# Patient Record
Sex: MALE | Race: OTHER | HISPANIC OR LATINO | Employment: PART TIME | URBAN - METROPOLITAN AREA
[De-identification: names, ages, dates, MRNs, and addresses within clinical notes are randomized per-mention and may not be internally consistent; named-entity substitution may affect disease eponyms.]

---

## 2021-04-12 ENCOUNTER — IMMUNIZATIONS (OUTPATIENT)
Dept: FAMILY MEDICINE CLINIC | Facility: HOSPITAL | Age: 66
End: 2021-04-12

## 2021-04-12 DIAGNOSIS — Z23 ENCOUNTER FOR IMMUNIZATION: Primary | ICD-10-CM

## 2021-04-12 PROCEDURE — 0001A SARS-COV-2 / COVID-19 MRNA VACCINE (PFIZER-BIONTECH) 30 MCG: CPT

## 2021-04-12 PROCEDURE — 91300 SARS-COV-2 / COVID-19 MRNA VACCINE (PFIZER-BIONTECH) 30 MCG: CPT

## 2021-04-26 ENCOUNTER — OFFICE VISIT (OUTPATIENT)
Dept: FAMILY MEDICINE CLINIC | Facility: CLINIC | Age: 66
End: 2021-04-26

## 2021-04-26 VITALS
HEIGHT: 64 IN | TEMPERATURE: 97.9 F | SYSTOLIC BLOOD PRESSURE: 168 MMHG | RESPIRATION RATE: 16 BRPM | OXYGEN SATURATION: 98 % | DIASTOLIC BLOOD PRESSURE: 92 MMHG | BODY MASS INDEX: 32.85 KG/M2 | WEIGHT: 192.4 LBS | HEART RATE: 86 BPM

## 2021-04-26 DIAGNOSIS — M62.81 MUSCLE WEAKNESS OF EXTREMITY FOLLOWING POLIOMYELITIS: ICD-10-CM

## 2021-04-26 DIAGNOSIS — I10 ESSENTIAL HYPERTENSION: Primary | ICD-10-CM

## 2021-04-26 DIAGNOSIS — B91 MUSCLE WEAKNESS OF EXTREMITY FOLLOWING POLIOMYELITIS: ICD-10-CM

## 2021-04-26 DIAGNOSIS — E66.09 CLASS 1 OBESITY DUE TO EXCESS CALORIES WITH SERIOUS COMORBIDITY AND BODY MASS INDEX (BMI) OF 32.0 TO 32.9 IN ADULT: ICD-10-CM

## 2021-04-26 DIAGNOSIS — R73.03 PREDIABETES: ICD-10-CM

## 2021-04-26 PROCEDURE — 99204 OFFICE O/P NEW MOD 45 MIN: CPT | Performed by: FAMILY MEDICINE

## 2021-04-26 RX ORDER — LOSARTAN POTASSIUM 100 MG/1
50 TABLET ORAL DAILY
Qty: 90 TABLET | Refills: 0 | Status: SHIPPED | OUTPATIENT
Start: 2021-04-26

## 2021-04-26 RX ORDER — AMLODIPINE BESYLATE 10 MG/1
10 TABLET ORAL DAILY
Qty: 90 TABLET | Refills: 0 | Status: SHIPPED | OUTPATIENT
Start: 2021-04-26

## 2021-04-26 RX ORDER — AMLODIPINE BESYLATE 10 MG/1
10 TABLET ORAL DAILY
Qty: 90 TABLET | Refills: 0 | Status: SHIPPED | OUTPATIENT
Start: 2021-04-26 | End: 2021-04-26

## 2021-04-26 RX ORDER — ASPIRIN 81 MG/1
81 TABLET ORAL DAILY
Qty: 90 TABLET | Refills: 0 | Status: SHIPPED | OUTPATIENT
Start: 2021-04-26

## 2021-04-26 RX ORDER — LOSARTAN POTASSIUM 50 MG/1
50 TABLET ORAL DAILY
COMMUNITY
End: 2021-04-26 | Stop reason: SDUPTHER

## 2021-04-26 RX ORDER — ASPIRIN 81 MG/1
81 TABLET ORAL DAILY
COMMUNITY
End: 2021-04-26 | Stop reason: SDUPTHER

## 2021-04-26 RX ORDER — ATENOLOL 50 MG/1
25 TABLET ORAL 2 TIMES DAILY
COMMUNITY
End: 2021-04-26

## 2021-04-26 NOTE — PROGRESS NOTES
Subjective:      Patient ID: Bonnie Sosa is a 60 y o  male  Julius Fernandez used as   12 yo grandson who speaks [place] and [place] present in the room  Hypertension- chronic, patient is on atenelol and losartan 50 mg which he has not taken for 10 days, no history of CKD or CAD, no symptoms at present  Prediabetes- chronic, unknown control, was previously on metformin 500 bid , he has not been taking it  Barriers to treatment-lack of insurance, financial constraints  Polio- right lower extremity   Obesity- chronic, uncontrolled, poor diet and no exercise      Past Medical History:   Diagnosis Date    Hypertension     Polio        Family History   Problem Relation Age of Onset    Hypertension Mother     Diabetes Mother     Hypertension Father     Diabetes Father        Past Surgical History:   Procedure Laterality Date    LEG SURGERY      for polio        reports that he has never smoked  He has never used smokeless tobacco  He reports that he does not drink alcohol or use drugs  Current Outpatient Medications:     amLODIPine (NORVASC) 10 mg tablet, Take 1 tablet (10 mg total) by mouth daily, Disp: 90 tablet, Rfl: 0    aspirin (ECOTRIN LOW STRENGTH) 81 mg EC tablet, Take 1 tablet (81 mg total) by mouth daily, Disp: 90 tablet, Rfl: 0    losartan (COZAAR) 100 MG tablet, Take 0 5 tablets (50 mg total) by mouth daily, Disp: 90 tablet, Rfl: 0    metFORMIN (GLUCOPHAGE) 500 mg tablet, Take 1 tablet (500 mg total) by mouth 2 (two) times a day with meals, Disp: 180 tablet, Rfl: 0    The following portions of the patient's history were reviewed and updated as appropriate: allergies, current medications, past family history, past medical history, past social history, past surgical history and problem list     Review of Systems   Constitutional: Negative for activity change, chills, diaphoresis, fatigue and fever     HENT: Negative for congestion, ear discharge, ear pain, mouth sores, nosebleeds, postnasal drip, rhinorrhea, sinus pressure, sinus pain, sore throat, tinnitus and voice change  Eyes: Negative for photophobia, pain, discharge, redness and visual disturbance  Respiratory: Negative for shortness of breath, wheezing and stridor  Cardiovascular: Negative for chest pain and palpitations  Gastrointestinal: Negative for abdominal pain, blood in stool, constipation, diarrhea, nausea and vomiting  Endocrine: Negative for cold intolerance and heat intolerance  Musculoskeletal: Negative for arthralgias, back pain, joint swelling and myalgias  Skin: Negative for pallor and rash  Neurological: Negative for dizziness, tremors, seizures, syncope, speech difficulty, weakness and headaches  Psychiatric/Behavioral: Negative for behavioral problems, decreased concentration, hallucinations and suicidal ideas  The patient is not nervous/anxious  Objective:    /92 (BP Location: Right arm, Patient Position: Sitting, Cuff Size: Adult)   Pulse 86   Temp 97 9 °F (36 6 °C) (Temporal)   Resp 16   Ht 5' 4 17" (1 63 m)   Wt 87 3 kg (192 lb 6 4 oz)   SpO2 98%   BMI 32 85 kg/m²      Physical Exam  Vitals signs and nursing note reviewed  Constitutional:       Appearance: Normal appearance  He is well-developed  HENT:      Head: Normocephalic and atraumatic  Right Ear: External ear normal       Left Ear: External ear normal       Nose: Nose normal    Eyes:      Conjunctiva/sclera: Conjunctivae normal       Pupils: Pupils are equal, round, and reactive to light  Neck:      Musculoskeletal: Normal range of motion and neck supple  Cardiovascular:      Rate and Rhythm: Normal rate and regular rhythm  Heart sounds: Normal heart sounds  No murmur  No gallop  Pulmonary:      Effort: Pulmonary effort is normal  No respiratory distress  Breath sounds: Normal breath sounds  No wheezing or rales  Abdominal:      General: There is no distension        Palpations: Abdomen is soft  Tenderness: There is no abdominal tenderness  Musculoskeletal:         General: No tenderness or deformity  Lymphadenopathy:      Cervical: No cervical adenopathy  Skin:     Coloration: Skin is not pale  Findings: No erythema or rash  Neurological:      Mental Status: He is alert and oriented to person, place, and time  Mental status is at baseline  Motor: Weakness present  Gait: Gait abnormal       Comments: Left leg polio myelitis   Psychiatric:         Mood and Affect: Mood normal          Behavior: Behavior normal                Assessment/Plan:       Diagnoses and all orders for this visit:    Essential hypertension  -     aspirin (ECOTRIN LOW STRENGTH) 81 mg EC tablet; Take 1 tablet (81 mg total) by mouth daily  -     losartan (COZAAR) 100 MG tablet; Take 0 5 tablets (50 mg total) by mouth daily  -     CBC and differential; Future  -     Comprehensive metabolic panel; Future  -     Discontinue: amLODIPine (NORVASC) 10 mg tablet; Take 1 tablet (10 mg total) by mouth daily  -     amLODIPine (NORVASC) 10 mg tablet; Take 1 tablet (10 mg total) by mouth daily    Class 1 obesity due to excess calories with serious comorbidity and body mass index (BMI) of 32 0 to 32 9 in adult  -     CBC and differential; Future  -     Comprehensive metabolic panel; Future  -     Lipid panel; Future    Prediabetes  -     metFORMIN (GLUCOPHAGE) 500 mg tablet; Take 1 tablet (500 mg total) by mouth 2 (two) times a day with meals  -     HEMOGLOBIN A1C W/ EAG ESTIMATION; Future    Muscle weakness of extremity following poliomyelitis    Other orders  -     Discontinue: losartan (COZAAR) 50 mg tablet; Take 50 mg by mouth daily  -     Discontinue: aspirin (ECOTRIN LOW STRENGTH) 81 mg EC tablet; Take 81 mg by mouth daily  -     Discontinue: atenolol (TENORMIN) 50 mg tablet; Take 25 mg by mouth 2 (two) times a day        Start losartan at 100 mg -previously 50 mg, stop atenelol, start amlodipine  Continue aspirin and metformin 500 bid, check labs, follow up with insurance  BMI Counseling: Body mass index is 32 85 kg/m²  The BMI is above normal  Nutrition recommendations include decreasing portion sizes, encouraging healthy choices of fruits and vegetables, decreasing fast food intake, consuming healthier snacks, limiting drinks that contain sugar, moderation in carbohydrate intake and reducing intake of cholesterol  Exercise recommendations include moderate physical activity 150 minutes/week  No pharmacotherapy was ordered  Patient was given opportunity to ask questions and all questions were answered

## 2021-04-27 PROBLEM — B91: Status: ACTIVE | Noted: 2021-04-27

## 2021-04-27 PROBLEM — I10 ESSENTIAL HYPERTENSION: Status: ACTIVE | Noted: 2021-04-27

## 2021-04-27 PROBLEM — M62.81: Status: ACTIVE | Noted: 2021-04-27

## 2021-04-27 PROBLEM — E66.811 CLASS 1 OBESITY DUE TO EXCESS CALORIES WITH SERIOUS COMORBIDITY AND BODY MASS INDEX (BMI) OF 32.0 TO 32.9 IN ADULT: Status: ACTIVE | Noted: 2021-04-27

## 2021-04-27 PROBLEM — E66.09 CLASS 1 OBESITY DUE TO EXCESS CALORIES WITH SERIOUS COMORBIDITY AND BODY MASS INDEX (BMI) OF 32.0 TO 32.9 IN ADULT: Status: ACTIVE | Noted: 2021-04-27

## 2021-04-27 PROBLEM — R73.03 PREDIABETES: Status: ACTIVE | Noted: 2021-04-27

## 2021-05-03 ENCOUNTER — IMMUNIZATIONS (OUTPATIENT)
Dept: FAMILY MEDICINE CLINIC | Facility: HOSPITAL | Age: 66
End: 2021-05-03

## 2021-05-03 DIAGNOSIS — Z23 ENCOUNTER FOR IMMUNIZATION: Primary | ICD-10-CM

## 2021-05-03 PROCEDURE — 91300 SARS-COV-2 / COVID-19 MRNA VACCINE (PFIZER-BIONTECH) 30 MCG: CPT

## 2021-05-03 PROCEDURE — 0002A SARS-COV-2 / COVID-19 MRNA VACCINE (PFIZER-BIONTECH) 30 MCG: CPT

## 2021-11-13 ENCOUNTER — IMMUNIZATIONS (OUTPATIENT)
Dept: FAMILY MEDICINE CLINIC | Facility: HOSPITAL | Age: 66
End: 2021-11-13

## 2021-11-13 DIAGNOSIS — Z23 ENCOUNTER FOR IMMUNIZATION: Primary | ICD-10-CM

## 2021-11-13 PROCEDURE — 91300 COVID-19 PFIZER VACC 0.3 ML: CPT | Performed by: NURSE PRACTITIONER

## 2021-11-13 PROCEDURE — 0001A COVID-19 PFIZER VACC 0.3 ML: CPT | Performed by: NURSE PRACTITIONER

## 2022-07-30 ENCOUNTER — HOSPITAL ENCOUNTER (OUTPATIENT)
Dept: RADIOLOGY | Facility: HOSPITAL | Age: 67
Discharge: HOME | End: 2022-07-30
Payer: COMMERCIAL

## 2022-07-30 DIAGNOSIS — M54.32 LEFT SIDED SCIATICA: ICD-10-CM

## 2022-07-30 PROCEDURE — 72100 X-RAY EXAM L-S SPINE 2/3 VWS: CPT

## 2022-10-12 ENCOUNTER — EVALUATION (OUTPATIENT)
Dept: PHYSICAL THERAPY | Facility: CLINIC | Age: 67
End: 2022-10-12
Payer: COMMERCIAL

## 2022-10-12 DIAGNOSIS — M51.36 OTHER INTERVERTEBRAL DISC DEGENERATION, LUMBAR REGION: Primary | ICD-10-CM

## 2022-10-12 PROCEDURE — 97162 PT EVAL MOD COMPLEX 30 MIN: CPT | Performed by: PHYSICAL THERAPIST

## 2022-10-12 NOTE — LETTER
2022    Roberto Fung, [address] [place] [id number]    Patient: Lee Sabillon   YOB: 1955   Date of Visit: 10/12/2022     Encounter Diagnosis     ICD-10-CM    1  Other intervertebral disc degeneration, lumbar region  M51 36        Dear Dr Darnell Reveles: Thank you for your recent referral of Lee Sabillon  Please review the attached evaluation summary from Zack's recent visit  Please verify that you agree with the plan of care by signing the attached order  If you have any questions or concerns, please do not hesitate to call  I sincerely appreciate the opportunity to share in the care of one of your patients and hope to have another opportunity to work with you in the near future  Sincerely,    Benita Ray, PT      Referring Provider:      I certify that I have read the below Plan of Care and certify the need for these services furnished under this plan of treatment while under my care  Roberto Fung, [address] [place] [address]  Via Fax: [phone]          PT Evaluation     Today's date: 10/12/2022  Patient name: Lee Sabillon  : 1955  MRN: [id number]  Referring provider: MANNY March  Dx:   Encounter Diagnosis     ICD-10-CM    1  Other intervertebral disc degeneration, lumbar region  M51 36                   Assessment  Assessment details: Lee Sabillon is a 73 y o  male who presents to the clinic with complaints of lower back pain spanning several months  The patient presents with the above listed impairments  He demonstrates decreased lumbar ROM as notes having difficulty with prolonged activity as well as bending and lifting  He is eager to decrease his pain and should benefit from skilled physical therapy    Thank you for the referral       Impairments: abnormal muscle firing, abnormal or restricted ROM, activity intolerance, impaired physical strength, lacks appropriate home exercise program, pain with function, poor posture  and poor body mechanics  Barriers to therapy: Finances may limit the patient's ability to regularly attend PT sessions  Understanding of Dx/Px/POC: good   Prognosis: good    Goals  Impairment Goals  - Decrease pain by 2 points in 4 weeks  - Increase bilateral flexibility by 25% in 4 weeks  Functional Goals  - Return to Prior Level of Function in 4 weeks  - Patient will be independent with HEP in 4 weeks  - Patient will be able to complete all work tasks with decreased pain in 4 weeks  - Patient will be able to complete ADLs with decreased pain in 4 weeks       Plan  Patient would benefit from: skilled physical therapy  Planned modality interventions: cryotherapy, thermotherapy: hydrocollator packs and TENS  Planned therapy interventions: home exercise program, graded exercise, functional ROM exercises, flexibility, body mechanics training, postural training, patient education, therapeutic activities, therapeutic exercise, manual therapy, joint mobilization and neuromuscular re-education  Frequency: 1x week  Duration in weeks: 4  Treatment plan discussed with: patient and PCP        Subjective Evaluation    History of Present Illness  Mechanism of injury: HPI:  Patient notes that he has been having pain in his hip area and glute area as well as tingling into his left leg  He notes that the pain has been going on for 3-4 months  He notes that he had x-ray which demonstrated "arthritis"  He was then referred to physical therapy  Pain Location:  Lower Lumbar Spine / Tingling into left LE  Occupation:  Works at [employer] Limitations:   Independent   AGG:  Unable to recall any true aggravating factors  Ease:  Unable to recall any true easing factors  Patient Goals:  "I want to feel better"    Pain  Current pain ratin  At best pain ratin  At worst pain ratin  Quality: sharp and dull ache ("It's more annoying" )          Objective     Tenderness     Additional Tenderness Details  Patient notes tenderness lower lumbar spine, most discomfort lower lumbar/upper sacrum     Active Range of Motion     Additional Active Range of Motion Details  Lumbar Flexion - WNL  Lumbar Extension - WNL  Side bend - 50% b/l with slight discomfort     Strength/Myotome Testing     Left Hip   Planes of Motion   Flexion: 4  Abduction: 4    Left Knee   Extension: 4+    Muscle Activation   Patient able to activate left transverse abdominals, left multifidus, right transverse abdominals and right multifidus       Additional Muscle Activation Details  Moderate cueing                Diagnosis:    Precautions:    Manuals        PROM        Mobs                                There Ex        Prone Press Ups (?)        Lumbar Roll Outs                                Review of HEP        Neruo Re-Ed        TrA Sets        Pull Over        Bridge (?)        Glute Sets        SL Clahmshell        SL Hip ABD                 Shld Ext                                                                               Ther Act                                         Modalities             CP PRN

## 2022-10-12 NOTE — PROGRESS NOTES
PT Evaluation     Today's date: 10/12/2022  Patient name: Ant Forrest  : 1955  MRN: [id number]  Referring provider: MANNY Levy  Dx:   Encounter Diagnosis     ICD-10-CM    1  Other intervertebral disc degeneration, lumbar region  M51 36                   Assessment  Assessment details: Ant Forrest is a 73 y o  male who presents to the clinic with complaints of lower back pain spanning several months  The patient presents with the above listed impairments  He demonstrates decreased lumbar ROM as notes having difficulty with prolonged activity as well as bending and lifting  He is eager to decrease his pain and should benefit from skilled physical therapy  Thank you for the referral       Impairments: abnormal muscle firing, abnormal or restricted ROM, activity intolerance, impaired physical strength, lacks appropriate home exercise program, pain with function, poor posture  and poor body mechanics  Barriers to therapy: Finances may limit the patient's ability to regularly attend PT sessions  Understanding of Dx/Px/POC: good   Prognosis: good    Goals  Impairment Goals  - Decrease pain by 2 points in 4 weeks  - Increase bilateral flexibility by 25% in 4 weeks      Functional Goals  - Return to Prior Level of Function in 4 weeks  - Patient will be independent with HEP in 4 weeks  - Patient will be able to complete all work tasks with decreased pain in 4 weeks  - Patient will be able to complete ADLs with decreased pain in 4 weeks       Plan  Patient would benefit from: skilled physical therapy  Planned modality interventions: cryotherapy, thermotherapy: hydrocollator packs and TENS  Planned therapy interventions: home exercise program, graded exercise, functional ROM exercises, flexibility, body mechanics training, postural training, patient education, therapeutic activities, therapeutic exercise, manual therapy, joint mobilization and neuromuscular re-education  Frequency: 1x week  Duration in weeks: 4  Treatment plan discussed with: patient and PCP        Subjective Evaluation    History of Present Illness  Mechanism of injury: HPI:  Patient notes that he has been having pain in his hip area and glute area as well as tingling into his left leg  He notes that the pain has been going on for 3-4 months  He notes that he had x-ray which demonstrated "arthritis"  He was then referred to physical therapy  Pain Location:  Lower Lumbar Spine / Tingling into left LE  Occupation:  Works at [employer] Limitations: Independent   AGG:  Unable to recall any true aggravating factors  Ease:  Unable to recall any true easing factors  Patient Goals:  "I want to feel better"    Pain  Current pain ratin  At best pain ratin  At worst pain ratin  Quality: sharp and dull ache ("It's more annoying" )          Objective     Tenderness     Additional Tenderness Details  Patient notes tenderness lower lumbar spine, most discomfort lower lumbar/upper sacrum     Active Range of Motion     Additional Active Range of Motion Details  Lumbar Flexion - WNL  Lumbar Extension - WNL  Side bend - 50% b/l with slight discomfort     Strength/Myotome Testing     Left Hip   Planes of Motion   Flexion: 4  Abduction: 4    Left Knee   Extension: 4+    Muscle Activation   Patient able to activate left transverse abdominals, left multifidus, right transverse abdominals and right multifidus       Additional Muscle Activation Details  Moderate cueing                Diagnosis:    Precautions:    Manuals        PROM        Mobs                                There Ex        Prone Press Ups (?)        Lumbar Roll Outs                                Review of HEP        Neruo Re-Ed        TrA Sets        Pull Over        Bridge (?)        Glute Sets        SL Clahmshell        SL Hip ABD                 Shld Ext                                                                               Ther Act Modalities             CP PRN

## 2022-10-12 NOTE — LETTER
2022    Yobani Cruz[address] [address]    Patient: Deysi Abreu   YOB: 1955   Date of Visit: 10/12/2022     Encounter Diagnosis     ICD-10-CM    1  Other intervertebral disc degeneration, lumbar region  M51 36        Dear Dr Brice Moh: Thank you for your recent referral of Deysi Abreu  Please review the attached evaluation summary from Zack's recent visit  Please verify that you agree with the plan of care by signing the attached order  If you have any questions or concerns, please do not hesitate to call  I sincerely appreciate the opportunity to share in the care of one of your patients and hope to have another opportunity to work with you in the near future  Sincerely,    Viji Franks, PT      Referring Provider:      I certify that I have read the below Plan of Care and certify the need for these services furnished under this plan of treatment while under my care  Yobani Cruz[address] [address]  Via Fax: [phone]          PT Evaluation     Today's date: 10/12/2022  Patient name: Deysi Abreu  : 1955  MRN: [id number]  Referring provider: MANNY Flores  Dx:   Encounter Diagnosis     ICD-10-CM    1  Other intervertebral disc degeneration, lumbar region  M51 36                   Assessment  Assessment details: Deysi Abreu is a 73 y o  male who presents to the clinic with complaints of lower back pain spanning several months  The patient presents with the above listed impairments  He demonstrates decreased lumbar ROM as notes having difficulty with prolonged activity as well as bending and lifting  He is eager to decrease his pain and should benefit from skilled physical therapy    Thank you for the referral       Impairments: abnormal muscle firing, abnormal or restricted ROM, activity intolerance, impaired physical strength, lacks appropriate home exercise program, pain with function, poor posture  and poor body mechanics  Barriers to therapy: Finances may limit the patient's ability to regularly attend PT sessions  Understanding of Dx/Px/POC: good   Prognosis: good    Goals  Impairment Goals  - Decrease pain by 2 points in 4 weeks  - Increase bilateral flexibility by 25% in 4 weeks  Functional Goals  - Return to Prior Level of Function in 4 weeks  - Patient will be independent with HEP in 4 weeks  - Patient will be able to complete all work tasks with decreased pain in 4 weeks  - Patient will be able to complete ADLs with decreased pain in 4 weeks       Plan  Patient would benefit from: skilled physical therapy  Planned modality interventions: cryotherapy, thermotherapy: hydrocollator packs and TENS  Planned therapy interventions: home exercise program, graded exercise, functional ROM exercises, flexibility, body mechanics training, postural training, patient education, therapeutic activities, therapeutic exercise, manual therapy, joint mobilization and neuromuscular re-education  Frequency: 1x week  Duration in weeks: 4  Treatment plan discussed with: patient and PCP        Subjective Evaluation    History of Present Illness  Mechanism of injury: HPI:  Patient notes that he has been having pain in his hip area and glute area as well as tingling into his left leg  He notes that the pain has been going on for 3-4 months  He notes that he had x-ray which demonstrated "arthritis"  He was then referred to physical therapy  Pain Location:  Lower Lumbar Spine / Tingling into left LE  Occupation:  Works at [employer] Limitations:   Independent   AGG:  Unable to recall any true aggravating factors  Ease:  Unable to recall any true easing factors  Patient Goals:  "I want to feel better"    Pain  Current pain ratin  At best pain ratin  At worst pain ratin  Quality: sharp and dull ache ("It's more annoying" )          Objective     Tenderness     Additional Tenderness Details  Patient notes tenderness lower lumbar spine, most discomfort lower lumbar/upper sacrum     Active Range of Motion     Additional Active Range of Motion Details  Lumbar Flexion - WNL  Lumbar Extension - WNL  Side bend - 50% b/l with slight discomfort     Strength/Myotome Testing     Left Hip   Planes of Motion   Flexion: 4  Abduction: 4    Left Knee   Extension: 4+    Muscle Activation   Patient able to activate left transverse abdominals, left multifidus, right transverse abdominals and right multifidus       Additional Muscle Activation Details  Moderate cueing                Diagnosis:    Precautions:    Manuals        PROM        Mobs                                There Ex        Prone Press Ups (?)        Lumbar Roll Outs                                Review of HEP        Neruo Re-Ed        TrA Sets        Pull Over        Bridge (?)        Glute Sets        SL Clahmshell        SL Hip ABD                 Shld Ext                                                                               Ther Act                                         Modalities             CP PRN

## 2022-10-14 ENCOUNTER — OFFICE VISIT (OUTPATIENT)
Dept: PHYSICAL THERAPY | Facility: CLINIC | Age: 67
End: 2022-10-14
Payer: COMMERCIAL

## 2022-10-14 DIAGNOSIS — M51.36 OTHER INTERVERTEBRAL DISC DEGENERATION, LUMBAR REGION: Primary | ICD-10-CM

## 2022-10-14 PROCEDURE — 97112 NEUROMUSCULAR REEDUCATION: CPT

## 2022-10-14 PROCEDURE — 97110 THERAPEUTIC EXERCISES: CPT

## 2022-10-14 NOTE — PROGRESS NOTES
Daily Note     Today's date: 10/14/2022  Patient name: Filiberto Larios  : 1955  MRN: [id number]  Referring provider: MANNY Disla  Dx:   Encounter Diagnosis     ICD-10-CM    1  Other intervertebral disc degeneration, lumbar region  M51 36                   Subjective: Patient reports that he has a little bit of pain in his lower back  Objective: See treatment diary below      Assessment: Tolerated treatment well  Patient would benefit from continued PT  Patient did well with his first PT session  Focused on core activation with exercises  He is limited by his right leg motion and strength, so exercises were modified for his comfort  He does note some shoulder soreness with overhead exercises today, but notes that this is from a previous injury  He was given an updated HEP and was given bands to complete exercises at home  Will continue to progress as able  Plan: Continue per plan of care        Diagnosis:    Precautions: Unable to bend right knee, strength deficiency in right leg, shoulder discomfort with OH motions    Manuals 10/14        PROM        Mobs                                There Ex        Prone Press Ups (?)        Lumbar Roll Outs 10" x 10                                Review of HEP        Neruo Re-Ed        TrA Sets        Pull Over [place], 2x10        Bridge (?) Unable to perform        Glute Sets 2x10, 5" hold - knees extended        SL Clamshell Unable to perform        SL Hip ABD  Left only 2x10        SLR  Left only 2x10        Shld Ext 20# 4x10 with tra set        Shoulder Rows 27# 4x10 with tra set        Multifidus Press  7# 2x10 ea        Standing med ball alphabet         Seated med ball reaches OH and diagonals: 10# 10x ea                                               Ther Act                                         Modalities             CP PRN

## 2022-10-28 ENCOUNTER — OFFICE VISIT (OUTPATIENT)
Dept: PHYSICAL THERAPY | Facility: CLINIC | Age: 67
End: 2022-10-28
Payer: COMMERCIAL

## 2022-10-28 DIAGNOSIS — M51.36 OTHER INTERVERTEBRAL DISC DEGENERATION, LUMBAR REGION: Primary | ICD-10-CM

## 2022-10-28 PROCEDURE — 97112 NEUROMUSCULAR REEDUCATION: CPT | Performed by: PHYSICAL THERAPIST

## 2022-10-28 PROCEDURE — 97110 THERAPEUTIC EXERCISES: CPT | Performed by: PHYSICAL THERAPIST

## 2022-10-28 NOTE — PROGRESS NOTES
Daily Note     Today's date: 10/28/2022  Patient name: Naresh Mcclain  : 1955  MRN: [id number]  Referring provider: MANNY Sanchez  Dx:   Encounter Diagnosis     ICD-10-CM    1  Other intervertebral disc degeneration, lumbar region  M51 36                   Subjective: "It feels a little better  The pain comes and goes"       Objective: See treatment diary below      Assessment: Tolerated treatment well  Patient would benefit from continued PT   services were used throughout the session  Patient did well with new exercises today  Updated HEP - focused on core activation and strengthening  Patient will be contacted in 1-2 weeks to check on progress and check on the need for any further therapy  Plan: Progress treatment as tolerated         Diagnosis:    Precautions: Unable to bend right knee, strength deficiency in right leg, shoulder discomfort with OH motions    Manuals 10/14  10/28      PROM        Mobs                                There Ex        Prone Press Ups (?)        Lumbar Roll Outs 10" x 10  10" x 20                              Review of HEP  RT      Neruo Re-Ed        TrA Sets  3" 2x10      Pull Over [place], 2x10        Bridge (?) Unable to perform        Glute Sets 2x10, 5" hold - knees extended        SL Clamshell Unable to perform        SL Hip ABD  Left only 2x10  L only x10      SLR  Left only 2x10  L only x10      Shld Ext 20# 4x10 with tra set        Shoulder Rows 27# 4x10 with tra set        Multifidus Press  7# 2x10 ea  Seated 10# kettle ell 2x01      Standing med ball alphabet         Seated med ball reaches [place] and diagonals: 10# 10x ea        [place]  Black 2x10 ea      Wall Clock  Green 3" 2x10      Standing Plank  R UE and L LE alternating 2x10                     Ther Act                                         Modalities             CP PRN

## 2023-08-16 ENCOUNTER — OFFICE VISIT (OUTPATIENT)
Dept: FAMILY MEDICINE CLINIC | Facility: CLINIC | Age: 68
End: 2023-08-16
Payer: COMMERCIAL

## 2023-08-16 VITALS
HEIGHT: 64 IN | SYSTOLIC BLOOD PRESSURE: 220 MMHG | BODY MASS INDEX: 33.29 KG/M2 | TEMPERATURE: 98.4 F | RESPIRATION RATE: 14 BRPM | DIASTOLIC BLOOD PRESSURE: 110 MMHG | OXYGEN SATURATION: 96 % | WEIGHT: 195 LBS | HEART RATE: 70 BPM

## 2023-08-16 DIAGNOSIS — Z00.01 ENCOUNTER FOR GENERAL ADULT MEDICAL EXAMINATION WITH ABNORMAL FINDINGS: Primary | ICD-10-CM

## 2023-08-16 DIAGNOSIS — E66.09 CLASS 1 OBESITY DUE TO EXCESS CALORIES WITH SERIOUS COMORBIDITY AND BODY MASS INDEX (BMI) OF 33.0 TO 33.9 IN ADULT: ICD-10-CM

## 2023-08-16 DIAGNOSIS — I10 ESSENTIAL HYPERTENSION: ICD-10-CM

## 2023-08-16 DIAGNOSIS — R73.03 PREDIABETES: ICD-10-CM

## 2023-08-16 DIAGNOSIS — B02.9 HERPES ZOSTER WITHOUT COMPLICATION: ICD-10-CM

## 2023-08-16 DIAGNOSIS — Z12.11 COLON CANCER SCREENING: ICD-10-CM

## 2023-08-16 PROBLEM — M21.969: Status: ACTIVE | Noted: 2021-07-22

## 2023-08-16 PROBLEM — Z86.12 HISTORY OF POLIOMYELITIS: Status: ACTIVE | Noted: 2021-07-22

## 2023-08-16 PROCEDURE — 99397 PER PM REEVAL EST PAT 65+ YR: CPT | Performed by: FAMILY MEDICINE

## 2023-08-16 PROCEDURE — 99214 OFFICE O/P EST MOD 30 MIN: CPT | Performed by: FAMILY MEDICINE

## 2023-08-16 RX ORDER — ATENOLOL 25 MG/1
1 TABLET ORAL 2 TIMES DAILY
COMMUNITY
End: 2023-08-16 | Stop reason: SDUPTHER

## 2023-08-16 RX ORDER — AMLODIPINE BESYLATE 10 MG/1
10 TABLET ORAL DAILY
Qty: 90 TABLET | Refills: 0 | Status: SHIPPED | OUTPATIENT
Start: 2023-08-16

## 2023-08-16 RX ORDER — ATENOLOL 25 MG/1
25 TABLET ORAL 2 TIMES DAILY
Qty: 180 TABLET | Refills: 0 | Status: SHIPPED | OUTPATIENT
Start: 2023-08-16

## 2023-08-16 RX ORDER — VALACYCLOVIR HYDROCHLORIDE 1 G/1
1000 TABLET, FILM COATED ORAL 3 TIMES DAILY
Qty: 21 TABLET | Refills: 0 | Status: SHIPPED | OUTPATIENT
Start: 2023-08-16 | End: 2023-08-23

## 2023-08-16 RX ORDER — LOSARTAN POTASSIUM 100 MG/1
100 TABLET ORAL DAILY
Qty: 90 TABLET | Refills: 0 | Status: SHIPPED | OUTPATIENT
Start: 2023-08-16

## 2023-08-17 NOTE — PROGRESS NOTES
[address] [facility]    NAME: Dev Muñoz  AGE: 78 y.o. SEX: male  : 1955     DATE: 2023     Assessment and Plan:     Problem List Items Addressed This Visit        Cardiovascular and Mediastinum    Essential hypertension    Relevant Medications    amLODIPine (NORVASC) 10 mg tablet    losartan (COZAAR) 100 MG tablet    atenolol (TENORMIN) 25 mg tablet       Other    Prediabetes    Class 1 obesity due to excess calories with serious comorbidity and body mass index (BMI) of 33.0 to 33.9 in adult   Other Visit Diagnoses     Encounter for general adult medical examination with abnormal findings    -  Primary    Relevant Orders    CBC and differential    Comprehensive metabolic panel    Lipid panel    TSH, 3rd generation with Free T4 reflex    Herpes zoster without complication        Relevant Medications    valACYclovir (VALTREX) 1,000 mg tablet    Colon cancer screening        Relevant Orders    Cologuard      BP is elevated due to non compliance with medications  Recommend labs as above  Start valacyclovir for shingles, no pain  Resume medications for hypertension, f/u in 4 weeks    Patient has refused colonoscopy now and also in the past and has never had colonoscopy screening for colon cancer. Patient is agreeable to getting [place] guard testing every 3 years and understands the risk that smaller lesions may be missed as it is not as comprehensive as colonoscopy. Patient is agreeable to doing the colonoscopy if colo-guard was abnormal. Risks and benefits discussed at length including finding the cancer at a later stage when not age appropriately screened with colonoscopy. Patient is accepting of the risks and verbalizes understanding of increased risk of death of finding colon Cancer at later stage    Immunizations and preventive care screenings were discussed with patient today.  Appropriate education was printed on patient's after visit summary. Counseling:  Alcohol/drug use: discussed moderation in alcohol intake, the recommendations for healthy alcohol use, and avoidance of illicit drug use. Dental Health: discussed importance of regular tooth brushing, flossing, and dental visits. Injury prevention: discussed safety/seat belts, safety helmets, smoke detectors, carbon dioxide detectors, and smoking near bedding or upholstery. · Exercise: the importance of regular exercise/physical activity was discussed. Recommend exercise 3-5 times per week for at least 30 minutes. BMI Counseling: Body mass index is 33.47 kg/m². The BMI is above normal. Nutrition recommendations include decreasing portion sizes, encouraging healthy choices of fruits and vegetables, decreasing fast food intake, consuming healthier snacks, limiting drinks that contain sugar, moderation in carbohydrate intake and reducing intake of cholesterol. Exercise recommendations include moderate physical activity 150 minutes/week. No pharmacotherapy was ordered. Rationale for BMI follow-up plan is due to patient being overweight or obese. Depression Screening and Follow-up Plan: Patient was screened for depression during today's encounter. They screened negative with a PHQ-2 score of 0. Return in about 4 weeks (around 9/13/2023) for Next scheduled follow up.      Chief Complaint:     Chief Complaint   Patient presents with   • Rash      History of Present Illness:     Adult Annual Physical   Patient here for a comprehensive physical exam. The patient reports problems - rash- developed 3 days ago, itchy , painful on his trunk and chest.  26 yo grandson who speaks [place] and [place]ian present in the room  Hypertension- chronic, patient is on atenelol and losartan 50 mg gets it from his home country, no history of CKD or CAD, no symptoms at present  Prediabetes- chronic, not on medications watches his diet  Barriers to treatment-lack of insurance, financial constraints  Polio- right lower extremity   Obesity- chronic, uncontrolled, poor diet and no exercise  Diet and Physical Activity  · Diet/Nutrition: well balanced diet and consuming 3-5 servings of fruits/vegetables daily. · Exercise: walking. Depression Screening  PHQ-2/9 Depression Screening    Little interest or pleasure in doing things: 0 - not at all  Feeling down, depressed, or hopeless: 0 - not at all  PHQ-2 Score: 0  PHQ-2 Interpretation: Negative depression screen       General Health  · Sleep: sleeps well. · Hearing: grossly normal.  · Vision: no vision problems and most recent eye exam >1 year ago. · Dental: no dental visits for >1 year and brushes teeth twice daily.  Health  · Symptoms include: none     Review of Systems:     Review of Systems   Constitutional: Negative for chills and fever. HENT: Negative for congestion, rhinorrhea and sore throat. Eyes: Negative for discharge, redness and itching. Respiratory: Negative for chest tightness, shortness of breath and wheezing. Cardiovascular: Negative for chest pain and palpitations. Gastrointestinal: Negative for abdominal pain, constipation and diarrhea. Genitourinary: Negative for dysuria. Skin: Positive for rash. Negative for pallor. Neurological: Positive for weakness. Negative for dizziness, numbness and headaches.       Past Medical History:     Past Medical History:   Diagnosis Date   • Hypertension    • Polio       Past Surgical History:     Past Surgical History:   Procedure Laterality Date   • LEG SURGERY      for polio      Family History:     Family History   Problem Relation Age of Onset   • Hypertension Mother    • Diabetes Mother    • Hypertension Father    • Diabetes Father       Social History:     Social History     Socioeconomic History   • Marital status: /Civil Union     Spouse name: None   • Number of children: None   • Years of education: None   • Highest education level: None Occupational History   • None   Tobacco Use   • Smoking status: Never   • Smokeless tobacco: Never   Vaping Use   • Vaping Use: Never used   Substance and Sexual Activity   • Alcohol use: Never   • Drug use: Never   • Sexual activity: None   Other Topics Concern   • None   Social History Narrative   • None     Social Determinants of Health     Financial Resource Strain: Not on file   Food Insecurity: Not on file   Transportation Needs: Not on file   Physical Activity: Not on file   Stress: Not on file   Social Connections: Not on file   Intimate Partner Violence: Not on file   Housing Stability: Not on file      Current Medications:     Current Outpatient Medications   Medication Sig Dispense Refill   • amLODIPine (NORVASC) 10 mg tablet Take 1 tablet (10 mg total) by mouth daily 90 tablet 0   • aspirin (ECOTRIN LOW STRENGTH) 81 mg EC tablet Take 1 tablet (81 mg total) by mouth daily 90 tablet 0   • atenolol (TENORMIN) 25 mg tablet Take 1 tablet (25 mg total) by mouth 2 (two) times a day 180 tablet 0   • losartan (COZAAR) 100 MG tablet Take 1 tablet (100 mg total) by mouth daily 90 tablet 0   • valACYclovir (VALTREX) 1,000 mg tablet Take 1 tablet (1,000 mg total) by mouth 3 (three) times a day for 7 days 21 tablet 0     No current facility-administered medications for this visit. Allergies:     No Known Allergies   Physical Exam:     BP (!) 220/110 (BP Location: Left arm, Patient Position: Sitting, Cuff Size: Adult)   Pulse 70   Temp 98.4 °F (36.9 °C) (Tympanic)   Resp 14   Ht 5' 4" (1.626 m)   Wt 88.5 kg (195 lb)   SpO2 96%   BMI 33.47 kg/m²     Physical Exam  Vitals and nursing note reviewed. Constitutional:       General: He is not in acute distress. Appearance: Normal appearance. He is well-developed and normal weight. He is not ill-appearing or toxic-appearing. HENT:      Head: Normocephalic and atraumatic.       Right Ear: Tympanic membrane, ear canal and external ear normal.      Left Ear:

## 2023-08-18 ENCOUNTER — APPOINTMENT (OUTPATIENT)
Dept: LAB | Facility: CLINIC | Age: 68
End: 2023-08-18
Payer: COMMERCIAL

## 2023-08-18 DIAGNOSIS — Z00.01 ENCOUNTER FOR GENERAL ADULT MEDICAL EXAMINATION WITH ABNORMAL FINDINGS: ICD-10-CM

## 2023-08-18 LAB
ALBUMIN SERPL BCP-MCNC: 3.4 G/DL (ref 3.5–5)
ALP SERPL-CCNC: 69 U/L (ref 34–104)
ALT SERPL W P-5'-P-CCNC: 13 U/L (ref 7–52)
ANION GAP SERPL CALCULATED.3IONS-SCNC: 6 MMOL/L
AST SERPL W P-5'-P-CCNC: 13 U/L (ref 13–39)
BASOPHILS # BLD MANUAL: 0.42 THOUSAND/UL (ref 0–0.1)
BASOPHILS NFR MAR MANUAL: 6 % (ref 0–1)
BILIRUB SERPL-MCNC: 0.43 MG/DL (ref 0.2–1)
BUN SERPL-MCNC: 24 MG/DL (ref 5–25)
CALCIUM ALBUM COR SERPL-MCNC: 9.6 MG/DL (ref 8.3–10.1)
CALCIUM SERPL-MCNC: 9.1 MG/DL (ref 8.4–10.2)
CHLORIDE SERPL-SCNC: 111 MMOL/L (ref 96–108)
CHOLEST SERPL-MCNC: 76 MG/DL
CO2 SERPL-SCNC: 26 MMOL/L (ref 21–32)
CREAT SERPL-MCNC: 1.36 MG/DL (ref 0.6–1.3)
EOSINOPHIL # BLD MANUAL: 0.21 THOUSAND/UL (ref 0–0.4)
EOSINOPHIL NFR BLD MANUAL: 3 % (ref 0–6)
ERYTHROCYTE [DISTWIDTH] IN BLOOD BY AUTOMATED COUNT: 13.1 % (ref 11.6–15.1)
GFR SERPL CREATININE-BSD FRML MDRD: 53 ML/MIN/1.73SQ M
GLUCOSE P FAST SERPL-MCNC: 94 MG/DL (ref 65–99)
HCT VFR BLD AUTO: 38.9 % (ref 36.5–49.3)
HDLC SERPL-MCNC: 25 MG/DL
HGB BLD-MCNC: 12.7 G/DL (ref 12–17)
LDLC SERPL CALC-MCNC: 22 MG/DL (ref 0–100)
LYMPHOCYTES # BLD AUTO: 1.39 THOUSAND/UL (ref 0.6–4.47)
LYMPHOCYTES # BLD AUTO: 20 % (ref 14–44)
MCH RBC QN AUTO: 30.4 PG (ref 26.8–34.3)
MCHC RBC AUTO-ENTMCNC: 32.6 G/DL (ref 31.4–37.4)
MCV RBC AUTO: 93 FL (ref 82–98)
MONOCYTES # BLD AUTO: 0.9 THOUSAND/UL (ref 0–1.22)
MONOCYTES NFR BLD: 13 % (ref 4–12)
NEUTROPHILS # BLD MANUAL: 4.04 THOUSAND/UL (ref 1.85–7.62)
NEUTS BAND NFR BLD MANUAL: 1 % (ref 0–8)
NEUTS SEG NFR BLD AUTO: 57 % (ref 43–75)
NONHDLC SERPL-MCNC: 51 MG/DL
PLATELET # BLD AUTO: 133 THOUSANDS/UL (ref 149–390)
PLATELET BLD QL SMEAR: ABNORMAL
PMV BLD AUTO: 13.3 FL (ref 8.9–12.7)
POTASSIUM SERPL-SCNC: 3.6 MMOL/L (ref 3.5–5.3)
PROT SERPL-MCNC: 7.1 G/DL (ref 6.4–8.4)
RBC # BLD AUTO: 4.18 MILLION/UL (ref 3.88–5.62)
RBC MORPH BLD: NORMAL
SODIUM SERPL-SCNC: 143 MMOL/L (ref 135–147)
TRIGL SERPL-MCNC: 144 MG/DL
TSH SERPL DL<=0.05 MIU/L-ACNC: 1.39 UIU/ML (ref 0.45–4.5)
WBC # BLD AUTO: 6.96 THOUSAND/UL (ref 4.31–10.16)

## 2023-08-18 PROCEDURE — 80053 COMPREHEN METABOLIC PANEL: CPT

## 2023-08-18 PROCEDURE — 85007 BL SMEAR W/DIFF WBC COUNT: CPT

## 2023-08-18 PROCEDURE — 36415 COLL VENOUS BLD VENIPUNCTURE: CPT

## 2023-08-18 PROCEDURE — 85027 COMPLETE CBC AUTOMATED: CPT

## 2023-08-18 PROCEDURE — 84443 ASSAY THYROID STIM HORMONE: CPT

## 2023-08-18 PROCEDURE — 80061 LIPID PANEL: CPT

## 2023-08-21 ENCOUNTER — TELEPHONE (OUTPATIENT)
Dept: FAMILY MEDICINE CLINIC | Facility: CLINIC | Age: 68
End: 2023-08-21

## 2023-08-21 NOTE — TELEPHONE ENCOUNTER
Grandson called to say Oliverjose maria Dixon does NOT want to go through with colo guard. They received the kit & do not want to be charged for it. ALSO doesn't want to have colonoscopy done & wishes to [facility] appointments etc. (including the appt he had scheduled with us). He is now going to "[facility]"  They basically want to make sure he won't be charged for colo guard kit.

## 2023-09-19 PROBLEM — N18.9 CKD (CHRONIC KIDNEY DISEASE): Status: ACTIVE | Noted: 2023-09-19

## 2023-09-19 PROBLEM — Z12.5 SCREENING FOR PROSTATE CANCER: Status: ACTIVE | Noted: 2023-09-19

## 2023-10-06 ENCOUNTER — OFFICE VISIT (OUTPATIENT)
Dept: FAMILY MEDICINE CLINIC | Facility: CLINIC | Age: 68
End: 2023-10-06
Payer: COMMERCIAL

## 2023-10-06 VITALS
BODY MASS INDEX: 33.46 KG/M2 | HEART RATE: 75 BPM | OXYGEN SATURATION: 96 % | SYSTOLIC BLOOD PRESSURE: 144 MMHG | WEIGHT: 196 LBS | HEIGHT: 64 IN | TEMPERATURE: 97.2 F | RESPIRATION RATE: 16 BRPM | DIASTOLIC BLOOD PRESSURE: 80 MMHG

## 2023-10-06 DIAGNOSIS — E66.09 CLASS 1 OBESITY DUE TO EXCESS CALORIES WITH SERIOUS COMORBIDITY AND BODY MASS INDEX (BMI) OF 33.0 TO 33.9 IN ADULT: ICD-10-CM

## 2023-10-06 DIAGNOSIS — Z86.010 HISTORY OF COLON POLYPS: ICD-10-CM

## 2023-10-06 DIAGNOSIS — D69.6 THROMBOCYTOPENIA (HCC): ICD-10-CM

## 2023-10-06 DIAGNOSIS — Z11.59 NEED FOR HEPATITIS C SCREENING TEST: ICD-10-CM

## 2023-10-06 DIAGNOSIS — Z23 IMMUNIZATION DUE: ICD-10-CM

## 2023-10-06 DIAGNOSIS — N18.31 STAGE 3A CHRONIC KIDNEY DISEASE (HCC): Primary | ICD-10-CM

## 2023-10-06 DIAGNOSIS — Z12.5 PROSTATE CANCER SCREENING: ICD-10-CM

## 2023-10-06 DIAGNOSIS — M62.81 MUSCLE WEAKNESS OF EXTREMITY FOLLOWING POLIOMYELITIS: ICD-10-CM

## 2023-10-06 DIAGNOSIS — Z12.5 SCREENING FOR PROSTATE CANCER: ICD-10-CM

## 2023-10-06 DIAGNOSIS — B91 MUSCLE WEAKNESS OF EXTREMITY FOLLOWING POLIOMYELITIS: ICD-10-CM

## 2023-10-06 DIAGNOSIS — R73.03 PREDIABETES: ICD-10-CM

## 2023-10-06 DIAGNOSIS — I10 ESSENTIAL HYPERTENSION: ICD-10-CM

## 2023-10-06 PROBLEM — Z86.0100 HISTORY OF COLON POLYPS: Status: ACTIVE | Noted: 2023-10-06

## 2023-10-06 LAB — SL AMB POCT HEMOGLOBIN AIC: 5.9 (ref ?–6.5)

## 2023-10-06 PROCEDURE — 99214 OFFICE O/P EST MOD 30 MIN: CPT | Performed by: FAMILY MEDICINE

## 2023-10-06 PROCEDURE — 83036 HEMOGLOBIN GLYCOSYLATED A1C: CPT | Performed by: FAMILY MEDICINE

## 2023-10-06 RX ORDER — CYCLOBENZAPRINE HCL 10 MG
TABLET ORAL
COMMUNITY
End: 2023-10-06

## 2023-10-06 RX ORDER — AMLODIPINE BESYLATE 10 MG/1
10 TABLET ORAL DAILY
Qty: 90 TABLET | Refills: 0 | Status: SHIPPED | OUTPATIENT
Start: 2023-10-06 | End: 2023-10-06

## 2023-10-06 RX ORDER — GABAPENTIN 300 MG/1
CAPSULE ORAL
COMMUNITY
Start: 2022-08-10 | End: 2023-10-06

## 2023-10-06 RX ORDER — AMLODIPINE BESYLATE 5 MG/1
5 TABLET ORAL DAILY
Qty: 90 TABLET | Refills: 3 | Status: SHIPPED | OUTPATIENT
Start: 2023-10-06

## 2023-10-06 NOTE — ASSESSMENT & PLAN NOTE
Lab Results   Component Value Date    EGFR 53 08/18/2023    CREATININE 1.36 (H) 08/18/2023   will monitor  Needs strict control of BP

## 2023-10-06 NOTE — PROGRESS NOTES
Name: Daria Carmona      : 1955      MRN: [id number]  Encounter Provider: Jigar Bazan MD  Encounter Date: 10/6/2023   Encounter department: [address]    Assessment & Plan     1. Stage 3a chronic kidney disease [facility])  Assessment & Plan:  Lab Results   Component Value Date    EGFR 53 2023    CREATININE 1.36 (H) 2023   will monitor  Needs strict control of BP      2. Essential hypertension  Assessment & Plan:  Pt ran out of amlodipne will refill and have pt return in 1 mo    Orders:  -     amLODIPine (NORVASC) 5 mg tablet; Take 1 tablet (5 mg total) by mouth daily    3. Class 1 obesity due to excess calories with serious comorbidity and body mass index (BMI) of 33.0 to 33.9 in adult  Assessment & Plan:  Discussion on diet and exercise guidelines for weight loss and  health reviewed with pt         4. Prediabetes  Assessment & Plan:  Discussion on diet and exercise guidelines for weight loss and  health reviewed with pt       Orders:  -     POCT hemoglobin A1c    5. Thrombocytopenia (HCC)  -     CBC and differential; Future    6. Immunization due    7. Need for hepatitis C screening test  -     Hepatitis C antibody; Future    8. Prostate cancer screening  -     PSA, Total Screen; Future    9. History of colon polyps  Assessment & Plan:  Had colonoscopy in [place]  2 yrs ago due again 3/24 in [place] pt will bring report       10. Screening for prostate cancer    11. Muscle weakness of extremity following poliomyelitis  Assessment & Plan:  Wears brace              Subjective      HPI Pt is here for interval visit and evaluation of multiple medical problems, review of medications, labs, Health Maintenance     Review of Systems   Constitutional: Negative for appetite change, chills, fatigue and fever. Respiratory: Negative for cough, chest tightness and shortness of breath. Cardiovascular: Negative for chest pain, palpitations and leg swelling.    Gastrointestinal: Negative for abdominal pain, constipation, diarrhea, nausea and vomiting. Genitourinary: Negative for difficulty urinating and frequency. Musculoskeletal: Negative for arthralgias, back pain, gait problem and neck pain. Skin: Negative for rash. Neurological: Positive for weakness (right lower extremity). Negative for dizziness, light-headedness, numbness and headaches. Hematological: Does not bruise/bleed easily. Psychiatric/Behavioral: Negative for dysphoric mood and sleep disturbance. The patient is not nervous/anxious. Current Outpatient Medications on File Prior to Visit   Medication Sig   • atenolol (TENORMIN) 25 mg tablet Take 1 tablet (25 mg total) by mouth 2 (two) times a day   • losartan (COZAAR) 100 MG tablet Take 1 tablet (100 mg total) by mouth daily   • [DISCONTINUED] amLODIPine (NORVASC) 10 mg tablet Take 1 tablet (10 mg total) by mouth daily   • [DISCONTINUED] aspirin (ECOTRIN LOW STRENGTH) 81 mg EC tablet Take 1 tablet (81 mg total) by mouth daily   • [DISCONTINUED] cyclobenzaprine (FLEXERIL) 10 mg tablet TAKE 1 TABLET 3 TIMES A DAY BY ORAL ROUTE AS NEEDED. (Patient not taking: Reported on 10/6/2023)   • [DISCONTINUED] gabapentin (NEURONTIN) 300 mg capsule Take 1 capsule every day by oral route at bedtime. (Patient not taking: Reported on 10/6/2023)   • [DISCONTINUED] valACYclovir (VALTREX) 1,000 mg tablet Take 1 tablet (1,000 mg total) by mouth 3 (three) times a day for 7 days (Patient not taking: Reported on 10/6/2023)       Objective     /80   Pulse 75   Temp (!) 97.2 °F (36.2 °C) (Tympanic)   Resp 16   Ht 5' 4" (1.626 m)   Wt 88.9 kg (196 lb)   SpO2 96%   BMI 33.64 kg/m²     Physical Exam  Vitals reviewed. Constitutional:       General: He is not in acute distress. Appearance: Normal appearance. He is well-developed. He is not ill-appearing.    HENT:      Mouth/Throat:      Mouth: Mucous membranes are moist.   Eyes:      Extraocular Movements: Extraocular movements intact. Conjunctiva/sclera: Conjunctivae normal.      Pupils: Pupils are equal, round, and reactive to light. Neck:      Thyroid: No thyromegaly. Vascular: No carotid bruit. Cardiovascular:      Rate and Rhythm: Normal rate and regular rhythm. Pulses: Normal pulses. Heart sounds: Normal heart sounds. No murmur heard. Pulmonary:      Effort: Pulmonary effort is normal.      Breath sounds: Normal breath sounds. Chest:      Chest wall: No tenderness. Abdominal:      General: Bowel sounds are normal. There is no distension. Palpations: Abdomen is soft. Tenderness: There is no abdominal tenderness. Musculoskeletal:      Cervical back: Normal range of motion and neck supple. Lymphadenopathy:      Cervical: No cervical adenopathy. Skin:     General: Skin is warm and dry. Neurological:      General: No focal deficit present. Mental Status: He is alert and oriented to person, place, and time. Mental status is at baseline. Cranial Nerves: No cranial nerve deficit.    Psychiatric:         Mood and Affect: Mood normal.         Behavior: Behavior normal.       Eve Hernandez MD

## 2023-11-18 PROBLEM — Z12.5 SCREENING FOR PROSTATE CANCER: Status: RESOLVED | Noted: 2023-09-19 | Resolved: 2023-11-18

## 2024-04-04 ENCOUNTER — TELEPHONE (OUTPATIENT)
Dept: NEPHROLOGY | Facility: CLINIC | Age: 69
End: 2024-04-04

## 2024-04-09 NOTE — TELEPHONE ENCOUNTER
Second attempt. Called patient using Turkish interpretor. Patient answered phone and hung up when interpretor began speaking.

## 2024-05-07 ENCOUNTER — CONSULT (OUTPATIENT)
Dept: NEPHROLOGY | Facility: CLINIC | Age: 69
End: 2024-05-07
Payer: COMMERCIAL

## 2024-05-07 ENCOUNTER — APPOINTMENT (OUTPATIENT)
Dept: LAB | Facility: CLINIC | Age: 69
End: 2024-05-07
Payer: COMMERCIAL

## 2024-05-07 VITALS
WEIGHT: 190 LBS | HEART RATE: 92 BPM | SYSTOLIC BLOOD PRESSURE: 140 MMHG | BODY MASS INDEX: 32.44 KG/M2 | OXYGEN SATURATION: 97 % | DIASTOLIC BLOOD PRESSURE: 78 MMHG | HEIGHT: 64 IN

## 2024-05-07 DIAGNOSIS — N18.30 BENIGN HYPERTENSION WITH CKD (CHRONIC KIDNEY DISEASE) STAGE III (HCC): ICD-10-CM

## 2024-05-07 DIAGNOSIS — R80.9 PROTEINURIA, UNSPECIFIED TYPE: ICD-10-CM

## 2024-05-07 DIAGNOSIS — R10.9 FLANK PAIN: ICD-10-CM

## 2024-05-07 DIAGNOSIS — N18.31 STAGE 3A CHRONIC KIDNEY DISEASE (HCC): ICD-10-CM

## 2024-05-07 DIAGNOSIS — I12.9 BENIGN HYPERTENSION WITH CKD (CHRONIC KIDNEY DISEASE) STAGE III (HCC): ICD-10-CM

## 2024-05-07 DIAGNOSIS — D69.6 THROMBOCYTOPENIA (HCC): ICD-10-CM

## 2024-05-07 DIAGNOSIS — Z11.59 NEED FOR HEPATITIS C SCREENING TEST: ICD-10-CM

## 2024-05-07 DIAGNOSIS — Z12.5 PROSTATE CANCER SCREENING: ICD-10-CM

## 2024-05-07 DIAGNOSIS — N18.31 STAGE 3A CHRONIC KIDNEY DISEASE (HCC): Primary | ICD-10-CM

## 2024-05-07 LAB
25(OH)D3 SERPL-MCNC: 16.1 NG/ML (ref 30–100)
ALBUMIN SERPL BCP-MCNC: 3.3 G/DL (ref 3.5–5)
ANION GAP SERPL CALCULATED.3IONS-SCNC: 4 MMOL/L (ref 4–13)
BACTERIA UR QL AUTO: ABNORMAL /HPF
BASOPHILS # BLD AUTO: 0.08 THOUSANDS/ÂΜL (ref 0–0.1)
BASOPHILS NFR BLD AUTO: 1 % (ref 0–1)
BILIRUB UR QL STRIP: NEGATIVE
BUN SERPL-MCNC: 29 MG/DL (ref 5–25)
C3 SERPL-MCNC: 120 MG/DL (ref 87–200)
C4 SERPL-MCNC: 21 MG/DL (ref 19–52)
CALCIUM ALBUM COR SERPL-MCNC: 9.7 MG/DL (ref 8.3–10.1)
CALCIUM SERPL-MCNC: 9.1 MG/DL (ref 8.4–10.2)
CHLORIDE SERPL-SCNC: 109 MMOL/L (ref 96–108)
CLARITY UR: CLEAR
CO2 SERPL-SCNC: 26 MMOL/L (ref 21–32)
COLOR UR: ABNORMAL
CREAT SERPL-MCNC: 1.3 MG/DL (ref 0.6–1.3)
CREAT UR-MCNC: 92 MG/DL
CREAT UR-MCNC: 92.4 MG/DL
EOSINOPHIL # BLD AUTO: 0.42 THOUSAND/ÂΜL (ref 0–0.61)
EOSINOPHIL NFR BLD AUTO: 6 % (ref 0–6)
ERYTHROCYTE [DISTWIDTH] IN BLOOD BY AUTOMATED COUNT: 13.3 % (ref 11.6–15.1)
GFR SERPL CREATININE-BSD FRML MDRD: 56 ML/MIN/1.73SQ M
GLUCOSE SERPL-MCNC: 98 MG/DL (ref 65–140)
GLUCOSE UR STRIP-MCNC: NEGATIVE MG/DL
HCT VFR BLD AUTO: 36.1 % (ref 36.5–49.3)
HGB BLD-MCNC: 11.7 G/DL (ref 12–17)
HGB UR QL STRIP.AUTO: ABNORMAL
HYALINE CASTS #/AREA URNS LPF: ABNORMAL /LPF
IMM GRANULOCYTES # BLD AUTO: 0.01 THOUSAND/UL (ref 0–0.2)
IMM GRANULOCYTES NFR BLD AUTO: 0 % (ref 0–2)
KETONES UR STRIP-MCNC: NEGATIVE MG/DL
LEUKOCYTE ESTERASE UR QL STRIP: NEGATIVE
LYMPHOCYTES # BLD AUTO: 1.83 THOUSANDS/ÂΜL (ref 0.6–4.47)
LYMPHOCYTES NFR BLD AUTO: 25 % (ref 14–44)
MCH RBC QN AUTO: 30.1 PG (ref 26.8–34.3)
MCHC RBC AUTO-ENTMCNC: 32.4 G/DL (ref 31.4–37.4)
MCV RBC AUTO: 93 FL (ref 82–98)
MICROALBUMIN UR-MCNC: 824.8 MG/L
MICROALBUMIN/CREAT 24H UR: 893 MG/G CREATININE (ref 0–30)
MONOCYTES # BLD AUTO: 0.69 THOUSAND/ÂΜL (ref 0.17–1.22)
MONOCYTES NFR BLD AUTO: 10 % (ref 4–12)
NEUTROPHILS # BLD AUTO: 4.27 THOUSANDS/ÂΜL (ref 1.85–7.62)
NEUTS SEG NFR BLD AUTO: 58 % (ref 43–75)
NITRITE UR QL STRIP: NEGATIVE
NON-SQ EPI CELLS URNS QL MICRO: ABNORMAL /HPF
NRBC BLD AUTO-RTO: 0 /100 WBCS
PH UR STRIP.AUTO: 6 [PH]
PHOSPHATE SERPL-MCNC: 2.8 MG/DL (ref 2.3–4.1)
PLATELET # BLD AUTO: 139 THOUSANDS/UL (ref 149–390)
PMV BLD AUTO: 12.9 FL (ref 8.9–12.7)
POTASSIUM SERPL-SCNC: 3.8 MMOL/L (ref 3.5–5.3)
PROT UR STRIP-MCNC: ABNORMAL MG/DL
PROT UR-MCNC: 156 MG/DL
PROT/CREAT UR: 1.7 MG/G{CREAT} (ref 0–0.1)
PTH-INTACT SERPL-MCNC: 55.1 PG/ML (ref 12–88)
RBC # BLD AUTO: 3.89 MILLION/UL (ref 3.88–5.62)
RBC #/AREA URNS AUTO: ABNORMAL /HPF
SODIUM SERPL-SCNC: 139 MMOL/L (ref 135–147)
SP GR UR STRIP.AUTO: 1.01 (ref 1–1.03)
UROBILINOGEN UR STRIP-ACNC: <2 MG/DL
WBC # BLD AUTO: 7.3 THOUSAND/UL (ref 4.31–10.16)
WBC #/AREA URNS AUTO: ABNORMAL /HPF

## 2024-05-07 PROCEDURE — 36415 COLL VENOUS BLD VENIPUNCTURE: CPT

## 2024-05-07 PROCEDURE — 82043 UR ALBUMIN QUANTITATIVE: CPT

## 2024-05-07 PROCEDURE — 84165 PROTEIN E-PHORESIS SERUM: CPT

## 2024-05-07 PROCEDURE — 84166 PROTEIN E-PHORESIS/URINE/CSF: CPT

## 2024-05-07 PROCEDURE — 83970 ASSAY OF PARATHORMONE: CPT

## 2024-05-07 PROCEDURE — 86038 ANTINUCLEAR ANTIBODIES: CPT

## 2024-05-07 PROCEDURE — 86160 COMPLEMENT ANTIGEN: CPT

## 2024-05-07 PROCEDURE — 82570 ASSAY OF URINE CREATININE: CPT

## 2024-05-07 PROCEDURE — 83521 IG LIGHT CHAINS FREE EACH: CPT

## 2024-05-07 PROCEDURE — 85025 COMPLETE CBC W/AUTO DIFF WBC: CPT

## 2024-05-07 PROCEDURE — 82306 VITAMIN D 25 HYDROXY: CPT

## 2024-05-07 PROCEDURE — 86225 DNA ANTIBODY NATIVE: CPT

## 2024-05-07 PROCEDURE — 86335 IMMUNFIX E-PHORSIS/URINE/CSF: CPT

## 2024-05-07 PROCEDURE — 99204 OFFICE O/P NEW MOD 45 MIN: CPT | Performed by: INTERNAL MEDICINE

## 2024-05-07 PROCEDURE — 86334 IMMUNOFIX E-PHORESIS SERUM: CPT

## 2024-05-07 PROCEDURE — 81001 URINALYSIS AUTO W/SCOPE: CPT

## 2024-05-07 PROCEDURE — 80069 RENAL FUNCTION PANEL: CPT

## 2024-05-07 PROCEDURE — 84156 ASSAY OF PROTEIN URINE: CPT

## 2024-05-07 RX ORDER — MULTIVITAMIN
1 CAPSULE ORAL DAILY
COMMUNITY

## 2024-05-07 RX ORDER — CHLORTHALIDONE 25 MG/1
12.5 TABLET ORAL DAILY
Qty: 45 TABLET | Refills: 3 | Status: SHIPPED | OUTPATIENT
Start: 2024-05-07 | End: 2024-08-05

## 2024-05-07 RX ORDER — IRBESARTAN 300 MG/1
300 TABLET ORAL DAILY
COMMUNITY

## 2024-05-07 NOTE — PATIENT INSTRUCTIONS
You were evaluated for kidney disease and protein in the urine.    We will start with lab work including blood tests and urine test for further evaluation.    Given the pain in your flank area we will check a kidney ultrasound and will give you a referral to see the urologist.    We will start chlorthalidone half tablet once daily for better blood pressure control and control of protein in the urine.    After starting the medication, check blood test BMP in 1 month.    Repeat lab work before next office visit in 3 to 4 months.

## 2024-05-07 NOTE — H&P (VIEW-ONLY)
Assessment & Plan     1. Hyperlipidemia, unspecified hyperlipidemia type  - stable   - atorvastatin (LIPITOR) 40 MG tablet; Take 1 tablet (40 mg) by mouth daily  Dispense: 90 tablet; Refill: 1  - Lipid Profile (Chol, Trig, HDL, LDL calc); Future    Benign essential hypertension  - controlled continue current medication   - lisinopril (ZESTRIL) 5 MG tablet; Take 1 tablet (5 mg) by mouth daily  Dispense: 90 tablet; Refill: 1  - Comprehensive metabolic panel (BMP + Alb, Alk Phos, ALT, AST, Total. Bili, TP); Future  - Albumin Random Urine Quantitative with Creat Ratio; Future    3. Acute reaction to stress, MDD   - Unsure if medication is working, when we discussed decreasing dose pt was hesitant and wanted to continue current dose. Refill done  - busPIRone (BUSPAR) 10 MG tablet; TAKE ONE TABLET BY MOUTH THREE TIMES A DAY  Dispense: 90 tablet; Refill: 1    4. Major depressive disorder, recurrent episode, moderate (H)    5. Screening for diabetes mellitus  - Hemoglobin A1c; Future    6. Encounter for screening mammogram for breast cancer  - *MA Screening Digital Bilateral; Future    7. Cognitive changes  - Pt will continue to follow up with neurologist         Luciana Morillo MD  [facility]    Radhika Vang is a 71 year old who presents for the following health issues     HPI     Patient is here for establish care.      She will establish care with neurologist - Dr. Radha VERA - She is unsure if Buspar is helping with symptoms. She would like to continue the medication.   HTN - stable   HLD - stable       Review of Systems         Objective    There were no vitals taken for this visit.  There is no height or weight on file to calculate BMI.  Physical Exam                    NEPHROLOGY OUTPATIENT CONSULTATION   Zack Partida 68 y.o. male MRN: [id number]  Date: 05/07/24  Reason for consultation: Chronic kidney disease    ASSESSMENT and PLAN:  68-year-old male was seen in nephrology office today for evaluation management of chronic kidney disease.    # Chronic Kidney Disease Stage III-A with proteinuria  - Etiology/risk factors: Hypertension, rule out glomerular disease  - Baseline Cr: 1.17 in 03/2022, 1.36 in 08/2023, 1.4 03/2024  - Urinalysis: Positive for protein negative for blood in 03/2024 in [place]  - Proteinuria: 530 mg per 24-hour in 03/2024 in [place]  - Imaging: Bilateral nephrolithiasis on kidney ultrasound in 03/2024 in [place]  - Check urinalysis with microscopy, urine albumin to creatinine ratio and urine protein to creatinine ratio  - Check C3/C4/KATELIN/dsDNA  - Check SPEP/UPEP/serum free light chain ratio  - Currently on RAAS blockade  - Add chlorthalidone 12.5 mg daily due to its antiproteinuric properties  - Based on results of lab work above, will expand workup if needed  - Discussed risk factor reduction to slow progression of chronic kidney disease  Intensive blood pressure control as below   Glycemic control, he is non-diabetic   Lifestyle modifications (weight loss/exercise)  Avoidance of NSAIDs    # Hypertension/Volume   - Goal BP <120/80 per KDIGO guidelines   - Volume status: Euvolemic  - Status: Blood pressure above goal  - Current antihypertensive regimen: Irbesartan 300 mg daily, amlodipine 5 mg daily  - Changes: Add chlorthalidone 12.5 mg daily for control of proteinuria and hypertension    # Screening for Anemia   - Target Hb: More than 10 g/dL  - Most recent hemoglobin: 12.7 g/dL    # Electrolytes/Acid Base status   - Electrolytes and acid base status within normal limits    # CKD Mineral and Bone parameters   - Goal Ca 8.5-10 mg/dL, goal Phos 2.7-4.6 mg/dL, goal iPTH 30-70 pg/mL  - Check 25 hydroxy vitamin-D and iPTH level    # Right-sided flank pain  - Kidney  ultrasound from [place] shows right-sided 11 mm kidney stone  - Repeat kidney ultrasound to rule out obstructive nephrolithiasis  - Referral provided to urology given size of kidney stone    HISTORY OF PRESENT ILLNESS:  Requesting Physician: Ayanna Paz MD    Zack Partida is a 68 y.o. male who has history of hypertension since 2013.  He tells me that he has history of kidney disease as a child but cannot tell the diagnosis.  He has history of nephrolithiasis.  He does not have diabetes.  He was in [place] in 03/2024 and workup revealed 24-hour urine protein of around 530 mg.    >> Major risk factors for CKD  - Diabetes: No  - Hypertension: Yes since 2013   - Age ? 55 years: Yes   - Family history of kidney disease: No  - Obesity or metabolic syndrome: Yes     >> Medical history evaluation   - Prior kidney disease or dialysis: Unknown kidney disease as a child   - Incidental hematuria in the past: Yes with kidney stones   - Urinary symptoms: No   - History of foamy or frothy urine: Nocturia x3-4  - History of nephrolithiasis: Yes   - Diseases that share risk factors with CKD: HTN  - Systemic diseases that might affect kidney: No  - History of use of medications that might affect renal function: No    PAST MEDICAL HISTORY:  Past Medical History:   Diagnosis Date    Hypertension     Polio        PAST SURGICAL HISTORY:  Past Surgical History:   Procedure Laterality Date    LEG SURGERY      for polio       ALLERGIES:  No Known Allergies    SOCIAL HISTORY:  Social History     Substance and Sexual Activity   Alcohol Use Never     Social History     Substance and Sexual Activity   Drug Use Never     Social History     Tobacco Use   Smoking Status Never   Smokeless Tobacco Never       FAMILY HISTORY:  Family History   Problem Relation Age of Onset    Hypertension Mother     Diabetes Mother     Hypertension Father     Diabetes Father        MEDICATIONS:    Current Outpatient Medications:     amLODIPine (NORVASC) 5 mg tablet,  "Take 1 tablet (5 mg total) by mouth daily (Patient taking differently: Take 5 mg by mouth daily DAILY IN THE PM), Disp: 90 tablet, Rfl: 3    chlorthalidone 25 mg tablet, Take 0.5 tablets (12.5 mg total) by mouth daily, Disp: 45 tablet, Rfl: 3    irbesartan (AVAPRO) 300 mg tablet, Take 300 mg by mouth daily DAILY IN THE AM, Disp: , Rfl:     Multiple Vitamin (multivitamin) capsule, Take 1 capsule by mouth daily, Disp: , Rfl:     atenolol (TENORMIN) 25 mg tablet, Take 1 tablet (25 mg total) by mouth 2 (two) times a day (Patient not taking: Reported on 5/7/2024), Disp: 180 tablet, Rfl: 0    losartan (COZAAR) 100 MG tablet, Take 1 tablet (100 mg total) by mouth daily (Patient not taking: Reported on 5/7/2024), Disp: 90 tablet, Rfl: 0    REVIEW OF SYSTEMS:  Review of Systems   Constitutional:  Negative for chills and fever.   HENT:  Negative for ear pain and sore throat.    Eyes:  Negative for pain and visual disturbance.   Respiratory:  Negative for cough and shortness of breath.    Cardiovascular:  Negative for chest pain and palpitations.   Gastrointestinal:  Negative for abdominal pain and vomiting.   Genitourinary:  Negative for dysuria and hematuria.   Musculoskeletal:  Negative for arthralgias and back pain.   Skin:  Negative for color change and rash.   Neurological:  Negative for seizures and syncope.   All other systems reviewed and are negative.    All the systems were reviewed and were negative except as documented on the HPI.    PHYSICAL EXAMINATION:  /78 (BP Location: Left arm, Patient Position: Sitting, Cuff Size: Large)   Pulse 92   Ht 5' 4\" (1.626 m)   Wt 86.2 kg (190 lb)   SpO2 97%   BMI 32.61 kg/m²   Current Weight: Weight - Scale: 86.2 kg (190 lb) Body mass index is 32.61 kg/m².  Physical Exam  Constitutional:       Appearance: Normal appearance.   HENT:      Head: Normocephalic and atraumatic.   Cardiovascular:      Rate and Rhythm: Normal rate and regular rhythm.      Pulses: Normal " pulses.      Heart sounds: Normal heart sounds.   Pulmonary:      Effort: Pulmonary effort is normal.      Breath sounds: Normal breath sounds.   Abdominal:      Palpations: Abdomen is soft.   Musculoskeletal:         General: Normal range of motion.      Right lower leg: No edema.      Left lower leg: No edema.   Skin:     General: Skin is warm.   Neurological:      Mental Status: He is alert and oriented to person, place, and time. Mental status is at baseline.   Psychiatric:         Mood and Affect: Mood normal.       LABORATORY RESULTS:  Lab Results   Component Value Date    K 3.6 08/18/2023     (H) 08/18/2023    CO2 26 08/18/2023    BUN 24 08/18/2023    CREATININE 1.36 (H) 08/18/2023    GLUF 94 08/18/2023    CALCIUM 9.1 08/18/2023    CORRECTEDCA 9.6 08/18/2023    AST 13 08/18/2023    ALT 13 08/18/2023    ALKPHOS 69 08/18/2023    EGFR 53 08/18/2023     Lab Results   Component Value Date    WBC 6.96 08/18/2023    HGB 12.7 08/18/2023    HCT 38.9 08/18/2023    MCV 93 08/18/2023     (L) 08/18/2023     Lab Results   Component Value Date    CALCIUM 9.1 08/18/2023

## 2024-05-07 NOTE — PROGRESS NOTES
NEPHROLOGY OUTPATIENT CONSULTATION   Zack Partida 68 y.o. male MRN: [id number]  Date: 05/07/24  Reason for consultation: Chronic kidney disease    ASSESSMENT and PLAN:  68-year-old male was seen in nephrology office today for evaluation management of chronic kidney disease.    # Chronic Kidney Disease Stage III-A with proteinuria  - Etiology/risk factors: Hypertension, rule out glomerular disease  - Baseline Cr: 1.17 in 03/2022, 1.36 in 08/2023, 1.4 03/2024  - Urinalysis: Positive for protein negative for blood in 03/2024 in [place]  - Proteinuria: 530 mg per 24-hour in 03/2024 in [place]  - Imaging: Bilateral nephrolithiasis on kidney ultrasound in 03/2024 in [place]  - Check urinalysis with microscopy, urine albumin to creatinine ratio and urine protein to creatinine ratio  - Check C3/C4/KATELIN/dsDNA  - Check SPEP/UPEP/serum free light chain ratio  - Currently on RAAS blockade  - Add chlorthalidone 12.5 mg daily due to its antiproteinuric properties  - Based on results of lab work above, will expand workup if needed  - Discussed risk factor reduction to slow progression of chronic kidney disease  Intensive blood pressure control as below   Glycemic control, he is non-diabetic   Lifestyle modifications (weight loss/exercise)  Avoidance of NSAIDs    # Hypertension/Volume   - Goal BP <120/80 per KDIGO guidelines   - Volume status: Euvolemic  - Status: Blood pressure above goal  - Current antihypertensive regimen: Irbesartan 300 mg daily, amlodipine 5 mg daily  - Changes: Add chlorthalidone 12.5 mg daily for control of proteinuria and hypertension    # Screening for Anemia   - Target Hb: More than 10 g/dL  - Most recent hemoglobin: 12.7 g/dL    # Electrolytes/Acid Base status   - Electrolytes and acid base status within normal limits    # CKD Mineral and Bone parameters   - Goal Ca 8.5-10 mg/dL, goal Phos 2.7-4.6 mg/dL, goal iPTH 30-70 pg/mL  - Check 25 hydroxy vitamin-D and iPTH level    # Right-sided flank pain  - Kidney  ultrasound from [place] shows right-sided 11 mm kidney stone  - Repeat kidney ultrasound to rule out obstructive nephrolithiasis  - Referral provided to urology given size of kidney stone    HISTORY OF PRESENT ILLNESS:  Requesting Physician: Ayanna Paz MD    Zack Partida is a 68 y.o. male who has history of hypertension since 2013.  He tells me that he has history of kidney disease as a child but cannot tell the diagnosis.  He has history of nephrolithiasis.  He does not have diabetes.  He was in [place] in 03/2024 and workup revealed 24-hour urine protein of around 530 mg.    >> Major risk factors for CKD  - Diabetes: No  - Hypertension: Yes since 2013   - Age ? 55 years: Yes   - Family history of kidney disease: No  - Obesity or metabolic syndrome: Yes     >> Medical history evaluation   - Prior kidney disease or dialysis: Unknown kidney disease as a child   - Incidental hematuria in the past: Yes with kidney stones   - Urinary symptoms: No   - History of foamy or frothy urine: Nocturia x3-4  - History of nephrolithiasis: Yes   - Diseases that share risk factors with CKD: HTN  - Systemic diseases that might affect kidney: No  - History of use of medications that might affect renal function: No    PAST MEDICAL HISTORY:  Past Medical History:   Diagnosis Date    Hypertension     Polio        PAST SURGICAL HISTORY:  Past Surgical History:   Procedure Laterality Date    LEG SURGERY      for polio       ALLERGIES:  No Known Allergies    SOCIAL HISTORY:  Social History     Substance and Sexual Activity   Alcohol Use Never     Social History     Substance and Sexual Activity   Drug Use Never     Social History     Tobacco Use   Smoking Status Never   Smokeless Tobacco Never       FAMILY HISTORY:  Family History   Problem Relation Age of Onset    Hypertension Mother     Diabetes Mother     Hypertension Father     Diabetes Father        MEDICATIONS:    Current Outpatient Medications:     amLODIPine (NORVASC) 5 mg tablet,  "Take 1 tablet (5 mg total) by mouth daily (Patient taking differently: Take 5 mg by mouth daily DAILY IN THE PM), Disp: 90 tablet, Rfl: 3    chlorthalidone 25 mg tablet, Take 0.5 tablets (12.5 mg total) by mouth daily, Disp: 45 tablet, Rfl: 3    irbesartan (AVAPRO) 300 mg tablet, Take 300 mg by mouth daily DAILY IN THE AM, Disp: , Rfl:     Multiple Vitamin (multivitamin) capsule, Take 1 capsule by mouth daily, Disp: , Rfl:     atenolol (TENORMIN) 25 mg tablet, Take 1 tablet (25 mg total) by mouth 2 (two) times a day (Patient not taking: Reported on 5/7/2024), Disp: 180 tablet, Rfl: 0    losartan (COZAAR) 100 MG tablet, Take 1 tablet (100 mg total) by mouth daily (Patient not taking: Reported on 5/7/2024), Disp: 90 tablet, Rfl: 0    REVIEW OF SYSTEMS:  Review of Systems   Constitutional:  Negative for chills and fever.   HENT:  Negative for ear pain and sore throat.    Eyes:  Negative for pain and visual disturbance.   Respiratory:  Negative for cough and shortness of breath.    Cardiovascular:  Negative for chest pain and palpitations.   Gastrointestinal:  Negative for abdominal pain and vomiting.   Genitourinary:  Negative for dysuria and hematuria.   Musculoskeletal:  Negative for arthralgias and back pain.   Skin:  Negative for color change and rash.   Neurological:  Negative for seizures and syncope.   All other systems reviewed and are negative.    All the systems were reviewed and were negative except as documented on the HPI.    PHYSICAL EXAMINATION:  /78 (BP Location: Left arm, Patient Position: Sitting, Cuff Size: Large)   Pulse 92   Ht 5' 4\" (1.626 m)   Wt 86.2 kg (190 lb)   SpO2 97%   BMI 32.61 kg/m²   Current Weight: Weight - Scale: 86.2 kg (190 lb) Body mass index is 32.61 kg/m².  Physical Exam  Constitutional:       Appearance: Normal appearance.   HENT:      Head: Normocephalic and atraumatic.   Cardiovascular:      Rate and Rhythm: Normal rate and regular rhythm.      Pulses: Normal " pulses.      Heart sounds: Normal heart sounds.   Pulmonary:      Effort: Pulmonary effort is normal.      Breath sounds: Normal breath sounds.   Abdominal:      Palpations: Abdomen is soft.   Musculoskeletal:         General: Normal range of motion.      Right lower leg: No edema.      Left lower leg: No edema.   Skin:     General: Skin is warm.   Neurological:      Mental Status: He is alert and oriented to person, place, and time. Mental status is at baseline.   Psychiatric:         Mood and Affect: Mood normal.       LABORATORY RESULTS:  Lab Results   Component Value Date    K 3.6 08/18/2023     (H) 08/18/2023    CO2 26 08/18/2023    BUN 24 08/18/2023    CREATININE 1.36 (H) 08/18/2023    GLUF 94 08/18/2023    CALCIUM 9.1 08/18/2023    CORRECTEDCA 9.6 08/18/2023    AST 13 08/18/2023    ALT 13 08/18/2023    ALKPHOS 69 08/18/2023    EGFR 53 08/18/2023     Lab Results   Component Value Date    WBC 6.96 08/18/2023    HGB 12.7 08/18/2023    HCT 38.9 08/18/2023    MCV 93 08/18/2023     (L) 08/18/2023     Lab Results   Component Value Date    CALCIUM 9.1 08/18/2023

## 2024-05-08 ENCOUNTER — TELEPHONE (OUTPATIENT)
Dept: NEPHROLOGY | Facility: CLINIC | Age: 69
End: 2024-05-08

## 2024-05-08 DIAGNOSIS — E55.9 VITAMIN D DEFICIENCY: Primary | ICD-10-CM

## 2024-05-08 DIAGNOSIS — D47.2 IGG LAMBDA MONOCLONAL GAMMOPATHY: Primary | ICD-10-CM

## 2024-05-08 LAB
ALBUMIN SERPL ELPH-MCNC: 3.22 G/DL (ref 3.2–5.1)
ALBUMIN SERPL ELPH-MCNC: 45.3 % (ref 48–70)
ALBUMIN UR ELPH-MCNC: 58.7 %
ALPHA1 GLOB MFR UR ELPH: 3.9 %
ALPHA1 GLOB SERPL ELPH-MCNC: 0.28 G/DL (ref 0.15–0.47)
ALPHA1 GLOB SERPL ELPH-MCNC: 3.9 % (ref 1.8–7)
ALPHA2 GLOB MFR UR ELPH: 3.4 %
ALPHA2 GLOB SERPL ELPH-MCNC: 0.66 G/DL (ref 0.42–1.04)
ALPHA2 GLOB SERPL ELPH-MCNC: 9.3 % (ref 5.9–14.9)
ANA SER QL IA: NEGATIVE
B-GLOBULIN MFR UR ELPH: 26.6 %
BETA GLOB ABNORMAL SERPL ELPH-MCNC: 0.36 G/DL (ref 0.31–0.57)
BETA1 GLOB SERPL ELPH-MCNC: 5 % (ref 4.7–7.7)
BETA2 GLOB SERPL ELPH-MCNC: 30.6 % (ref 3.1–7.9)
BETA2+GAMMA GLOB SERPL ELPH-MCNC: 2.17 G/DL (ref 0.2–0.58)
DSDNA AB SER-ACNC: 1 IU/ML (ref 0–9)
GAMMA GLOB ABNORMAL SERPL ELPH-MCNC: 0.42 G/DL (ref 0.4–1.66)
GAMMA GLOB MFR UR ELPH: 7.4 %
GAMMA GLOB SERPL ELPH-MCNC: 5.9 % (ref 6.9–22.3)
IGG/ALB SER: 0.83 {RATIO} (ref 1.1–1.8)
INTERPRETATION UR IFE-IMP: NORMAL
INTERPRETATION UR IFE-IMP: NORMAL
KAPPA LC FREE SER-MCNC: 21 MG/L (ref 3.3–19.4)
KAPPA LC FREE/LAMBDA FREE SER: 0.04 {RATIO} (ref 0.26–1.65)
LAMBDA LC FREE SERPL-MCNC: 511 MG/L (ref 5.7–26.3)
M PROTEIN 1 MFR SERPL ELPH: 29 %
M PROTEIN 1 SERPL ELPH-MCNC: 2.06 G/DL
M PROTEIN MFR UR ELPH: 30.4 MG/DL
M PROTEIN UR-MCNC: 19.7 %
PROT PATTERN SERPL ELPH-IMP: ABNORMAL
PROT PATTERN UR ELPH-IMP: NORMAL
PROT SERPL-MCNC: 7.1 G/DL (ref 6.4–8.2)
PROT UR-MCNC: 154.3 MG/DL

## 2024-05-08 PROCEDURE — 86335 IMMUNFIX E-PHORSIS/URINE/CSF: CPT | Performed by: PATHOLOGY

## 2024-05-08 PROCEDURE — 84166 PROTEIN E-PHORESIS/URINE/CSF: CPT | Performed by: PATHOLOGY

## 2024-05-08 PROCEDURE — 84165 PROTEIN E-PHORESIS SERUM: CPT | Performed by: PATHOLOGY

## 2024-05-08 PROCEDURE — 86334 IMMUNOFIX E-PHORESIS SERUM: CPT | Performed by: PATHOLOGY

## 2024-05-08 RX ORDER — ERGOCALCIFEROL 1.25 MG/1
50000 CAPSULE ORAL WEEKLY
Qty: 8 CAPSULE | Refills: 0 | Status: SHIPPED | OUTPATIENT
Start: 2024-05-08 | End: 2024-06-27

## 2024-05-08 NOTE — TELEPHONE ENCOUNTER
Tried calling the patient to discuss.  We will reach out to the patient and family and will discuss following.    Follow-up from initial consultation on 05/07.    Creatinine stable at 1.3  UACR 893 mg/g/UPCR 1.7  SPEP IgG lambda monoclonal immunoglobulin  UPEP IgG lambda monoclonal immunoglobulin  No hypocomplementemia  KATELIN/dsDNA negative  Mild anemia and thrombocytopenia  Severe vitamin D deficiency with vitamin D level of 16.1    Assessment  Rule out MGRS    Plan  Hematology consultation +/- bone marrow biopsy  Kidney biopsy  Ergocalciferol 50,000 units weekly x 8 doses followed by cholecalciferol 1000 units daily

## 2024-05-09 ENCOUNTER — TELEPHONE (OUTPATIENT)
Age: 69
End: 2024-05-09

## 2024-05-09 ENCOUNTER — PREP FOR PROCEDURE (OUTPATIENT)
Dept: INTERVENTIONAL RADIOLOGY/VASCULAR | Facility: CLINIC | Age: 69
End: 2024-05-09

## 2024-05-09 ENCOUNTER — TELEPHONE (OUTPATIENT)
Dept: NEPHROLOGY | Facility: CLINIC | Age: 69
End: 2024-05-09

## 2024-05-09 DIAGNOSIS — N18.31 STAGE 3A CHRONIC KIDNEY DISEASE (HCC): Primary | ICD-10-CM

## 2024-05-09 DIAGNOSIS — D47.2 MONOCLONAL GAMMOPATHY OF RENAL SIGNIFICANCE (MGRS): Primary | ICD-10-CM

## 2024-05-09 DIAGNOSIS — D47.2 IGG LAMBDA MONOCLONAL GAMMOPATHY: Primary | ICD-10-CM

## 2024-05-09 DIAGNOSIS — N29 MONOCLONAL GAMMOPATHY OF RENAL SIGNIFICANCE (MGRS): Primary | ICD-10-CM

## 2024-05-09 NOTE — TELEPHONE ENCOUNTER
Patient's grandson Hemant called today in attempt to establish the patient in [place] for kidney stones.    Pt had imaging done in [place] where Kidney stones were diagnosed. Caller is unsure of the size and doesn't have much information available.    Hemant was provided the address to the [place] office because the patient has copies of the imaging report from [place] and has imaging pictures as well.     Pt will be present with Hemant to be able to sign a consent for us to be able to speak with Hemant in the future regarding the patient's care.    Hemant was also given the tel number for CS to schedule the renal ultrasound that was ordered by PCP during recent office visit.    Please review records once obtained for scheduling recommendations.    Call back patient (patient works a lot) Zack [phone]

## 2024-05-09 NOTE — TELEPHONE ENCOUNTER
MD Radha Ching [facility] [place] [facility]  Caller: Unspecified (Today,  1:48 PM)  Please help with followin. Renal function panel weekly, orders placed    2. Kidney ultrasound ASAP, orders placed    3. Kidney biopsy ASAP, orders placed    4. Hematology consult ASAP, orders placed    Thank you very much

## 2024-05-09 NOTE — TELEPHONE ENCOUNTER
----- Message from Vega Landry MD sent at 5/8/2024  4:50 PM EDT -----  Tried calling the patient to discuss.  Can you please reach out to the patient and family so that I can discuss the lab results and future plan.  Thank you.

## 2024-05-09 NOTE — TELEPHONE ENCOUNTER
Left voicemail for the patient's grandson, Jose, requesting a call back with any scheduling restrictions for hematology and US appts

## 2024-05-10 ENCOUNTER — TELEPHONE (OUTPATIENT)
Dept: HEMATOLOGY ONCOLOGY | Facility: CLINIC | Age: 69
End: 2024-05-10

## 2024-05-10 NOTE — TELEPHONE ENCOUNTER
I called Zack in response to a referral that was received for patient to establish care with Hematology.     Outreach was made to schedule a consultation.    I left a voicemail explaining the reason for my call and advised patient to call [facility] at [phone].  Another attempt will be made to contact patient.

## 2024-05-10 NOTE — TELEPHONE ENCOUNTER
Attempted to call patient and his daughter, Belinda, with interpretor [phone]. No answer and no option for a voicemail.

## 2024-05-10 NOTE — TELEPHONE ENCOUNTER
Reviewed image report with Dr. Viramontes who was able to translate for me. Said patient should be advised to have PCP order a non contrast CT and then establish care with us.     If patient is having severe pain, nausea, vomiting or fevers he should proceed to the ER     Attempted to call grandson but there was no answer and not able to leave a VM     If family calls back please relay the above

## 2024-05-10 NOTE — TELEPHONE ENCOUNTER
CALLED GRANDSON AGAIN AND HAD TO LEAVE A MESSAGE. WHEN HE CALLS BACK PLEASE ADVISE MESSAGE FROM BELOW

## 2024-05-11 ENCOUNTER — APPOINTMENT (OUTPATIENT)
Dept: LAB | Facility: CLINIC | Age: 69
End: 2024-05-11
Payer: COMMERCIAL

## 2024-05-11 DIAGNOSIS — N18.31 STAGE 3A CHRONIC KIDNEY DISEASE (HCC): ICD-10-CM

## 2024-05-11 LAB
ALBUMIN SERPL BCP-MCNC: 3.3 G/DL (ref 3.5–5)
ANION GAP SERPL CALCULATED.3IONS-SCNC: 6 MMOL/L (ref 4–13)
BASOPHILS # BLD AUTO: 0.07 THOUSANDS/ÂΜL (ref 0–0.1)
BASOPHILS NFR BLD AUTO: 1 % (ref 0–1)
BUN SERPL-MCNC: 25 MG/DL (ref 5–25)
CALCIUM ALBUM COR SERPL-MCNC: 10.1 MG/DL (ref 8.3–10.1)
CALCIUM SERPL-MCNC: 9.5 MG/DL (ref 8.4–10.2)
CHLORIDE SERPL-SCNC: 110 MMOL/L (ref 96–108)
CO2 SERPL-SCNC: 26 MMOL/L (ref 21–32)
CREAT SERPL-MCNC: 1.16 MG/DL (ref 0.6–1.3)
EOSINOPHIL # BLD AUTO: 0.47 THOUSAND/ÂΜL (ref 0–0.61)
EOSINOPHIL NFR BLD AUTO: 7 % (ref 0–6)
ERYTHROCYTE [DISTWIDTH] IN BLOOD BY AUTOMATED COUNT: 13.1 % (ref 11.6–15.1)
GFR SERPL CREATININE-BSD FRML MDRD: 64 ML/MIN/1.73SQ M
GLUCOSE P FAST SERPL-MCNC: 94 MG/DL (ref 65–99)
HCT VFR BLD AUTO: 37.9 % (ref 36.5–49.3)
HCV AB SER QL: NORMAL
HGB BLD-MCNC: 12.1 G/DL (ref 12–17)
IMM GRANULOCYTES # BLD AUTO: 0.03 THOUSAND/UL (ref 0–0.2)
IMM GRANULOCYTES NFR BLD AUTO: 0 % (ref 0–2)
LYMPHOCYTES # BLD AUTO: 1.67 THOUSANDS/ÂΜL (ref 0.6–4.47)
LYMPHOCYTES NFR BLD AUTO: 23 % (ref 14–44)
MCH RBC QN AUTO: 29.9 PG (ref 26.8–34.3)
MCHC RBC AUTO-ENTMCNC: 31.9 G/DL (ref 31.4–37.4)
MCV RBC AUTO: 94 FL (ref 82–98)
MONOCYTES # BLD AUTO: 0.54 THOUSAND/ÂΜL (ref 0.17–1.22)
MONOCYTES NFR BLD AUTO: 7 % (ref 4–12)
NEUTROPHILS # BLD AUTO: 4.49 THOUSANDS/ÂΜL (ref 1.85–7.62)
NEUTS SEG NFR BLD AUTO: 62 % (ref 43–75)
NRBC BLD AUTO-RTO: 0 /100 WBCS
PHOSPHATE SERPL-MCNC: 2.6 MG/DL (ref 2.3–4.1)
PLATELET # BLD AUTO: 149 THOUSANDS/UL (ref 149–390)
PMV BLD AUTO: 12.8 FL (ref 8.9–12.7)
POTASSIUM SERPL-SCNC: 3.7 MMOL/L (ref 3.5–5.3)
PSA SERPL-MCNC: 1.09 NG/ML (ref 0–4)
RBC # BLD AUTO: 4.05 MILLION/UL (ref 3.88–5.62)
SODIUM SERPL-SCNC: 142 MMOL/L (ref 135–147)
WBC # BLD AUTO: 7.27 THOUSAND/UL (ref 4.31–10.16)

## 2024-05-11 PROCEDURE — 36415 COLL VENOUS BLD VENIPUNCTURE: CPT

## 2024-05-11 PROCEDURE — 85025 COMPLETE CBC W/AUTO DIFF WBC: CPT

## 2024-05-11 PROCEDURE — 80069 RENAL FUNCTION PANEL: CPT

## 2024-05-11 PROCEDURE — 86803 HEPATITIS C AB TEST: CPT

## 2024-05-11 PROCEDURE — G0103 PSA SCREENING: HCPCS

## 2024-05-13 ENCOUNTER — TELEPHONE (OUTPATIENT)
Dept: HEMATOLOGY ONCOLOGY | Facility: CLINIC | Age: 69
End: 2024-05-13

## 2024-05-13 NOTE — TELEPHONE ENCOUNTER
I called Zack in response to a referral that was received for patient to establish care with Hematology.      Outreach was made to schedule a consultation.     I left a voicemail explaining the reason for my call and advised patient to call [facility] at [phone].  Another attempt will be made to contact patient.

## 2024-05-13 NOTE — TELEPHONE ENCOUNTER
GRANDSON WAS MADE AWARE OF HAVING PCP SCHEDULED THE CT SCAN WITHOUT CONTRAST. HE WILL CALL US BACK TO SCHEDULE WITH UROLOGY ONCE CT IS SCHEDULED.

## 2024-05-14 NOTE — TELEPHONE ENCOUNTER
Attempted to call patient and his daughter Belinda. No answer for either call and no option to leave a voicemail.

## 2024-05-14 NOTE — TELEPHONE ENCOUNTER
Vega Landry MD  [facility] [place] [facility]  Kidney function is stable.  Continue current medications.  Continue renal function panel on a weekly basis.  Still waiting to see if he can get appointment with hematology, not scheduled yet.  Thank you.

## 2024-05-15 ENCOUNTER — TELEPHONE (OUTPATIENT)
Dept: FAMILY MEDICINE CLINIC | Facility: CLINIC | Age: 69
End: 2024-05-15

## 2024-05-15 ENCOUNTER — TELEPHONE (OUTPATIENT)
Dept: HEMATOLOGY ONCOLOGY | Facility: CLINIC | Age: 69
End: 2024-05-15

## 2024-05-15 NOTE — TELEPHONE ENCOUNTER
----- Message from Ayanna Paz MD sent at 5/13/2024  9:17 AM EDT -----  Advise pt he has many abnormal labs ordered by his kidney dr  have pt contact his kidney  will need to see a hematologist as well for these abnormalities

## 2024-05-15 NOTE — TELEPHONE ENCOUNTER
----- Message from Ayanna Paz MD sent at 5/15/2024  2:54 PM EDT -----  Call pt to see if we can get through to him  ----- Message -----  From: Vega Landry MD  Sent: 5/15/2024   2:45 PM EDT  To: MD DANIEL Mendoza.  He is scheduled for a kidney biopsy for further evaluation of his proteinuria.  He also needs a bone marrow biopsy and we have been trying to get him to see hematology but he never picks up when they call to schedule him.

## 2024-05-15 NOTE — TELEPHONE ENCOUNTER
I called Zack in response to a referral that was received for patient to establish care with Hematology.     Outreach was made to schedule a consultation.    I left a voicemail explaining the reason for my call and advised patient to call [facility] at [phone].  This is the third attempt to schedule patient unsuccessfully. The referral has been closed, a [employer] message has been sent to patient (if applicable).

## 2024-05-16 ENCOUNTER — TELEPHONE (OUTPATIENT)
Age: 69
End: 2024-05-16

## 2024-05-16 ENCOUNTER — TELEPHONE (OUTPATIENT)
Dept: HEMATOLOGY ONCOLOGY | Facility: CLINIC | Age: 69
End: 2024-05-16

## 2024-05-16 DIAGNOSIS — N20.0 KIDNEY CALCULI: Primary | ICD-10-CM

## 2024-05-16 NOTE — TELEPHONE ENCOUNTER
Zack called in response to a referral that was received for patient to establish care with Hematology.     Outreach was made to schedule a consultation.    A consultation was scheduled for patient during this call. Patient is scheduled on 5/21/24 at 130 PM with Radha Marquez at the [facility]

## 2024-05-16 NOTE — TELEPHONE ENCOUNTER
Pt's grandson Jose called in requesting a CT order to be put in for the pt. Jose states the pt was recently seen at a urology appt and they are requesting the pt to complete a CT scan first with no contrast. Pt was advised to contact his PCP for script. Urology also advised that the notes from their office is in the pt's chart regarding the CT scan.    Please advise.

## 2024-05-20 NOTE — PROGRESS NOTES
Spoke with dgt Belinda about pt's appointment on 5/29 at 0830. Instructions given on arrival time of 0730 to [facility]; NPO after midnight, AM meds ok with sips of water; take BP medication before procedure; need for a  pre and post procedure; and recovery time of 4hrs post procedure. Dgt verbalized understanding, questions answered as offered.

## 2024-05-21 ENCOUNTER — CONSULT (OUTPATIENT)
Dept: HEMATOLOGY ONCOLOGY | Facility: CLINIC | Age: 69
End: 2024-05-21
Payer: COMMERCIAL

## 2024-05-21 VITALS
WEIGHT: 180.5 LBS | SYSTOLIC BLOOD PRESSURE: 116 MMHG | OXYGEN SATURATION: 97 % | HEART RATE: 87 BPM | DIASTOLIC BLOOD PRESSURE: 72 MMHG | BODY MASS INDEX: 30.81 KG/M2 | TEMPERATURE: 97.7 F | HEIGHT: 64 IN

## 2024-05-21 DIAGNOSIS — D47.2 IGG LAMBDA MONOCLONAL GAMMOPATHY: ICD-10-CM

## 2024-05-21 PROCEDURE — 99204 OFFICE O/P NEW MOD 45 MIN: CPT | Performed by: PHYSICIAN ASSISTANT

## 2024-05-21 NOTE — H&P (VIEW-ONLY)
Hematology/Oncology Outpatient Consult  Zack Partida 68 y.o. male 1955 [id number]    Date:  5/21/2024      Assessment and Plan:    1. IgG lambda monoclonal gammopathy  IgG lambda serum measuring 2.06 g/dL, IgG lambda light in urine measuring 30.4 mg/dL    Serum lambda light chain 511    No anemia or other CBC abnormalities    Creatinine is acceptable    Calcium not assessed    Due to abnormal SPEP and UPEP, recommend bone marrow biopsy to differentiate patient's status of likely MGUS versus smoldering multiple myeloma.    Patient will have a bone marrow biopsy hopefully at the same time as his renal biopsy next week.  Staff will reach out to IR regarding this.    He will have other labs as below.    Patient lives in [place] was seen in [place] today due to new patient spot open availability.  He was accompanied by his grandson who provided translation and they would prefer follow-up at [facility] with physician to review.  This has been arranged today.    Follow-up to occur within 1 month of bone marrow biopsy.    - Comprehensive metabolic panel; Future  - Beta 2 microglobulin, serum; Future  - IgG, IgA, IgM; Future  - Ambulatory Referral to Interventional Radiology; Future  - CT low dose whole body myeloma scan; Future     HPI:  68-year-old male presents for consultation visit for abnormal SPEP.     May 2024   SPEP  IgG lambda  2.06 g/dL    UPEP   IgG lambda   30.40 mg/dL    Lambda light chain in blood: 511.0  Kappa light chain in blood: 21.0   Ratio 0.04     CBC: hgb 12.1 , platelets 134,000, WBC 7.27     Cr 1.16     No calcium assessed     Patient was living in [place] and now is living here and is permanent resident here.  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    Had polio before and gait abnormality related to that. Was a weight  in [place]     ROS: Review of Systems   Constitutional:  Negative for appetite change, chills, fatigue, fever and unexpected weight  change.   Respiratory:  Negative for cough and shortness of breath.    Cardiovascular:  Negative for chest pain, palpitations and leg swelling.   Gastrointestinal:  Negative for abdominal pain, constipation, diarrhea, nausea and vomiting.   Genitourinary:  Negative for difficulty urinating, dysuria and hematuria.   Musculoskeletal:  Positive for arthralgias (left shoulder).   Skin: Negative.    Neurological:  Negative for dizziness, weakness, light-headedness, numbness and headaches.   Hematological: Negative.    Psychiatric/Behavioral: Negative.       Past Medical History:   Diagnosis Date    Hypertension     Polio        Past Surgical History:   Procedure Laterality Date    LEG SURGERY      for polio       Social History     Socioeconomic History    Marital status: /Civil Union     Spouse name: None    Number of children: None    Years of education: None    Highest education level: None   Occupational History    None   Tobacco Use    Smoking status: Never    Smokeless tobacco: Never   Vaping Use    Vaping status: Never Used   Substance and Sexual Activity    Alcohol use: Never    Drug use: Never    Sexual activity: None   Other Topics Concern    None   Social History Narrative    None     Social Determinants of Health     Financial Resource Strain: Not on file   Food Insecurity: Not on file   Transportation Needs: Not on file   Physical Activity: Not on file   Stress: Not on file   Social Connections: Not on file   Intimate Partner Violence: Not on file   Housing Stability: Not on file       Family History   Problem Relation Age of Onset    Hypertension Mother     Diabetes Mother     Hypertension Father     Diabetes Father        No Known Allergies      Current Outpatient Medications:     amLODIPine (NORVASC) 5 mg tablet, Take 1 tablet (5 mg total) by mouth daily (Patient taking differently: Take 5 mg by mouth daily DAILY IN THE PM), Disp: 90 tablet, Rfl: 3    chlorthalidone 25 mg tablet, Take 0.5 tablets  "(12.5 mg total) by mouth daily, Disp: 45 tablet, Rfl: 3    ergocalciferol (ERGOCALCIFEROL) 1.25 MG (13512 UT) capsule, Take 1 capsule (50,000 Units total) by mouth once a week for 8 doses, Disp: 8 capsule, Rfl: 0    irbesartan (AVAPRO) 300 mg tablet, Take 300 mg by mouth daily DAILY IN THE AM, Disp: , Rfl:     Multiple Vitamin (multivitamin) capsule, Take 1 capsule by mouth daily, Disp: , Rfl:     atenolol (TENORMIN) 25 mg tablet, Take 1 tablet (25 mg total) by mouth 2 (two) times a day (Patient not taking: Reported on 5/7/2024), Disp: 180 tablet, Rfl: 0    losartan (COZAAR) 100 MG tablet, Take 1 tablet (100 mg total) by mouth daily (Patient not taking: Reported on 5/7/2024), Disp: 90 tablet, Rfl: 0      Physical Exam:  /72 (BP Location: Right arm, Patient Position: Sitting, Cuff Size: Large)   Pulse 87   Temp 97.7 °F (36.5 °C) (Temporal)   Ht 5' 4\" (1.626 m)   Wt 81.9 kg (180 lb 8 oz)   SpO2 97%   BMI 30.98 kg/m²     Physical Exam  Vitals reviewed.   Constitutional:       General: He is not in acute distress.     Appearance: He is well-developed. He is not ill-appearing.   HENT:      Head: Normocephalic and atraumatic.   Eyes:      General: No scleral icterus.     Conjunctiva/sclera: Conjunctivae normal.   Cardiovascular:      Rate and Rhythm: Normal rate and regular rhythm.      Heart sounds: Normal heart sounds. No murmur heard.  Pulmonary:      Effort: Pulmonary effort is normal. No respiratory distress.      Breath sounds: Normal breath sounds.   Abdominal:      Palpations: Abdomen is soft.      Tenderness: There is no abdominal tenderness.   Musculoskeletal:         General: No tenderness.      Cervical back: Normal range of motion and neck supple.      Right lower leg: No edema.      Left lower leg: No edema.      Comments: Abnormal gait    Lymphadenopathy:      Cervical: No cervical adenopathy.   Skin:     General: Skin is warm and dry.   Neurological:      Mental Status: He is alert and oriented " to person, place, and time.      Cranial Nerves: No cranial nerve deficit.   Psychiatric:         Mood and Affect: Mood normal.         Behavior: Behavior normal.         Labs:  Lab Results   Component Value Date    WBC 7.27 05/11/2024    HGB 12.1 05/11/2024    HCT 37.9 05/11/2024    MCV 94 05/11/2024     05/11/2024       I have spent 30 minutes with Patient and family today in which greater than 50% of this time was spent in counseling/coordination of care regarding Diagnostic results, Risks and benefits of tx options, Instructions for management, Impressions, Documenting in the medical record, Reviewing / ordering tests, medicine, procedures  , and Obtaining or reviewing history  .     Patient voiced understanding and agreement in the above discussion. Aware to contact our office with questions/symptoms in the interim.     This note has been generated by voice recognition software system.  Therefore, there may be spelling, grammar, and or syntax errors. Please contact if questions arise.

## 2024-05-21 NOTE — PROGRESS NOTES
Hematology/Oncology Outpatient Consult  Zack Partida 68 y.o. male 1955 [id number]    Date:  5/21/2024      Assessment and Plan:    1. IgG lambda monoclonal gammopathy  IgG lambda serum measuring 2.06 g/dL, IgG lambda light in urine measuring 30.4 mg/dL    Serum lambda light chain 511    No anemia or other CBC abnormalities    Creatinine is acceptable    Calcium not assessed    Due to abnormal SPEP and UPEP, recommend bone marrow biopsy to differentiate patient's status of likely MGUS versus smoldering multiple myeloma.    Patient will have a bone marrow biopsy hopefully at the same time as his renal biopsy next week.  Staff will reach out to IR regarding this.    He will have other labs as below.    Patient lives in [place] was seen in [place] today due to new patient spot open availability.  He was accompanied by his grandson who provided translation and they would prefer follow-up at [facility] with physician to review.  This has been arranged today.    Follow-up to occur within 1 month of bone marrow biopsy.    - Comprehensive metabolic panel; Future  - Beta 2 microglobulin, serum; Future  - IgG, IgA, IgM; Future  - Ambulatory Referral to Interventional Radiology; Future  - CT low dose whole body myeloma scan; Future     HPI:  68-year-old male presents for consultation visit for abnormal SPEP.     May 2024   SPEP  IgG lambda  2.06 g/dL    UPEP   IgG lambda   30.40 mg/dL    Lambda light chain in blood: 511.0  Kappa light chain in blood: 21.0   Ratio 0.04     CBC: hgb 12.1 , platelets 134,000, WBC 7.27     Cr 1.16     No calcium assessed     Patient was living in [place] and now is living here and is permanent resident here.  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    Had polio before and gait abnormality related to that. Was a weight  in the [place]     ROS: Review of Systems   Constitutional:  Negative for appetite change, chills, fatigue, fever and unexpected weight  change.   Respiratory:  Negative for cough and shortness of breath.    Cardiovascular:  Negative for chest pain, palpitations and leg swelling.   Gastrointestinal:  Negative for abdominal pain, constipation, diarrhea, nausea and vomiting.   Genitourinary:  Negative for difficulty urinating, dysuria and hematuria.   Musculoskeletal:  Positive for arthralgias (left shoulder).   Skin: Negative.    Neurological:  Negative for dizziness, weakness, light-headedness, numbness and headaches.   Hematological: Negative.    Psychiatric/Behavioral: Negative.       Past Medical History:   Diagnosis Date    Hypertension     Polio        Past Surgical History:   Procedure Laterality Date    LEG SURGERY      for polio       Social History     Socioeconomic History    Marital status: /Civil Union     Spouse name: None    Number of children: None    Years of education: None    Highest education level: None   Occupational History    None   Tobacco Use    Smoking status: Never    Smokeless tobacco: Never   Vaping Use    Vaping status: Never Used   Substance and Sexual Activity    Alcohol use: Never    Drug use: Never    Sexual activity: None   Other Topics Concern    None   Social History Narrative    None     Social Determinants of Health     Financial Resource Strain: Not on file   Food Insecurity: Not on file   Transportation Needs: Not on file   Physical Activity: Not on file   Stress: Not on file   Social Connections: Not on file   Intimate Partner Violence: Not on file   Housing Stability: Not on file       Family History   Problem Relation Age of Onset    Hypertension Mother     Diabetes Mother     Hypertension Father     Diabetes Father        No Known Allergies      Current Outpatient Medications:     amLODIPine (NORVASC) 5 mg tablet, Take 1 tablet (5 mg total) by mouth daily (Patient taking differently: Take 5 mg by mouth daily DAILY IN THE PM), Disp: 90 tablet, Rfl: 3    chlorthalidone 25 mg tablet, Take 0.5 tablets  "(12.5 mg total) by mouth daily, Disp: 45 tablet, Rfl: 3    ergocalciferol (ERGOCALCIFEROL) 1.25 MG (01170 UT) capsule, Take 1 capsule (50,000 Units total) by mouth once a week for 8 doses, Disp: 8 capsule, Rfl: 0    irbesartan (AVAPRO) 300 mg tablet, Take 300 mg by mouth daily DAILY IN THE AM, Disp: , Rfl:     Multiple Vitamin (multivitamin) capsule, Take 1 capsule by mouth daily, Disp: , Rfl:     atenolol (TENORMIN) 25 mg tablet, Take 1 tablet (25 mg total) by mouth 2 (two) times a day (Patient not taking: Reported on 5/7/2024), Disp: 180 tablet, Rfl: 0    losartan (COZAAR) 100 MG tablet, Take 1 tablet (100 mg total) by mouth daily (Patient not taking: Reported on 5/7/2024), Disp: 90 tablet, Rfl: 0      Physical Exam:  /72 (BP Location: Right arm, Patient Position: Sitting, Cuff Size: Large)   Pulse 87   Temp 97.7 °F (36.5 °C) (Temporal)   Ht 5' 4\" (1.626 m)   Wt 81.9 kg (180 lb 8 oz)   SpO2 97%   BMI 30.98 kg/m²     Physical Exam  Vitals reviewed.   Constitutional:       General: He is not in acute distress.     Appearance: He is well-developed. He is not ill-appearing.   HENT:      Head: Normocephalic and atraumatic.   Eyes:      General: No scleral icterus.     Conjunctiva/sclera: Conjunctivae normal.   Cardiovascular:      Rate and Rhythm: Normal rate and regular rhythm.      Heart sounds: Normal heart sounds. No murmur heard.  Pulmonary:      Effort: Pulmonary effort is normal. No respiratory distress.      Breath sounds: Normal breath sounds.   Abdominal:      Palpations: Abdomen is soft.      Tenderness: There is no abdominal tenderness.   Musculoskeletal:         General: No tenderness.      Cervical back: Normal range of motion and neck supple.      Right lower leg: No edema.      Left lower leg: No edema.      Comments: Abnormal gait    Lymphadenopathy:      Cervical: No cervical adenopathy.   Skin:     General: Skin is warm and dry.   Neurological:      Mental Status: He is alert and oriented " to person, place, and time.      Cranial Nerves: No cranial nerve deficit.   Psychiatric:         Mood and Affect: Mood normal.         Behavior: Behavior normal.         Labs:  Lab Results   Component Value Date    WBC 7.27 05/11/2024    HGB 12.1 05/11/2024    HCT 37.9 05/11/2024    MCV 94 05/11/2024     05/11/2024       I have spent 30 minutes with Patient and family today in which greater than 50% of this time was spent in counseling/coordination of care regarding Diagnostic results, Risks and benefits of tx options, Instructions for management, Impressions, Documenting in the medical record, Reviewing / ordering tests, medicine, procedures  , and Obtaining or reviewing history  .     Patient voiced understanding and agreement in the above discussion. Aware to contact our office with questions/symptoms in the interim.     This note has been generated by voice recognition software system.  Therefore, there may be spelling, grammar, and or syntax errors. Please contact if questions arise.

## 2024-05-23 ENCOUNTER — HOSPITAL ENCOUNTER (OUTPATIENT)
Dept: RADIOLOGY | Age: 69
Discharge: HOME | End: 2024-05-23
Payer: COMMERCIAL

## 2024-05-23 DIAGNOSIS — I12.9 BENIGN HYPERTENSION WITH CKD (CHRONIC KIDNEY DISEASE) STAGE III (HCC): ICD-10-CM

## 2024-05-23 DIAGNOSIS — N18.31 STAGE 3A CHRONIC KIDNEY DISEASE (HCC): ICD-10-CM

## 2024-05-23 DIAGNOSIS — N18.30 BENIGN HYPERTENSION WITH CKD (CHRONIC KIDNEY DISEASE) STAGE III (HCC): ICD-10-CM

## 2024-05-23 DIAGNOSIS — R10.9 FLANK PAIN: ICD-10-CM

## 2024-05-23 DIAGNOSIS — R80.9 PROTEINURIA, UNSPECIFIED TYPE: ICD-10-CM

## 2024-05-23 DIAGNOSIS — N20.0 KIDNEY CALCULI: ICD-10-CM

## 2024-05-23 PROCEDURE — 76770 US EXAM ABDO BACK WALL COMP: CPT

## 2024-05-23 PROCEDURE — 74176 CT ABD & PELVIS W/O CONTRAST: CPT

## 2024-05-29 ENCOUNTER — HOSPITAL ENCOUNTER (OUTPATIENT)
Dept: CT IMAGING | Facility: HOSPITAL | Age: 69
Discharge: HOME | End: 2024-05-29
Attending: RADIOLOGY
Payer: COMMERCIAL

## 2024-05-29 VITALS
RESPIRATION RATE: 18 BRPM | SYSTOLIC BLOOD PRESSURE: 136 MMHG | HEART RATE: 90 BPM | TEMPERATURE: 96.5 F | HEIGHT: 65 IN | DIASTOLIC BLOOD PRESSURE: 65 MMHG | OXYGEN SATURATION: 100 % | BODY MASS INDEX: 29.66 KG/M2 | WEIGHT: 178 LBS

## 2024-05-29 DIAGNOSIS — D47.2 IGG LAMBDA MONOCLONAL GAMMOPATHY: ICD-10-CM

## 2024-05-29 LAB
ERYTHROCYTE [DISTWIDTH] IN BLOOD BY AUTOMATED COUNT: 12.7 % (ref 11.6–15.1)
HCT VFR BLD AUTO: 35.9 % (ref 36.5–49.3)
HGB BLD-MCNC: 11.8 G/DL (ref 12–17)
INR PPP: 0.94 (ref 0.84–1.19)
MCH RBC QN AUTO: 30.2 PG (ref 26.8–34.3)
MCHC RBC AUTO-ENTMCNC: 32.9 G/DL (ref 31.4–37.4)
MCV RBC AUTO: 92 FL (ref 82–98)
PLATELET # BLD AUTO: 151 THOUSANDS/UL (ref 149–390)
PMV BLD AUTO: 12.7 FL (ref 8.9–12.7)
PROTHROMBIN TIME: 13.1 SECONDS (ref 11.6–14.5)
RBC # BLD AUTO: 3.91 MILLION/UL (ref 3.88–5.62)
WBC # BLD AUTO: 9.62 THOUSAND/UL (ref 4.31–10.16)

## 2024-05-29 PROCEDURE — 99153 MOD SED SAME PHYS/QHP EA: CPT

## 2024-05-29 PROCEDURE — 88348 ELECTRON MICROSCOPY DX: CPT | Performed by: INTERNAL MEDICINE

## 2024-05-29 PROCEDURE — 88305 TISSUE EXAM BY PATHOLOGIST: CPT | Performed by: INTERNAL MEDICINE

## 2024-05-29 PROCEDURE — 77012 CT SCAN FOR NEEDLE BIOPSY: CPT

## 2024-05-29 PROCEDURE — 88313 SPECIAL STAINS GROUP 2: CPT | Performed by: INTERNAL MEDICINE

## 2024-05-29 PROCEDURE — 88300 SURGICAL PATH GROSS: CPT | Performed by: PATHOLOGY

## 2024-05-29 PROCEDURE — 99152 MOD SED SAME PHYS/QHP 5/>YRS: CPT

## 2024-05-29 PROCEDURE — 50200 RENAL BIOPSY PERQ: CPT

## 2024-05-29 PROCEDURE — 88346 IMFLUOR 1ST 1ANTB STAIN PX: CPT | Performed by: INTERNAL MEDICINE

## 2024-05-29 PROCEDURE — 85610 PROTHROMBIN TIME: CPT | Performed by: RADIOLOGY

## 2024-05-29 PROCEDURE — 85027 COMPLETE CBC AUTOMATED: CPT | Performed by: RADIOLOGY

## 2024-05-29 PROCEDURE — 88350 IMFLUOR EA ADDL 1ANTB STN PX: CPT | Performed by: INTERNAL MEDICINE

## 2024-05-29 RX ORDER — LIDOCAINE WITH 8.4% SOD BICARB 0.9%(10ML)
SYRINGE (ML) INJECTION AS NEEDED
Status: COMPLETED | OUTPATIENT
Start: 2024-05-29 | End: 2024-05-29

## 2024-05-29 RX ORDER — HYDRALAZINE HYDROCHLORIDE 20 MG/ML
INJECTION INTRAMUSCULAR; INTRAVENOUS AS NEEDED
Status: COMPLETED | OUTPATIENT
Start: 2024-05-29 | End: 2024-05-29

## 2024-05-29 RX ORDER — OXYCODONE HYDROCHLORIDE 5 MG/1
5 TABLET ORAL EVERY 4 HOURS PRN
Status: DISCONTINUED | OUTPATIENT
Start: 2024-05-29 | End: 2024-05-30 | Stop reason: HOSPADM

## 2024-05-29 RX ORDER — MIDAZOLAM HYDROCHLORIDE 2 MG/2ML
INJECTION, SOLUTION INTRAMUSCULAR; INTRAVENOUS AS NEEDED
Status: COMPLETED | OUTPATIENT
Start: 2024-05-29 | End: 2024-05-29

## 2024-05-29 RX ORDER — FENTANYL CITRATE 50 UG/ML
INJECTION, SOLUTION INTRAMUSCULAR; INTRAVENOUS AS NEEDED
Status: COMPLETED | OUTPATIENT
Start: 2024-05-29 | End: 2024-05-29

## 2024-05-29 RX ADMIN — MIDAZOLAM 0.5 MG: 1 INJECTION INTRAMUSCULAR; INTRAVENOUS at 09:06

## 2024-05-29 RX ADMIN — Medication 10 ML: at 09:03

## 2024-05-29 RX ADMIN — MIDAZOLAM 0.5 MG: 1 INJECTION INTRAMUSCULAR; INTRAVENOUS at 08:58

## 2024-05-29 RX ADMIN — HYDRALAZINE HYDROCHLORIDE 10 MG: 20 INJECTION, SOLUTION INTRAMUSCULAR; INTRAVENOUS at 08:49

## 2024-05-29 RX ADMIN — MIDAZOLAM 0.5 MG: 1 INJECTION INTRAMUSCULAR; INTRAVENOUS at 08:51

## 2024-05-29 RX ADMIN — HYDRALAZINE HYDROCHLORIDE 10 MG: 20 INJECTION, SOLUTION INTRAMUSCULAR; INTRAVENOUS at 09:11

## 2024-05-29 RX ADMIN — FENTANYL CITRATE 25 MCG: 50 INJECTION INTRAMUSCULAR; INTRAVENOUS at 09:01

## 2024-05-29 RX ADMIN — MIDAZOLAM 0.5 MG: 1 INJECTION INTRAMUSCULAR; INTRAVENOUS at 09:01

## 2024-05-29 RX ADMIN — FENTANYL CITRATE 25 MCG: 50 INJECTION INTRAMUSCULAR; INTRAVENOUS at 09:06

## 2024-05-29 RX ADMIN — FENTANYL CITRATE 25 MCG: 50 INJECTION INTRAMUSCULAR; INTRAVENOUS at 08:51

## 2024-05-29 RX ADMIN — FENTANYL CITRATE 25 MCG: 50 INJECTION INTRAMUSCULAR; INTRAVENOUS at 08:58

## 2024-05-29 NOTE — INTERVAL H&P NOTE
"The history and physical were reviewed, along with progress notes, and the patient was examined. There are no changes since it was written.    /84   Pulse 85   Temp (!) 96.2 °F (35.7 °C) (Temporal)   Resp 18   Ht 5' 4.57\" (1.64 m)   Wt 80.7 kg (178 lb)   SpO2 100%   BMI 30.02 kg/m²        Marilia Deng MD/May 29, 2024/8:41 AM  "

## 2024-05-29 NOTE — SEDATION DOCUMENTATION
Procedure completed by Dr Deng. Pt tolerated without issues, VSS. Education provided to pt and family via  services prior to procedure, questions answered as offered. Band-aid to site. Transported back to GI Pre for recovery, bedside report given.

## 2024-05-29 NOTE — BRIEF OP NOTE (RAD/CATH)
IR BIOPSY KIDNEY RANDOM Procedure Note    PATIENT NAME: Zack Partida  : 1955  MRN: [id number]    Pre-op Diagnosis:   1. IgG lambda monoclonal gammopathy      Post-op Diagnosis:   1. IgG lambda monoclonal gammopathy        Surgeon:   Marilia Deng MD    Assistants:   Fracisco Little DO    Estimated Blood Loss: minimal    Findings: successful left kidney biopsy    Specimens: sent for evaluation    Complications:  none    Anesthesia: conscious sedation    Fracisco Little DO     Date: 2024  Time: 9:31 AM

## 2024-05-31 ENCOUNTER — HOSPITAL ENCOUNTER (OUTPATIENT)
Dept: CT IMAGING | Facility: HOSPITAL | Age: 69
Discharge: HOME | End: 2024-05-31
Payer: COMMERCIAL

## 2024-05-31 ENCOUNTER — TELEPHONE (OUTPATIENT)
Dept: NEPHROLOGY | Facility: CLINIC | Age: 69
End: 2024-05-31

## 2024-05-31 DIAGNOSIS — N29 MONOCLONAL GAMMOPATHY OF RENAL SIGNIFICANCE (MGRS): Primary | ICD-10-CM

## 2024-05-31 DIAGNOSIS — D47.2 MONOCLONAL GAMMOPATHY OF RENAL SIGNIFICANCE (MGRS): Primary | ICD-10-CM

## 2024-05-31 DIAGNOSIS — D47.2 IGG LAMBDA MONOCLONAL GAMMOPATHY: ICD-10-CM

## 2024-05-31 PROCEDURE — 76497 UNLISTED CT PROCEDURE: CPT

## 2024-05-31 NOTE — TELEPHONE ENCOUNTER
Preliminary kidney biopsy results:  Lambda light chain deposition disease, early stages per pathologist  No cast nephropathy  We will reach out to oncology for management of light chain disease  Bone marrow biopsy is pending on 06/24/2024 patient may need to be expedited  Last creatinine 1.16/GFR 64  We will request the patient to do weekly labs until seen by oncology

## 2024-06-01 ENCOUNTER — LAB (OUTPATIENT)
Dept: LAB | Facility: CLINIC | Age: 69
End: 2024-06-01
Payer: COMMERCIAL

## 2024-06-01 DIAGNOSIS — D47.2 IGG LAMBDA MONOCLONAL GAMMOPATHY: ICD-10-CM

## 2024-06-01 DIAGNOSIS — D47.2 MONOCLONAL GAMMOPATHY OF RENAL SIGNIFICANCE (MGRS): ICD-10-CM

## 2024-06-01 DIAGNOSIS — N29 MONOCLONAL GAMMOPATHY OF RENAL SIGNIFICANCE (MGRS): ICD-10-CM

## 2024-06-01 LAB
ALBUMIN SERPL BCP-MCNC: 3.4 G/DL (ref 3.5–5)
ALP SERPL-CCNC: 71 U/L (ref 34–104)
ALT SERPL W P-5'-P-CCNC: 16 U/L (ref 7–52)
ANION GAP SERPL CALCULATED.3IONS-SCNC: 5 MMOL/L (ref 4–13)
AST SERPL W P-5'-P-CCNC: 16 U/L (ref 13–39)
BILIRUB SERPL-MCNC: 0.33 MG/DL (ref 0.2–1)
BUN SERPL-MCNC: 28 MG/DL (ref 5–25)
CALCIUM ALBUM COR SERPL-MCNC: 9.9 MG/DL (ref 8.3–10.1)
CALCIUM SERPL-MCNC: 9.4 MG/DL (ref 8.4–10.2)
CHLORIDE SERPL-SCNC: 110 MMOL/L (ref 96–108)
CO2 SERPL-SCNC: 26 MMOL/L (ref 21–32)
CREAT SERPL-MCNC: 1.18 MG/DL (ref 0.6–1.3)
GFR SERPL CREATININE-BSD FRML MDRD: 63 ML/MIN/1.73SQ M
GLUCOSE P FAST SERPL-MCNC: 85 MG/DL (ref 65–99)
IGA SERPL-MCNC: 49 MG/DL (ref 66–433)
IGG SERPL-MCNC: 2266 MG/DL (ref 635–1741)
IGM SERPL-MCNC: 24 MG/DL (ref 45–281)
PHOSPHATE SERPL-MCNC: 2.6 MG/DL (ref 2.3–4.1)
POTASSIUM SERPL-SCNC: 3.9 MMOL/L (ref 3.5–5.3)
PROT SERPL-MCNC: 7.6 G/DL (ref 6.4–8.4)
SODIUM SERPL-SCNC: 141 MMOL/L (ref 135–147)

## 2024-06-01 PROCEDURE — 80053 COMPREHEN METABOLIC PANEL: CPT

## 2024-06-01 PROCEDURE — 84100 ASSAY OF PHOSPHORUS: CPT

## 2024-06-01 PROCEDURE — 82784 ASSAY IGA/IGD/IGG/IGM EACH: CPT

## 2024-06-01 PROCEDURE — 82232 ASSAY OF BETA-2 PROTEIN: CPT

## 2024-06-01 PROCEDURE — 36415 COLL VENOUS BLD VENIPUNCTURE: CPT

## 2024-06-02 ENCOUNTER — APPOINTMENT (OUTPATIENT)
Dept: LAB | Facility: CLINIC | Age: 69
End: 2024-06-02
Payer: COMMERCIAL

## 2024-06-02 PROCEDURE — 83521 IG LIGHT CHAINS FREE EACH: CPT

## 2024-06-03 LAB — B2 MICROGLOB SERPL-MCNC: 2.7 MG/L (ref 0.6–2.4)

## 2024-06-04 ENCOUNTER — TELEPHONE (OUTPATIENT)
Dept: HEMATOLOGY ONCOLOGY | Facility: CLINIC | Age: 69
End: 2024-06-04

## 2024-06-04 ENCOUNTER — TELEPHONE (OUTPATIENT)
Dept: NEPHROLOGY | Facility: CLINIC | Age: 69
End: 2024-06-04

## 2024-06-04 LAB
KAPPA LC UR-MCNC: 47.66 MG/L (ref 1.17–86.46)
KAPPA LC/LAMBDA UR: 0.16 {RATIO} (ref 1.83–14.26)
LAMBDA LC UR-MCNC: 289.52 MG/L (ref 0.27–15.21)

## 2024-06-04 NOTE — TELEPHONE ENCOUNTER
Patient has bx proven disease in kidney. He needs to see a physician in a 40 min slot ASAP. He can be seen before the bone marrow bx.     Patient and family prefer [facility].     ----- Message from Vega Landry MD sent at 6/4/2024  3:12 PM EDT -----  Jordan Garcia reaching out to you regarding mutual patient.  Preliminary biopsy results are consistent with lambda light chain deposition disease.  I see that he is rescheduled for oncology follow-up on 07/15.  Just wanted to make you aware of this to see if this can be expedited so that a treatment plan can be formulated.

## 2024-06-04 NOTE — TELEPHONE ENCOUNTER
----- Message from Vega Landry MD sent at 6/3/2024  2:36 PM EDT -----  Please let the patient know that kidney function is stable and we are trying to see if we can get him to see the blood specialist sooner to get a bone marrow biopsy and further evaluation as treatment of his kidneys.  For now, he should continue renal function panel on a weekly basis.

## 2024-06-04 NOTE — TELEPHONE ENCOUNTER
Patient was seen in consult with me in [place] but wanted to f/u at [place].   Needs a follow up 40 min slot with physician 2 weeks after bone marrow. Bone marrow is on 6/25.   Also, I do not think Dr Lynch sees myeloma patients? Should be seen by Damaso/Dandy/Leonard

## 2024-06-04 NOTE — TELEPHONE ENCOUNTER
Rescheduled patient, called and left a voicemail with location, date and time. Provided patient with call back number if date and time does not work for his schedule.

## 2024-06-05 ENCOUNTER — TELEPHONE (OUTPATIENT)
Dept: HEMATOLOGY ONCOLOGY | Facility: CLINIC | Age: 69
End: 2024-06-05

## 2024-06-05 ENCOUNTER — TELEPHONE (OUTPATIENT)
Dept: NEPHROLOGY | Facility: CLINIC | Age: 69
End: 2024-06-05

## 2024-06-05 NOTE — TELEPHONE ENCOUNTER
----- Message from Vega Landry MD sent at 6/4/2024  4:14 PM EDT -----  Please let the patient know that he has kidney stone on the right side.  He also has enlarged prostate.  He has an upcoming appointment with urology and they will address both these issues.  He also has benign cysts in his kidney for which there is no follow-up needed.

## 2024-06-05 NOTE — TELEPHONE ENCOUNTER
Left voicemail for the patient with interpretor [phone] requesting a call back to review message above.

## 2024-06-05 NOTE — TELEPHONE ENCOUNTER
Spoke to daughter yesterday to give new appts for BMBX and FU w Dr Valle, she was out and could not write them down. Called today and left message on V/M w appt detail left [place] # if she has any questions

## 2024-06-06 ENCOUNTER — TELEPHONE (OUTPATIENT)
Dept: RADIOLOGY | Facility: HOSPITAL | Age: 69
End: 2024-06-06

## 2024-06-06 RX ORDER — SODIUM CHLORIDE 9 MG/ML
75 INJECTION, SOLUTION INTRAVENOUS CONTINUOUS
OUTPATIENT
Start: 2024-06-06

## 2024-06-07 LAB — SCAN RESULT: NORMAL

## 2024-06-07 NOTE — TELEPHONE ENCOUNTER
Called and spoke to the patient with interpretor [phone] to relay the messages above. Patient expressed understanding and had no further questions or concerns at this time.

## 2024-06-08 ENCOUNTER — APPOINTMENT (OUTPATIENT)
Dept: LAB | Facility: CLINIC | Age: 69
End: 2024-06-08
Payer: COMMERCIAL

## 2024-06-08 DIAGNOSIS — N18.31 STAGE 3A CHRONIC KIDNEY DISEASE (HCC): ICD-10-CM

## 2024-06-08 LAB
ALBUMIN SERPL BCP-MCNC: 3.4 G/DL (ref 3.5–5)
ANION GAP SERPL CALCULATED.3IONS-SCNC: 4 MMOL/L (ref 4–13)
BUN SERPL-MCNC: 31 MG/DL (ref 5–25)
CALCIUM ALBUM COR SERPL-MCNC: 10 MG/DL (ref 8.3–10.1)
CALCIUM SERPL-MCNC: 9.5 MG/DL (ref 8.4–10.2)
CHLORIDE SERPL-SCNC: 114 MMOL/L (ref 96–108)
CO2 SERPL-SCNC: 25 MMOL/L (ref 21–32)
CREAT SERPL-MCNC: 1.26 MG/DL (ref 0.6–1.3)
GFR SERPL CREATININE-BSD FRML MDRD: 58 ML/MIN/1.73SQ M
GLUCOSE P FAST SERPL-MCNC: 88 MG/DL (ref 65–99)
PHOSPHATE SERPL-MCNC: 2.6 MG/DL (ref 2.3–4.1)
POTASSIUM SERPL-SCNC: 4 MMOL/L (ref 3.5–5.3)
SODIUM SERPL-SCNC: 143 MMOL/L (ref 135–147)

## 2024-06-08 PROCEDURE — 80069 RENAL FUNCTION PANEL: CPT

## 2024-06-08 PROCEDURE — 36415 COLL VENOUS BLD VENIPUNCTURE: CPT

## 2024-06-10 ENCOUNTER — TELEPHONE (OUTPATIENT)
Dept: NEPHROLOGY | Facility: CLINIC | Age: 69
End: 2024-06-10

## 2024-06-10 NOTE — TELEPHONE ENCOUNTER
----- Message from Vega Landry MD sent at 6/8/2024  3:07 PM EDT -----  Please let the patient know that kidney function remains stable.  Please remind him that he has bone marrow biopsy scheduled on 06/11 and this is very important for further management of his kidney disease.

## 2024-06-10 NOTE — TELEPHONE ENCOUNTER
Called and spoke to the patient's grandson. Relayed message above. Expressed understanding. Took phone number for center doing biopsy tomorrow.

## 2024-06-11 ENCOUNTER — HOSPITAL ENCOUNTER (OUTPATIENT)
Dept: INTERVENTIONAL RADIOLOGY/VASCULAR | Facility: HOSPITAL | Age: 69
Discharge: HOME | End: 2024-06-11
Payer: COMMERCIAL

## 2024-06-11 VITALS
RESPIRATION RATE: 18 BRPM | HEIGHT: 65 IN | SYSTOLIC BLOOD PRESSURE: 160 MMHG | HEART RATE: 77 BPM | TEMPERATURE: 96.7 F | BODY MASS INDEX: 30.99 KG/M2 | OXYGEN SATURATION: 97 % | WEIGHT: 186 LBS | DIASTOLIC BLOOD PRESSURE: 85 MMHG

## 2024-06-11 DIAGNOSIS — D47.2 IGG LAMBDA MONOCLONAL GAMMOPATHY: ICD-10-CM

## 2024-06-11 LAB
ERYTHROCYTE [DISTWIDTH] IN BLOOD BY AUTOMATED COUNT: 12.7 % (ref 11.6–15.1)
HCT VFR BLD AUTO: 33.5 % (ref 36.5–49.3)
HGB BLD-MCNC: 11.6 G/DL (ref 12–17)
MCH RBC QN AUTO: 30.9 PG (ref 26.8–34.3)
MCHC RBC AUTO-ENTMCNC: 34.6 G/DL (ref 31.4–37.4)
MCV RBC AUTO: 89 FL (ref 82–98)
NRBC BLD AUTO-RTO: 0 /100 WBCS
PLATELET # BLD AUTO: 144 THOUSANDS/UL (ref 149–390)
PMV BLD AUTO: 12.3 FL (ref 8.9–12.7)
RBC # BLD AUTO: 3.76 MILLION/UL (ref 3.88–5.62)
WBC # BLD AUTO: 7.55 THOUSAND/UL (ref 4.31–10.16)

## 2024-06-11 PROCEDURE — 85097 BONE MARROW INTERPRETATION: CPT | Performed by: PATHOLOGY

## 2024-06-11 PROCEDURE — 77002 NEEDLE LOCALIZATION BY XRAY: CPT

## 2024-06-11 PROCEDURE — 88341 IMHCHEM/IMCYTCHM EA ADD ANTB: CPT | Performed by: PATHOLOGY

## 2024-06-11 PROCEDURE — 88184 FLOWCYTOMETRY/ TC 1 MARKER: CPT | Performed by: PHYSICIAN ASSISTANT

## 2024-06-11 PROCEDURE — 88313 SPECIAL STAINS GROUP 2: CPT | Performed by: PATHOLOGY

## 2024-06-11 PROCEDURE — 88360 TUMOR IMMUNOHISTOCHEM/MANUAL: CPT | Performed by: PATHOLOGY

## 2024-06-11 PROCEDURE — 88189 FLOWCYTOMETRY/READ 16 & >: CPT

## 2024-06-11 PROCEDURE — 88365 INSITU HYBRIDIZATION (FISH): CPT | Performed by: PATHOLOGY

## 2024-06-11 PROCEDURE — 88305 TISSUE EXAM BY PATHOLOGIST: CPT | Performed by: PATHOLOGY

## 2024-06-11 PROCEDURE — 99153 MOD SED SAME PHYS/QHP EA: CPT

## 2024-06-11 PROCEDURE — 88364 INSITU HYBRIDIZATION (FISH): CPT | Performed by: PATHOLOGY

## 2024-06-11 PROCEDURE — 85007 BL SMEAR W/DIFF WBC COUNT: CPT | Performed by: STUDENT IN AN ORGANIZED HEALTH CARE EDUCATION/TRAINING PROGRAM

## 2024-06-11 PROCEDURE — 88342 IMHCHEM/IMCYTCHM 1ST ANTB: CPT | Performed by: PATHOLOGY

## 2024-06-11 PROCEDURE — 88185 FLOWCYTOMETRY/TC ADD-ON: CPT

## 2024-06-11 PROCEDURE — 88311 DECALCIFY TISSUE: CPT | Performed by: PATHOLOGY

## 2024-06-11 PROCEDURE — 38222 DX BONE MARROW BX & ASPIR: CPT

## 2024-06-11 PROCEDURE — C1830 POWER BONE MARROW BX NEEDLE: HCPCS

## 2024-06-11 RX ORDER — SODIUM CHLORIDE 9 MG/ML
75 INJECTION, SOLUTION INTRAVENOUS CONTINUOUS
Status: DISCONTINUED | OUTPATIENT
Start: 2024-06-11 | End: 2024-06-12 | Stop reason: HOSPADM

## 2024-06-11 RX ORDER — FENTANYL CITRATE 50 UG/ML
INJECTION, SOLUTION INTRAMUSCULAR; INTRAVENOUS AS NEEDED
Status: COMPLETED | OUTPATIENT
Start: 2024-06-11 | End: 2024-06-11

## 2024-06-11 RX ORDER — MIDAZOLAM HYDROCHLORIDE 2 MG/2ML
INJECTION, SOLUTION INTRAMUSCULAR; INTRAVENOUS AS NEEDED
Status: COMPLETED | OUTPATIENT
Start: 2024-06-11 | End: 2024-06-11

## 2024-06-11 RX ADMIN — FENTANYL CITRATE 25 MCG: 50 INJECTION, SOLUTION INTRAMUSCULAR; INTRAVENOUS at 13:19

## 2024-06-11 RX ADMIN — FENTANYL CITRATE 25 MCG: 50 INJECTION, SOLUTION INTRAMUSCULAR; INTRAVENOUS at 13:25

## 2024-06-11 RX ADMIN — SODIUM CHLORIDE 75 ML/HR: 0.9 INJECTION, SOLUTION INTRAVENOUS at 12:11

## 2024-06-11 RX ADMIN — FENTANYL CITRATE 25 MCG: 50 INJECTION, SOLUTION INTRAMUSCULAR; INTRAVENOUS at 13:12

## 2024-06-11 RX ADMIN — MIDAZOLAM 1 MG: 1 INJECTION INTRAMUSCULAR; INTRAVENOUS at 13:10

## 2024-06-11 RX ADMIN — MIDAZOLAM 0.5 MG: 1 INJECTION INTRAMUSCULAR; INTRAVENOUS at 13:12

## 2024-06-11 RX ADMIN — MIDAZOLAM 0.5 MG: 1 INJECTION INTRAMUSCULAR; INTRAVENOUS at 13:19

## 2024-06-11 RX ADMIN — FENTANYL CITRATE 50 MCG: 50 INJECTION, SOLUTION INTRAMUSCULAR; INTRAVENOUS at 13:10

## 2024-06-11 NOTE — NURSING NOTE
No distress. Denies pain. Band-aid remains clean, dry and intact. Tolerated toast and drinks. Ready for discharge.

## 2024-06-11 NOTE — DISCHARGE INSTRUCTIONS
Bone Marrow Biopsy     WHAT YOU NEED TO KNOW:   A bone marrow biopsy is a procedure to remove a small amount of bone marrow from your bone. Bone marrow is the soft tissue inside your bone that helps to make blood cells. The sample is tested for disease or infection.    DISCHARGE INSTRUCTIONS:     1. Limit your activities day of biopsy as directed by your doctor.    2. Use medication as ordered.    3. Return to your normal diet.Small sips of flat soda will help with nausea.    4. Remove band-aid or dressing 24 hours after procedure.    Contact Interventional Radiology at [phone] if:    1. Difficulty breathing, nausea or vomiting.    2. Chills or fever above 101 F.    3. Pain at biopsy site not relieved by medication    4. Develop any redness, swelling, heat, unusual drainage, heavy bruising or bleeding from biopsy site.    Your skin is itchy, swollen, or you have a rash.     You have nausea or are vomiting for more than 8 hours after the procedure.      You have questions or concerns about your condition or care. Procedural Sedation   WHAT YOU NEED TO KNOW:   Procedural sedation is medicine used during procedures to help you feel relaxed and calm. You will remember little to none of the procedure. After sedation you may feel tired, weak, or unsteady on your feet. You may also have trouble concentrating or short-term memory loss. These symptoms should go away in 24 hours or less.   DISCHARGE INSTRUCTIONS:   Call 911 or have someone else call for any of the following:   You have sudden trouble breathing.     You cannot be woken.     Contact Interventional Radiology at [phone]   [facility] PATIENTS: Contact Interventional Radiology at [phone] [facility] PATIENTS: Contact Interventional Radiology at [phone]) if any of the following occur:      You have a severe headache or dizziness.     Your heart is beating faster than usual.    You have a fever or chills.     Your skin is itchy, swollen, or you have a  rash.     You have nausea or are vomiting for more than 8 hours after the procedure.      You have questions or concerns about your condition or care.  Self-care:   Have someone stay with you for 24 hours. This person can drive you to errands and help you do things around the house. This person can also watch for problems.      Rest and do quiet activities for 24 hours. Do not exercise, ride a bike, or play sports. Stand up slowly to prevent dizziness and falls. Take short walks around the house with another person. Slowly return to your usual activities the next day.      Do not drive or use dangerous machines or tools for 24 hours. You may injure yourself or others. Examples include a lawnmower, saw, or drill. Do not return to work for 24 hours if you use dangerous machines or tools for work.      Do not make important decisions for 24 hours. For example, do not sign important papers or invest money.      Drink liquids as directed. Liquids help flush the sedation medicine out of your body. Ask how much liquid to drink each day and which liquids are best for you.      Eat small, frequent meals to prevent nausea and vomiting. Start with clear liquids such as juice or broth. If you do not vomit after clear liquids, you can eat your usual foods.      Do not drink alcohol or take medicines that make you drowsy. This includes medicines that help you sleep and anxiety medicines. Ask your healthcare provider if it is safe for you to take pain medicine.  Follow up with your healthcare provider as directed: Write down your questions so you remember to ask them during your visits.

## 2024-06-11 NOTE — INTERVAL H&P NOTE
"H&P reviewed. After examining the patient, I find no changed to the H&P since it had been written.    /81 (BP Location: Right arm)   Pulse 68   Temp 98.3 °F (36.8 °C) (Temporal)   Resp 16   Ht 5' 4.5\" (1.638 m)   Wt 84.4 kg (186 lb)   SpO2 98%   BMI 31.43 kg/m²     Patient re-evaluated. Accept as history and physical.    Antelmo Bruner, DO/June 11, 2024/1:05 PM  "

## 2024-06-11 NOTE — BRIEF OP NOTE (RAD/CATH)
IR BIOPSY BONE MARROW  Procedure Note    PATIENT NAME: Zack Partida  : 1955  MRN: [id number]     Pre-op Diagnosis:   1. IgG lambda monoclonal gammopathy      Post-op Diagnosis:   1. IgG lambda monoclonal gammopathy        Surgeon:   Antelmo Bruner DO  Assistants:     No qualified resident was available.    Estimated Blood Loss: None  Findings: Right iliac crest targeted.    Specimens: BM aspirate / core bone    Complications:  none    Anesthesia: conscious sedation and local    Antelmo Bruner DO     Date: 2024  Time: 1:42 PM

## 2024-06-15 ENCOUNTER — APPOINTMENT (OUTPATIENT)
Dept: LAB | Facility: CLINIC | Age: 69
End: 2024-06-15
Payer: COMMERCIAL

## 2024-06-15 DIAGNOSIS — N18.31 STAGE 3A CHRONIC KIDNEY DISEASE (HCC): ICD-10-CM

## 2024-06-15 DIAGNOSIS — N29 MONOCLONAL GAMMOPATHY OF RENAL SIGNIFICANCE (MGRS): ICD-10-CM

## 2024-06-15 DIAGNOSIS — D47.2 MONOCLONAL GAMMOPATHY OF RENAL SIGNIFICANCE (MGRS): ICD-10-CM

## 2024-06-15 LAB
ALBUMIN SERPL BCP-MCNC: 3.3 G/DL (ref 3.5–5)
ANION GAP SERPL CALCULATED.3IONS-SCNC: 4 MMOL/L (ref 4–13)
BUN SERPL-MCNC: 33 MG/DL (ref 5–25)
CALCIUM ALBUM COR SERPL-MCNC: 9.7 MG/DL (ref 8.3–10.1)
CALCIUM SERPL-MCNC: 9.1 MG/DL (ref 8.4–10.2)
CHLORIDE SERPL-SCNC: 115 MMOL/L (ref 96–108)
CO2 SERPL-SCNC: 24 MMOL/L (ref 21–32)
CREAT SERPL-MCNC: 1.24 MG/DL (ref 0.6–1.3)
GFR SERPL CREATININE-BSD FRML MDRD: 59 ML/MIN/1.73SQ M
GLUCOSE P FAST SERPL-MCNC: 90 MG/DL (ref 65–99)
PHOSPHATE SERPL-MCNC: 2.9 MG/DL (ref 2.3–4.1)
POTASSIUM SERPL-SCNC: 3.8 MMOL/L (ref 3.5–5.3)
SODIUM SERPL-SCNC: 143 MMOL/L (ref 135–147)

## 2024-06-15 PROCEDURE — 36415 COLL VENOUS BLD VENIPUNCTURE: CPT

## 2024-06-15 PROCEDURE — 80069 RENAL FUNCTION PANEL: CPT

## 2024-06-17 LAB
BASOPHILS # BLD AUTO: 0.08 THOUSAND/UL (ref 0–0.1)
BASOPHILS NFR MAR MANUAL: 1 % (ref 0–1)
EOSINOPHIL # BLD AUTO: 0.38 THOUSAND/UL (ref 0–0.61)
EOSINOPHIL NFR BLD MANUAL: 5 % (ref 0–6)
LG PLATELETS BLD QL SMEAR: PRESENT
LYMPHOCYTES # BLD AUTO: 1.59 THOUSAND/UL (ref 0.6–4.47)
LYMPHOCYTES # BLD AUTO: 21 %
MONOCYTES # BLD AUTO: 0.45 THOUSAND/UL (ref 0–1.22)
MONOCYTES NFR BLD AUTO: 6 % (ref 4–12)
NEUTS SEG # BLD: 5.06 THOUSAND/UL (ref 1.81–6.82)
NEUTS SEG NFR BLD AUTO: 67 %
PATHOLOGY REVIEW: YES
PLATELET BLD QL SMEAR: ADEQUATE
RBC MORPH BLD: NORMAL
TOTAL CELLS COUNTED SPEC: 100

## 2024-06-18 ENCOUNTER — TELEPHONE (OUTPATIENT)
Dept: NEPHROLOGY | Facility: CLINIC | Age: 69
End: 2024-06-18

## 2024-06-18 PROCEDURE — 85097 BONE MARROW INTERPRETATION: CPT | Performed by: PATHOLOGY

## 2024-06-18 PROCEDURE — 88313 SPECIAL STAINS GROUP 2: CPT | Performed by: PATHOLOGY

## 2024-06-18 PROCEDURE — 88341 IMHCHEM/IMCYTCHM EA ADD ANTB: CPT | Performed by: PATHOLOGY

## 2024-06-18 PROCEDURE — 88365 INSITU HYBRIDIZATION (FISH): CPT | Performed by: PATHOLOGY

## 2024-06-18 PROCEDURE — 88305 TISSUE EXAM BY PATHOLOGIST: CPT | Performed by: PATHOLOGY

## 2024-06-18 PROCEDURE — 88311 DECALCIFY TISSUE: CPT | Performed by: PATHOLOGY

## 2024-06-18 PROCEDURE — 88342 IMHCHEM/IMCYTCHM 1ST ANTB: CPT | Performed by: PATHOLOGY

## 2024-06-18 PROCEDURE — 88364 INSITU HYBRIDIZATION (FISH): CPT | Performed by: PATHOLOGY

## 2024-06-18 PROCEDURE — 88360 TUMOR IMMUNOHISTOCHEM/MANUAL: CPT | Performed by: PATHOLOGY

## 2024-06-18 NOTE — TELEPHONE ENCOUNTER
----- Message from Vega Landry MD sent at 6/15/2024  6:54 PM EDT -----  Please let the patient or his grandson know that kidney function and electrolytes remains stable.  Continue with weekly renal function panel until seen by hematologist later in the month.  Please advise him that he has an appointment with hematologist on 06/24/2024 at 9 AM.

## 2024-06-18 NOTE — TELEPHONE ENCOUNTER
----- Message from Vega Landry MD sent at 6/14/2024  3:50 PM EDT -----  Regarding: Hematology appointment  Can you please remind the patient or his grandson or his daughter that he has an important appointment with hematology on 06/24 at 9 AM and he should absolutely keep this appointment.  Thank you.

## 2024-06-18 NOTE — TELEPHONE ENCOUNTER
Called and spoke to the patient's daughter, Belinda, to relay the message above. Belinda expressed understanding and had no further questions or concerns at this time.

## 2024-06-19 ENCOUNTER — OFFICE VISIT (OUTPATIENT)
Dept: UROLOGY | Facility: CLINIC | Age: 69
End: 2024-06-19
Payer: COMMERCIAL

## 2024-06-19 VITALS
WEIGHT: 196 LBS | OXYGEN SATURATION: 99 % | BODY MASS INDEX: 32.65 KG/M2 | DIASTOLIC BLOOD PRESSURE: 84 MMHG | HEIGHT: 65 IN | SYSTOLIC BLOOD PRESSURE: 138 MMHG | HEART RATE: 81 BPM

## 2024-06-19 DIAGNOSIS — R10.9 FLANK PAIN: ICD-10-CM

## 2024-06-19 DIAGNOSIS — N20.0 NEPHROLITHIASIS: ICD-10-CM

## 2024-06-19 DIAGNOSIS — R35.1 BENIGN PROSTATIC HYPERPLASIA WITH NOCTURIA: Primary | ICD-10-CM

## 2024-06-19 DIAGNOSIS — N40.1 BENIGN PROSTATIC HYPERPLASIA WITH NOCTURIA: Primary | ICD-10-CM

## 2024-06-19 PROCEDURE — 99204 OFFICE O/P NEW MOD 45 MIN: CPT | Performed by: UROLOGY

## 2024-06-19 RX ORDER — TAMSULOSIN HYDROCHLORIDE 0.4 MG/1
0.4 CAPSULE ORAL
Qty: 90 CAPSULE | Refills: 3 | Status: SHIPPED | OUTPATIENT
Start: 2024-06-19

## 2024-06-19 NOTE — ASSESSMENT & PLAN NOTE
- Nonobstructing right upper pole intrarenal calculus present on CT, 7 mm  - Observation recommended

## 2024-06-19 NOTE — PROGRESS NOTES
"Referring Physician: Ayanna Paz MD  A copy of this note was sent to the referring physician.       Diagnoses and all orders for this visit:    Benign prostatic hyperplasia with nocturia  -     tamsulosin (FLOMAX) 0.4 mg; Take 1 capsule (0.4 mg total) by mouth daily with dinner    Nephrolithiasis    Flank pain  -     Ambulatory referral to Urology            Assessment and plan:       1.  Nephrolithiasis  -7 mm nonobstructing right upper pole intrarenal calculus  -    2.  BPH with nocturia-  -discussed a trial of tamsulosin, patient amenable      Today, we discussed a stepwise approach in treating lower urinary tract symptoms associated with BPH.    Lower urinary tract symptoms (LUTS) can be sub-divided into those that result from failure of the bladder to store urine normally (\"storage symptoms\"), those that result from difficulties in emptying (\"voiding symptoms\"), or those that follow micturition (\"post-micturition symptoms\").    Obstructive symptoms such as hesitancy, weak stream, and pushing to urinate are classified as voiding symptoms. OAB is due to the inability of the bladder to store urine normally. The symptoms of frequency, urgency, nocturia, and urge incontinence are classified as storage symptoms. I discussed the mainstays of therapy for voiding symptoms including watchful waiting, conservative interventions, pharmacotherapy with alpha blockers, 5-alpha reductase inhibitors, and PDE5 inhibitors.  We also discussed surgical management including UroLift, TURP, laser/ablative procedures, and prostatectomy.  Pros and cons, expectations, lifestyle and sexual adverse effects of all modalities were reviewed.    Tamsulosin prescribed.  Patient will follow-up in 6 months with advanced practitioner team for symptom reassessment.    Emiliano Celis MD      Chief Complaint     Consult for nephrolithiasis and BPH      History of Present Illness     Zack Partida is a 68 y.o. referred in consultation for " "nephrolithiasis and BPH    Detailed Urologic History     - please refer to HPI    Review of Systems     Review of Systems   Constitutional: Negative for activity change and fatigue.   HENT: Negative for congestion.    Eyes: Negative for visual disturbance.   Respiratory: Negative for shortness of breath and wheezing.    Cardiovascular: Negative for chest pain and leg swelling.   Gastrointestinal: Negative for abdominal pain.   Endocrine: Negative for polyuria.   Genitourinary: Negative for dysuria, flank pain, hematuria and urgency.   Musculoskeletal: Negative for back pain.   Allergic/Immunologic: Negative for immunocompromised state.   Neurological: Negative for dizziness and numbness.   Psychiatric/Behavioral: Negative for dysphoric mood.   All other systems reviewed and are negative.          Allergies     No Known Allergies    Physical Exam       Physical Exam  Constitutional:       General: He is not in acute distress.     Appearance: He is well-developed.   HENT:      Head: Normocephalic and atraumatic.   Cardiovascular:      Comments: Negative lower extremity edema  Pulmonary:      Effort: Pulmonary effort is normal.      Breath sounds: Normal breath sounds.   Abdominal:      Palpations: Abdomen is soft.   Musculoskeletal:         General: Normal range of motion.      Cervical back: Normal range of motion.   Skin:     General: Skin is warm.   Neurological:      Mental Status: He is alert and oriented to person, place, and time.   Psychiatric:         Behavior: Behavior normal.           Vital Signs  Vitals:    06/19/24 1118   BP: 138/84   BP Location: Left arm   Patient Position: Sitting   Cuff Size: Adult   Pulse: 81   SpO2: 99%   Weight: 88.9 kg (196 lb)   Height: 5' 4.5\" (1.638 m)         Current Medications       Current Outpatient Medications:     amLODIPine (NORVASC) 5 mg tablet, Take 1 tablet (5 mg total) by mouth daily (Patient taking differently: Take 5 mg by mouth daily DAILY IN THE PM), Disp: 90 " tablet, Rfl: 3    chlorthalidone 25 mg tablet, Take 0.5 tablets (12.5 mg total) by mouth daily, Disp: 45 tablet, Rfl: 3    ergocalciferol (ERGOCALCIFEROL) 1.25 MG (69543 UT) capsule, Take 1 capsule (50,000 Units total) by mouth once a week for 8 doses, Disp: 8 capsule, Rfl: 0    irbesartan (AVAPRO) 300 mg tablet, Take 300 mg by mouth daily DAILY IN THE AM, Disp: , Rfl:     Multiple Vitamin (multivitamin) capsule, Take 1 capsule by mouth daily, Disp: , Rfl:     tamsulosin (FLOMAX) 0.4 mg, Take 1 capsule (0.4 mg total) by mouth daily with dinner, Disp: 90 capsule, Rfl: 3    atenolol (TENORMIN) 25 mg tablet, Take 1 tablet (25 mg total) by mouth 2 (two) times a day (Patient not taking: Reported on 5/7/2024), Disp: 180 tablet, Rfl: 0    losartan (COZAAR) 100 MG tablet, Take 1 tablet (100 mg total) by mouth daily (Patient not taking: Reported on 5/7/2024), Disp: 90 tablet, Rfl: 0      Active Problems     Patient Active Problem List   Diagnosis    Muscle weakness of extremity following poliomyelitis    Prediabetes    Class 1 obesity due to excess calories with serious comorbidity and body mass index (BMI) of 33.0 to 33.9 in adult    Essential hypertension    History of poliomyelitis    Unspecified acquired deformity of unspecified lower leg    Stage 3a chronic kidney disease (HCC)    History of colon polyps    Nephrolithiasis         Past Medical History     Past Medical History:   Diagnosis Date    Hypertension     Polio          Surgical History     Past Surgical History:   Procedure Laterality Date    IR BIOPSY BONE MARROW  6/11/2024    IR BIOPSY KIDNEY RANDOM  5/29/2024    LEG SURGERY      for polio         Family History     Family History   Problem Relation Age of Onset    Hypertension Mother     Diabetes Mother     Hypertension Father     Diabetes Father          Social History     Social History     Social History     Tobacco Use   Smoking Status Never   Smokeless Tobacco Never         Pertinent Lab Values     Lab  Results   Component Value Date    CREATININE 1.24 06/15/2024       Lab Results   Component Value Date    PSA 1.09 05/11/2024       @RESULTRCNT(1H])@      Pertinent Imaging       IR biopsy bone marrow    Result Date: 6/11/2024  Narrative: INDICATION: MGUS PROCEDURE: 1. Fluoroscopic-guided right ilium bone marrow aspiration and core bone biopsy     Impression: Bone marrow aspiration and core ilium bone biopsy. _______________________________________________________________ COMPARISON: None PROCEDURE DETAILS: Operators: Dr. Bruner Anesthesia: Moderate sedation was provided for 15 minutes. Hemodynamic parameters were continuously monitored by IR nursing. Local anesthesia. Medications: 1% lidocaine, fentanyl, Versed Fluoroscopy time: 1.2 Images: 1 COMMENTS: The patient was placed prone on the imaging table.  A preprocedure timeout was performed per [facility] protocol. The posterior inferior iliac spine was localized by fluoroscopy and the low back was prepped and draped in sterile fashion. An 11-gauge needle was advanced using fluoroscopic guidance through the cortex of the iliac spine into the marrow. Bone marrow aspiration was performed followed by a core ilium bone biopsy. The puncture site was cleansed and a dressing was applied. Workstation performed: [id number]     US kidney and bladder with pvr    Result Date: 6/4/2024  Narrative: RENAL ULTRASOUND WITH PVR INDICATION: N18.31: Chronic kidney disease, stage 3a I12.9: Hypertensive chronic kidney disease with stage 1 through stage 4 chronic kidney disease, or unspecified chronic kidney disease N18.30: Chronic kidney disease, stage 3 unspecified R10.9: Unspecified abdominal pain R80.9: Proteinuria, unspecified. COMPARISON: CT renal stone study 5/23/24 TECHNIQUE: Ultrasound of the retroperitoneum was performed with a curvilinear transducer utilizing volumetric sweeps and still imaging techniques. FINDINGS: KIDNEYS: Symmetric and normal size. Right kidney: 9.9 x 5.5 x  5.3 cm. Volume 149.8 mL Left kidney: 10.9 x 5.7 x 5.1 cm. Volume 166.7 mL Right kidney Normal echogenicity and contour. No mass is identified. Simple cysts measuring up to 1.8 cm. No hydronephrosis. 6 mm stone in the midportion. No perinephric fluid collections. Left kidney Normal echogenicity and contour. No mass is identified. A couple simple cysts measuring up to 1.2 cm. No hydronephrosis. No shadowing calculi. No perinephric fluid collections. URETERS: Nonvisualized. BLADDER: Normally distended. No focal thickening or mass lesions. Twinkle artifact seen at the left UVJ but no corresponding stone in this area on CT done the same day. Bilateral ureteral jets detected. Prevoid: 239.4 No significant post void volume. Measured post void volume in mL: 19.8 Prostate is enlarged measuring 5.0 x 4.9 x 4.1 cm (Vol 53.4 cc) with mass effect on the urinary bladder.     Impression: 1. Nonobstructing 6mm right renal stone. No hydronephrosis. 2. Prostatomegaly with mass effect on the urinary bladder. No significant post void residual. 3. Bilateral simple renal cysts. Workstation performed: [id number]     CT low dose whole body myeloma scan    Result Date: 6/2/2024  Narrative: WHOLE BODY LOW DOSE CT WITHOUT IV CONTRAST (MULTIPLE MYELOMA PROTOCOL.) INDICATION:   D47.2: Monoclonal gammopathy. COMPARISON: CT abdomen pelvis 5/23/2024 TECHNIQUE: Low radiation dose thin section CT examination of the whole body was performed without intravenous or oral contrast according to a protocol specifically designed to evaluate for possible multiple myeloma. Scan included the head, cervical spine, chest, abdomen, pelvis, as well as the humeri and femurs.  Axial, sagittal, and coronal 2D reformatted images were created from the source data and submitted for interpretation.  Evaluation for pathology in nonosseous structures is limited. Radiation dose length product (DLP) for this visit:  396 mGy-cm .  This examination, like all CT scans performed  in the [facility], was performed utilizing techniques to minimize radiation dose exposure, including the use of iterative reconstruction and automated exposure control. Enteric contrast was not administered. FINDINGS: Reporting of Bone findings below are based on the 2014 International Myeloma Working Group (IMWG) recommendations. BONE FINDINGS: SUSPICIOUS OSTEOLYTIC LESIONS: None. VERTEBRAL COMPRESSION FRACTURES: None. PERIPHERAL MEDULLARY PATTERN: Fatty pattern. VISUALIZED BRAIN: No acute abnormalities are seen. HEAD/NECK: Unremarkable. CHEST LUNGS:  Lungs are clear. 5 mm middle lobe calcified granuloma. There is no tracheal or endobronchial lesion. PLEURA:  Unremarkable. HEART/GREAT VESSELS: Heart is unremarkable for patient's age.  No thoracic aortic aneurysm. MEDIASTINUM AND KRIS:  Unremarkable. ABDOMEN LIVER/BILIARY TREE:  Unremarkable. GALLBLADDER:  No calcified gallstones. No pericholecystic inflammatory change. SPLEEN:  Unremarkable. PANCREAS:  Unremarkable. ADRENAL GLANDS:  Unremarkable. KIDNEYS/URETERS: Nonobstructing 7 mm right upper pole renal calculus. STOMACH AND BOWEL:  Unremarkable. APPENDIX: A normal appendix was visualized. ABDOMINOPELVIC CAVITY:  No ascites.  No pneumoperitoneum.  No lymphadenopathy. VESSELS:  Unremarkable for patient's age. PELVIS REPRODUCTIVE ORGANS: Moderate prostatomegaly. URINARY BLADDER:  Unremarkable. ABDOMINAL WALL/INGUINAL REGIONS: There is a small fat-containing umbilical hernia.     Impression: WHOLE BODY LOW DOSE CT FOR EVALUATION OF MULTIPLE MYELOMA: OSTEOLYTIC LESIONS: None. PROBABLY MALIGNANT VERTEBRAL FRACTURE: None. SUSPICIOUS PERIPHERAL MEDULLARY PATTERN: No. Workstation performed: [id number]     CT renal stone study abdomen pelvis wo contrast    Result Date: 6/2/2024  Narrative: CT ABDOMEN AND PELVIS WITHOUT IV CONTRAST - LOW DOSE RENAL STONE INDICATION: N20.0: Calculus of kidney. COMPARISON: None. TECHNIQUE: Low radiation dose thin section CT  examination of the abdomen and pelvis was performed without intravenous or oral contrast according to a protocol specifically designed to evaluate for urinary tract calculus. Axial, sagittal, and coronal 2D reformatted images were created from the source data and submitted for interpretation. Evaluation for pathology in the abdomen and pelvis that is unrelated to urinary tract calculi is limited. Radiation dose length product (DLP) for this visit: 348 mGy-cm . This examination, like all CT scans performed in the [facility], was performed utilizing techniques to minimize radiation dose exposure, including the use of iterative reconstruction and automated exposure control. URINARY TRACT FINDINGS: RIGHT KIDNEY AND URETER: Nonobstructing 7 mm right upper pole renal calculus. 1.5 cm right upper pole renal cyst. LEFT KIDNEY AND URETER: No urinary tract calculi. No hydronephrosis or hydroureter. URINARY BLADDER: Mild circumferential bladder wall thickening. ADDITIONAL FINDINGS: LOWER CHEST: Elevated left hemidiaphragm. Lung bases are clear. Small hiatal hernia. SOLID VISCERA: Limited low radiation dose noncontrast CT evaluation demonstrates no clinically significant abnormality of the imaged portions of the liver, spleen, pancreas, or adrenal glands. GALLBLADDER/BILIARY TREE: No calcified gallstones. No pericholecystic inflammatory change. No biliary dilation. STOMACH AND BOWEL: Small hiatal hernia. No bowel wall thickening or intestinal obstruction. Scattered diverticula. APPENDIX: Normal. ABDOMINOPELVIC CAVITY: No ascites. No pneumoperitoneum. No lymphadenopathy. VESSELS: Unremarkable for patient's age. REPRODUCTIVE ORGANS: Moderate prostatomegaly. ABDOMINAL WALL/INGUINAL REGIONS: Small fat-containing umbilical hernia. BONES: No acute fracture or suspicious osseous lesion. Spinal degenerative changes.     Impression: 1. Nonobstructing 7 mm right upper pole renal calculus. 2. Moderate prostatomegaly with  "mild circumferential bladder wall thickening presumably related to chronic bladder outlet obstruction. 3. Additional findings as noted. Workstation performed: [id number]     IR biopsy kidney random    Result Date: 5/29/2024  Narrative: CT-scan guided core biopsy of the left kidney History: Proteinuria and MGUS, stage IIIb kidney disease : Marilia Deng MD Assistant: Fracisco Little DO Conscious sedation time: 30 mins Technique: This examination, like all CT scans performed in the [facility], was performed utilizing techniques to minimize radiation dose exposure, including the use of iterative reconstruction and automated exposure control. The patient was brought to the CT scanner and placed prone on the table. After axial images were obtained through the abdomen, an area of the skin was then marked, prepped, and draped in usual sterile fashion. Lidocaine was administered to the skin and a  small skin incision was made. A 17-gauge cannula was advanced up to the cortex of the left kidney and an 18 gauge core biopsy specimen was obtained. 5 additional passes were made. The needle was repositioned, and 3 additional cores were obtained. The specimen was found to be adequate for Laurel kit evaluation. At the end of the procedure, D-Stat was used for hemostasis. The patient tolerated the procedure well and suffered no complications.     Impression: Impression: Successful percutaneous 18-gauge core biopsy of the left kidney yielding a specimen adequate for Laurel kit evaluation. A full pathology report will follow. Workstation performed: [id number]         Portions of the record may have been created with voice recognition software.  Occasional wrong word or \"sound a like\" substitutions may have occurred due to the inherent limitations of voice recognition software.  In addition some of the content generated from this outpatient encounter includes information designed for patient education " and/or communication back to the referring provider.  Read the chart carefully and recognize, using context, where substitutions have occurred.

## 2024-06-21 LAB — MISCELLANEOUS LAB TEST RESULT: NORMAL

## 2024-06-22 ENCOUNTER — APPOINTMENT (OUTPATIENT)
Dept: LAB | Facility: CLINIC | Age: 69
End: 2024-06-22
Payer: COMMERCIAL

## 2024-06-22 DIAGNOSIS — E87.6 DIURETIC-INDUCED HYPOKALEMIA: Primary | ICD-10-CM

## 2024-06-22 DIAGNOSIS — N18.31 STAGE 3A CHRONIC KIDNEY DISEASE (HCC): ICD-10-CM

## 2024-06-22 DIAGNOSIS — T50.2X5A DIURETIC-INDUCED HYPOKALEMIA: Primary | ICD-10-CM

## 2024-06-22 DIAGNOSIS — N29 MONOCLONAL GAMMOPATHY OF RENAL SIGNIFICANCE (MGRS): ICD-10-CM

## 2024-06-22 DIAGNOSIS — D47.2 MONOCLONAL GAMMOPATHY OF RENAL SIGNIFICANCE (MGRS): ICD-10-CM

## 2024-06-22 LAB
ALBUMIN SERPL BCG-MCNC: 3.1 G/DL (ref 3.5–5)
ANION GAP SERPL CALCULATED.3IONS-SCNC: 5 MMOL/L (ref 4–13)
BUN SERPL-MCNC: 25 MG/DL (ref 5–25)
CALCIUM ALBUM COR SERPL-MCNC: 9.6 MG/DL (ref 8.3–10.1)
CALCIUM SERPL-MCNC: 8.9 MG/DL (ref 8.4–10.2)
CHLORIDE SERPL-SCNC: 112 MMOL/L (ref 96–108)
CO2 SERPL-SCNC: 25 MMOL/L (ref 21–32)
CREAT SERPL-MCNC: 1.07 MG/DL (ref 0.6–1.3)
GFR SERPL CREATININE-BSD FRML MDRD: 71 ML/MIN/1.73SQ M
GLUCOSE P FAST SERPL-MCNC: 94 MG/DL (ref 65–99)
PHOSPHATE SERPL-MCNC: 2.3 MG/DL (ref 2.3–4.1)
POTASSIUM SERPL-SCNC: 3.4 MMOL/L (ref 3.5–5.3)
SODIUM SERPL-SCNC: 142 MMOL/L (ref 135–147)

## 2024-06-22 PROCEDURE — 80069 RENAL FUNCTION PANEL: CPT

## 2024-06-22 PROCEDURE — 36415 COLL VENOUS BLD VENIPUNCTURE: CPT

## 2024-06-22 RX ORDER — POTASSIUM CHLORIDE 750 MG/1
10 TABLET, EXTENDED RELEASE ORAL DAILY
Qty: 30 TABLET | Refills: 3 | Status: SHIPPED | OUTPATIENT
Start: 2024-06-22 | End: 2024-07-22

## 2024-06-22 NOTE — PROGRESS NOTES
HEMATOLOGY / ONCOLOGY CLINIC FOLLOW UP NOTE    Patient Zack Partida  MRN: [id number]  : 1955  Date of Encounter 2024      Referring Provider:      Reason for Encounter: follow up newly diagnosed smoldering myeoma with IgG lambda gammopathy; bone marrow with 15-17% blasts        Oncology History    No history exists.       Discussion Smoldering Myeloma    Marrow plasma cells 10-59%; M protein >30 g/L, no CRAB    The approach to monitoring individuals with low- or intermediate-risk SMM is similar to that suggested by the [facility]      At the time of diagnosis, we perform the following to rule out end-organ damage and other findings that are diagnostic of MM:    Laboratory studies should include a serum protein electrophoresis (SPEP), complete blood count (CBC), serum creatinine, serum calcium, urine protein electrophoresis (UPEP) with immunofixation, FLC ratio, and bone marrow biopsy.    Cross-sectional imaging with either low-dose whole-body CT without contrast or whole-body combined fluorine-18-labeled fluorodeoxyglucose positron emission tomography/computed tomography (18F-FDG PET/CT), followed by a whole-body MRI or MRI of the spine and pelvis. Conventional skeletal surveys are no longer a preferred imaging modality.    Two to three months after this testing,  again measure the SPEP, CBC, creatinine, calcium, FLC ratio, UPEP, and urine immunofixation. If the results of these tests are stable, the span of time between testing can be lengthened to every four to six months for one year, then to every 6 to 12 months.    For patients with SMM and no lesions on initial CT (or PET/CT) and MRI,  perform annual MRI to monitor for the development of asymptomatic bone lesions.    For patients with SMM whose initial imaging demonstrated one focal lesion on MRI, we image every six months, alternating whole-body MRI and whole-body low-dose CT or PET/CT.      Other patients who may warrant imaging every six months  include those with diffuse infiltration of the bone marrow on initial MRI and those with equivocal findings on initial imaging. In patients with otherwise asymptomatic myeloma, the identification of more than one focal bone lesion on MRI is diagnostic of MM and an indication for therapy.     The importance of monitoring M protein and hemoglobin levels was illustrated in a single-center retrospective study of 190 patients with SMM, which identified those with two to three of the following risk factors as having a high risk of progression within two years, thereby potentially meriting further work-up and/or therapy     An evolving monoclonal protein level (eMP) defined as an increase in the M protein level of ?25 percent within the first 12 months (minimum increase ?0.5 g/dL) and/or a ?10 percent increase within the first six months of diagnosis in patients with a baseline M protein ?3 g/dL.      An evolving change in hemoglobin (eHb) defined as decrease in hemoglobin concentration by ?0.5 g/dL within 12 months of diagnosis.    Thus no treatment for now but close follow up    Assessment and Plan:      IgG lambda monoclonal gammopathy  IgG lambda serum measuring 2.06 g/dL, IgG lambda light in urine measuring 30.4 mg/dL     Serum lambda light chain 511     Anemia with Hgb 11.6 MCV 89     Creatinine is acceptable now with GFR 71; has CKD stage III seeing nephrology     Calcium 8.9     Due to abnormal SPEP and UPEP, recommend bone marrow biopsy to differentiate patient's status of likely MGUS versus smoldering multiple myeloma.         Patient lives in [place] was seen in [place] today due to new patient spot open availability.  He was accompanied by his grandson who provided translation     Bone marrow 5/29/2024  with 15-17% plasma cells lambda light chain restrcted    Will follow for now; repeat labs every 3 months including SPEP, immunofixation, FLC, will consider repeating UPEP intermittently      LE edema    No  evidence clot on exam; will get Doppler    ECHO     Has HTN and albumin was 3.1 with CKD so most likely chronic; trace edema on exam     Follow up    3 months         HPI: per Radha Marquez       68-year-old male presents for consultation visit for abnormal SPEP.      May 2024   SPEP  IgG lambda  2.06 g/dL     UPEP   IgG lambda   30.40 mg/dL     Lambda light chain in blood: 511.0  Kappa light chain in blood: 21.0   Ratio 0.04      CBC: hgb 12.1 , platelets 134,000, WBC 7.27      Cr 1.16        Patient was living in [place] and now is living here and is permanent resident here.  He works at [employer].     Had polio before and gait abnormality related to that. Was a weight  in the para [employer]         Interval History: 6/24/2024       Patient presents for follow up of bone marrow biopsy as M spike with 2.05 6gm/dL IgG lambda     Plasma cells 15-17%;thus at this time would follow with no treatment.  Patient through his grandson as interpretor with lower extremity swelling.  No pain.  Symmetric and no evidence clot.  Has brace right leg and will get Doppler.  Has underlying CKD.  Has had no weight loss or B symptoms Has pain in left shoulder but appears brachial plexus related/states was weight  in his youth.       iagnosis   A -C. Bone marrow, right iliac crest, biopsy and aspirate:  -Lambda light chain restricted plasma cell neoplasm (15-17%) compatible with plasma cell myeloma (see note).  -Negative for abnormal lymphocyte proliferation.  -Maturing trilineage hematopoiesis without significant features of dysplasia or increased blasts.  -Adequate stainable storage iron.  -Normal reticulin fibers without fibrosis.  -Negative for collagen fibrosis, granulomata, vasculitis, necrosis.   Electronically signed by Steve Looney MD on 6/18/2024 at  1:50 PM   Microscopic Description    Bone marrow aspirate smears: The aspirate smears are hypocellular and suboptimal for evaluation  secondary to lack of significant particles and prominent presence of hemodilution.  Cellularity is composed of predominantly peripheral blood elements with mature neutrophils and scattered lymphocytes.  Occasional scattered maturing hematopoietic elements are present with limited immature erythroid and myeloid precursors showing normal morphology without distinct features of dysplasia or increased blasts (blasts are less than 5%).  Megakaryocytes are not significantly sampled.  Lymphocytes are not increased and show no distinct atypia.  Scattered plasma cells are present, though quantitation is limited secondary to hemodilution.     Peripheral blood smear review shows mild normochromic normocytic anemia without significant anisopoikilocytosis.  Platelets are slightly decreased with normal morphology and no significant satellitosis or clumping.  White blood cells show normal distribution and morphology without distinct forms of atypia/dysplasia or circulating myeloblasts or plasma cells.     Bone marrow core biopsy and clot sections are adequate for evaluation.  The marrow cellularity is normal for patient age (30%).  Myelopoiesis: Myeloid precursors show decreased quantities normal distribution and maturation with no distinct features of increased blast by H&E stain  Erythropoiesis: Erythroid precursors show decreased quantities with orderly appearing maturation and distribution  Megakaryocytes: Megakaryocytes are slightly decreased with normal appearing distribution and morphology  Lymphocytes: Lymphocytes are not significantly increased  Plasma cells: Plasma cells are increased within the interstitium with clusters (see below for quantitation)     PAS stain performed on the core biopsy shows mixed maturing trilineage hematopoiesis with scattered increased plasma cells  Iron stain performed on the core biopsy, clot, and aspirate shows adequate stainable storage iron and no ring sideroblasts.  Reticulin stain  performed on the core biopsy shows normal reticulin fibers (grade 0 of 3).  Immunohistochemical stains performed with appropriate controls show:  CD34 highlights occasional scattered myeloblasts without significant quantitative increase (less than 5% overall)   highlights scattered maturing myeloid and erythroid precursors  CD61 highlights scattered megakaryocytes with normal appearing morphology and distribution  CD3 highlights scattered reactive appearing T cells without significant quantitative increase or abnormal distribution  CD20 highlights occasional scattered reactive appearing B cells   highlights increased plasma cells within the interstitium and in aggregates and clusters accounting for approximately 15-17% of total cellularity  In situ hybridization analysis shows the plasma cells to be lambda light chain restricted with minimal kappa expression.   Note    CBC                 Component  Ref Range & Units 6/11/24 1210 5/29/24 0753 5/11/24 0914 5/7/24 1250 8/18/23 0953   WBC  4.31 - 10.16 Thousand/uL 7.55 9.62 7.27 7.30 6.96   RBC  3.88 - 5.62 Million/uL 3.76 Low  3.91 4.05 3.89 4.18   Hemoglobin  12.0 - 17.0 g/dL 11.6 Low  11.8 Low  12.1 11.7 Low  12.7   Hematocrit  36.5 - 49.3 % 33.5 Low  35.9 Low  37.9 36.1 Low  38.9   MCV  82 - 98 fL 89 92 94 93 93   MCH  26.8 - 34.3 pg 30.9 30.2 29.9 30.1 30.4   MCHC  31.4 - 37.4 g/dL 34.6 32.9 31.9 32.4 32.6   RDW  11.6 - 15.1 % 12.7 12.7 13.1 13.3 13.1   MPV  8.9 - 12.7 fL 12.3 12.7 12.8 High  12.9 High  13.3 High    Platelets  149 - 390 Thousands/uL 144 Low  151 149 139 Low  133 Low       Component  Ref Range & Units 6/11/24 1210 5/11/24 0914 5/7/24 1250 8/18/23 0953   Total Counted 100         Comment: This is an appended report.  These results have been appended to a previously final verified report.   Segmented %  % 67 62 R 58 R 57 R   Comment: This is an appended report.  These results have been appended to a previously final verified report.    Lymphocytes %  % 21 23 R 25 R 20 R   Comment: This is an appended report.  These results have been appended to a previously final verified report.   Monocytes %  4 - 12 % 6 7 10 13 High    Comment: This is an appended report.  These results have been appended to a previously final verified report.   Eosinophils %  0 - 6 % 5 7 High  6 3   Comment: This is an appended report.  These results have been appended to a previously final verified report.   Basophils %  0 - 1 % 1 1 1 6 High    Comment: This is an appended report.  These results have been appended to a previously final verified report.   Absolute Neutrophils  1.81 - 6.82 Thousand/uL 5.06 4.49 R 4.27 R 4.04 R   Comment: This is an appended report.  These results have been appended to a previously final verified report.   Absolute Lymphocytes  0.60 - 4.47 Thousand/uL 1.59 1.67 R 1.83 R 1.39   Comment: This is an appended report.  These results have been appended to a previously final verified report.   Absolute Monocytes  0.00 - 1.22 Thousand/uL 0.45 0.54 R 0.69 R 0.90   Comment: This is an appended report.  These results have been appended to a previously final verified report.   Absolute Eosinophils  0.00 - 0.61 Thousand/uL 0.38 0.47 R 0.42 R 0.21 R   Comment: This is an appended report.  These results have been appended to a previously final verified report.   Absolute Basophils  0.00 - 0.10 Thousand/uL 0.08 0.07 R 0.08 R 0.42 High    Comment: This is an appended report.  These results have been appended to a previously final verified report.              Additional genetic analysis including cytogenetics and myeloma FISH panel are pending and will be reported as an addendum.     Overall, the combination of findings is compatible with a diagnosis of a lambda light chain restricted plasma cell myeloma with 15-17% plasma cells.  The presence of myeloma may explain the patient's mild peripheral blood cytopenias if other potential etiologies are not clinically  identified.  Other potential etiologies for cytopenias may include hypersplenism, nutritional deficiency, toxin exposure, infection, drug effect, autoimmune disease, or other reactive condition.  No distinct features of primary myeloid disorders or increased blasts identified.  Correlation with clinical impression and other laboratory findings is recommended to assess disease status and severity.       REVIEW OF SYSTEMS:  Please note that a 14-point review of systems was performed to include Constitutional, HEENT, Respiratory, CVS, GI, , Musculoskeletal, Integumentary, Neurologic, Rheumatologic, Endocrinologic, Psychiatric, Lymphatic, and Hematologic/Oncologic systems were reviewed and are negative unless otherwise stated in HPI. Positive and negative findings pertinent to this evaluation are incorporated into the history of present illness.      ECOG PS: 1-2    PROBLEM LIST:  Patient Active Problem List   Diagnosis    Muscle weakness of extremity following poliomyelitis    Prediabetes    Class 1 obesity due to excess calories with serious comorbidity and body mass index (BMI) of 33.0 to 33.9 in adult    Essential hypertension    History of poliomyelitis    Unspecified acquired deformity of unspecified lower leg    Stage 3a chronic kidney disease (HCC)    History of colon polyps    Nephrolithiasis       Past Medical History:   has a past medical history of Hypertension and Polio.    PAST SURGICAL HISTORY:   has a past surgical history that includes Leg Surgery; IR biopsy kidney random (5/29/2024); and IR biopsy bone marrow (6/11/2024).    CURRENT MEDICATIONS  Current Outpatient Medications   Medication Sig Dispense Refill    amLODIPine (NORVASC) 5 mg tablet Take 1 tablet (5 mg total) by mouth daily (Patient taking differently: Take 5 mg by mouth daily DAILY IN THE PM) 90 tablet 3    atenolol (TENORMIN) 25 mg tablet Take 1 tablet (25 mg total) by mouth 2 (two) times a day (Patient not taking: Reported on 5/7/2024)  180 tablet 0    chlorthalidone 25 mg tablet Take 0.5 tablets (12.5 mg total) by mouth daily 45 tablet 3    ergocalciferol (ERGOCALCIFEROL) 1.25 MG (72948 UT) capsule Take 1 capsule (50,000 Units total) by mouth once a week for 8 doses 8 capsule 0    irbesartan (AVAPRO) 300 mg tablet Take 300 mg by mouth daily DAILY IN THE AM      losartan (COZAAR) 100 MG tablet Take 1 tablet (100 mg total) by mouth daily (Patient not taking: Reported on 5/7/2024) 90 tablet 0    Multiple Vitamin (multivitamin) capsule Take 1 capsule by mouth daily      tamsulosin (FLOMAX) 0.4 mg Take 1 capsule (0.4 mg total) by mouth daily with dinner 90 capsule 3     No current facility-administered medications for this visit.     [unfilled]    SOCIAL HISTORY:   reports that he has never smoked. He has never used smokeless tobacco. He reports that he does not drink alcohol and does not use drugs.     FAMILY HISTORY:  family history includes Diabetes in his father and mother; Hypertension in his father and mother.     ALLERGIES:  has No Known Allergies.      Physical Exam:  Vital Signs:   Visit Vitals  Smoking Status Never     There is no height or weight on file to calculate BMI.  There is no height or weight on file to calculate BSA.    GEN: Alert, awake oriented x3, in no acute distress  HEENT- No pallor, icterus, cyanosis, no oral mucosal lesions,   LAD - no palpable cervical, clavicle, axillary, inguinal LAD  Heart- normal S1 S2, regular rate and rhythm, No murmur, rubs.   Lungs- clear breathing sound bilateral.   Abdomen- soft, Non tender, bowel sounds present  Extremities- No cyanosis, clubbing, symmetric trace edema, no cord   Neuro- No focal neurological deficit    Labs:  Lab Results   Component Value Date    WBC 7.55 06/11/2024    HGB 11.6 (L) 06/11/2024    HCT 33.5 (L) 06/11/2024    MCV 89 06/11/2024     (L) 06/11/2024     Lab Results   Component Value Date    SODIUM 143 06/15/2024    K 3.8 06/15/2024     (H) 06/15/2024    CO2  24 06/15/2024    AGAP 4 06/15/2024    BUN 33 (H) 06/15/2024    CREATININE 1.24 06/15/2024    GLUC 98 05/07/2024    GLUF 90 06/15/2024    CALCIUM 9.1 06/15/2024    AST 16 06/01/2024    ALT 16 06/01/2024    ALKPHOS 71 06/01/2024    TP 7.6 06/01/2024    TBILI 0.33 06/01/2024    EGFR 59 06/15/2024        [facility] Reference Range & Units 06/11/24 12:10 06/15/24 09:16   Sodium 135 - 147 mmol/L  143   Potassium 3.5 - 5.3 mmol/L  3.8   Chloride 96 - 108 mmol/L  115 (H)   Carbon Dioxide 21 - 32 mmol/L  24   ANION GAP 4 - 13 mmol/L  4   BUN 5 - 25 mg/dL  33 (H)   Creatinine 0.60 - 1.30 mg/dL  1.24   GLUCOSE, FASTING 65 - 99 mg/dL  90   Calcium 8.4 - 10.2 mg/dL  9.1   CORRECTED CALCIUM 8.3 - 10.1 mg/dL  9.7   Albumin 3.5 - 5.0 g/dL  3.3 (L)   GFR, Calculated ml/min/1.73sq m  59   Phosphorus 2.3 - 4.1 mg/dL  2.9   WBC 4.31 - 10.16 Thousand/uL 7.55    RBC 3.88 - 5.62 Million/uL 3.76 (L)    Hemoglobin 12.0 - 17.0 g/dL 11.6 (L)    Hematocrit 36.5 - 49.3 % 33.5 (L)    MCV 82 - 98 fL 89    MCH 26.8 - 34.3 pg 30.9    MCHC 31.4 - 37.4 g/dL 34.6    RDW 11.6 - 15.1 % 12.7    Platelet Count 149 - 390 Thousands/uL 144 (L)    MPV 8.9 - 12.7 fL 12.3    Platelet Estimate Adequate  Adequate    nRBC /100 WBCs 0    Segmented % % 67    Lymphocytes % % 21    Monocytes % 4 - 12 % 6    Eosinophils % 0 - 6 % 5    Basophils % 0 - 1 % 1    Absolute Neutrophils 1.81 - 6.82 Thousand/uL 5.06    Absolute Lymphocytes 0.60 - 4.47 Thousand/uL 1.59    Absolute Monocytes 0.00 - 1.22 Thousand/uL 0.45    Absolute Eosinophils 0.00 - 0.61 Thousand/uL 0.38    Absolute Basophils 0.00 - 0.10 Thousand/uL 0.08    Total Counted  100    RBC Morphology  Normal    Large Platelet  Present    Pathology Review No  Yes !    (H): Data is abnormally high  (L): Data is abnormally low  !: Data is abnormal    I spent 40 minutes on chart review, face to face counseling time, coordination of care and documentation.    Selena Valle MD PhD

## 2024-06-24 ENCOUNTER — TELEPHONE (OUTPATIENT)
Dept: NEPHROLOGY | Facility: CLINIC | Age: 69
End: 2024-06-24

## 2024-06-24 ENCOUNTER — OFFICE VISIT (OUTPATIENT)
Dept: HEMATOLOGY ONCOLOGY | Facility: CLINIC | Age: 69
End: 2024-06-24
Payer: COMMERCIAL

## 2024-06-24 VITALS
DIASTOLIC BLOOD PRESSURE: 76 MMHG | WEIGHT: 186 LBS | SYSTOLIC BLOOD PRESSURE: 138 MMHG | BODY MASS INDEX: 30.99 KG/M2 | HEIGHT: 65 IN | OXYGEN SATURATION: 96 % | HEART RATE: 102 BPM

## 2024-06-24 DIAGNOSIS — N18.31 STAGE 3A CHRONIC KIDNEY DISEASE (HCC): Primary | ICD-10-CM

## 2024-06-24 DIAGNOSIS — D47.2 IGG LAMBDA MONOCLONAL GAMMOPATHY: ICD-10-CM

## 2024-06-24 DIAGNOSIS — N20.0 NEPHROLITHIASIS: ICD-10-CM

## 2024-06-24 DIAGNOSIS — M79.89 LOCALIZED SWELLING OF LOWER EXTREMITY: Primary | ICD-10-CM

## 2024-06-24 PROCEDURE — 99215 OFFICE O/P EST HI 40 MIN: CPT | Performed by: INTERNAL MEDICINE

## 2024-06-24 NOTE — TELEPHONE ENCOUNTER
Attempted to call patient and his daughter, Belinda, to review message above. No answer for either line and no voicemail.

## 2024-06-24 NOTE — TELEPHONE ENCOUNTER
----- Message from Vega Landry MD sent at 6/22/2024  8:26 PM EDT -----  Please let patient or daughter or grandson know that kidney function remains stable.  Potassium level is mildly low at 3.4 and would recommend starting K. Dur 10 mEq daily with chlorthalidone.  Please remind him that he has a very important appointment with hematology on 06/24.  He should continue checking his renal function panel but can now go every 2 weeks instead of weekly but would also recommend checking urine albumin to creatinine ratio and urine protein to creatinine ratio once with next lab check in 2 weeks.  Thank you.

## 2024-06-24 NOTE — PATIENT INSTRUCTIONS
Ultrasound lower extremities  Echocardiogram     Both to be done in next 1-2 weeks    Labs to be repeated in 3 months prior to next visit

## 2024-06-25 LAB
MISCELLANEOUS LAB TEST RESULT: NORMAL
MISCELLANEOUS LAB TEST RESULT: NORMAL

## 2024-07-07 ENCOUNTER — HOSPITAL ENCOUNTER (OUTPATIENT)
Dept: VASCULAR ULTRASOUND | Facility: HOSPITAL | Age: 69
Discharge: HOME | End: 2024-07-07
Attending: INTERNAL MEDICINE
Payer: COMMERCIAL

## 2024-07-07 DIAGNOSIS — M79.89 LOCALIZED SWELLING OF LOWER EXTREMITY: ICD-10-CM

## 2024-07-07 PROCEDURE — 93970 EXTREMITY STUDY: CPT

## 2024-07-07 PROCEDURE — 93970 EXTREMITY STUDY: CPT | Performed by: SURGERY

## 2024-07-13 ENCOUNTER — APPOINTMENT (OUTPATIENT)
Dept: LAB | Facility: CLINIC | Age: 69
End: 2024-07-13
Payer: COMMERCIAL

## 2024-07-13 DIAGNOSIS — N18.31 STAGE 3A CHRONIC KIDNEY DISEASE (HCC): ICD-10-CM

## 2024-07-13 LAB
ALBUMIN SERPL BCG-MCNC: 3.2 G/DL (ref 3.5–5)
ANION GAP SERPL CALCULATED.3IONS-SCNC: 5 MMOL/L (ref 4–13)
BUN SERPL-MCNC: 26 MG/DL (ref 5–25)
CALCIUM ALBUM COR SERPL-MCNC: 9.5 MG/DL (ref 8.3–10.1)
CALCIUM SERPL-MCNC: 8.9 MG/DL (ref 8.4–10.2)
CHLORIDE SERPL-SCNC: 112 MMOL/L (ref 96–108)
CO2 SERPL-SCNC: 24 MMOL/L (ref 21–32)
CREAT SERPL-MCNC: 1.13 MG/DL (ref 0.6–1.3)
GFR SERPL CREATININE-BSD FRML MDRD: 66 ML/MIN/1.73SQ M
GLUCOSE P FAST SERPL-MCNC: 100 MG/DL (ref 65–99)
PHOSPHATE SERPL-MCNC: 2.5 MG/DL (ref 2.3–4.1)
POTASSIUM SERPL-SCNC: 3.8 MMOL/L (ref 3.5–5.3)
SODIUM SERPL-SCNC: 141 MMOL/L (ref 135–147)

## 2024-07-13 PROCEDURE — 36415 COLL VENOUS BLD VENIPUNCTURE: CPT

## 2024-07-13 PROCEDURE — 80069 RENAL FUNCTION PANEL: CPT

## 2024-07-16 ENCOUNTER — TELEPHONE (OUTPATIENT)
Dept: NEPHROLOGY | Facility: CLINIC | Age: 69
End: 2024-07-16

## 2024-07-16 NOTE — TELEPHONE ENCOUNTER
LM for patient's daughter, Belinda, about the following, asked her to call back if she has any questions:    ----- Message from Vega Landry MD sent at 7/15/2024  9:41 AM EDT -----  Please let the patient or his grandson know that kidney function is stable and there has been no worsening.  He was also advised to repeat urine albumin to creatinine ratio and urine protein to creatinine ratio at this time which has not been done yet.  Can you please advise him to complete urine albumin to creatinine ratio and urine protein to creatinine ratio.  Thank you.

## 2024-07-18 ENCOUNTER — TELEPHONE (OUTPATIENT)
Dept: NEPHROLOGY | Facility: CLINIC | Age: 69
End: 2024-07-18

## 2024-07-18 NOTE — TELEPHONE ENCOUNTER
SHARMAINE for pt to r/s 8/27 appointment with [facility] due to change in provider schedule. Dr. Landry currently has one opening 7/25, and 2 on 8/22 if still available.

## 2024-07-26 ENCOUNTER — TELEPHONE (OUTPATIENT)
Dept: NEPHROLOGY | Facility: CLINIC | Age: 69
End: 2024-07-26

## 2024-07-26 NOTE — TELEPHONE ENCOUNTER
Called and spoke to the patient's grandson, Hemant, to relay the message above. Hemant expressed understanding and had no further questions or concerns at this time.

## 2024-07-26 NOTE — TELEPHONE ENCOUNTER
----- Message from Vega Landry MD sent at 7/26/2024  9:37 AM EDT -----  Regarding: Labs  Please remind patient or his daughter or his grandson that he should repeat renal function panel with urine albumin to creatinine ratio and urine protein to creatinine ratio week of 08/05.    [phone] ABIGAIL ROMERO

## 2024-07-27 ENCOUNTER — LAB (OUTPATIENT)
Dept: LAB | Facility: CLINIC | Age: 69
End: 2024-07-27
Payer: COMMERCIAL

## 2024-07-27 DIAGNOSIS — N20.0 NEPHROLITHIASIS: ICD-10-CM

## 2024-07-27 DIAGNOSIS — N18.30 BENIGN HYPERTENSION WITH CKD (CHRONIC KIDNEY DISEASE) STAGE III (HCC): ICD-10-CM

## 2024-07-27 DIAGNOSIS — N18.31 STAGE 3A CHRONIC KIDNEY DISEASE (HCC): ICD-10-CM

## 2024-07-27 DIAGNOSIS — R80.9 PROTEINURIA, UNSPECIFIED TYPE: ICD-10-CM

## 2024-07-27 DIAGNOSIS — I12.9 BENIGN HYPERTENSION WITH CKD (CHRONIC KIDNEY DISEASE) STAGE III (HCC): ICD-10-CM

## 2024-07-27 LAB
ALBUMIN SERPL BCG-MCNC: 3.1 G/DL (ref 3.5–5)
ANION GAP SERPL CALCULATED.3IONS-SCNC: 4 MMOL/L (ref 4–13)
BUN SERPL-MCNC: 26 MG/DL (ref 5–25)
CALCIUM ALBUM COR SERPL-MCNC: 9.9 MG/DL (ref 8.3–10.1)
CALCIUM SERPL-MCNC: 9.2 MG/DL (ref 8.4–10.2)
CHLORIDE SERPL-SCNC: 113 MMOL/L (ref 96–108)
CO2 SERPL-SCNC: 26 MMOL/L (ref 21–32)
CREAT SERPL-MCNC: 1.16 MG/DL (ref 0.6–1.3)
GFR SERPL CREATININE-BSD FRML MDRD: 64 ML/MIN/1.73SQ M
GLUCOSE P FAST SERPL-MCNC: 84 MG/DL (ref 65–99)
PHOSPHATE SERPL-MCNC: 2.3 MG/DL (ref 2.3–4.1)
POTASSIUM SERPL-SCNC: 3.7 MMOL/L (ref 3.5–5.3)
SODIUM SERPL-SCNC: 143 MMOL/L (ref 135–147)

## 2024-07-27 PROCEDURE — 80069 RENAL FUNCTION PANEL: CPT

## 2024-07-27 PROCEDURE — 36415 COLL VENOUS BLD VENIPUNCTURE: CPT

## 2024-07-29 ENCOUNTER — TELEPHONE (OUTPATIENT)
Dept: NEPHROLOGY | Facility: CLINIC | Age: 69
End: 2024-07-29

## 2024-07-29 NOTE — TELEPHONE ENCOUNTER
----- Message from Vega Landry MD sent at 7/27/2024 11:58 AM EDT -----  Please let the patient daughter or grandson Hemant know that kidney function remains stable without any worsening.  But I want him to complete urine albumin to creatinine ratio and urine protein to creatinine ratio also to follow-up on the protein in the urine.  Continue current medications as such.

## 2024-08-02 ENCOUNTER — HOSPITAL ENCOUNTER (OUTPATIENT)
Dept: NON INVASIVE DIAGNOSTICS | Facility: CLINIC | Age: 69
Discharge: HOME | End: 2024-08-02
Payer: COMMERCIAL

## 2024-08-02 VITALS
HEART RATE: 72 BPM | HEIGHT: 65 IN | BODY MASS INDEX: 31 KG/M2 | SYSTOLIC BLOOD PRESSURE: 138 MMHG | WEIGHT: 186.07 LBS | DIASTOLIC BLOOD PRESSURE: 76 MMHG

## 2024-08-02 DIAGNOSIS — M79.89 LOCALIZED SWELLING OF LOWER EXTREMITY: ICD-10-CM

## 2024-08-02 LAB
AORTIC ROOT: 2.6 CM
APICAL FOUR CHAMBER EJECTION FRACTION: 61 %
ASCENDING AORTA: 2.6 CM
AV LVOT MEAN GRADIENT: 2 MMHG
AV LVOT PEAK GRADIENT: 5 MMHG
AV REGURGITATION PRESSURE HALF TIME: 705 MS
BSA FOR ECHO PROCEDURE: 1.91 M2
DOP CALC LVOT PEAK VEL VTI: 23.37 CM
DOP CALC LVOT PEAK VEL: 1.16 M/S
E WAVE DECELERATION TIME: 187 MS
E/A RATIO: 0.79
FRACTIONAL SHORTENING: 23 (ref 28–44)
INTERVENTRICULAR SEPTUM IN DIASTOLE (PARASTERNAL SHORT AXIS VIEW): 1.1 CM
INTERVENTRICULAR SEPTUM: 1.1 CM (ref 0.6–1.1)
LAAS-AP2: 15.6 CM2
LAAS-AP4: 16.1 CM2
LEFT ATRIUM AREA SYSTOLE SINGLE PLANE A4C: 16.8 CM2
LEFT ATRIUM SIZE: 3 CM
LEFT ATRIUM VOLUME (MOD BIPLANE): 45 ML
LEFT ATRIUM VOLUME INDEX (MOD BIPLANE): 23.6 ML/M2
LEFT INTERNAL DIMENSION IN SYSTOLE: 4 CM (ref 2.1–4)
LEFT VENTRICULAR INTERNAL DIMENSION IN DIASTOLE: 5.2 CM (ref 3.5–6)
LEFT VENTRICULAR POSTERIOR WALL IN END DIASTOLE: 1.1 CM
LEFT VENTRICULAR STROKE VOLUME: 57 ML
LVSV (TEICH): 57 ML
MV E'TISSUE VEL-SEP: 7 CM/S
MV PEAK A VEL: 1.17 M/S
MV PEAK E VEL: 92 CM/S
MV STENOSIS PRESSURE HALF TIME: 54 MS
MV VALVE AREA P 1/2 METHOD: 4.07
RA PRESSURE ESTIMATED: 5 MMHG
RIGHT ATRIUM AREA SYSTOLE A4C: 10.9 CM2
RIGHT VENTRICLE ID DIMENSION: 2.9 CM
RV PSP: 31 MMHG
SL CV AV DECELERATION TIME RETROGRADE: 2433 MS
SL CV AV PEAK GRADIENT RETROGRADE: 75 MMHG
SL CV LEFT ATRIUM LENGTH A2C: 4.5 CM
SL CV LV EF: 60
SL CV PED ECHO LEFT VENTRICLE DIASTOLIC VOLUME (MOD BIPLANE) 2D: 127 ML
SL CV PED ECHO LEFT VENTRICLE SYSTOLIC VOLUME (MOD BIPLANE) 2D: 70 ML
TR MAX PG: 26 MMHG
TR PEAK VELOCITY: 2.6 M/S
TRICUSPID ANNULAR PLANE SYSTOLIC EXCURSION: 2.1 CM
TRICUSPID VALVE PEAK REGURGITATION VELOCITY: 2.55 M/S

## 2024-08-02 PROCEDURE — 93306 TTE W/DOPPLER COMPLETE: CPT | Performed by: INTERNAL MEDICINE

## 2024-08-02 PROCEDURE — 93306 TTE W/DOPPLER COMPLETE: CPT

## 2024-08-03 ENCOUNTER — LAB (OUTPATIENT)
Dept: LAB | Facility: CLINIC | Age: 69
End: 2024-08-03
Payer: COMMERCIAL

## 2024-08-03 DIAGNOSIS — D47.2 MONOCLONAL GAMMOPATHY OF RENAL SIGNIFICANCE (MGRS): ICD-10-CM

## 2024-08-03 DIAGNOSIS — I12.9 BENIGN HYPERTENSION WITH CKD (CHRONIC KIDNEY DISEASE) STAGE III (HCC): ICD-10-CM

## 2024-08-03 DIAGNOSIS — N18.30 BENIGN HYPERTENSION WITH CKD (CHRONIC KIDNEY DISEASE) STAGE III (HCC): ICD-10-CM

## 2024-08-03 DIAGNOSIS — R80.9 PROTEINURIA, UNSPECIFIED TYPE: ICD-10-CM

## 2024-08-03 DIAGNOSIS — N18.31 STAGE 3A CHRONIC KIDNEY DISEASE (HCC): ICD-10-CM

## 2024-08-03 DIAGNOSIS — N29 MONOCLONAL GAMMOPATHY OF RENAL SIGNIFICANCE (MGRS): ICD-10-CM

## 2024-08-03 LAB
CREAT UR-MCNC: 33.7 MG/DL
CREAT UR-MCNC: 35.2 MG/DL
MICROALBUMIN UR-MCNC: 793.1 MG/L
MICROALBUMIN/CREAT 24H UR: 2253 MG/G CREATININE (ref 0–30)
PROT UR-MCNC: 143 MG/DL
PROT/CREAT UR: 4.24 MG/G{CREAT} (ref 0–0.1)

## 2024-08-03 PROCEDURE — 82570 ASSAY OF URINE CREATININE: CPT

## 2024-08-03 PROCEDURE — 84156 ASSAY OF PROTEIN URINE: CPT

## 2024-08-03 PROCEDURE — 82043 UR ALBUMIN QUANTITATIVE: CPT

## 2024-08-10 ENCOUNTER — TELEPHONE (OUTPATIENT)
Dept: OTHER | Facility: HOSPITAL | Age: 69
End: 2024-08-10

## 2024-08-10 ENCOUNTER — APPOINTMENT (OUTPATIENT)
Dept: LAB | Facility: CLINIC | Age: 69
End: 2024-08-10
Payer: COMMERCIAL

## 2024-08-10 DIAGNOSIS — N29 MONOCLONAL GAMMOPATHY OF RENAL SIGNIFICANCE (MGRS): ICD-10-CM

## 2024-08-10 DIAGNOSIS — D47.2 MONOCLONAL GAMMOPATHY OF RENAL SIGNIFICANCE (MGRS): ICD-10-CM

## 2024-08-10 LAB
ALBUMIN SERPL BCG-MCNC: 3.1 G/DL (ref 3.5–5)
ANION GAP SERPL CALCULATED.3IONS-SCNC: 4 MMOL/L (ref 4–13)
BASOPHILS # BLD AUTO: 0.06 THOUSANDS/ÂΜL (ref 0–0.1)
BASOPHILS NFR BLD AUTO: 1 % (ref 0–1)
BUN SERPL-MCNC: 28 MG/DL (ref 5–25)
CALCIUM ALBUM COR SERPL-MCNC: 9.7 MG/DL (ref 8.3–10.1)
CALCIUM SERPL-MCNC: 9 MG/DL (ref 8.4–10.2)
CHLORIDE SERPL-SCNC: 114 MMOL/L (ref 96–108)
CO2 SERPL-SCNC: 24 MMOL/L (ref 21–32)
CREAT SERPL-MCNC: 1.1 MG/DL (ref 0.6–1.3)
EOSINOPHIL # BLD AUTO: 0.38 THOUSAND/ÂΜL (ref 0–0.61)
EOSINOPHIL NFR BLD AUTO: 6 % (ref 0–6)
ERYTHROCYTE [DISTWIDTH] IN BLOOD BY AUTOMATED COUNT: 13 % (ref 11.6–15.1)
GFR SERPL CREATININE-BSD FRML MDRD: 69 ML/MIN/1.73SQ M
GLUCOSE P FAST SERPL-MCNC: 99 MG/DL (ref 65–99)
HCT VFR BLD AUTO: 33.8 % (ref 36.5–49.3)
HGB BLD-MCNC: 10.9 G/DL (ref 12–17)
IMM GRANULOCYTES # BLD AUTO: 0.02 THOUSAND/UL (ref 0–0.2)
IMM GRANULOCYTES NFR BLD AUTO: 0 % (ref 0–2)
LYMPHOCYTES # BLD AUTO: 1.29 THOUSANDS/ÂΜL (ref 0.6–4.47)
LYMPHOCYTES NFR BLD AUTO: 20 % (ref 14–44)
MCH RBC QN AUTO: 29.9 PG (ref 26.8–34.3)
MCHC RBC AUTO-ENTMCNC: 32.2 G/DL (ref 31.4–37.4)
MCV RBC AUTO: 93 FL (ref 82–98)
MONOCYTES # BLD AUTO: 0.61 THOUSAND/ÂΜL (ref 0.17–1.22)
MONOCYTES NFR BLD AUTO: 10 % (ref 4–12)
NEUTROPHILS # BLD AUTO: 3.95 THOUSANDS/ÂΜL (ref 1.85–7.62)
NEUTS SEG NFR BLD AUTO: 63 % (ref 43–75)
NRBC BLD AUTO-RTO: 0 /100 WBCS
PHOSPHATE SERPL-MCNC: 2.4 MG/DL (ref 2.3–4.1)
PLATELET # BLD AUTO: 141 THOUSANDS/UL (ref 149–390)
PMV BLD AUTO: 12.8 FL (ref 8.9–12.7)
POTASSIUM SERPL-SCNC: 3.8 MMOL/L (ref 3.5–5.3)
RBC # BLD AUTO: 3.64 MILLION/UL (ref 3.88–5.62)
SODIUM SERPL-SCNC: 142 MMOL/L (ref 135–147)
WBC # BLD AUTO: 6.31 THOUSAND/UL (ref 4.31–10.16)

## 2024-08-10 PROCEDURE — 85025 COMPLETE CBC W/AUTO DIFF WBC: CPT

## 2024-08-10 PROCEDURE — 36415 COLL VENOUS BLD VENIPUNCTURE: CPT

## 2024-08-10 PROCEDURE — 80069 RENAL FUNCTION PANEL: CPT

## 2024-08-10 NOTE — TELEPHONE ENCOUNTER
Follow-up CKD.  Discussed with patient on the phone with help of his grandson.  No difficulty with urination.  No hematuria.  No dyspnea.  No facial swelling.  No upper extremity swelling.  Chronic right lower extremity swelling and mild left extremity swelling.  Serum creatinine remains stable.  No hypercalcemia.  Mild anemia and mild thrombocytopenia.  However albuminuria/proteinuria has worsened with UPCR of 4.2 g and UACR of 2.2 g.  Kidney biopsy was consistent with lambda light chain deposition disease of early and mild degree (06/2024).  Given worsening of proteinuria, will discuss with hematology if he would qualify for treatment for MGRS.    Lab Results   Component Value Date    SODIUM 142 08/10/2024    K 3.8 08/10/2024     (H) 08/10/2024    CO2 24 08/10/2024    BUN 28 (H) 08/10/2024    CREATININE 1.10 08/10/2024    GLUC 98 05/07/2024    CALCIUM 9.0 08/10/2024     Lab Results   Component Value Date    WBC 6.31 08/10/2024    HGB 10.9 (L) 08/10/2024    HCT 33.8 (L) 08/10/2024    MCV 93 08/10/2024     (L) 08/10/2024

## 2024-08-13 ENCOUNTER — TELEPHONE (OUTPATIENT)
Age: 69
End: 2024-08-13

## 2024-08-13 NOTE — TELEPHONE ENCOUNTER
Dr. Landry is asking to speak with Dr. Valle directly about this patient. He has tried messaging in secure chat and has not gotten any response yet from her

## 2024-08-19 DIAGNOSIS — D47.2 IGG LAMBDA MONOCLONAL GAMMOPATHY: Primary | ICD-10-CM

## 2024-08-24 DIAGNOSIS — D47.2 MONOCLONAL GAMMOPATHY OF RENAL SIGNIFICANCE (MGRS): ICD-10-CM

## 2024-08-24 DIAGNOSIS — N18.31 STAGE 3A CHRONIC KIDNEY DISEASE (HCC): Primary | ICD-10-CM

## 2024-08-24 DIAGNOSIS — N29 MONOCLONAL GAMMOPATHY OF RENAL SIGNIFICANCE (MGRS): ICD-10-CM

## 2024-09-10 ENCOUNTER — OFFICE VISIT (OUTPATIENT)
Dept: NEPHROLOGY | Facility: CLINIC | Age: 69
End: 2024-09-10
Payer: COMMERCIAL

## 2024-09-10 VITALS
WEIGHT: 187 LBS | SYSTOLIC BLOOD PRESSURE: 144 MMHG | HEART RATE: 90 BPM | HEIGHT: 65 IN | BODY MASS INDEX: 31.16 KG/M2 | DIASTOLIC BLOOD PRESSURE: 75 MMHG

## 2024-09-10 DIAGNOSIS — R80.9 NEPHROTIC RANGE PROTEINURIA: ICD-10-CM

## 2024-09-10 DIAGNOSIS — N18.31 STAGE 3A CHRONIC KIDNEY DISEASE (HCC): Primary | ICD-10-CM

## 2024-09-10 DIAGNOSIS — E55.9 VITAMIN D DEFICIENCY: ICD-10-CM

## 2024-09-10 DIAGNOSIS — D89.89 LAMBDA LIGHT CHAIN DEPOSITION DISEASE (HCC): ICD-10-CM

## 2024-09-10 DIAGNOSIS — N18.30 BENIGN HYPERTENSION WITH CKD (CHRONIC KIDNEY DISEASE) STAGE III (HCC): ICD-10-CM

## 2024-09-10 DIAGNOSIS — I12.9 BENIGN HYPERTENSION WITH CKD (CHRONIC KIDNEY DISEASE) STAGE III (HCC): ICD-10-CM

## 2024-09-10 PROCEDURE — 99214 OFFICE O/P EST MOD 30 MIN: CPT | Performed by: INTERNAL MEDICINE

## 2024-09-10 NOTE — H&P (VIEW-ONLY)
NEPHROLOGY OFFICE VISIT   Zack Partida 68 y.o. male MRN: [id number]  9/10/2024    Reason for Visit: Chronic kidney disease, MGRS    ASSESSMENT and PLAN:  It was a pleasure evaluating your patient in the office today. Thank you for allowing our team to participate in the care of Mr. Zack Partida. Please do not hesitate to contact our team if further issues/questions shall arise in the interim.     # Chronic Kidney Disease Stage III-A with nephrotic range proteinuria  - Etiology: Lambda light chain deposition disease  - Baseline Cr: 1.17 in 03/2022, 1.36 in 08/2023, 1.4 03/2024  - Urinalysis: 2+ protein, 1-2 RBC, 2-4 WBC 05/2024  - Proteinuria: UPCR 4.2 g 08/2024 increased from 1.7 g in 05/2024 increased from 530 mg per 24-hour in 03/2024 in [place], UACR 2200 mg/g 08/2024 increased from 893 mg/g 05/2024  - Imaging: Bilateral simple cysts, right kidney 9.9 cm/left kidney 10.9 cm/normal echogenicity 05/2024  -No hypocomplementemia, KATELIN/dsDNA negative  SPEP IgG lambda monoclonal immunoglobulin  UPEP IgG lambda monoclonal immunoglobulin  Serum KL ratio 0.04  - Kidney biopsy 05/2024: Lambda light chain deposition disease, early and mild/tubular atrophy and interstitial fibrosis, mild to moderate, arterial/arteriolosclerosis and hyalinosis mild  - Bone marrow biopsy 06/2024: Lambda light chain restricted plasma cell neoplasm (15-17%) compatible with plasma cell myeloma  - CT low-dose myeloma scan negative 05/2024  - Discussed with oncology regarding concern of worsening proteinuria and need for treatment for lambda light chain deposition disease  - Bone marrow biopsy has been reordered for further evaluation, this is scheduled in 10/2024  - We will try to get this as soon as possible so that a treatment plan can be made regarding treatment of MGRS  - Current Rx: Irbesartan, chlorthalidone  - Changes: Jardiance 10 mg daily for reduction in proteinuria  - Renal function panel/UACR/UPCR every 4 weeks  - Discussed risk  factor reduction to slow progression of chronic kidney disease  Intensive blood pressure control as below   Glycemic control, he is non-diabetic   Lifestyle modifications (weight loss/exercise)  Avoidance of NSAIDs     # Hypertension/Volume   - Goal BP <120/80 per KDIGO guidelines   - Volume status: Euvolemic  - Status: Blood pressure above goal  - Current antihypertensive regimen: Irbesartan 300 mg daily, amlodipine 5 mg daily chlorthalidone 25 mg daily  - Changes: Addition of Jardiance as above will help with further blood pressure control  - Patient was advised to maintain a blood pressure log at home     # Anemia and thrombocytopenia  - Target Hb: More than 10 g/dL  - Most recent hemoglobin: 10.9 g/dL 08/2024   - Most recent platelet count: 141 08/2024  - Defer management to hematology/oncology     # Electrolytes/Acid Base status   - Electrolytes and acid base status within normal limits     # CKD Mineral and Bone parameters   - Goal Ca 8.5-10 mg/dL, goal Phos 2.7-4.6 mg/dL, goal iPTH 30-70 pg/mL  - PTH 55.1 05/2020/calcium 9.0/phosphorus 2.4 08/2024 all at goal  - 25-hydroxy vitamin D level 16.1 status post ergocalciferol 50,000 units weekly for 8 doses  - Start cholecalciferol 1000 units daily    # Nephrolithiasis  - Kidney ultrasound and CT stone study showing 6 to 7 mm right renal stone without hydronephrosis  - Follows with urology    HPI:  Since last visit: He has been doing well.  He has intermittent right leg swelling.  No facial swelling.  No shortness of breath.    Zack Partida is a 68 y.o. male who has history of hypertension since 2013.  He tells me that he has history of kidney disease as a child but cannot tell the diagnosis.  He has history of nephrolithiasis.  He does not have diabetes.  He was in [place] in 03/2024 and workup revealed 24-hour urine protein of around 530 mg.     >> Major risk factors for CKD  - Diabetes: No  - Hypertension: Yes since 2013   - Age >= 55 years: Yes   - Family  "history of kidney disease: No  - Obesity or metabolic syndrome: Yes      >> Medical history evaluation   - Prior kidney disease or dialysis: Unknown kidney disease as a child   - Incidental hematuria in the past: Yes with kidney stones   - Urinary symptoms: No   - History of foamy or frothy urine: Nocturia x3-4  - History of nephrolithiasis: Yes   - Diseases that share risk factors with CKD: HTN  - Systemic diseases that might affect kidney: No  - History of use of medications that might affect renal function: No    PATIENT INSTRUCTIONS:    Patient Instructions   Continue all medications as such for blood pressure control including amlodipine, chlorthalidone and irbesartan.    Start Jardiance 10 mg daily for reduction of protein in the urine.    Check blood test and urine test every 4 weeks starting this Saturday.    We will try to expedite bone marrow biopsy.    Follow-up in the office in 3 months.      OBJECTIVE:  Current Weight: Weight - Scale: 84.8 kg (187 lb)  Vitals:    09/10/24 0819   BP: 144/75   BP Location: Left arm   Patient Position: Sitting   Cuff Size: Large   Pulse: 90   Weight: 84.8 kg (187 lb)   Height: 5' 4.5\" (1.638 m)    Body mass index is 31.6 kg/m².      REVIEW OF SYSTEMS:    Review of Systems   Constitutional:  Negative for chills and fever.   HENT:  Negative for ear pain and sore throat.    Eyes:  Negative for pain and visual disturbance.   Respiratory:  Negative for cough and shortness of breath.    Cardiovascular:  Negative for chest pain and palpitations.   Gastrointestinal:  Negative for abdominal pain and vomiting.   Genitourinary:  Negative for dysuria and hematuria.   Musculoskeletal:  Negative for arthralgias and back pain.   Skin:  Negative for color change and rash.   Neurological:  Negative for seizures and syncope.   All other systems reviewed and are negative.      Past Medical History:   Diagnosis Date    Hypertension     Polio        Past Surgical History:   Procedure " Laterality Date    IR BIOPSY BONE MARROW  6/11/2024    IR BIOPSY KIDNEY RANDOM  5/29/2024    LEG SURGERY      for polio       Family History   Problem Relation Age of Onset    Hypertension Mother     Diabetes Mother     Hypertension Father     Diabetes Father         Social History     Substance and Sexual Activity   Alcohol Use Never     Social History     Substance and Sexual Activity   Drug Use Never     Social History     Tobacco Use   Smoking Status Never   Smokeless Tobacco Never       PHYSICAL EXAM:      Physical Exam  Constitutional:       Appearance: Normal appearance.   HENT:      Head: Normocephalic and atraumatic.   Cardiovascular:      Rate and Rhythm: Normal rate and regular rhythm.      Pulses: Normal pulses.      Heart sounds: Normal heart sounds.   Pulmonary:      Effort: Pulmonary effort is normal.      Breath sounds: Normal breath sounds.   Abdominal:      Tenderness: There is no right CVA tenderness or left CVA tenderness.   Musculoskeletal:         General: Normal range of motion.      Right lower leg: Edema present.      Left lower leg: No edema.   Skin:     General: Skin is warm.   Neurological:      Mental Status: He is alert and oriented to person, place, and time. Mental status is at baseline.   Psychiatric:         Mood and Affect: Mood normal.         Medications:    Current Outpatient Medications:     amLODIPine (NORVASC) 5 mg tablet, Take 1 tablet (5 mg total) by mouth daily (Patient taking differently: Take 5 mg by mouth daily DAILY IN THE PM), Disp: 90 tablet, Rfl: 3    chlorthalidone 25 mg tablet, Take 0.5 tablets (12.5 mg total) by mouth daily (Patient taking differently: Take 25 mg by mouth daily), Disp: 45 tablet, Rfl: 3    Cholecalciferol (VITAMIN D3) 1,000 units tablet, Take 1 tablet (1,000 Units total) by mouth daily, Disp: 90 tablet, Rfl: 0    Empagliflozin (Jardiance) 10 MG TABS tablet, Take 1 tablet (10 mg total) by mouth every morning, Disp: 30 tablet, Rfl: 3    irbesartan  "(AVAPRO) 300 mg tablet, Take 300 mg by mouth daily DAILY IN THE AM, Disp: , Rfl:     atenolol (TENORMIN) 25 mg tablet, Take 1 tablet (25 mg total) by mouth 2 (two) times a day (Patient not taking: Reported on 5/7/2024), Disp: 180 tablet, Rfl: 0    ergocalciferol (ERGOCALCIFEROL) 1.25 MG (28330 UT) capsule, Take 1 capsule (50,000 Units total) by mouth once a week for 8 doses, Disp: 8 capsule, Rfl: 0    losartan (COZAAR) 100 MG tablet, Take 1 tablet (100 mg total) by mouth daily (Patient not taking: Reported on 5/7/2024), Disp: 90 tablet, Rfl: 0    Multiple Vitamin (multivitamin) capsule, Take 1 capsule by mouth daily (Patient not taking: Reported on 9/10/2024), Disp: , Rfl:     potassium chloride (Klor-Con M10) 10 mEq tablet, Take 1 tablet (10 mEq total) by mouth daily (Patient not taking: Reported on 9/10/2024), Disp: 30 tablet, Rfl: 3    tamsulosin (FLOMAX) 0.4 mg, Take 1 capsule (0.4 mg total) by mouth daily with dinner (Patient not taking: Reported on 9/10/2024), Disp: 90 capsule, Rfl: 3    Laboratory Results:        Invalid input(s): \"ALBUMIN\"    Results for orders placed or performed in visit on 08/03/24   Renal function panel   Result Value Ref Range    Albumin 3.1 (L) 3.5 - 5.0 g/dL    Calcium 9.0 8.4 - 10.2 mg/dL    Corrected Calcium 9.7 8.3 - 10.1 mg/dL    Phosphorus 2.4 2.3 - 4.1 mg/dL    BUN 28 (H) 5 - 25 mg/dL    Creatinine 1.10 0.60 - 1.30 mg/dL    Sodium 142 135 - 147 mmol/L    Potassium 3.8 3.5 - 5.3 mmol/L    Chloride 114 (H) 96 - 108 mmol/L    CO2 24 21 - 32 mmol/L    ANION GAP 4 4 - 13 mmol/L    eGFR 69 ml/min/1.73sq m    Glucose, Fasting 99 65 - 99 mg/dL         "

## 2024-09-10 NOTE — PROGRESS NOTES
NEPHROLOGY OFFICE VISIT   Zack Partida 68 y.o. male MRN: [id number]  9/10/2024    Reason for Visit: Chronic kidney disease, MGRS    ASSESSMENT and PLAN:  It was a pleasure evaluating your patient in the office today. Thank you for allowing our team to participate in the care of Mr. Zack Partida. Please do not hesitate to contact our team if further issues/questions shall arise in the interim.     # Chronic Kidney Disease Stage III-A with nephrotic range proteinuria  - Etiology: Lambda light chain deposition disease  - Baseline Cr: 1.17 in 03/2022, 1.36 in 08/2023, 1.4 03/2024  - Urinalysis: 2+ protein, 1-2 RBC, 2-4 WBC 05/2024  - Proteinuria: UPCR 4.2 g 08/2024 increased from 1.7 g in 05/2024 increased from 530 mg per 24-hour in 03/2024 in [place], UACR 2200 mg/g 08/2024 increased from 893 mg/g 05/2024  - Imaging: Bilateral simple cysts, right kidney 9.9 cm/left kidney 10.9 cm/normal echogenicity 05/2024  -No hypocomplementemia, KATELIN/dsDNA negative  SPEP IgG lambda monoclonal immunoglobulin  UPEP IgG lambda monoclonal immunoglobulin  Serum KL ratio 0.04  - Kidney biopsy 05/2024: Lambda light chain deposition disease, early and mild/tubular atrophy and interstitial fibrosis, mild to moderate, arterial/arteriolosclerosis and hyalinosis mild  - Bone marrow biopsy 06/2024: Lambda light chain restricted plasma cell neoplasm (15-17%) compatible with plasma cell myeloma  - CT low-dose myeloma scan negative 05/2024  - Discussed with oncology regarding concern of worsening proteinuria and need for treatment for lambda light chain deposition disease  - Bone marrow biopsy has been reordered for further evaluation, this is scheduled in 10/2024  - We will try to get this as soon as possible so that a treatment plan can be made regarding treatment of MGRS  - Current Rx: Irbesartan, chlorthalidone  - Changes: Jardiance 10 mg daily for reduction in proteinuria  - Renal function panel/UACR/UPCR every 4 weeks  - Discussed risk  factor reduction to slow progression of chronic kidney disease  Intensive blood pressure control as below   Glycemic control, he is non-diabetic   Lifestyle modifications (weight loss/exercise)  Avoidance of NSAIDs     # Hypertension/Volume   - Goal BP <120/80 per KDIGO guidelines   - Volume status: Euvolemic  - Status: Blood pressure above goal  - Current antihypertensive regimen: Irbesartan 300 mg daily, amlodipine 5 mg daily chlorthalidone 25 mg daily  - Changes: Addition of Jardiance as above will help with further blood pressure control  - Patient was advised to maintain a blood pressure log at home     # Anemia and thrombocytopenia  - Target Hb: More than 10 g/dL  - Most recent hemoglobin: 10.9 g/dL 08/2024   - Most recent platelet count: 141 08/2024  - Defer management to hematology/oncology     # Electrolytes/Acid Base status   - Electrolytes and acid base status within normal limits     # CKD Mineral and Bone parameters   - Goal Ca 8.5-10 mg/dL, goal Phos 2.7-4.6 mg/dL, goal iPTH 30-70 pg/mL  - PTH 55.1 05/2020/calcium 9.0/phosphorus 2.4 08/2024 all at goal  - 25-hydroxy vitamin D level 16.1 status post ergocalciferol 50,000 units weekly for 8 doses  - Start cholecalciferol 1000 units daily    # Nephrolithiasis  - Kidney ultrasound and CT stone study showing 6 to 7 mm right renal stone without hydronephrosis  - Follows with urology    HPI:  Since last visit: He has been doing well.  He has intermittent right leg swelling.  No facial swelling.  No shortness of breath.    Zack Partida is a 68 y.o. male who has history of hypertension since 2013.  He tells me that he has history of kidney disease as a child but cannot tell the diagnosis.  He has history of nephrolithiasis.  He does not have diabetes.  He was in [place] in 03/2024 and workup revealed 24-hour urine protein of around 530 mg.     >> Major risk factors for CKD  - Diabetes: No  - Hypertension: Yes since 2013   - Age ? 55 years: Yes   - Family  "history of kidney disease: No  - Obesity or metabolic syndrome: Yes      >> Medical history evaluation   - Prior kidney disease or dialysis: Unknown kidney disease as a child   - Incidental hematuria in the past: Yes with kidney stones   - Urinary symptoms: No   - History of foamy or frothy urine: Nocturia x3-4  - History of nephrolithiasis: Yes   - Diseases that share risk factors with CKD: HTN  - Systemic diseases that might affect kidney: No  - History of use of medications that might affect renal function: No    PATIENT INSTRUCTIONS:    Patient Instructions   Continue all medications as such for blood pressure control including amlodipine, chlorthalidone and irbesartan.    Start Jardiance 10 mg daily for reduction of protein in the urine.    Check blood test and urine test every 4 weeks starting this Saturday.    We will try to expedite bone marrow biopsy.    Follow-up in the office in 3 months.      OBJECTIVE:  Current Weight: Weight - Scale: 84.8 kg (187 lb)  Vitals:    09/10/24 0819   BP: 144/75   BP Location: Left arm   Patient Position: Sitting   Cuff Size: Large   Pulse: 90   Weight: 84.8 kg (187 lb)   Height: 5' 4.5\" (1.638 m)    Body mass index is 31.6 kg/m².      REVIEW OF SYSTEMS:    Review of Systems   Constitutional:  Negative for chills and fever.   HENT:  Negative for ear pain and sore throat.    Eyes:  Negative for pain and visual disturbance.   Respiratory:  Negative for cough and shortness of breath.    Cardiovascular:  Negative for chest pain and palpitations.   Gastrointestinal:  Negative for abdominal pain and vomiting.   Genitourinary:  Negative for dysuria and hematuria.   Musculoskeletal:  Negative for arthralgias and back pain.   Skin:  Negative for color change and rash.   Neurological:  Negative for seizures and syncope.   All other systems reviewed and are negative.      Past Medical History:   Diagnosis Date    Hypertension     Polio        Past Surgical History:   Procedure " Laterality Date    IR BIOPSY BONE MARROW  6/11/2024    IR BIOPSY KIDNEY RANDOM  5/29/2024    LEG SURGERY      for polio       Family History   Problem Relation Age of Onset    Hypertension Mother     Diabetes Mother     Hypertension Father     Diabetes Father         Social History     Substance and Sexual Activity   Alcohol Use Never     Social History     Substance and Sexual Activity   Drug Use Never     Social History     Tobacco Use   Smoking Status Never   Smokeless Tobacco Never       PHYSICAL EXAM:      Physical Exam  Constitutional:       Appearance: Normal appearance.   HENT:      Head: Normocephalic and atraumatic.   Cardiovascular:      Rate and Rhythm: Normal rate and regular rhythm.      Pulses: Normal pulses.      Heart sounds: Normal heart sounds.   Pulmonary:      Effort: Pulmonary effort is normal.      Breath sounds: Normal breath sounds.   Abdominal:      Tenderness: There is no right CVA tenderness or left CVA tenderness.   Musculoskeletal:         General: Normal range of motion.      Right lower leg: Edema present.      Left lower leg: No edema.   Skin:     General: Skin is warm.   Neurological:      Mental Status: He is alert and oriented to person, place, and time. Mental status is at baseline.   Psychiatric:         Mood and Affect: Mood normal.         Medications:    Current Outpatient Medications:     amLODIPine (NORVASC) 5 mg tablet, Take 1 tablet (5 mg total) by mouth daily (Patient taking differently: Take 5 mg by mouth daily DAILY IN THE PM), Disp: 90 tablet, Rfl: 3    chlorthalidone 25 mg tablet, Take 0.5 tablets (12.5 mg total) by mouth daily (Patient taking differently: Take 25 mg by mouth daily), Disp: 45 tablet, Rfl: 3    Cholecalciferol (VITAMIN D3) 1,000 units tablet, Take 1 tablet (1,000 Units total) by mouth daily, Disp: 90 tablet, Rfl: 0    Empagliflozin (Jardiance) 10 MG TABS tablet, Take 1 tablet (10 mg total) by mouth every morning, Disp: 30 tablet, Rfl: 3    irbesartan  "(AVAPRO) 300 mg tablet, Take 300 mg by mouth daily DAILY IN THE AM, Disp: , Rfl:     atenolol (TENORMIN) 25 mg tablet, Take 1 tablet (25 mg total) by mouth 2 (two) times a day (Patient not taking: Reported on 5/7/2024), Disp: 180 tablet, Rfl: 0    ergocalciferol (ERGOCALCIFEROL) 1.25 MG (97466 UT) capsule, Take 1 capsule (50,000 Units total) by mouth once a week for 8 doses, Disp: 8 capsule, Rfl: 0    losartan (COZAAR) 100 MG tablet, Take 1 tablet (100 mg total) by mouth daily (Patient not taking: Reported on 5/7/2024), Disp: 90 tablet, Rfl: 0    Multiple Vitamin (multivitamin) capsule, Take 1 capsule by mouth daily (Patient not taking: Reported on 9/10/2024), Disp: , Rfl:     potassium chloride (Klor-Con M10) 10 mEq tablet, Take 1 tablet (10 mEq total) by mouth daily (Patient not taking: Reported on 9/10/2024), Disp: 30 tablet, Rfl: 3    tamsulosin (FLOMAX) 0.4 mg, Take 1 capsule (0.4 mg total) by mouth daily with dinner (Patient not taking: Reported on 9/10/2024), Disp: 90 capsule, Rfl: 3    Laboratory Results:        Invalid input(s): \"ALBUMIN\"    Results for orders placed or performed in visit on 08/03/24   Renal function panel   Result Value Ref Range    Albumin 3.1 (L) 3.5 - 5.0 g/dL    Calcium 9.0 8.4 - 10.2 mg/dL    Corrected Calcium 9.7 8.3 - 10.1 mg/dL    Phosphorus 2.4 2.3 - 4.1 mg/dL    BUN 28 (H) 5 - 25 mg/dL    Creatinine 1.10 0.60 - 1.30 mg/dL    Sodium 142 135 - 147 mmol/L    Potassium 3.8 3.5 - 5.3 mmol/L    Chloride 114 (H) 96 - 108 mmol/L    CO2 24 21 - 32 mmol/L    ANION GAP 4 4 - 13 mmol/L    eGFR 69 ml/min/1.73sq m    Glucose, Fasting 99 65 - 99 mg/dL         "

## 2024-09-10 NOTE — PATIENT INSTRUCTIONS
Continue all medications as such for blood pressure control including amlodipine, chlorthalidone and irbesartan.    Start Jardiance 10 mg daily for reduction of protein in the urine.    Check blood test and urine test every 4 weeks starting this Saturday.    We will try to expedite bone marrow biopsy.    Follow-up in the office in 3 months.

## 2024-09-14 ENCOUNTER — LAB (OUTPATIENT)
Dept: LAB | Facility: CLINIC | Age: 69
End: 2024-09-14
Payer: COMMERCIAL

## 2024-09-14 DIAGNOSIS — I12.9 BENIGN HYPERTENSION WITH CKD (CHRONIC KIDNEY DISEASE) STAGE III (HCC): ICD-10-CM

## 2024-09-14 DIAGNOSIS — I10 ESSENTIAL HYPERTENSION: ICD-10-CM

## 2024-09-14 DIAGNOSIS — N18.30 STAGE 3 CHRONIC KIDNEY DISEASE, UNSPECIFIED WHETHER STAGE 3A OR 3B CKD (HCC): ICD-10-CM

## 2024-09-14 DIAGNOSIS — D89.89 LAMBDA LIGHT CHAIN DEPOSITION DISEASE (HCC): ICD-10-CM

## 2024-09-14 DIAGNOSIS — R80.9 NEPHROTIC RANGE PROTEINURIA: ICD-10-CM

## 2024-09-14 DIAGNOSIS — N29 MONOCLONAL GAMMOPATHY OF RENAL SIGNIFICANCE (MGRS): ICD-10-CM

## 2024-09-14 DIAGNOSIS — R41.3 AMNESIA: ICD-10-CM

## 2024-09-14 DIAGNOSIS — N18.31 STAGE 3A CHRONIC KIDNEY DISEASE (HCC): ICD-10-CM

## 2024-09-14 DIAGNOSIS — N18.30 BENIGN HYPERTENSION WITH CKD (CHRONIC KIDNEY DISEASE) STAGE III (HCC): ICD-10-CM

## 2024-09-14 DIAGNOSIS — D47.2 MONOCLONAL GAMMOPATHY OF RENAL SIGNIFICANCE (MGRS): ICD-10-CM

## 2024-09-14 LAB
ALBUMIN SERPL BCG-MCNC: 3.3 G/DL (ref 3.5–5)
ALP SERPL-CCNC: 78 U/L (ref 34–104)
ALT SERPL W P-5'-P-CCNC: 17 U/L (ref 7–52)
ANION GAP SERPL CALCULATED.3IONS-SCNC: 6 MMOL/L (ref 4–13)
AST SERPL W P-5'-P-CCNC: 15 U/L (ref 13–39)
BASOPHILS # BLD AUTO: 0.06 THOUSANDS/ΜL (ref 0–0.1)
BASOPHILS NFR BLD AUTO: 1 % (ref 0–1)
BILIRUB SERPL-MCNC: 0.26 MG/DL (ref 0.2–1)
BUN SERPL-MCNC: 39 MG/DL (ref 5–25)
CALCIUM ALBUM COR SERPL-MCNC: 9.9 MG/DL (ref 8.3–10.1)
CALCIUM SERPL-MCNC: 9.3 MG/DL (ref 8.4–10.2)
CHLORIDE SERPL-SCNC: 111 MMOL/L (ref 96–108)
CO2 SERPL-SCNC: 23 MMOL/L (ref 21–32)
CREAT SERPL-MCNC: 1.49 MG/DL (ref 0.6–1.3)
CREAT UR-MCNC: 53 MG/DL
CREAT UR-MCNC: 53.6 MG/DL
EOSINOPHIL # BLD AUTO: 0.4 THOUSAND/ΜL (ref 0–0.61)
EOSINOPHIL NFR BLD AUTO: 6 % (ref 0–6)
ERYTHROCYTE [DISTWIDTH] IN BLOOD BY AUTOMATED COUNT: 12.7 % (ref 11.6–15.1)
GFR SERPL CREATININE-BSD FRML MDRD: 47 ML/MIN/1.73SQ M
GLUCOSE P FAST SERPL-MCNC: 97 MG/DL (ref 65–99)
HCT VFR BLD AUTO: 34.5 % (ref 36.5–49.3)
HGB BLD-MCNC: 11.2 G/DL (ref 12–17)
IMM GRANULOCYTES # BLD AUTO: 0.02 THOUSAND/UL (ref 0–0.2)
IMM GRANULOCYTES NFR BLD AUTO: 0 % (ref 0–2)
LYMPHOCYTES # BLD AUTO: 1.53 THOUSANDS/ΜL (ref 0.6–4.47)
LYMPHOCYTES NFR BLD AUTO: 24 % (ref 14–44)
MCH RBC QN AUTO: 29.8 PG (ref 26.8–34.3)
MCHC RBC AUTO-ENTMCNC: 32.5 G/DL (ref 31.4–37.4)
MCV RBC AUTO: 92 FL (ref 82–98)
MICROALBUMIN UR-MCNC: 450.5 MG/L
MICROALBUMIN/CREAT 24H UR: 850 MG/G CREATININE (ref 0–30)
MONOCYTES # BLD AUTO: 0.54 THOUSAND/ΜL (ref 0.17–1.22)
MONOCYTES NFR BLD AUTO: 8 % (ref 4–12)
NEUTROPHILS # BLD AUTO: 3.91 THOUSANDS/ΜL (ref 1.85–7.62)
NEUTS SEG NFR BLD AUTO: 61 % (ref 43–75)
NRBC BLD AUTO-RTO: 0 /100 WBCS
PHOSPHATE SERPL-MCNC: 2.8 MG/DL (ref 2.3–4.1)
PLATELET # BLD AUTO: 149 THOUSANDS/UL (ref 149–390)
PMV BLD AUTO: 12.5 FL (ref 8.9–12.7)
POTASSIUM SERPL-SCNC: 3.9 MMOL/L (ref 3.5–5.3)
PROT SERPL-MCNC: 7.4 G/DL (ref 6.4–8.4)
PROT UR-MCNC: 97 MG/DL
PROT/CREAT UR: 1.8 MG/G{CREAT} (ref 0–0.1)
RBC # BLD AUTO: 3.76 MILLION/UL (ref 3.88–5.62)
SODIUM SERPL-SCNC: 140 MMOL/L (ref 135–147)
TREPONEMA PALLIDUM IGG+IGM AB [PRESENCE] IN SERUM OR PLASMA BY IMMUNOASSAY: NORMAL
TSH SERPL DL<=0.05 MIU/L-ACNC: 1.12 UIU/ML (ref 0.45–4.5)
WBC # BLD AUTO: 6.46 THOUSAND/UL (ref 4.31–10.16)

## 2024-09-14 PROCEDURE — 84443 ASSAY THYROID STIM HORMONE: CPT

## 2024-09-14 PROCEDURE — 82043 UR ALBUMIN QUANTITATIVE: CPT

## 2024-09-14 PROCEDURE — 36415 COLL VENOUS BLD VENIPUNCTURE: CPT

## 2024-09-14 PROCEDURE — 84156 ASSAY OF PROTEIN URINE: CPT

## 2024-09-14 PROCEDURE — 82570 ASSAY OF URINE CREATININE: CPT

## 2024-09-14 PROCEDURE — 84100 ASSAY OF PHOSPHORUS: CPT

## 2024-09-14 PROCEDURE — 80053 COMPREHEN METABOLIC PANEL: CPT

## 2024-09-14 PROCEDURE — 85025 COMPLETE CBC W/AUTO DIFF WBC: CPT

## 2024-09-14 PROCEDURE — 86780 TREPONEMA PALLIDUM: CPT

## 2024-09-18 DIAGNOSIS — D47.2 IGG LAMBDA MONOCLONAL GAMMOPATHY: Primary | ICD-10-CM

## 2024-09-20 ENCOUNTER — TELEPHONE (OUTPATIENT)
Age: 69
End: 2024-09-20

## 2024-09-20 NOTE — TELEPHONE ENCOUNTER
I canceled the duplicate order. Will keep bmbx as scheduled for 10/4. Called IR to make them aware.

## 2024-09-20 NOTE — TELEPHONE ENCOUNTER
Laverne from IR called because this patient is already scheduled for an appt with them on 10/4/24 in [place], but then she just got another order for the patient to have it done in the next 7 days.  She wants to know which order they needs to follow.  Please call IR back to advice at [phone]

## 2024-09-21 ENCOUNTER — OFFICE VISIT (OUTPATIENT)
Dept: OBGYN CLINIC | Facility: CLINIC | Age: 69
End: 2024-09-21
Payer: COMMERCIAL

## 2024-09-21 VITALS — HEIGHT: 64 IN | BODY MASS INDEX: 31.92 KG/M2 | WEIGHT: 187 LBS

## 2024-09-21 DIAGNOSIS — Z86.12 HISTORY OF POLIOMYELITIS: Primary | ICD-10-CM

## 2024-09-21 DIAGNOSIS — R26.2 DIFFICULTY WALKING: ICD-10-CM

## 2024-09-21 PROCEDURE — 99202 OFFICE O/P NEW SF 15 MIN: CPT | Performed by: PHYSICIAN ASSISTANT

## 2024-09-21 NOTE — PROGRESS NOTES
Assessment & Plan   Diagnoses and all orders for this visit:    History of poliomyelitis    Difficulty walking  -  Needs new AFO brace  -  Order placed.  He will f/u with [facility] to have it made. Contact info given          Subjective   Patient ID: Zack Partida is a 68 y.o. male.    There were no vitals filed for this visit.  69yo male comes in for an evaluation.  He has a history of polio affecting the right lower leg and wears an AFO brace.  He states the brace is loud when he walks and he does not think it will hold up much longer.  He requests an rx for a new brace.  No injury or other new complaint.        The following portions of the patient's history were reviewed and updated as appropriate: allergies, current medications, past family history, past medical history, past social history, past surgical history, and problem list.    Review of Systems  Ortho Exam  Past Medical History:   Diagnosis Date    Hypertension     Polio      Past Surgical History:   Procedure Laterality Date    IR BIOPSY BONE MARROW  6/11/2024    IR BIOPSY KIDNEY RANDOM  5/29/2024    LEG SURGERY      for polio     Family History   Problem Relation Age of Onset    Hypertension Mother     Diabetes Mother     Hypertension Father     Diabetes Father      Social History     Occupational History    Not on file   Tobacco Use    Smoking status: Never    Smokeless tobacco: Never   Vaping Use    Vaping status: Never Used   Substance and Sexual Activity    Alcohol use: Never    Drug use: Never    Sexual activity: Not on file       Review of Systems   Constitutional: Negative.    HENT: Negative.    Eyes: Negative.    Respiratory: Negative.    Cardiovascular: Negative.    Gastrointestinal: Negative.    Endocrine: Negative.    Genitourinary: Negative.    Musculoskeletal: As below..   Allergic/Immunologic: Negative.    Neurological: Negative.    Hematological: Negative.    Psychiatric/Behavioral: Negative.          Objective   Physical  Exam      Xray:         Constitutional: Awake, Alert, Oriented  Eyes: EOMI  Psych: Mood and affect appropriate  Heart: regular rate   Lungs: No audible wheezing  Abdomen: No guarding  Lymph: no lymphedema            right ankle brace:  Appearance  This is a brace with two metal poles that attach to the exterior of a stiff boot.  It makes creaking and squeaking noises when he walks.

## 2024-10-04 ENCOUNTER — HOSPITAL ENCOUNTER (OUTPATIENT)
Dept: CT IMAGING | Facility: HOSPITAL | Age: 69
Discharge: HOME | End: 2024-10-04
Attending: INTERNAL MEDICINE
Payer: COMMERCIAL

## 2024-10-04 VITALS
OXYGEN SATURATION: 98 % | SYSTOLIC BLOOD PRESSURE: 137 MMHG | WEIGHT: 187 LBS | DIASTOLIC BLOOD PRESSURE: 76 MMHG | RESPIRATION RATE: 18 BRPM | HEIGHT: 64 IN | BODY MASS INDEX: 31.92 KG/M2 | HEART RATE: 63 BPM | TEMPERATURE: 96 F

## 2024-10-04 DIAGNOSIS — D47.2 IGG LAMBDA MONOCLONAL GAMMOPATHY: ICD-10-CM

## 2024-10-04 LAB
ERYTHROCYTE [DISTWIDTH] IN BLOOD BY AUTOMATED COUNT: 12.8 % (ref 11.6–15.1)
HCT VFR BLD AUTO: 34.7 % (ref 36.5–49.3)
HGB BLD-MCNC: 11.2 G/DL (ref 12–17)
MCH RBC QN AUTO: 29.9 PG (ref 26.8–34.3)
MCHC RBC AUTO-ENTMCNC: 32.3 G/DL (ref 31.4–37.4)
MCV RBC AUTO: 93 FL (ref 82–98)
NRBC BLD AUTO-RTO: 0 /100 WBCS
PLATELET # BLD AUTO: 165 THOUSANDS/UL (ref 149–390)
PMV BLD AUTO: 12.2 FL (ref 8.9–12.7)
RBC # BLD AUTO: 3.74 MILLION/UL (ref 3.88–5.62)
WBC # BLD AUTO: 7.48 THOUSAND/UL (ref 4.31–10.16)

## 2024-10-04 PROCEDURE — 77012 CT SCAN FOR NEEDLE BIOPSY: CPT

## 2024-10-04 PROCEDURE — 88185 FLOWCYTOMETRY/TC ADD-ON: CPT | Performed by: INTERNAL MEDICINE

## 2024-10-04 PROCEDURE — 99153 MOD SED SAME PHYS/QHP EA: CPT

## 2024-10-04 PROCEDURE — 99152 MOD SED SAME PHYS/QHP 5/>YRS: CPT

## 2024-10-04 PROCEDURE — C1830 POWER BONE MARROW BX NEEDLE: HCPCS

## 2024-10-04 PROCEDURE — 88264 CHROMOSOME ANALYSIS 20-25: CPT | Performed by: INTERNAL MEDICINE

## 2024-10-04 PROCEDURE — 88184 FLOWCYTOMETRY/ TC 1 MARKER: CPT | Performed by: INTERNAL MEDICINE

## 2024-10-04 PROCEDURE — 88237 TISSUE CULTURE BONE MARROW: CPT | Performed by: INTERNAL MEDICINE

## 2024-10-04 RX ORDER — FENTANYL CITRATE 50 UG/ML
INJECTION, SOLUTION INTRAMUSCULAR; INTRAVENOUS AS NEEDED
Status: COMPLETED | OUTPATIENT
Start: 2024-10-04 | End: 2024-10-04

## 2024-10-04 RX ORDER — MIDAZOLAM HYDROCHLORIDE 2 MG/2ML
INJECTION, SOLUTION INTRAMUSCULAR; INTRAVENOUS AS NEEDED
Status: COMPLETED | OUTPATIENT
Start: 2024-10-04 | End: 2024-10-04

## 2024-10-04 RX ORDER — SODIUM CHLORIDE 9 MG/ML
75 INJECTION, SOLUTION INTRAVENOUS CONTINUOUS
Status: DISCONTINUED | OUTPATIENT
Start: 2024-10-04 | End: 2024-10-05 | Stop reason: HOSPADM

## 2024-10-04 RX ADMIN — FENTANYL CITRATE 50 MCG: 50 INJECTION INTRAMUSCULAR; INTRAVENOUS at 09:11

## 2024-10-04 RX ADMIN — FENTANYL CITRATE 50 MCG: 50 INJECTION INTRAMUSCULAR; INTRAVENOUS at 09:23

## 2024-10-04 RX ADMIN — MIDAZOLAM 1 MG: 1 INJECTION INTRAMUSCULAR; INTRAVENOUS at 09:10

## 2024-10-04 RX ADMIN — MIDAZOLAM 1 MG: 1 INJECTION INTRAMUSCULAR; INTRAVENOUS at 09:22

## 2024-10-04 NOTE — INTERVAL H&P NOTE
"The history and physical were reviewed, along with progress notes, and the patient was examined. There are no changes since it was written.    /77   Pulse 72   Temp (!) 96.3 °F (35.7 °C) (Temporal)   Resp 18   Ht 5' 4\" (1.626 m)   Wt 84.8 kg (187 lb)   SpO2 100%   BMI 32.10 kg/m²          Marilia Deng MD/October 4, 2024/8:46 AM  "

## 2024-10-04 NOTE — SEDATION DOCUMENTATION
Bone marrow biopsy completed by Dr. Deng. Specimen sent to lab. Band-aid to right iliac site. Patient tolerated well.

## 2024-10-04 NOTE — DISCHARGE INSTRUCTIONS
Bone Marrow Biopsy     WHAT YOU NEED TO KNOW:   A bone marrow biopsy is a procedure to remove a small amount of bone marrow from your bone. Bone marrow is the soft tissue inside your bone that helps to make blood cells. The sample is tested for disease or infection.    DISCHARGE INSTRUCTIONS:     1. Limit your activities day of biopsy as directed by your doctor.    2. Use medication as ordered.    3. Return to your normal diet.Small sips of flat soda will help with nausea.    4. Remove band-aid or dressing 24 hours after procedure.    Contact Interventional Radiology at [phone] (DONNELL PATIENTS: Contact Interventional Radiology at [phone]) ([facility]OMAS PATIENTS: Contact Interventional Radiology at [phone]) if:    1. Difficulty breathing, nausea or vomiting.    2. Chills or fever above 101 F.    3. Pain at biopsy site not relieved by medication.    4. Develop any redness, swelling, heat, unusual drainage, heavy bruising or bleeding from biopsy site.            Moderate Sedation   WHAT YOU NEED TO KNOW:   Moderate sedation, or conscious sedation, is medicine used during procedures to help you feel relaxed and calm. You will be awake and able to follow directions without anxiety or pain. You will remember little to none of the procedure. You may feel tired, weak, or unsteady on your feet after you get sedation. You may also have trouble concentrating or short-term memory loss. These symptoms should go away in 24 hours or less.   DISCHARGE INSTRUCTIONS:   Call 911 or have someone else call for any of the following:   You have sudden trouble breathing.     You cannot be woken.  Seek care immediately if:   You have a severe headache or dizziness.     Your heart is beating faster than usual.  Contact your healthcare provider if:   You have a fever.     You have nausea or are vomiting for more than 8 hours after the procedure.      Your skin is itchy, swollen, or you have a rash.     You have questions or concerns  about your condition or care.  Self-care:   Have someone stay with you for 24 hours. This person can drive you to errands and help you do things around the house. This person can also watch for problems.      Rest and do quiet activities for 24 hours. Do not exercise, ride a bike, or play sports. Stand up slowly to prevent dizziness and falls. Take short walks around the house with another person. Slowly return to your usual activities the next day.      Do not drive or use dangerous machines or tools for 24 hours. You may injure yourself or others. Examples include a lawnmower, saw, or drill. Do not return to work for 24 hours if you use dangerous machines or tools for work.      Do not make important decisions for 24 hours. For example, do not sign important papers or invest money.      Drink liquids as directed. Liquids help flush the sedation medicine out of your body. Ask how much liquid to drink each day and which liquids are best for you.      Eat small, frequent meals to prevent nausea and vomiting. Start with clear liquids such as juice or broth. If you do not vomit after clear liquids, you can eat your usual foods.      Do not drink alcohol or take medicines that make you drowsy. This includes medicines that help you sleep and anxiety medicines. Ask your healthcare provider if it is safe for you to take pain medicine.  Follow up with your healthcare provider as directed: Write down your questions so you remember to ask them during your visits.   © 2017 [employer] Information is for End User's use only and may not be sold, redistributed or otherwise used for commercial purposes. All illustrations and images included in CareNotes® are the copyrighted property of [employer].D.A[employer] or [employer].  The above information is an  only. It is not intended as medical advice for individual conditions or treatments. Talk to your doctor, nurse or pharmacist before following any  medical regimen to see if it is safe and effective for you.

## 2024-10-04 NOTE — BRIEF OP NOTE (RAD/CATH)
INTERVENTIONAL RADIOLOGY PROCEDURE NOTE    Date: 10/4/2024    Procedure:   Procedure Summary       Date: 10/04/24 Room / Location: [facility] CAT Scan    Anesthesia Start:  Anesthesia Stop:     Procedure: IR BIOPSY BONE MARROW Diagnosis:       IgG lambda monoclonal gammopathy      (IgG lambda monoclonal gammopathy)    Scheduled Providers:  Responsible Provider:     Anesthesia Type: Not recorded ASA Status: Not recorded            Preoperative diagnosis:   1. IgG lambda monoclonal gammopathy         Postoperative diagnosis: Same.    Surgeon: Marilia Deng MD     Assistant: None. No qualified resident was available.    Blood loss: 6 mL    Specimens: 5 mL marrow, 1 bone core.    Findings: Successful bone marrow biopsy obtained from right ilium under CT guidance.    Complications: None immediate.    Anesthesia: conscious sedation

## 2024-10-09 LAB — SCAN RESULT: NORMAL

## 2024-10-14 LAB
MISCELLANEOUS LAB TEST RESULT: NORMAL
MISCELLANEOUS LAB TEST RESULT: NORMAL

## 2024-10-15 ENCOUNTER — TELEPHONE (OUTPATIENT)
Dept: OTHER | Facility: HOSPITAL | Age: 69
End: 2024-10-15

## 2024-10-15 ENCOUNTER — TELEPHONE (OUTPATIENT)
Dept: HEMATOLOGY ONCOLOGY | Facility: CLINIC | Age: 69
End: 2024-10-15

## 2024-10-15 DIAGNOSIS — D47.2 IGG LAMBDA MONOCLONAL GAMMOPATHY: Primary | ICD-10-CM

## 2024-10-15 NOTE — TELEPHONE ENCOUNTER
Follow-up monoclonal gammopathy of renal significance:    SPEP IgG lambda monoclonal immunoglobulin  UPEP IgG lambda monoclonal immunoglobulin  Serum KL ratio 0.04  - Kidney biopsy 05/2024: Lambda light chain deposition disease, early and mild/tubular atrophy and interstitial fibrosis, mild to moderate, arterial/arteriolosclerosis and hyalinosis mild  - Bone marrow biopsy 06/2024: Lambda light chain restricted plasma cell neoplasm (15-17%) compatible with plasma cell myeloma  - CT low-dose myeloma scan negative 05/2024    Repeat bone marrow biopsy 10/2024: Prelim 30% plasma cells, final report pending    Will reach out to oncology regarding further steps especially in light of rising creatinine.

## 2024-10-15 NOTE — TELEPHONE ENCOUNTER
I spoke with the patient's nephrologist Dr. Miranda.  Bone marrow biopsy is showing an increasing amount of plasma cells.  Creatinine is starting to increase.  He was nervous to let the patient go any longer without treatment.  His oncologist, Dr. Valle, is out of the office until 10/28.  I will bring the patient in to get him started on treatment this Friday.  He also I am going to put in a referral to surgical oncology for a fat pad biopsy to make sure this is not amyloidosis.  [place] red on both bone marrow biopsies was negative.  Patient's primary contact should be his grandson Hemant.  His number is [phone].  He is requesting to be called for appointments between 10 and 2 PM.

## 2024-10-16 LAB
BASOPHILS # BLD AUTO: 0.07 THOUSAND/UL (ref 0–0.1)
BASOPHILS NFR MAR MANUAL: 1 % (ref 0–1)
EOSINOPHIL # BLD AUTO: 0.3 THOUSAND/UL (ref 0–0.61)
EOSINOPHIL NFR BLD MANUAL: 4 % (ref 0–6)
LYMPHOCYTES # BLD AUTO: 1.94 THOUSAND/UL (ref 0.6–4.47)
LYMPHOCYTES # BLD AUTO: 21 %
MONOCYTES # BLD AUTO: 0.3 THOUSAND/UL (ref 0–1.22)
MONOCYTES NFR BLD AUTO: 4 % (ref 4–12)
NEUTS SEG # BLD: 4.86 THOUSAND/UL (ref 1.81–6.82)
NEUTS SEG NFR BLD AUTO: 65 %
PATHOLOGY REVIEW: YES
PLATELET BLD QL SMEAR: ADEQUATE
RBC MORPH BLD: NORMAL
TOTAL CELLS COUNTED SPEC: 100
VARIANT LYMPHS # BLD AUTO: 5 % (ref 0–0)

## 2024-10-16 NOTE — TELEPHONE ENCOUNTER
I put patient on the schedule for Friday at 1120 per Dr. Flores  Can you just confirm with grandson?

## 2024-10-17 PROBLEM — D47.2 SMOLDERING MYELOMA: Status: ACTIVE | Noted: 2024-10-17

## 2024-10-17 PROBLEM — D47.2 IGG LAMBDA MONOCLONAL GAMMOPATHY: Status: ACTIVE | Noted: 2024-10-17

## 2024-10-18 ENCOUNTER — TELEPHONE (OUTPATIENT)
Dept: HEMATOLOGY ONCOLOGY | Facility: CLINIC | Age: 69
End: 2024-10-18

## 2024-10-18 ENCOUNTER — APPOINTMENT (OUTPATIENT)
Dept: LAB | Facility: CLINIC | Age: 69
End: 2024-10-18
Payer: COMMERCIAL

## 2024-10-18 ENCOUNTER — OFFICE VISIT (OUTPATIENT)
Dept: HEMATOLOGY ONCOLOGY | Facility: CLINIC | Age: 69
End: 2024-10-18
Payer: COMMERCIAL

## 2024-10-18 ENCOUNTER — OFFICE VISIT (OUTPATIENT)
Dept: SURGICAL ONCOLOGY | Facility: CLINIC | Age: 69
End: 2024-10-18
Payer: COMMERCIAL

## 2024-10-18 VITALS
OXYGEN SATURATION: 99 % | DIASTOLIC BLOOD PRESSURE: 74 MMHG | TEMPERATURE: 98 F | SYSTOLIC BLOOD PRESSURE: 120 MMHG | WEIGHT: 183.8 LBS | HEART RATE: 94 BPM | HEIGHT: 64 IN | BODY MASS INDEX: 31.38 KG/M2 | RESPIRATION RATE: 17 BRPM

## 2024-10-18 VITALS
BODY MASS INDEX: 29.88 KG/M2 | DIASTOLIC BLOOD PRESSURE: 74 MMHG | HEART RATE: 94 BPM | OXYGEN SATURATION: 99 % | WEIGHT: 175 LBS | SYSTOLIC BLOOD PRESSURE: 120 MMHG | HEIGHT: 64 IN | TEMPERATURE: 97.5 F | RESPIRATION RATE: 18 BRPM

## 2024-10-18 DIAGNOSIS — D47.2 IGG LAMBDA MONOCLONAL GAMMOPATHY: ICD-10-CM

## 2024-10-18 DIAGNOSIS — D47.2 SMOLDERING MYELOMA: Primary | ICD-10-CM

## 2024-10-18 DIAGNOSIS — C90.00 MULTIPLE MYELOMA NOT HAVING ACHIEVED REMISSION (HCC): ICD-10-CM

## 2024-10-18 DIAGNOSIS — C90.00 MULTIPLE MYELOMA NOT HAVING ACHIEVED REMISSION (HCC): Primary | ICD-10-CM

## 2024-10-18 LAB
ALBUMIN SERPL BCG-MCNC: 3.3 G/DL (ref 3.5–5)
ALP SERPL-CCNC: 74 U/L (ref 34–104)
ALT SERPL W P-5'-P-CCNC: 18 U/L (ref 7–52)
ANION GAP SERPL CALCULATED.3IONS-SCNC: 5 MMOL/L (ref 4–13)
AST SERPL W P-5'-P-CCNC: 14 U/L (ref 13–39)
BASOPHILS # BLD AUTO: 0.06 THOUSANDS/ΜL (ref 0–0.1)
BASOPHILS NFR BLD AUTO: 1 % (ref 0–1)
BILIRUB SERPL-MCNC: 0.25 MG/DL (ref 0.2–1)
BUN SERPL-MCNC: 48 MG/DL (ref 5–25)
CALCIUM ALBUM COR SERPL-MCNC: 10.1 MG/DL (ref 8.3–10.1)
CALCIUM SERPL-MCNC: 9.5 MG/DL (ref 8.4–10.2)
CHLORIDE SERPL-SCNC: 113 MMOL/L (ref 96–108)
CO2 SERPL-SCNC: 23 MMOL/L (ref 21–32)
CREAT SERPL-MCNC: 1.75 MG/DL (ref 0.6–1.3)
EOSINOPHIL # BLD AUTO: 0.38 THOUSAND/ΜL (ref 0–0.61)
EOSINOPHIL NFR BLD AUTO: 5 % (ref 0–6)
ERYTHROCYTE [DISTWIDTH] IN BLOOD BY AUTOMATED COUNT: 13.1 % (ref 11.6–15.1)
GFR SERPL CREATININE-BSD FRML MDRD: 39 ML/MIN/1.73SQ M
GLUCOSE SERPL-MCNC: 71 MG/DL (ref 65–140)
HCT VFR BLD AUTO: 34.1 % (ref 36.5–49.3)
HGB BLD-MCNC: 10.8 G/DL (ref 12–17)
IGA SERPL-MCNC: 35 MG/DL (ref 66–433)
IGG SERPL-MCNC: 2420 MG/DL (ref 635–1741)
IGM SERPL-MCNC: 24 MG/DL (ref 45–281)
IMM GRANULOCYTES # BLD AUTO: 0.02 THOUSAND/UL (ref 0–0.2)
IMM GRANULOCYTES NFR BLD AUTO: 0 % (ref 0–2)
LYMPHOCYTES # BLD AUTO: 1.71 THOUSANDS/ΜL (ref 0.6–4.47)
LYMPHOCYTES NFR BLD AUTO: 22 % (ref 14–44)
MCH RBC QN AUTO: 29.4 PG (ref 26.8–34.3)
MCHC RBC AUTO-ENTMCNC: 31.7 G/DL (ref 31.4–37.4)
MCV RBC AUTO: 93 FL (ref 82–98)
MONOCYTES # BLD AUTO: 0.75 THOUSAND/ΜL (ref 0.17–1.22)
MONOCYTES NFR BLD AUTO: 10 % (ref 4–12)
NEUTROPHILS # BLD AUTO: 4.77 THOUSANDS/ΜL (ref 1.85–7.62)
NEUTS SEG NFR BLD AUTO: 62 % (ref 43–75)
NRBC BLD AUTO-RTO: 0 /100 WBCS
PLATELET # BLD AUTO: 156 THOUSANDS/UL (ref 149–390)
PMV BLD AUTO: 12.5 FL (ref 8.9–12.7)
POTASSIUM SERPL-SCNC: 4.3 MMOL/L (ref 3.5–5.3)
PROT SERPL-MCNC: 7.7 G/DL (ref 6.4–8.4)
RBC # BLD AUTO: 3.67 MILLION/UL (ref 3.88–5.62)
SODIUM SERPL-SCNC: 141 MMOL/L (ref 135–147)
WBC # BLD AUTO: 7.69 THOUSAND/UL (ref 4.31–10.16)

## 2024-10-18 PROCEDURE — 99203 OFFICE O/P NEW LOW 30 MIN: CPT | Performed by: SURGERY

## 2024-10-18 PROCEDURE — 84165 PROTEIN E-PHORESIS SERUM: CPT

## 2024-10-18 PROCEDURE — 99215 OFFICE O/P EST HI 40 MIN: CPT | Performed by: INTERNAL MEDICINE

## 2024-10-18 PROCEDURE — 80053 COMPREHEN METABOLIC PANEL: CPT

## 2024-10-18 PROCEDURE — 85025 COMPLETE CBC W/AUTO DIFF WBC: CPT

## 2024-10-18 PROCEDURE — 82784 ASSAY IGA/IGD/IGG/IGM EACH: CPT

## 2024-10-18 PROCEDURE — 83521 IG LIGHT CHAINS FREE EACH: CPT

## 2024-10-18 PROCEDURE — 36415 COLL VENOUS BLD VENIPUNCTURE: CPT

## 2024-10-18 RX ORDER — VALACYCLOVIR HYDROCHLORIDE 1 G/1
TABLET, FILM COATED ORAL
COMMUNITY
End: 2024-10-18

## 2024-10-18 RX ORDER — SODIUM CHLORIDE 9 MG/ML
20 INJECTION, SOLUTION INTRAVENOUS ONCE
OUTPATIENT
Start: 2024-10-25

## 2024-10-18 RX ORDER — ACETAMINOPHEN 325 MG/1
650 TABLET ORAL ONCE
OUTPATIENT
Start: 2024-10-18

## 2024-10-18 RX ORDER — DIPHENHYDRAMINE HCL 25 MG
25 TABLET ORAL ONCE
OUTPATIENT
Start: 2024-11-01

## 2024-10-18 RX ORDER — ACETAMINOPHEN 325 MG/1
650 TABLET ORAL ONCE
OUTPATIENT
Start: 2024-10-25

## 2024-10-18 RX ORDER — ACETAMINOPHEN 325 MG/1
650 TABLET ORAL ONCE
OUTPATIENT
Start: 2024-11-01

## 2024-10-18 RX ORDER — CEFAZOLIN SODIUM 1 G/50ML
1000 SOLUTION INTRAVENOUS ONCE
OUTPATIENT
Start: 2024-10-18 | End: 2024-10-18

## 2024-10-18 RX ORDER — DEXAMETHASONE 4 MG/1
20 TABLET ORAL ONCE
OUTPATIENT
Start: 2024-11-01

## 2024-10-18 RX ORDER — SODIUM CHLORIDE 9 MG/ML
20 INJECTION, SOLUTION INTRAVENOUS ONCE
OUTPATIENT
Start: 2024-10-18

## 2024-10-18 RX ORDER — OXYCODONE HYDROCHLORIDE 5 MG/1
5 TABLET ORAL EVERY 4 HOURS PRN
Qty: 10 TABLET | Refills: 0 | Status: SHIPPED | OUTPATIENT
Start: 2024-10-18

## 2024-10-18 RX ORDER — ACYCLOVIR 400 MG/1
400 TABLET ORAL 2 TIMES DAILY
Qty: 60 TABLET | Refills: 4 | Status: SHIPPED | OUTPATIENT
Start: 2024-10-18 | End: 2025-03-17

## 2024-10-18 NOTE — PROGRESS NOTES
Surgical Oncology Consult       [facility] [place]  [address] [address]  [phone]    Zack Partida  1955  [id number]  [facility] [place]  [address] [address]  [phone]    1. Smoldering myeloma  -     Case request operating room: ABDOMINAL FAT PAD BIOPSY; Standing  -     Comprehensive metabolic panel; Future  -     CBC and differential; Future  -     EKG 12 lead; Future  -     XR chest pa and lateral; Future; Expected date: 10/18/2024  -     oxyCODONE (Roxicodone) 5 immediate release tablet; Take 1 tablet (5 mg total) by mouth every 4 (four) hours as needed for moderate pain Max Daily Amount: 30 mg  -     Case request operating room: ABDOMINAL FAT PAD BIOPSY  2. IgG lambda monoclonal gammopathy  Assessment & Plan:  68-year-old male with monoclonal gammopathy.  Amyloid is to be ruled out according to medical oncology.  This can be done with an abdominal fat pad biopsy.  I explained the risks including bleeding, infection, need for further surgery, and wound complications.  Informed consent was obtained.  We will schedule this at our earliest mutual convenience.  He is agreeable to this plan.  All his questions were answered.  Orders:  -     Ambulatory Referral to Surgical Oncology  -     Case request operating room: ABDOMINAL FAT PAD BIOPSY; Standing  -     Case request operating room: ABDOMINAL FAT PAD BIOPSY      No chief complaint on file.      No follow-ups on file.    Oncology History    No history exists.       History of Present Illness: 68-year-old male who had a bone marrow biopsy with increasing plasma cells.  Medical oncology wishes for him to have a fat pad biopsy to rule out amyloidosis.  He comes in now to discuss this.    Review of Systems  Complete ROS Surg Onc:   Constitutional: The patient denies new or  recent history of general fatigue, no recent weight loss, no change in appetite.   Eyes: No complaints of visual problems, no scleral icterus.   ENT: no complaints of ear pain, no hoarseness, no difficulty swallowing,  no tinnitus and no new masses in head, oral cavity, or neck.   Cardiovascular: No complaints of chest pain, no palpitations, no ankle edema.   Respiratory: No complaints of shortness of breath, no cough.   Gastrointestinal: No complaints of jaundice, no bloody stools, no pale stools.   Genitourinary: No complaints of dysuria, no hematuria, no nocturia, no frequent urination, no urethral discharge.   Musculoskeletal: No complaints of weakness, paralysis, joint stiffness or arthralgias.  Integumentary: No complaints of rash, no new lesions.   Neurological: No complaints of convulsions, no seizures, no dizziness.   Hematologic/Lymphatic: No complaints of easy bruising.   Endocrine:  No hot or cold intolerance.  No polydipsia, polyphagia, or polyuria.  Allergy/immunology:  No environmental allergies.  No food allergies.  Not immunocompromised.  Skin:  No pallor or rash.  No wound.          Patient Active Problem List   Diagnosis    Muscle weakness of extremity following poliomyelitis    Prediabetes    Class 1 obesity due to excess calories with serious comorbidity and body mass index (BMI) of 33.0 to 33.9 in adult    Essential hypertension    History of poliomyelitis    Unspecified acquired deformity of unspecified lower leg    Stage 3a chronic kidney disease (HCC)    History of colon polyps    Nephrolithiasis    Difficulty walking    Smoldering myeloma    IgG lambda monoclonal gammopathy     Past Medical History:   Diagnosis Date    Hypertension     Polio      Past Surgical History:   Procedure Laterality Date    IR BIOPSY BONE MARROW  6/11/2024    IR BIOPSY BONE MARROW  10/4/2024    IR BIOPSY KIDNEY RANDOM  5/29/2024    LEG SURGERY      for polio     Family History   Problem Relation Age of Onset     Hypertension Mother     Diabetes Mother     Hypertension Father     Diabetes Father      Social History     Socioeconomic History    Marital status: /Civil Union     Spouse name: Not on file    Number of children: Not on file    Years of education: Not on file    Highest education level: Not on file   Occupational History    Not on file   Tobacco Use    Smoking status: Never    Smokeless tobacco: Never   Vaping Use    Vaping status: Never Used   Substance and Sexual Activity    Alcohol use: Never    Drug use: Never    Sexual activity: Not on file   Other Topics Concern    Not on file   Social History Narrative    Not on file     Social Determinants of Health     Financial Resource Strain: Not on file   Food Insecurity: Not on file   Transportation Needs: Not on file   Physical Activity: Not on file   Stress: Not on file   Social Connections: Not on file   Intimate Partner Violence: Not on file   Housing Stability: Not on file       Current Outpatient Medications:     amLODIPine (NORVASC) 5 mg tablet, Take 1 tablet (5 mg total) by mouth daily (Patient taking differently: Take 5 mg by mouth daily DAILY IN THE PM), Disp: 90 tablet, Rfl: 3    Cholecalciferol (VITAMIN D3) 1,000 units tablet, Take 1 tablet (1,000 Units total) by mouth daily, Disp: 90 tablet, Rfl: 0    irbesartan (AVAPRO) 300 mg tablet, Take 300 mg by mouth daily DAILY IN THE AM, Disp: , Rfl:     losartan (COZAAR) 100 MG tablet, Take 1 tablet (100 mg total) by mouth daily, Disp: 90 tablet, Rfl: 0    oxyCODONE (Roxicodone) 5 immediate release tablet, Take 1 tablet (5 mg total) by mouth every 4 (four) hours as needed for moderate pain Max Daily Amount: 30 mg, Disp: 10 tablet, Rfl: 0    atenolol (TENORMIN) 25 mg tablet, Take 1 tablet (25 mg total) by mouth 2 (two) times a day (Patient not taking: Reported on 5/7/2024), Disp: 180 tablet, Rfl: 0    chlorthalidone 25 mg tablet, Take 0.5 tablets (12.5 mg total) by mouth daily (Patient taking differently:  Take 25 mg by mouth daily), Disp: 45 tablet, Rfl: 3    Empagliflozin (Jardiance) 10 MG TABS tablet, Take 1 tablet (10 mg total) by mouth every morning, Disp: 30 tablet, Rfl: 3    ergocalciferol (ERGOCALCIFEROL) 1.25 MG (56327 UT) capsule, Take 1 capsule (50,000 Units total) by mouth once a week for 8 doses (Patient not taking: Reported on 10/18/2024), Disp: 8 capsule, Rfl: 0    Multiple Vitamin (multivitamin) capsule, Take 1 capsule by mouth daily (Patient not taking: Reported on 10/18/2024), Disp: , Rfl:     potassium chloride (Klor-Con M10) 10 mEq tablet, Take 1 tablet (10 mEq total) by mouth daily (Patient not taking: Reported on 9/10/2024), Disp: 30 tablet, Rfl: 3    tamsulosin (FLOMAX) 0.4 mg, Take 1 capsule (0.4 mg total) by mouth daily with dinner (Patient not taking: Reported on 9/10/2024), Disp: 90 capsule, Rfl: 3    valACYclovir (VALTREX) 1,000 mg tablet, , Disp: , Rfl:   No Known Allergies  Vitals:    10/18/24 0829   BP: 120/74   Pulse: 94   Resp: 18   Temp: 97.5 °F (36.4 °C)   SpO2: 99%       Physical Exam   Constitutional: General appearance: The Patient is well-developed and well-nourished who appears the stated age in no acute distress. Patient is pleasant and talkative.     HEENT:  Normocephalic.  Sclerae are anicteric. Mucous membranes are moist. Neck is supple without adenopathy. No JVD.     Chest: The lungs are clear to auscultation.     Cardiac: Heart is regular rate.     Abdomen: Abdomen is soft, non-tender, non-distended and without masses.     Extremities: There is no clubbing or cyanosis. There is no edema.  Symmetric.  Neuro: Grossly nonfocal. Gait is abnormal.  This is secondary to his lower extremity deformity.     Lymphatic: No evidence of cervical adenopathy bilaterally.   Skin: Warm, anicteric.    Psych:  Patient is pleasant and talkative.  Breasts:      Pathology:  [unfilled]    Labs:      Imaging  IR biopsy bone marrow    Result Date: 10/4/2024  Narrative: CT-scan guided diagnostic  bone marrow aspiration and core biopsy History: Myeloma Conscious sedation time: 30  minutes Technique: The patient was brought to the CT scanner and placed prone on the table. After axial images were obtained through the pelvis, an area of the skin was then marked, prepped, and draped in usual sterile fashion. Lidocaine was administered to the skin, and subcutaneous tissues down to the periosteum of the right ileum, and a small skin incision was made. An OnControl needle was advanced percutaneously through the cortex, and a bone marrow aspiration was performed. The specimen was given to the cytotech to prepare. A core biopsy was then performed. The bone core was placed in formalin. The patient tolerated the procedure well and suffered no complications.     Impression: Impression: Successful percutaneous diagnostic bone marrow aspiration and bone core biopsy of the right ileum. A full pathology report will follow. Workstation performed: [id number]     I personally reviewed and interpreted the above laboratory and imaging data.

## 2024-10-18 NOTE — LETTER
October 18, 2024     Ayanna Paz MD  [address]  [address]  [facility] [address]    Patient: Zack Partida   YOB: 1955   Date of Visit: 10/18/2024       Dear Dr. Paz:    Thank you for referring Zack Partida to me for evaluation. Below are my notes for this consultation.    If you have questions, please do not hesitate to call me. I look forward to following your patient along with you.         Sincerely,        Remi Scott MD        CC: DO Remi Jimenez MD  10/18/2024  8:58 AM  Sign when Signing Visit               Surgical Oncology Consult       [facility] [place]  [address] [address]  [phone]    Zack Partida  1955  [id number]  [facility] [place]  [address] [address]  [phone]    1. Smoldering myeloma  -     Case request operating room: ABDOMINAL FAT PAD BIOPSY; Standing  -     Comprehensive metabolic panel; Future  -     CBC and differential; Future  -     EKG 12 lead; Future  -     XR chest pa and lateral; Future; Expected date: 10/18/2024  -     oxyCODONE (Roxicodone) 5 immediate release tablet; Take 1 tablet (5 mg total) by mouth every 4 (four) hours as needed for moderate pain Max Daily Amount: 30 mg  -     Case request operating room: ABDOMINAL FAT PAD BIOPSY  2. IgG lambda monoclonal gammopathy  Assessment & Plan:  68-year-old male with monoclonal gammopathy.  Amyloid is to be ruled out according to medical oncology.  This can be done with an abdominal fat pad biopsy.  I explained the risks including bleeding, infection, need for further surgery, and wound complications.  Informed consent was obtained.  We will schedule this at our earliest mutual convenience.  He is agreeable to this plan.  All his questions were answered.  Orders:  -     Ambulatory Referral to  Surgical Oncology  -     Case request operating room: ABDOMINAL FAT PAD BIOPSY; Standing  -     Case request operating room: ABDOMINAL FAT PAD BIOPSY      No chief complaint on file.      No follow-ups on file.    Oncology History    No history exists.       History of Present Illness: 68-year-old male who had a bone marrow biopsy with increasing plasma cells.  Medical oncology wishes for him to have a fat pad biopsy to rule out amyloidosis.  He comes in now to discuss this.    Review of Systems  Complete ROS Surg Onc:   Constitutional: The patient denies new or recent history of general fatigue, no recent weight loss, no change in appetite.   Eyes: No complaints of visual problems, no scleral icterus.   ENT: no complaints of ear pain, no hoarseness, no difficulty swallowing,  no tinnitus and no new masses in head, oral cavity, or neck.   Cardiovascular: No complaints of chest pain, no palpitations, no ankle edema.   Respiratory: No complaints of shortness of breath, no cough.   Gastrointestinal: No complaints of jaundice, no bloody stools, no pale stools.   Genitourinary: No complaints of dysuria, no hematuria, no nocturia, no frequent urination, no urethral discharge.   Musculoskeletal: No complaints of weakness, paralysis, joint stiffness or arthralgias.  Integumentary: No complaints of rash, no new lesions.   Neurological: No complaints of convulsions, no seizures, no dizziness.   Hematologic/Lymphatic: No complaints of easy bruising.   Endocrine:  No hot or cold intolerance.  No polydipsia, polyphagia, or polyuria.  Allergy/immunology:  No environmental allergies.  No food allergies.  Not immunocompromised.  Skin:  No pallor or rash.  No wound.          Patient Active Problem List   Diagnosis   • Muscle weakness of extremity following poliomyelitis   • Prediabetes   • Class 1 obesity due to excess calories with serious comorbidity and body mass index (BMI) of 33.0 to 33.9 in adult   • Essential hypertension   •  History of poliomyelitis   • Unspecified acquired deformity of unspecified lower leg   • Stage 3a chronic kidney disease (HCC)   • History of colon polyps   • Nephrolithiasis   • Difficulty walking   • Smoldering myeloma   • IgG lambda monoclonal gammopathy     Past Medical History:   Diagnosis Date   • Hypertension    • Polio      Past Surgical History:   Procedure Laterality Date   • IR BIOPSY BONE MARROW  6/11/2024   • IR BIOPSY BONE MARROW  10/4/2024   • IR BIOPSY KIDNEY RANDOM  5/29/2024   • LEG SURGERY      for polio     Family History   Problem Relation Age of Onset   • Hypertension Mother    • Diabetes Mother    • Hypertension Father    • Diabetes Father      Social History     Socioeconomic History   • Marital status: /Civil Union     Spouse name: Not on file   • Number of children: Not on file   • Years of education: Not on file   • Highest education level: Not on file   Occupational History   • Not on file   Tobacco Use   • Smoking status: Never   • Smokeless tobacco: Never   Vaping Use   • Vaping status: Never Used   Substance and Sexual Activity   • Alcohol use: Never   • Drug use: Never   • Sexual activity: Not on file   Other Topics Concern   • Not on file   Social History Narrative   • Not on file     Social Determinants of Health     Financial Resource Strain: Not on file   Food Insecurity: Not on file   Transportation Needs: Not on file   Physical Activity: Not on file   Stress: Not on file   Social Connections: Not on file   Intimate Partner Violence: Not on file   Housing Stability: Not on file       Current Outpatient Medications:   •  amLODIPine (NORVASC) 5 mg tablet, Take 1 tablet (5 mg total) by mouth daily (Patient taking differently: Take 5 mg by mouth daily DAILY IN THE PM), Disp: 90 tablet, Rfl: 3  •  Cholecalciferol (VITAMIN D3) 1,000 units tablet, Take 1 tablet (1,000 Units total) by mouth daily, Disp: 90 tablet, Rfl: 0  •  irbesartan (AVAPRO) 300 mg tablet, Take 300 mg by mouth  daily DAILY IN THE AM, Disp: , Rfl:   •  losartan (COZAAR) 100 MG tablet, Take 1 tablet (100 mg total) by mouth daily, Disp: 90 tablet, Rfl: 0  •  oxyCODONE (Roxicodone) 5 immediate release tablet, Take 1 tablet (5 mg total) by mouth every 4 (four) hours as needed for moderate pain Max Daily Amount: 30 mg, Disp: 10 tablet, Rfl: 0  •  atenolol (TENORMIN) 25 mg tablet, Take 1 tablet (25 mg total) by mouth 2 (two) times a day (Patient not taking: Reported on 5/7/2024), Disp: 180 tablet, Rfl: 0  •  chlorthalidone 25 mg tablet, Take 0.5 tablets (12.5 mg total) by mouth daily (Patient taking differently: Take 25 mg by mouth daily), Disp: 45 tablet, Rfl: 3  •  Empagliflozin (Jardiance) 10 MG TABS tablet, Take 1 tablet (10 mg total) by mouth every morning, Disp: 30 tablet, Rfl: 3  •  ergocalciferol (ERGOCALCIFEROL) 1.25 MG (09113 UT) capsule, Take 1 capsule (50,000 Units total) by mouth once a week for 8 doses (Patient not taking: Reported on 10/18/2024), Disp: 8 capsule, Rfl: 0  •  Multiple Vitamin (multivitamin) capsule, Take 1 capsule by mouth daily (Patient not taking: Reported on 10/18/2024), Disp: , Rfl:   •  potassium chloride (Klor-Con M10) 10 mEq tablet, Take 1 tablet (10 mEq total) by mouth daily (Patient not taking: Reported on 9/10/2024), Disp: 30 tablet, Rfl: 3  •  tamsulosin (FLOMAX) 0.4 mg, Take 1 capsule (0.4 mg total) by mouth daily with dinner (Patient not taking: Reported on 9/10/2024), Disp: 90 capsule, Rfl: 3  •  valACYclovir (VALTREX) 1,000 mg tablet, , Disp: , Rfl:   No Known Allergies  Vitals:    10/18/24 0829   BP: 120/74   Pulse: 94   Resp: 18   Temp: 97.5 °F (36.4 °C)   SpO2: 99%       Physical Exam   Constitutional: General appearance: The Patient is well-developed and well-nourished who appears the stated age in no acute distress. Patient is pleasant and talkative.     HEENT:  Normocephalic.  Sclerae are anicteric. Mucous membranes are moist. Neck is supple without adenopathy. No JVD.     Chest:  The lungs are clear to auscultation.     Cardiac: Heart is regular rate.     Abdomen: Abdomen is soft, non-tender, non-distended and without masses.     Extremities: There is no clubbing or cyanosis. There is no edema.  Symmetric.  Neuro: Grossly nonfocal. Gait is abnormal.  This is secondary to his lower extremity deformity.     Lymphatic: No evidence of cervical adenopathy bilaterally.   Skin: Warm, anicteric.    Psych:  Patient is pleasant and talkative.  Breasts:      Pathology:  [unfilled]    Labs:      Imaging  IR biopsy bone marrow    Result Date: 10/4/2024  Narrative: CT-scan guided diagnostic bone marrow aspiration and core biopsy History: Myeloma Conscious sedation time: 30  minutes Technique: The patient was brought to the CT scanner and placed prone on the table. After axial images were obtained through the pelvis, an area of the skin was then marked, prepped, and draped in usual sterile fashion. Lidocaine was administered to the skin, and subcutaneous tissues down to the periosteum of the right ileum, and a small skin incision was made. An OnControl needle was advanced percutaneously through the cortex, and a bone marrow aspiration was performed. The specimen was given to the cytotech to prepare. A core biopsy was then performed. The bone core was placed in formalin. The patient tolerated the procedure well and suffered no complications.     Impression: Impression: Successful percutaneous diagnostic bone marrow aspiration and bone core biopsy of the right ileum. A full pathology report will follow. Workstation performed: [id number]     I personally reviewed and interpreted the above laboratory and imaging data.

## 2024-10-18 NOTE — ASSESSMENT & PLAN NOTE
68-year-old male with monoclonal gammopathy.  Amyloid is to be ruled out according to medical oncology.  This can be done with an abdominal fat pad biopsy.  I explained the risks including bleeding, infection, need for further surgery, and wound complications.  Informed consent was obtained.  We will schedule this at our earliest mutual convenience.  He is agreeable to this plan.  All his questions were answered.

## 2024-10-18 NOTE — TELEPHONE ENCOUNTER
Left detailed VM for patient's grandchild Jose, to let him know that Dr. Flores sent in Acyclovir to his pharmacy to take one tablet twice daily. I also informed him that [facility] and we are working on referring him. I left my callback number if needed.

## 2024-10-19 LAB
KAPPA LC FREE SER-MCNC: 20.1 MG/L (ref 3.3–19.4)
KAPPA LC FREE/LAMBDA FREE SER: 0.04 {RATIO} (ref 0.26–1.65)
LAMBDA LC FREE SERPL-MCNC: 561.7 MG/L (ref 5.7–26.3)

## 2024-10-21 ENCOUNTER — TELEPHONE (OUTPATIENT)
Age: 69
End: 2024-10-21

## 2024-10-21 ENCOUNTER — TELEPHONE (OUTPATIENT)
Dept: OTHER | Facility: HOSPITAL | Age: 69
End: 2024-10-21

## 2024-10-21 DIAGNOSIS — C90.00 MULTIPLE MYELOMA NOT HAVING ACHIEVED REMISSION (HCC): Primary | ICD-10-CM

## 2024-10-21 RX ORDER — LENALIDOMIDE 15 MG/1
15 CAPSULE ORAL DAILY
Qty: 14 CAPSULE | Refills: 0 | Status: SHIPPED | OUTPATIENT
Start: 2024-10-21 | End: 2024-10-22 | Stop reason: SDUPTHER

## 2024-10-21 NOTE — PROGRESS NOTES
HEMATOLOGY / ONCOLOGY CLINIC FOLLOW UP NOTE    Primary Care Provider: Ayanna Paz MD  Referring Provider:    MRN: [id number]  : 1955    Reason for Encounter: follow up multiple myeloma       Oncology History Overview Note   2024 - kidney biopsy - LCDD - conge red stainging negative    2024 - BMBX with 15-17% lambda restricted plasma cells    10/2024 - BMBX with 30% lambda restricted plasma cells, congo red staining negative, FISH - dup 1q - monoclonal protein is IgG lambda     Multiple myeloma not having achieved remission (HCC)   10/18/2024 Initial Diagnosis    Multiple myeloma not having achieved remission (HCC)     10/18/2024 -  Chemotherapy    alteplase (CATHFLO), 2 mg, Intracatheter, Every 1 Minute as needed, 0 of 14 cycles  daratumumab-hyaluronidase (DARZALEX FASPRO), 1,800 mg, Subcutaneous daratumumab-hyaluronidase, Once, 0 of 14 cycles  bortezomib (VELCADE), 1.3 mg/m2 = 2.5 mg, Subcutaneous, Once, 0 of 8 cycles         Interval History: Patient presents for follow up of his IgGlambda multiple myeloma with 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  His other medical history is remarkable for hypertension and polio as a child.  He has a right foot drop as a result of it.  But no specific neuropathy.  Congo red staining on both most recent bone marrow biopsy and kidney were negative.  I spoke to the patient's nephrologist who had some concerns about his rising creatinine and dropping hemoglobin.  I brought him in to transfer care to me and get started with treatment earlier.  Lambda free light chain level is 511 Is 21 and IgG level is 2266.  He had an echo in August of this year that did not show any changes consistent with amyloid.  He is here today with his grandson here.  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  He denies tobacco or alcohol.  He is Haitian-speaking and originally from [place].  His grandson assisted with translation and interpretation  today.  He is feeling well.  He denies any back pain.  No fevers or recurrent infections.         REVIEW OF SYSTEMS:  Please note that a 14-point review of systems was performed to include Constitutional, HEENT, Respiratory, CVS, GI, , Musculoskeletal, Integumentary, Neurologic, Rheumatologic, Endocrinologic, Psychiatric, Lymphatic, and Hematologic/Oncologic systems were reviewed and are negative unless otherwise stated in HPI. Positive and negative findings pertinent to this evaluation are incorporated into the history of present illness.      ECOG PS: 0    PROBLEM LIST:  Patient Active Problem List   Diagnosis    Muscle weakness of extremity following poliomyelitis    Prediabetes    Class 1 obesity due to excess calories with serious comorbidity and body mass index (BMI) of 33.0 to 33.9 in adult    Essential hypertension    History of poliomyelitis    Unspecified acquired deformity of unspecified lower leg    Stage 3a chronic kidney disease (HCC)    History of colon polyps    Nephrolithiasis    Difficulty walking    Smoldering myeloma    IgG lambda monoclonal gammopathy    Multiple myeloma not having achieved remission (HCC)       Assessment / Plan: 68-year-old male with IgG lambda multiple myeloma and 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  He did see Dr. Scott for arrangement of a fat pad biopsy to double check that we did not find anything positive for Congo red staining and amyloid.  To this point I can find no evidence of amyloidosis.  Both the patient and his grandson are off on Friday so they were unable to get this biopsy scheduled until the end of November.  His grandson is going to see if somebody can bring him sooner and if we can move it up.  I am going to get a new set of labs today including CBC, CMP, free light chain levels, SPEP, immunoglobulin levels.  His renal function is starting to worsen and his nephrologist is concerned because he is spilling significant amounts of protein in his  urine.  I am going to get him started on treatment with Velcade, Revlimid, dexamethasone, daratumumab.  Risks and benefits of this regimen were discussed and consent was signed.  We discussed that the treatment paradigm for multiple myeloma is to induce the disease into remission followed by an autologous stem cell transplant.  It is going to be extremely difficult for him to get to [place] from a transportation standpoint.  I am going to refer him to our colleagues over at [facility] who have started autologous stem cell transplant program.  I am going to dose reduce his Revlimid to 15 mg because of his renal function and he will get Casandra-VRD on a every 3 weekly schedule with weekly dexamethasone, weekly Velcade, Revlimid 15 mg 2 weeks on 1 week off, dexamethasone 20 mg weekly.  I will plan on seeing her back prior to cycle 2 to check on tolerance.  We also discussed the rems program for Revlimid and the importance of answering phone calls to do the monthly questionnaire for refills.  We are making his grandson his primary phone contact to assist with the language.  He was given contact information for my team and he knows to call in the interim with any questions or concerns.       I spent 50 minutes on chart review, face to face counseling time, coordination of care and documentation.    Past Medical History:   has a past medical history of Hypertension and Polio.    PAST SURGICAL HISTORY:   has a past surgical history that includes Leg Surgery; IR biopsy kidney random (5/29/2024); IR biopsy bone marrow (6/11/2024); and IR biopsy bone marrow (10/4/2024).    CURRENT MEDICATIONS  Current Outpatient Medications   Medication Sig Dispense Refill    acyclovir (ZOVIRAX) 400 MG tablet Take 1 tablet (400 mg total) by mouth 2 (two) times a day 60 tablet 4    amLODIPine (NORVASC) 5 mg tablet Take 1 tablet (5 mg total) by mouth daily (Patient taking differently: Take 5 mg by mouth daily DAILY IN THE PM) 90  tablet 3    atenolol (TENORMIN) 25 mg tablet Take 1 tablet (25 mg total) by mouth 2 (two) times a day (Patient not taking: Reported on 5/7/2024) 180 tablet 0    chlorthalidone 25 mg tablet Take 0.5 tablets (12.5 mg total) by mouth daily (Patient taking differently: Take 25 mg by mouth daily) 45 tablet 3    Cholecalciferol (VITAMIN D3) 1,000 units tablet Take 1 tablet (1,000 Units total) by mouth daily 90 tablet 0    Empagliflozin (Jardiance) 10 MG TABS tablet Take 1 tablet (10 mg total) by mouth every morning 30 tablet 3    ergocalciferol (ERGOCALCIFEROL) 1.25 MG (49468 UT) capsule Take 1 capsule (50,000 Units total) by mouth once a week for 8 doses (Patient not taking: Reported on 10/18/2024) 8 capsule 0    irbesartan (AVAPRO) 300 mg tablet Take 300 mg by mouth daily DAILY IN THE AM      losartan (COZAAR) 100 MG tablet Take 1 tablet (100 mg total) by mouth daily 90 tablet 0    Multiple Vitamin (multivitamin) capsule Take 1 capsule by mouth daily (Patient not taking: Reported on 10/18/2024)      oxyCODONE (Roxicodone) 5 immediate release tablet Take 1 tablet (5 mg total) by mouth every 4 (four) hours as needed for moderate pain Max Daily Amount: 30 mg 10 tablet 0    potassium chloride (Klor-Con M10) 10 mEq tablet Take 1 tablet (10 mEq total) by mouth daily (Patient not taking: Reported on 9/10/2024) 30 tablet 3    tamsulosin (FLOMAX) 0.4 mg Take 1 capsule (0.4 mg total) by mouth daily with dinner (Patient not taking: Reported on 9/10/2024) 90 capsule 3     No current facility-administered medications for this visit.     [unfilled]    SOCIAL HISTORY:   reports that he has never smoked. He has never used smokeless tobacco. He reports that he does not drink alcohol and does not use drugs.     FAMILY HISTORY:  family history includes Diabetes in his father and mother; Hypertension in his father and mother.     ALLERGIES:  has No Known Allergies.      Physical Exam:  Vital Signs:   Visit Vitals  /74 (BP Location:  "Left arm, Patient Position: Sitting, Cuff Size: Adult)   Pulse 94   Temp 98 °F (36.7 °C)   Resp 17   Ht 5' 4\" (1.626 m)   Wt 83.4 kg (183 lb 12.8 oz)   SpO2 99%   BMI 31.55 kg/m²   Smoking Status Never   BSA 1.89 m²     Body mass index is 31.55 kg/m².  Body surface area is 1.89 meters squared.    GEN: Alert, awake oriented x3, in no acute distress  HEENT- No pallor, icterus, cyanosis, no oral mucosal lesions,   LAD - no palpable cervical, clavicle, axillary, inguinal LAD  Heart- normal S1 S2, regular rate and rhythm, No murmur, rubs.   Lungs- clear breathing sound bilateral.   Abdomen- soft, Non tender, bowel sounds present  Extremities- No cyanosis, clubbing, edema  Neuro- right foot drop    Labs:  Lab Results   Component Value Date    WBC 7.69 10/18/2024    HGB 10.8 (L) 10/18/2024    HCT 34.1 (L) 10/18/2024    MCV 93 10/18/2024     10/18/2024     Lab Results   Component Value Date    SODIUM 141 10/18/2024    K 4.3 10/18/2024     (H) 10/18/2024    CO2 23 10/18/2024    AGAP 5 10/18/2024    BUN 48 (H) 10/18/2024    CREATININE 1.75 (H) 10/18/2024    GLUC 71 10/18/2024    GLUF 97 09/14/2024    CALCIUM 9.5 10/18/2024    AST 14 10/18/2024    ALT 18 10/18/2024    ALKPHOS 74 10/18/2024    TP 7.7 10/18/2024    TBILI 0.25 10/18/2024    EGFR 39 10/18/2024       "

## 2024-10-21 NOTE — TELEPHONE ENCOUNTER
Plans noted per oncology regarding initiation of treatment for multiple myeloma.  He was started on Jardiance on last nephrology office visit for treatment of worsening proteinuria and took it for about 1 month.  In light of starting treatment for multiple myeloma which will pose a risk for volume depletion, will discontinue Jardiance for now.

## 2024-10-21 NOTE — TELEPHONE ENCOUNTER
Left detailed VM for patient's grandson to let him know that the patient should start Aspirin 81mg when he starts the Revlimid. Callback number left for patient.

## 2024-10-22 ENCOUNTER — TELEPHONE (OUTPATIENT)
Dept: HEMATOLOGY ONCOLOGY | Facility: CLINIC | Age: 69
End: 2024-10-22

## 2024-10-22 DIAGNOSIS — C90.00 MULTIPLE MYELOMA NOT HAVING ACHIEVED REMISSION (HCC): ICD-10-CM

## 2024-10-22 DIAGNOSIS — C90.00 MULTIPLE MYELOMA NOT HAVING ACHIEVED REMISSION (HCC): Primary | ICD-10-CM

## 2024-10-22 LAB
ALBUMIN SERPL ELPH-MCNC: 3.22 G/DL (ref 3.2–5.1)
ALBUMIN SERPL ELPH-MCNC: 45.3 % (ref 48–70)
ALPHA1 GLOB SERPL ELPH-MCNC: 0.25 G/DL (ref 0.15–0.47)
ALPHA1 GLOB SERPL ELPH-MCNC: 3.5 % (ref 1.8–7)
ALPHA2 GLOB SERPL ELPH-MCNC: 0.62 G/DL (ref 0.42–1.04)
ALPHA2 GLOB SERPL ELPH-MCNC: 8.7 % (ref 5.9–14.9)
BETA GLOB ABNORMAL SERPL ELPH-MCNC: 0.33 G/DL (ref 0.31–0.57)
BETA1 GLOB SERPL ELPH-MCNC: 4.7 % (ref 4.7–7.7)
BETA2 GLOB SERPL ELPH-MCNC: 32.3 % (ref 3.1–7.9)
BETA2+GAMMA GLOB SERPL ELPH-MCNC: 2.29 G/DL (ref 0.2–0.58)
GAMMA GLOB ABNORMAL SERPL ELPH-MCNC: 0.39 G/DL (ref 0.4–1.66)
GAMMA GLOB SERPL ELPH-MCNC: 5.5 % (ref 6.9–22.3)
IGG/ALB SER: 0.83 {RATIO} (ref 1.1–1.8)
M PROTEIN 1 MFR SERPL ELPH: 28 %
M PROTEIN 1 SERPL ELPH-MCNC: 1.99 G/DL
PROT PATTERN SERPL ELPH-IMP: ABNORMAL
PROT SERPL-MCNC: 7.1 G/DL (ref 6.4–8.2)

## 2024-10-22 PROCEDURE — 84165 PROTEIN E-PHORESIS SERUM: CPT | Performed by: PATHOLOGY

## 2024-10-22 RX ORDER — LENALIDOMIDE 15 MG/1
15 CAPSULE ORAL DAILY
Qty: 14 CAPSULE | Refills: 0 | Status: SHIPPED | OUTPATIENT
Start: 2024-10-22

## 2024-10-23 ENCOUNTER — TELEPHONE (OUTPATIENT)
Dept: SURGICAL ONCOLOGY | Facility: CLINIC | Age: 69
End: 2024-10-23

## 2024-10-23 ENCOUNTER — TELEPHONE (OUTPATIENT)
Age: 69
End: 2024-10-23

## 2024-10-23 NOTE — TELEPHONE ENCOUNTER
Spoke to patients Hemant Irby.   Confirmed surgery date of 11/22/24, [facility] with Dr Scott. Also confirmed post op appointment of 12/6/24, [facility].   Hemant asked that I look for a sooner surgery date per Dr Flores's direction.   Location and day of the week are no longer a concern as it was at the patients appointment.   I let Hemant know that I will be in touch over the next few days if/when I can find available O.R.time to accommodate this case.  He was appreciative of my call.

## 2024-10-23 NOTE — TELEPHONE ENCOUNTER
Patients grandson calling to speak with Dr. Scott.  He states he received a vm informing him the patients  ABDOMINAL FAT PAD BIOPSY (Abdomen) scheduled on 11/22/24 @ 8am with Dr. Scott was RS to 12/6/24.  Grandson informed me Dr. Flores would like the biopsy done asap.  He can be reached at [phone]    hasn't had hallucinations since 2000 patient lets the home care provider speak for her patient lets the home care provider speak for her; When questioned, she will answer them

## 2024-10-24 LAB — SCAN RESULT: NORMAL

## 2024-10-26 ENCOUNTER — TELEPHONE (OUTPATIENT)
Age: 69
End: 2024-10-26

## 2024-10-26 NOTE — TELEPHONE ENCOUNTER
Patient grandchild Jose asked to clarified  patients AFO brace at [facility] office.

## 2024-11-01 ENCOUNTER — HOSPITAL ENCOUNTER (OUTPATIENT)
Dept: INFUSION CENTER | Facility: CLINIC | Age: 69
End: 2024-11-01
Payer: COMMERCIAL

## 2024-11-01 ENCOUNTER — TELEPHONE (OUTPATIENT)
Dept: INFUSION CENTER | Facility: CLINIC | Age: 69
End: 2024-11-01

## 2024-11-01 VITALS
HEIGHT: 64 IN | OXYGEN SATURATION: 97 % | BODY MASS INDEX: 31.41 KG/M2 | DIASTOLIC BLOOD PRESSURE: 81 MMHG | RESPIRATION RATE: 18 BRPM | SYSTOLIC BLOOD PRESSURE: 138 MMHG | TEMPERATURE: 97 F | HEART RATE: 76 BPM | WEIGHT: 184 LBS

## 2024-11-01 DIAGNOSIS — D47.2 SMOLDERING MYELOMA: ICD-10-CM

## 2024-11-01 DIAGNOSIS — C90.00 MULTIPLE MYELOMA NOT HAVING ACHIEVED REMISSION (HCC): Primary | ICD-10-CM

## 2024-11-01 LAB
ABO GROUP BLD: NORMAL
ALBUMIN SERPL BCG-MCNC: 3.5 G/DL (ref 3.5–5)
ALP SERPL-CCNC: 75 U/L (ref 34–104)
ALT SERPL W P-5'-P-CCNC: 18 U/L (ref 7–52)
ANION GAP SERPL CALCULATED.3IONS-SCNC: 5 MMOL/L (ref 4–13)
AST SERPL W P-5'-P-CCNC: 15 U/L (ref 13–39)
BASOPHILS # BLD AUTO: 0.06 THOUSANDS/ΜL (ref 0–0.1)
BASOPHILS NFR BLD AUTO: 1 % (ref 0–1)
BILIRUB SERPL-MCNC: 0.31 MG/DL (ref 0.2–1)
BLD GP AB SCN SERPL QL: NEGATIVE
BUN SERPL-MCNC: 41 MG/DL (ref 5–25)
CALCIUM SERPL-MCNC: 9.7 MG/DL (ref 8.4–10.2)
CHLORIDE SERPL-SCNC: 113 MMOL/L (ref 96–108)
CO2 SERPL-SCNC: 22 MMOL/L (ref 21–32)
CREAT SERPL-MCNC: 1.78 MG/DL (ref 0.6–1.3)
EOSINOPHIL # BLD AUTO: 0.4 THOUSAND/ΜL (ref 0–0.61)
EOSINOPHIL NFR BLD AUTO: 5 % (ref 0–6)
ERYTHROCYTE [DISTWIDTH] IN BLOOD BY AUTOMATED COUNT: 13.2 % (ref 11.6–15.1)
GFR SERPL CREATININE-BSD FRML MDRD: 38 ML/MIN/1.73SQ M
GLUCOSE SERPL-MCNC: 90 MG/DL (ref 65–140)
HCT VFR BLD AUTO: 33.2 % (ref 36.5–49.3)
HGB BLD-MCNC: 10.8 G/DL (ref 12–17)
IMM GRANULOCYTES # BLD AUTO: 0.03 THOUSAND/UL (ref 0–0.2)
IMM GRANULOCYTES NFR BLD AUTO: 0 % (ref 0–2)
LYMPHOCYTES # BLD AUTO: 1.86 THOUSANDS/ΜL (ref 0.6–4.47)
LYMPHOCYTES NFR BLD AUTO: 23 % (ref 14–44)
MCH RBC QN AUTO: 29.9 PG (ref 26.8–34.3)
MCHC RBC AUTO-ENTMCNC: 32.5 G/DL (ref 31.4–37.4)
MCV RBC AUTO: 92 FL (ref 82–98)
MONOCYTES # BLD AUTO: 0.69 THOUSAND/ΜL (ref 0.17–1.22)
MONOCYTES NFR BLD AUTO: 8 % (ref 4–12)
NEUTROPHILS # BLD AUTO: 5.19 THOUSANDS/ΜL (ref 1.85–7.62)
NEUTS SEG NFR BLD AUTO: 63 % (ref 43–75)
NRBC BLD AUTO-RTO: 0 /100 WBCS
PLATELET # BLD AUTO: 154 THOUSANDS/UL (ref 149–390)
PMV BLD AUTO: 11.9 FL (ref 8.9–12.7)
POTASSIUM SERPL-SCNC: 3.9 MMOL/L (ref 3.5–5.3)
PROT SERPL-MCNC: 7.8 G/DL (ref 6.4–8.4)
RBC # BLD AUTO: 3.61 MILLION/UL (ref 3.88–5.62)
RH BLD: POSITIVE
SODIUM SERPL-SCNC: 140 MMOL/L (ref 135–147)
SPECIMEN EXPIRATION DATE: NORMAL
WBC # BLD AUTO: 8.23 THOUSAND/UL (ref 4.31–10.16)

## 2024-11-01 PROCEDURE — 96367 TX/PROPH/DG ADDL SEQ IV INF: CPT

## 2024-11-01 PROCEDURE — 80053 COMPREHEN METABOLIC PANEL: CPT

## 2024-11-01 PROCEDURE — 0084U RBC DNA GNOTYP 10 BLD GROUPS: CPT | Performed by: INTERNAL MEDICINE

## 2024-11-01 PROCEDURE — 86850 RBC ANTIBODY SCREEN: CPT | Performed by: INTERNAL MEDICINE

## 2024-11-01 PROCEDURE — 86900 BLOOD TYPING SEROLOGIC ABO: CPT | Performed by: INTERNAL MEDICINE

## 2024-11-01 PROCEDURE — 85025 COMPLETE CBC W/AUTO DIFF WBC: CPT

## 2024-11-01 PROCEDURE — 96365 THER/PROPH/DIAG IV INF INIT: CPT

## 2024-11-01 PROCEDURE — 86901 BLOOD TYPING SEROLOGIC RH(D): CPT | Performed by: INTERNAL MEDICINE

## 2024-11-01 PROCEDURE — 86870 RBC ANTIBODY IDENTIFICATION: CPT | Performed by: INTERNAL MEDICINE

## 2024-11-01 PROCEDURE — 96401 CHEMO ANTI-NEOPL SQ/IM: CPT

## 2024-11-01 RX ORDER — ACETAMINOPHEN 325 MG/1
650 TABLET ORAL ONCE
Status: COMPLETED | OUTPATIENT
Start: 2024-11-01 | End: 2024-11-01

## 2024-11-01 RX ORDER — SODIUM CHLORIDE 9 MG/ML
20 INJECTION, SOLUTION INTRAVENOUS ONCE
Status: COMPLETED | OUTPATIENT
Start: 2024-11-01 | End: 2024-11-01

## 2024-11-01 RX ADMIN — DARATUMUMAB AND HYALURONIDASE-FIHJ (HUMAN RECOMBINANT) 1800 MG: 1800; 30000 INJECTION SUBCUTANEOUS at 15:47

## 2024-11-01 RX ADMIN — ACETAMINOPHEN 650 MG: 325 TABLET ORAL at 13:54

## 2024-11-01 RX ADMIN — DEXAMETHASONE SODIUM PHOSPHATE 20 MG: 10 INJECTION, SOLUTION INTRAMUSCULAR; INTRAVENOUS at 14:14

## 2024-11-01 RX ADMIN — SODIUM CHLORIDE 20 ML/HR: 0.9 INJECTION, SOLUTION INTRAVENOUS at 13:26

## 2024-11-01 RX ADMIN — BORTEZOMIB 2.5 MG: 3.5 INJECTION, POWDER, LYOPHILIZED, FOR SOLUTION INTRAVENOUS; SUBCUTANEOUS at 15:47

## 2024-11-01 RX ADMIN — DIPHENHYDRAMINE HYDROCHLORIDE 25 MG: 50 INJECTION, SOLUTION INTRAMUSCULAR; INTRAVENOUS at 13:54

## 2024-11-01 NOTE — TELEPHONE ENCOUNTER
Called patient and grandson again, calls went to voice message left another message regarding labs for today's treatment.

## 2024-11-01 NOTE — TELEPHONE ENCOUNTER
Called patient and grandchild, left message on both to make them aware that patient needs labs before treatment this afternoon. Will try to reach out again later.   Opt out

## 2024-11-01 NOTE — PROGRESS NOTES
Pt here for darzalex faspro and velcade, tolerated well with no complaints at this time. 2hr obs completed. Next appt 11/7 at 0730 at [place]. Printout provided.

## 2024-11-02 ENCOUNTER — APPOINTMENT (OUTPATIENT)
Dept: LAB | Facility: CLINIC | Age: 69
End: 2024-11-02
Payer: COMMERCIAL

## 2024-11-02 ENCOUNTER — OFFICE VISIT (OUTPATIENT)
Dept: LAB | Facility: CLINIC | Age: 69
End: 2024-11-02
Payer: COMMERCIAL

## 2024-11-02 DIAGNOSIS — N18.30 BENIGN HYPERTENSION WITH CKD (CHRONIC KIDNEY DISEASE) STAGE III (HCC): ICD-10-CM

## 2024-11-02 DIAGNOSIS — D47.2 SMOLDERING MYELOMA: ICD-10-CM

## 2024-11-02 DIAGNOSIS — D47.2 IGG LAMBDA MONOCLONAL GAMMOPATHY: Primary | ICD-10-CM

## 2024-11-02 DIAGNOSIS — D89.89 LAMBDA LIGHT CHAIN DEPOSITION DISEASE (HCC): ICD-10-CM

## 2024-11-02 DIAGNOSIS — R80.9 NEPHROTIC RANGE PROTEINURIA: ICD-10-CM

## 2024-11-02 DIAGNOSIS — C90.00 MULTIPLE MYELOMA NOT HAVING ACHIEVED REMISSION (HCC): ICD-10-CM

## 2024-11-02 DIAGNOSIS — I12.9 BENIGN HYPERTENSION WITH CKD (CHRONIC KIDNEY DISEASE) STAGE III (HCC): ICD-10-CM

## 2024-11-02 DIAGNOSIS — N18.31 STAGE 3A CHRONIC KIDNEY DISEASE (HCC): ICD-10-CM

## 2024-11-02 LAB
ALBUMIN SERPL BCG-MCNC: 3.5 G/DL (ref 3.5–5)
ALP SERPL-CCNC: 73 U/L (ref 34–104)
ALT SERPL W P-5'-P-CCNC: 17 U/L (ref 7–52)
ANION GAP SERPL CALCULATED.3IONS-SCNC: 7 MMOL/L (ref 4–13)
AST SERPL W P-5'-P-CCNC: 14 U/L (ref 13–39)
BASOPHILS # BLD MANUAL: 0 THOUSAND/UL (ref 0–0.1)
BASOPHILS NFR MAR MANUAL: 0 % (ref 0–1)
BILIRUB SERPL-MCNC: 0.21 MG/DL (ref 0.2–1)
BUN SERPL-MCNC: 43 MG/DL (ref 5–25)
CALCIUM SERPL-MCNC: 9.8 MG/DL (ref 8.4–10.2)
CHLORIDE SERPL-SCNC: 113 MMOL/L (ref 96–108)
CO2 SERPL-SCNC: 22 MMOL/L (ref 21–32)
CREAT SERPL-MCNC: 1.81 MG/DL (ref 0.6–1.3)
CREAT UR-MCNC: 93.4 MG/DL
CREAT UR-MCNC: 93.8 MG/DL
EOSINOPHIL # BLD MANUAL: 0 THOUSAND/UL (ref 0–0.4)
EOSINOPHIL NFR BLD MANUAL: 0 % (ref 0–6)
ERYTHROCYTE [DISTWIDTH] IN BLOOD BY AUTOMATED COUNT: 13.3 % (ref 11.6–15.1)
GFR SERPL CREATININE-BSD FRML MDRD: 37 ML/MIN/1.73SQ M
GLUCOSE P FAST SERPL-MCNC: 135 MG/DL (ref 65–99)
HCT VFR BLD AUTO: 34 % (ref 36.5–49.3)
HGB BLD-MCNC: 11.2 G/DL (ref 12–17)
IGA SERPL-MCNC: 37 MG/DL (ref 66–433)
IGG SERPL-MCNC: 2555 MG/DL (ref 635–1741)
IGM SERPL-MCNC: 25 MG/DL (ref 45–281)
LDH SERPL-CCNC: 162 U/L (ref 140–271)
LYMPHOCYTES # BLD AUTO: 0.47 THOUSAND/UL (ref 0.6–4.47)
LYMPHOCYTES # BLD AUTO: 3 % (ref 14–44)
MAGNESIUM SERPL-MCNC: 2.2 MG/DL (ref 1.9–2.7)
MCH RBC QN AUTO: 30.4 PG (ref 26.8–34.3)
MCHC RBC AUTO-ENTMCNC: 32.9 G/DL (ref 31.4–37.4)
MCV RBC AUTO: 92 FL (ref 82–98)
MICROALBUMIN UR-MCNC: 477.2 MG/L
MICROALBUMIN/CREAT 24H UR: 511 MG/G CREATININE (ref 0–30)
MONOCYTES # BLD AUTO: 0.63 THOUSAND/UL (ref 0–1.22)
MONOCYTES NFR BLD: 4 % (ref 4–12)
NEUTROPHILS # BLD MANUAL: 14.55 THOUSAND/UL (ref 1.85–7.62)
NEUTS BAND NFR BLD MANUAL: 2 % (ref 0–8)
NEUTS SEG NFR BLD AUTO: 91 % (ref 43–75)
PHOSPHATE SERPL-MCNC: 3 MG/DL (ref 2.3–4.1)
PLATELET # BLD AUTO: 147 THOUSANDS/UL (ref 149–390)
PLATELET BLD QL SMEAR: ABNORMAL
PMV BLD AUTO: 12.2 FL (ref 8.9–12.7)
POTASSIUM SERPL-SCNC: 3.9 MMOL/L (ref 3.5–5.3)
PROT SERPL-MCNC: 7.9 G/DL (ref 6.4–8.4)
PROT UR-MCNC: 109.3 MG/DL
PROT/CREAT UR: 1.2 MG/G{CREAT} (ref 0–0.1)
RBC # BLD AUTO: 3.69 MILLION/UL (ref 3.88–5.62)
RBC MORPH BLD: NORMAL
RETICS # AUTO: NORMAL 10*3/UL (ref 14356–105094)
RETICS # CALC: 1.5 % (ref 0.37–1.87)
SODIUM SERPL-SCNC: 142 MMOL/L (ref 135–147)
WBC # BLD AUTO: 15.64 THOUSAND/UL (ref 4.31–10.16)

## 2024-11-02 PROCEDURE — 82570 ASSAY OF URINE CREATININE: CPT

## 2024-11-02 PROCEDURE — 85045 AUTOMATED RETICULOCYTE COUNT: CPT

## 2024-11-02 PROCEDURE — 82232 ASSAY OF BETA-2 PROTEIN: CPT

## 2024-11-02 PROCEDURE — 83735 ASSAY OF MAGNESIUM: CPT

## 2024-11-02 PROCEDURE — 83615 LACTATE (LD) (LDH) ENZYME: CPT

## 2024-11-02 PROCEDURE — 82784 ASSAY IGA/IGD/IGG/IGM EACH: CPT

## 2024-11-02 PROCEDURE — 36415 COLL VENOUS BLD VENIPUNCTURE: CPT

## 2024-11-02 PROCEDURE — 85007 BL SMEAR W/DIFF WBC COUNT: CPT

## 2024-11-02 PROCEDURE — 80053 COMPREHEN METABOLIC PANEL: CPT

## 2024-11-02 PROCEDURE — 82043 UR ALBUMIN QUANTITATIVE: CPT

## 2024-11-02 PROCEDURE — 85027 COMPLETE CBC AUTOMATED: CPT

## 2024-11-02 PROCEDURE — 84100 ASSAY OF PHOSPHORUS: CPT

## 2024-11-02 PROCEDURE — 84156 ASSAY OF PROTEIN URINE: CPT

## 2024-11-02 PROCEDURE — 84165 PROTEIN E-PHORESIS SERUM: CPT

## 2024-11-02 PROCEDURE — 93005 ELECTROCARDIOGRAM TRACING: CPT

## 2024-11-02 PROCEDURE — 83521 IG LIGHT CHAINS FREE EACH: CPT

## 2024-11-04 ENCOUNTER — TELEPHONE (OUTPATIENT)
Age: 69
End: 2024-11-04

## 2024-11-04 DIAGNOSIS — C90.00 MULTIPLE MYELOMA NOT HAVING ACHIEVED REMISSION (HCC): Primary | ICD-10-CM

## 2024-11-04 LAB
ATRIAL RATE: 81 BPM
B2 MICROGLOB SERPL-MCNC: 3.7 MG/L (ref 0.6–2.4)
P AXIS: 18 DEGREES
PR INTERVAL: 118 MS
QRS AXIS: 12 DEGREES
QRSD INTERVAL: 76 MS
QT INTERVAL: 362 MS
QTC INTERVAL: 420 MS
T WAVE AXIS: 43 DEGREES
VENTRICULAR RATE: 81 BPM

## 2024-11-04 PROCEDURE — 93010 ELECTROCARDIOGRAM REPORT: CPT | Performed by: INTERNAL MEDICINE

## 2024-11-05 LAB
ALBUMIN SERPL ELPH-MCNC: 3.4 G/DL (ref 3.2–5.1)
ALBUMIN SERPL ELPH-MCNC: 45.3 % (ref 48–70)
ALPHA1 GLOB SERPL ELPH-MCNC: 0.28 G/DL (ref 0.15–0.47)
ALPHA1 GLOB SERPL ELPH-MCNC: 3.7 % (ref 1.8–7)
ALPHA2 GLOB SERPL ELPH-MCNC: 0.65 G/DL (ref 0.42–1.04)
ALPHA2 GLOB SERPL ELPH-MCNC: 8.7 % (ref 5.9–14.9)
BETA GLOB ABNORMAL SERPL ELPH-MCNC: 0.38 G/DL (ref 0.31–0.57)
BETA1 GLOB SERPL ELPH-MCNC: 5 % (ref 4.7–7.7)
BETA2 GLOB SERPL ELPH-MCNC: 31.8 % (ref 3.1–7.9)
BETA2+GAMMA GLOB SERPL ELPH-MCNC: 2.39 G/DL (ref 0.2–0.58)
GAMMA GLOB ABNORMAL SERPL ELPH-MCNC: 0.41 G/DL (ref 0.4–1.66)
GAMMA GLOB SERPL ELPH-MCNC: 5.5 % (ref 6.9–22.3)
IGG/ALB SER: 0.83 {RATIO} (ref 1.1–1.8)
KAPPA LC FREE SER-MCNC: 13.7 MG/L (ref 3.3–19.4)
KAPPA LC FREE/LAMBDA FREE SER: 0.04 {RATIO} (ref 0.26–1.65)
LAMBDA LC FREE SERPL-MCNC: 390 MG/L (ref 5.7–26.3)
M PROTEIN 1 MFR SERPL ELPH: 27.8 %
M PROTEIN 1 SERPL ELPH-MCNC: 2.09 G/DL
PROT PATTERN SERPL ELPH-IMP: ABNORMAL
PROT SERPL-MCNC: 7.5 G/DL (ref 6.4–8.2)

## 2024-11-05 PROCEDURE — 84165 PROTEIN E-PHORESIS SERUM: CPT | Performed by: STUDENT IN AN ORGANIZED HEALTH CARE EDUCATION/TRAINING PROGRAM

## 2024-11-06 ENCOUNTER — TELEPHONE (OUTPATIENT)
Dept: NEPHROLOGY | Facility: CLINIC | Age: 69
End: 2024-11-06

## 2024-11-06 DIAGNOSIS — N18.31 STAGE 3A CHRONIC KIDNEY DISEASE (HCC): Primary | ICD-10-CM

## 2024-11-06 NOTE — TELEPHONE ENCOUNTER
----- Message from Vega Landry MD sent at 11/5/2024  4:30 PM EST -----  Regarding: Lab work in 1 month  Can you please have the patient repeat renal function panel, urine albumin to creatinine ratio and urine protein to creatinine ratio in 1 month.  Thank you

## 2024-11-07 ENCOUNTER — HOSPITAL ENCOUNTER (OUTPATIENT)
Dept: INFUSION CENTER | Facility: CLINIC | Age: 69
Discharge: HOME | End: 2024-11-07
Payer: COMMERCIAL

## 2024-11-07 VITALS
DIASTOLIC BLOOD PRESSURE: 78 MMHG | HEART RATE: 92 BPM | OXYGEN SATURATION: 97 % | WEIGHT: 187 LBS | TEMPERATURE: 97.8 F | SYSTOLIC BLOOD PRESSURE: 143 MMHG | HEIGHT: 64 IN | RESPIRATION RATE: 18 BRPM | BODY MASS INDEX: 31.92 KG/M2

## 2024-11-07 DIAGNOSIS — C90.00 MULTIPLE MYELOMA NOT HAVING ACHIEVED REMISSION (HCC): Primary | ICD-10-CM

## 2024-11-07 PROCEDURE — 96401 CHEMO ANTI-NEOPL SQ/IM: CPT

## 2024-11-07 PROCEDURE — 96367 TX/PROPH/DG ADDL SEQ IV INF: CPT

## 2024-11-07 PROCEDURE — 96365 THER/PROPH/DIAG IV INF INIT: CPT

## 2024-11-07 RX ORDER — SODIUM CHLORIDE 9 MG/ML
20 INJECTION, SOLUTION INTRAVENOUS ONCE
Status: COMPLETED | OUTPATIENT
Start: 2024-11-07 | End: 2024-11-07

## 2024-11-07 RX ORDER — ACETAMINOPHEN 325 MG/1
650 TABLET ORAL ONCE
Status: COMPLETED | OUTPATIENT
Start: 2024-11-07 | End: 2024-11-07

## 2024-11-07 RX ADMIN — ACETAMINOPHEN 650 MG: 325 TABLET ORAL at 08:12

## 2024-11-07 RX ADMIN — DARATUMUMAB AND HYALURONIDASE-FIHJ (HUMAN RECOMBINANT) 1800 MG: 1800; 30000 INJECTION SUBCUTANEOUS at 10:04

## 2024-11-07 RX ADMIN — BORTEZOMIB 2.5 MG: 3.5 INJECTION, POWDER, LYOPHILIZED, FOR SOLUTION INTRAVENOUS; SUBCUTANEOUS at 10:10

## 2024-11-07 RX ADMIN — DIPHENHYDRAMINE HYDROCHLORIDE 25 MG: 50 INJECTION, SOLUTION INTRAMUSCULAR; INTRAVENOUS at 08:39

## 2024-11-07 RX ADMIN — DEXAMETHASONE SODIUM PHOSPHATE 20 MG: 100 INJECTION INTRAMUSCULAR; INTRAVENOUS at 08:14

## 2024-11-07 RX ADMIN — SODIUM CHLORIDE 20 ML/HR: 0.9 INJECTION, SOLUTION INTRAVENOUS at 08:12

## 2024-11-07 NOTE — PROGRESS NOTES
Patient tolerated treatment well without incident. Injections given in b/l abdomen for Darzalex, Velcade on the Left side. He presents no adverse reactions during his 30-minute observation. Declined AVS. Confirmed next appointment at the [facility] for 11/15/2024 @ 1030. Patient ambulatory at time of discharge.

## 2024-11-07 NOTE — PROGRESS NOTES
[facility] for the treatment of Velcade and Darzalex Faspro. Labs reviewed from 11/02/2024 and within parameters for treatment. He offers no concerns at this time. PIV placed in his Left forearm with good blood return. Patient is resting comfortably in the chair, call bell within reach.

## 2024-11-08 ENCOUNTER — ANESTHESIA EVENT (OUTPATIENT)
Dept: GASTROENTEROLOGY | Facility: HOSPITAL | Age: 69
End: 2024-11-08
Payer: COMMERCIAL

## 2024-11-08 ENCOUNTER — HOSPITAL ENCOUNTER (OUTPATIENT)
Dept: GASTROENTEROLOGY | Facility: HOSPITAL | Age: 69
Setting detail: OUTPATIENT SURGERY
Discharge: HOME | End: 2024-11-08
Attending: INTERNAL MEDICINE
Payer: COMMERCIAL

## 2024-11-08 ENCOUNTER — ANESTHESIA (OUTPATIENT)
Dept: GASTROENTEROLOGY | Facility: HOSPITAL | Age: 69
End: 2024-11-08
Payer: COMMERCIAL

## 2024-11-08 VITALS
HEART RATE: 73 BPM | OXYGEN SATURATION: 99 % | DIASTOLIC BLOOD PRESSURE: 78 MMHG | TEMPERATURE: 97.6 F | RESPIRATION RATE: 18 BRPM | SYSTOLIC BLOOD PRESSURE: 141 MMHG

## 2024-11-08 DIAGNOSIS — Z86.0100 PERSONAL HISTORY OF COLON POLYPS, UNSPECIFIED: ICD-10-CM

## 2024-11-08 RX ORDER — PROPOFOL 10 MG/ML
INJECTION, EMULSION INTRAVENOUS AS NEEDED
Status: DISCONTINUED | OUTPATIENT
Start: 2024-11-08 | End: 2024-11-08

## 2024-11-08 RX ORDER — SODIUM CHLORIDE, SODIUM LACTATE, POTASSIUM CHLORIDE, CALCIUM CHLORIDE 600; 310; 30; 20 MG/100ML; MG/100ML; MG/100ML; MG/100ML
INJECTION, SOLUTION INTRAVENOUS CONTINUOUS PRN
Status: DISCONTINUED | OUTPATIENT
Start: 2024-11-08 | End: 2024-11-08

## 2024-11-08 RX ADMIN — PROPOFOL 50 MG: 10 INJECTION, EMULSION INTRAVENOUS at 09:04

## 2024-11-08 RX ADMIN — PROPOFOL 30 MG: 10 INJECTION, EMULSION INTRAVENOUS at 09:09

## 2024-11-08 RX ADMIN — SODIUM CHLORIDE, SODIUM LACTATE, POTASSIUM CHLORIDE, AND CALCIUM CHLORIDE: .6; .31; .03; .02 INJECTION, SOLUTION INTRAVENOUS at 08:35

## 2024-11-08 RX ADMIN — PROPOFOL 150 MG: 10 INJECTION, EMULSION INTRAVENOUS at 09:02

## 2024-11-08 RX ADMIN — PROPOFOL 50 MG: 10 INJECTION, EMULSION INTRAVENOUS at 09:06

## 2024-11-08 NOTE — H&P
History and Physical - [facility]  Zack Partida 69 y.o. male MRN: [id number]                  HPI: Zack Partida is a 69 y.o. year old male who presents for colonoscopy.  Indications for the procedure: History of colonic polyps.  The most recent colonoscopy was about 3 years ago and performed in [place].  As per patient a large polyp was removed and the patient was told to have a surveillance colonoscopy within 1 year.        REVIEW OF SYSTEMS: Per the HPI, and otherwise unremarkable.    Historical Information   Past Medical History:   Diagnosis Date    Hypertension     Multiple myeloma (HCC)     Polio      Past Surgical History:   Procedure Laterality Date    IR BIOPSY BONE MARROW  6/11/2024    IR BIOPSY BONE MARROW  10/4/2024    IR BIOPSY KIDNEY RANDOM  5/29/2024    LEG SURGERY      for polio     Social History   Social History     Substance and Sexual Activity   Alcohol Use Never     Social History     Substance and Sexual Activity   Drug Use Never     Social History     Tobacco Use   Smoking Status Never   Smokeless Tobacco Never     Family History   Problem Relation Age of Onset    Hypertension Mother     Diabetes Mother     Hypertension Father     Diabetes Father        Meds/Allergies       Current Outpatient Medications:     acyclovir (ZOVIRAX) 400 MG tablet    amLODIPine (NORVASC) 5 mg tablet    irbesartan (AVAPRO) 300 mg tablet    lenalidomide (REVLIMID) 15 MG CAPS    chlorthalidone 25 mg tablet    Cholecalciferol (VITAMIN D3) 1,000 units tablet    ergocalciferol (ERGOCALCIFEROL) 1.25 MG (10747 UT) capsule    Multiple Vitamin (multivitamin) capsule  No current facility-administered medications for this encounter.    Facility-Administered Medications Ordered in Other Encounters:     alteplase (CATHFLO) injection 2 mg, 2 mg, Intracatheter, Q1MIN PRN    lactated ringers infusion, , Intravenous, Continuous PRN, New Bag at 11/08/24 0835    No Known Allergies    Objective     /73    Pulse 91   Temp 98.6 °F (37 °C) (Temporal)   Resp 18   SpO2 97%       PHYSICAL EXAM    Gen: NAD  Head: NCAT  CV: RRR  CHEST: Clear  ABD: soft, NT/ND  EXT: no edema      ASSESSMENT/PLAN:  This is a 69 y.o. year old male here for colonoscopy, and he is stable and optimized for his procedure.

## 2024-11-08 NOTE — ANESTHESIA PREPROCEDURE EVALUATION
Procedure:  COLONOSCOPY    Relevant Problems   CARDIO   (+) Essential hypertension      /RENAL   (+) Nephrolithiasis   (+) Stage 3a chronic kidney disease (HCC)        Left Ventricle: Left ventricular cavity size is normal. Wall thickness is normal. The left ventricular ejection fraction is 60%. Systolic function is normal. Diastolic function is normal for age.    The following segments are hypokinetic: basal inferior.    All other segments are normal.    Right Ventricle: Right ventricular cavity size is normal. Systolic function is normal.    Mitral Valve: There is moderate annular calcification.     Physical Exam    Airway    Mallampati score: III  TM Distance: >3 FB  Neck ROM: full     Dental       Cardiovascular  Rhythm: regular, No weak pulses    Pulmonary   No stridor    Other Findings        Anesthesia Plan  ASA Score- 3     Anesthesia Type- IV sedation with anesthesia with ASA Monitors.         Additional Monitors:     Airway Plan:            Plan Factors-    Chart reviewed.   Existing labs reviewed. Patient summary reviewed.                  Induction- intravenous.    Postoperative Plan-         Informed Consent- Anesthetic plan and risks discussed with patient.  I personally reviewed this patient with the CRNA. Discussed and agreed on the Anesthesia Plan with the CRNA..

## 2024-11-11 NOTE — ANESTHESIA POSTPROCEDURE EVALUATION
Post-Op Assessment Note    CV Status:  Stable  Pain Score: 0    Pain management: adequate       Mental Status:  Alert and awake   Hydration Status:  Euvolemic   PONV Controlled:  Controlled   Airway Patency:  Patent     Post Op Vitals Reviewed: Yes    No anethesia notable event occurred.    Staff: Anesthesiologist           Last Filed PACU Vitals:  Vitals Value Taken Time   Temp 97.6 °F (36.4 °C) 11/08/24 0950   Pulse 73 11/08/24 0950   /78 11/08/24 0950   Resp 18 11/08/24 0950   SpO2 99 % 11/08/24 0950       Modified Mine:  No data recorded

## 2024-11-13 DIAGNOSIS — C90.00 MULTIPLE MYELOMA NOT HAVING ACHIEVED REMISSION (HCC): ICD-10-CM

## 2024-11-13 RX ORDER — LENALIDOMIDE 15 MG/1
CAPSULE ORAL
Qty: 14 CAPSULE | Refills: 0 | Status: SHIPPED | OUTPATIENT
Start: 2024-11-13

## 2024-11-15 ENCOUNTER — HOSPITAL ENCOUNTER (OUTPATIENT)
Dept: INFUSION CENTER | Facility: CLINIC | Age: 69
End: 2024-11-15
Payer: COMMERCIAL

## 2024-11-15 VITALS
OXYGEN SATURATION: 98 % | BODY MASS INDEX: 31.58 KG/M2 | DIASTOLIC BLOOD PRESSURE: 69 MMHG | HEART RATE: 86 BPM | TEMPERATURE: 97.2 F | RESPIRATION RATE: 18 BRPM | HEIGHT: 64 IN | SYSTOLIC BLOOD PRESSURE: 142 MMHG | WEIGHT: 185 LBS

## 2024-11-15 DIAGNOSIS — C90.00 MULTIPLE MYELOMA NOT HAVING ACHIEVED REMISSION (HCC): Primary | ICD-10-CM

## 2024-11-15 PROCEDURE — 96401 CHEMO ANTI-NEOPL SQ/IM: CPT

## 2024-11-15 RX ORDER — DEXAMETHASONE 4 MG/1
20 TABLET ORAL ONCE
Status: COMPLETED | OUTPATIENT
Start: 2024-11-15 | End: 2024-11-15

## 2024-11-15 RX ORDER — DIPHENHYDRAMINE HCL 25 MG
25 TABLET ORAL ONCE
Status: COMPLETED | OUTPATIENT
Start: 2024-11-15 | End: 2024-11-15

## 2024-11-15 RX ORDER — ACETAMINOPHEN 325 MG/1
650 TABLET ORAL ONCE
Status: COMPLETED | OUTPATIENT
Start: 2024-11-15 | End: 2024-11-15

## 2024-11-15 RX ADMIN — ACETAMINOPHEN 650 MG: 325 TABLET ORAL at 10:43

## 2024-11-15 RX ADMIN — BORTEZOMIB 2.5 MG: 3.5 INJECTION, POWDER, LYOPHILIZED, FOR SOLUTION INTRAVENOUS; SUBCUTANEOUS at 11:55

## 2024-11-15 RX ADMIN — DEXAMETHASONE 20 MG: 4 TABLET ORAL at 10:43

## 2024-11-15 RX ADMIN — DIPHENHYDRAMINE HYDROCHLORIDE 25 MG: 25 TABLET ORAL at 10:42

## 2024-11-15 RX ADMIN — DARATUMUMAB AND HYALURONIDASE-FIHJ (HUMAN RECOMBINANT) 1800 MG: 1800; 30000 INJECTION SUBCUTANEOUS at 11:48

## 2024-11-15 NOTE — PROGRESS NOTES
Patient presents to Infusion center for Velcade and Darzalex Faspro. Patient offers no acute complaints at this time. Labs reviewed from 11/2- no parameters for treatment today.

## 2024-11-15 NOTE — PROGRESS NOTES
Patient tolerated treatment without incident. Darzalex Fasporo x2 administered into b/l abdomen. Velcade adminisrered into L arm. 30 min observation completed after injections. Next appt confirmed with patient for 11/22 at 10:30 at [place]. AVS print out provided.

## 2024-11-18 NOTE — PROGRESS NOTES
Medical Oncology: Progress Note:             Primary Care Provider: Ayanna Paz MD                 MRN: [id number] : 1955    Reason for Encounter: Follow-Up Visit.  Interval Events: Zack Partida is a 69-year-old [place]-speaking male, with an established history of IgG lambda multiple myeloma, with light-chain deposition in the kidneys (Currently on Daratumumab-VRd - Cycle 1 Day 15 administered on 2024); who presents today for interval follow-up. Of note, Cycle 1 of Daratumumab-VRd was initiated on 2024. Patient has since completed Days 1, 8, and 15 of Daratumumab 1,800 mg and Bortezomib 2.5 mg most recently, on 2024. Patient is also taking Lenalidomide 15 mg Daily (14-days on, 7-days off), and completed Days 1-14 last 2024. He is tolerating the above-mentioned appropriately, without any significant adverse-effects, with the exception of an isolated, and transient episode of numbness involving the distal left upper extremity (e.g. Resolved after approximately 3-hours). He also endorses multiple episodes of loose, watery diarrhea beginning on , 2024 (e.g. Approximately 4-5 episodes), of which resolved mid-day on Monday, 2024, without intervention. Bowel movements have otherwise been normal. Patient is tolerating oral-intake appropriately, as well. Lastly, patient developed cutaneous-changes (e.g. Darkening) at his Bortezomib injection sites; however, the above-mentioned is non-painful, and non-pruritic. Patient has no other complaints or concerns at the present time; with the exception of desire to travel to [place] in 2025. We discussed that the above-mentioned is dependent on whether he pursues bone marrow transplant. Addressed all question from patient, and his grandson on completion of our encounter.    ECOG Performance Status: 1-Point.    Review of Systems:  All systems  reviewed and negative except otherwise listed in the History of Present Illness.    Oncology History Overview Note   5/2024 - kidney biopsy - LCDD - conge red stainging negative    6/2024 - BMBX with 15-17% lambda restricted plasma cells    10/2024 - BMBX with 30% lambda restricted plasma cells, congo red staining negative, FISH - dup 1q - monoclonal protein is IgG lambda     Multiple myeloma not having achieved remission (HCC)   10/18/2024 Initial Diagnosis    Multiple myeloma not having achieved remission (HCC)     11/1/2024 -  Chemotherapy    alteplase (CATHFLO), 2 mg, Intracatheter, Every 1 Minute as needed, 1 of 14 cycles  daratumumab-hyaluronidase (DARZALEX FASPRO), 1,800 mg, Subcutaneous daratumumab-hyaluronidase, Once, 1 of 14 cycles  Administration: 1,800 mg (11/1/2024), 1,800 mg (11/7/2024), 1,800 mg (11/15/2024)  bortezomib (VELCADE), 1.3 mg/m2 = 2.5 mg, Subcutaneous, Once, 1 of 8 cycles  Administration: 2.5 mg (11/1/2024), 2.5 mg (11/7/2024), 2.5 mg (11/15/2024)       Problem List:  Patient Active Problem List   Diagnosis    Muscle weakness of extremity following poliomyelitis    Prediabetes    Class 1 obesity due to excess calories with serious comorbidity and body mass index (BMI) of 33.0 to 33.9 in adult    Essential hypertension    History of poliomyelitis    Unspecified acquired deformity of unspecified lower leg    Stage 3a chronic kidney disease (HCC)    History of colon polyps    Nephrolithiasis    Difficulty walking    Smoldering myeloma    IgG lambda monoclonal gammopathy    Multiple myeloma not having achieved remission (HCC)     Assessment and Plan:   IgG Lambda Multiple Myeloma with Free Light Chain Deposition in Kidneys:  Revised-ISS Stage II and Low-Intermediate Risk R2-ISS with Standard-Risk Cytogenetics:  Patient is a 69-year-old [place]raltarian-speaking male, with an established history of IgG lambda multiple myeloma, with light-chain deposition in the kidneys (Currently on Daratumumab-VRd -  Cycle 1 Day 15 administered on November 15th, 2024); who presents today for interval follow-up. Of note, Cycle 1 of Daratumumab-VRd was initiated on November 1st, 2024. Patient has since completed Days 1, 8, and 15 of Daratumumab 1,800 mg and Bortezomib 2.5 mg most recently, on Friday, November 15th, 2024. Patient is also taking Lenalidomide 15 mg Daily (14-days on, 7-days off - Completed Day 14 on Wednesday, November 13th, 2024). He is tolerating the above-mentioned appropriately, without any identifiable adverse-effects (e.g. With the exception of short-limited diarrhea, and a transient episode of left upper extremity numbness). Will re-evaluate laboratory-parameters, and continue Daratumumab-VRd every 21-days, accordingly. Will also coordinate for transplant evaluation through the [facility] (e.g. More accessible to patient, and his family). Lastly, patient is anticipating an abdominal fat pad biopsy on November 22nd, 2024, to complete evaluation for AL amyloidosis. Will cancel the above-mentioned. Discussed the above-mentioned, at length, with patient and his grandson; who expressed understanding and agreement with the outlined plan.    Summary of Recommendations:  Reiterated importance of VTE-prophylaxis with Aspirin 81 mg Daily.  Cancel abdominal fat pad biopsy (Scheduled for November 22nd, 2024).  Continue systemic-therapy for IgG lambda multiple myeloma:  Continue Daratumumab-VRd every 21-days.  Monitor myeloma-parameters prior to start of each treatment cycle.  Continue antiviral prophylaxis with Acyclovir 400 mg BID.  No dose-adjustment (Creatinine Clearance of 46 mL/min).  Will coordinate for transplant evaluation at [facility].  Plan for close interval follow-up on December 17th, 2024 at 8:00 a.m.    Past Medical History:   has a past medical history of Hypertension, Multiple myeloma (HCC), and Polio.    Past Surgical History:   has a past surgical history that includes Leg  Surgery; IR biopsy kidney random (5/29/2024); IR biopsy bone marrow (6/11/2024); and IR biopsy bone marrow (10/4/2024).    Current Medications:  Current Outpatient Medications   Medication Sig Dispense Refill    acyclovir (ZOVIRAX) 400 MG tablet Take 1 tablet (400 mg total) by mouth 2 (two) times a day 60 tablet 4    amLODIPine (NORVASC) 5 mg tablet Take 1 tablet (5 mg total) by mouth daily (Patient taking differently: Take 5 mg by mouth daily DAILY IN THE PM) 90 tablet 3    Cholecalciferol (VITAMIN D3) 1,000 units tablet Take 1 tablet (1,000 Units total) by mouth daily 90 tablet 0    irbesartan (AVAPRO) 300 mg tablet Take 300 mg by mouth daily DAILY IN THE AM      lenalidomide (REVLIMID) 15 MG CAPS TAKE 1 CAPSULE BY MOUTH 1 TIME A DAY FOR 14 DAYS ON THEN 7 DAYS OFF 14 capsule 0    chlorthalidone 25 mg tablet Take 0.5 tablets (12.5 mg total) by mouth daily (Patient taking differently: Take 25 mg by mouth daily) 45 tablet 3    ergocalciferol (ERGOCALCIFEROL) 1.25 MG (44764 UT) capsule Take 1 capsule (50,000 Units total) by mouth once a week for 8 doses (Patient not taking: Reported on 10/18/2024) 8 capsule 0    Multiple Vitamin (multivitamin) capsule Take 1 capsule by mouth daily (Patient not taking: Reported on 10/18/2024)       No current facility-administered medications for this visit.     Social History:   reports that he has never smoked. He has never used smokeless tobacco. He reports that he does not drink alcohol and does not use drugs.     Family History:  family history includes Diabetes in his father and mother; Hypertension in his father and mother.     Allergies:  has no known allergies.    Objective:  Vitals:    11/19/24 0813   BP: 142/82   Pulse: 97   Resp: 16   Temp: 97.7 °F (36.5 °C)   SpO2: 98%     Physical Exam:  General: Alert, and oriented; Pleasant, and conversational; Well-Appearing Male  HEENT: Atraumatic, and normocephalic; PERRLA; EOMI; Moist membranes  Neck: Trachea midline; No neck  masses, thyromegaly, or cervical lymphadenopathy   Cardiovascular: Tachycardic (Regular); No murmurs, rubs, or gallops appreciated  Respiratory: Clear to auscultation bilaterally; Adequate aeration; No oxygen   Abdomen: Soft, non-tender; Non-distended; No organomegaly appreciated; Bowel sounds present; Skin Discoloration at Velcade Injection Sites  Neurologic: Appropriately alert, and oriented to Person, Place, and Time; No focal neurologic deficits; Gait Abnormality Secondary to Lower Extremity Deformity    Labs:  Lab Results   Component Value Date    WBC 15.64 (H) 11/02/2024    HGB 11.2 (L) 11/02/2024    HCT 34.0 (L) 11/02/2024    MCV 92 11/02/2024     (L) 11/02/2024     Lab Results   Component Value Date    SODIUM 142 11/02/2024    K 3.9 11/02/2024     (H) 11/02/2024    CO2 22 11/02/2024    AGAP 7 11/02/2024    BUN 43 (H) 11/02/2024    CREATININE 1.81 (H) 11/02/2024    GLUC 90 11/01/2024    GLUF 135 (H) 11/02/2024    CALCIUM 9.8 11/02/2024    AST 14 11/02/2024    ALT 17 11/02/2024    ALKPHOS 73 11/02/2024    TP 7.9 11/02/2024    TP 7.5 11/02/2024    TBILI 0.21 11/02/2024    EGFR 37 11/02/2024     Patient was seen and discussed with attending physician, Cathy Flores D.O.    Tamika Schwab D.O.  Hematology-Oncology Fellow (PGY-5)

## 2024-11-19 ENCOUNTER — TELEPHONE (OUTPATIENT)
Dept: HEMATOLOGY ONCOLOGY | Facility: CLINIC | Age: 69
End: 2024-11-19

## 2024-11-19 ENCOUNTER — APPOINTMENT (OUTPATIENT)
Dept: LAB | Facility: CLINIC | Age: 69
End: 2024-11-19
Payer: COMMERCIAL

## 2024-11-19 ENCOUNTER — OFFICE VISIT (OUTPATIENT)
Dept: HEMATOLOGY ONCOLOGY | Facility: CLINIC | Age: 69
End: 2024-11-19
Payer: COMMERCIAL

## 2024-11-19 VITALS
HEIGHT: 64 IN | WEIGHT: 185 LBS | RESPIRATION RATE: 16 BRPM | DIASTOLIC BLOOD PRESSURE: 82 MMHG | BODY MASS INDEX: 31.58 KG/M2 | SYSTOLIC BLOOD PRESSURE: 142 MMHG | HEART RATE: 97 BPM | OXYGEN SATURATION: 98 % | TEMPERATURE: 97.7 F

## 2024-11-19 DIAGNOSIS — C90.00 MULTIPLE MYELOMA NOT HAVING ACHIEVED REMISSION (HCC): Primary | ICD-10-CM

## 2024-11-19 DIAGNOSIS — C90.00 MULTIPLE MYELOMA NOT HAVING ACHIEVED REMISSION (HCC): ICD-10-CM

## 2024-11-19 LAB
ABO GROUP BLD: NORMAL
ALBUMIN SERPL BCG-MCNC: 3.3 G/DL (ref 3.5–5)
ALP SERPL-CCNC: 65 U/L (ref 34–104)
ALT SERPL W P-5'-P-CCNC: 20 U/L (ref 7–52)
ANION GAP SERPL CALCULATED.3IONS-SCNC: 6 MMOL/L (ref 4–13)
ANISOCYTOSIS BLD QL SMEAR: PRESENT
AST SERPL W P-5'-P-CCNC: 9 U/L (ref 13–39)
BASOPHILS # BLD AUTO: 0.04 THOUSANDS/ÂΜL (ref 0–0.1)
BASOPHILS NFR BLD AUTO: 1 % (ref 0–1)
BILIRUB SERPL-MCNC: 0.28 MG/DL (ref 0.2–1)
BLD GP AB SCN SERPL QL: POSITIVE
BUN SERPL-MCNC: 44 MG/DL (ref 5–25)
CALCIUM ALBUM COR SERPL-MCNC: 9.7 MG/DL (ref 8.3–10.1)
CALCIUM SERPL-MCNC: 9.1 MG/DL (ref 8.4–10.2)
CHLORIDE SERPL-SCNC: 113 MMOL/L (ref 96–108)
CO2 SERPL-SCNC: 23 MMOL/L (ref 21–32)
CREAT SERPL-MCNC: 1.95 MG/DL (ref 0.6–1.3)
EOSINOPHIL # BLD AUTO: 0.26 THOUSAND/ÂΜL (ref 0–0.61)
EOSINOPHIL NFR BLD AUTO: 5 % (ref 0–6)
ERYTHROCYTE [DISTWIDTH] IN BLOOD BY AUTOMATED COUNT: 14 % (ref 11.6–15.1)
GFR SERPL CREATININE-BSD FRML MDRD: 34 ML/MIN/1.73SQ M
GLUCOSE P FAST SERPL-MCNC: 86 MG/DL (ref 65–99)
HCT VFR BLD AUTO: 33.4 % (ref 36.5–49.3)
HGB BLD-MCNC: 10.8 G/DL (ref 12–17)
IGA SERPL-MCNC: 24 MG/DL (ref 66–433)
IGG SERPL-MCNC: 2001 MG/DL (ref 635–1741)
IGM SERPL-MCNC: 23 MG/DL (ref 45–281)
IMM GRANULOCYTES # BLD AUTO: 0.02 THOUSAND/UL (ref 0–0.2)
IMM GRANULOCYTES NFR BLD AUTO: 0 % (ref 0–2)
LYMPHOCYTES # BLD AUTO: 1.02 THOUSANDS/ÂΜL (ref 0.6–4.47)
LYMPHOCYTES NFR BLD AUTO: 18 % (ref 14–44)
MCH RBC QN AUTO: 30.3 PG (ref 26.8–34.3)
MCHC RBC AUTO-ENTMCNC: 32.3 G/DL (ref 31.4–37.4)
MCV RBC AUTO: 94 FL (ref 82–98)
MONOCYTES # BLD AUTO: 0.81 THOUSAND/ÂΜL (ref 0.17–1.22)
MONOCYTES NFR BLD AUTO: 14 % (ref 4–12)
NEUTROPHILS # BLD AUTO: 3.49 THOUSANDS/ÂΜL (ref 1.85–7.62)
NEUTS SEG NFR BLD AUTO: 62 % (ref 43–75)
NRBC BLD AUTO-RTO: 0 /100 WBCS
PLATELET # BLD AUTO: 85 THOUSANDS/UL (ref 149–390)
PLATELET BLD QL SMEAR: ABNORMAL
PMV BLD AUTO: 13 FL (ref 8.9–12.7)
POTASSIUM SERPL-SCNC: 4 MMOL/L (ref 3.5–5.3)
PROT SERPL-MCNC: 6.7 G/DL (ref 6.4–8.4)
RBC # BLD AUTO: 3.56 MILLION/UL (ref 3.88–5.62)
RBC MORPH BLD: PRESENT
RH BLD: POSITIVE
SODIUM SERPL-SCNC: 142 MMOL/L (ref 135–147)
SPECIMEN EXPIRATION DATE: NORMAL
WBC # BLD AUTO: 5.64 THOUSAND/UL (ref 4.31–10.16)

## 2024-11-19 PROCEDURE — 99214 OFFICE O/P EST MOD 30 MIN: CPT | Performed by: INTERNAL MEDICINE

## 2024-11-19 PROCEDURE — 86870 RBC ANTIBODY IDENTIFICATION: CPT

## 2024-11-19 PROCEDURE — 86900 BLOOD TYPING SEROLOGIC ABO: CPT

## 2024-11-19 PROCEDURE — 80053 COMPREHEN METABOLIC PANEL: CPT

## 2024-11-19 PROCEDURE — 85025 COMPLETE CBC W/AUTO DIFF WBC: CPT

## 2024-11-19 PROCEDURE — 84165 PROTEIN E-PHORESIS SERUM: CPT

## 2024-11-19 PROCEDURE — 82784 ASSAY IGA/IGD/IGG/IGM EACH: CPT

## 2024-11-19 PROCEDURE — 86901 BLOOD TYPING SEROLOGIC RH(D): CPT

## 2024-11-19 PROCEDURE — 86850 RBC ANTIBODY SCREEN: CPT

## 2024-11-19 PROCEDURE — 36415 COLL VENOUS BLD VENIPUNCTURE: CPT

## 2024-11-19 PROCEDURE — 83521 IG LIGHT CHAINS FREE EACH: CPT

## 2024-11-19 PROCEDURE — 86334 IMMUNOFIX E-PHORESIS SERUM: CPT

## 2024-11-19 RX ORDER — DEXAMETHASONE 4 MG/1
20 TABLET ORAL ONCE
OUTPATIENT
Start: 2024-11-29

## 2024-11-19 RX ORDER — DIPHENHYDRAMINE HCL 25 MG
25 TABLET ORAL ONCE
Status: CANCELLED | OUTPATIENT
Start: 2024-11-22

## 2024-11-19 RX ORDER — ACETAMINOPHEN 325 MG/1
650 TABLET ORAL ONCE
Status: CANCELLED | OUTPATIENT
Start: 2024-11-22

## 2024-11-19 RX ORDER — DEXAMETHASONE 4 MG/1
20 TABLET ORAL ONCE
Status: CANCELLED | OUTPATIENT
Start: 2024-11-22

## 2024-11-19 RX ORDER — ACETAMINOPHEN 325 MG/1
650 TABLET ORAL ONCE
OUTPATIENT
Start: 2024-12-06

## 2024-11-19 RX ORDER — DEXAMETHASONE 4 MG/1
20 TABLET ORAL ONCE
OUTPATIENT
Start: 2024-12-06

## 2024-11-19 RX ORDER — ACETAMINOPHEN 325 MG/1
650 TABLET ORAL ONCE
OUTPATIENT
Start: 2024-11-29

## 2024-11-19 RX ORDER — DIPHENHYDRAMINE HCL 25 MG
25 TABLET ORAL ONCE
OUTPATIENT
Start: 2024-12-06

## 2024-11-19 RX ORDER — DIPHENHYDRAMINE HCL 25 MG
25 TABLET ORAL ONCE
OUTPATIENT
Start: 2024-11-29

## 2024-11-19 NOTE — TELEPHONE ENCOUNTER
[facility] is scheduled to see Dr. Christianson on Nov 29th at 0730.

## 2024-11-20 LAB
KAPPA LC FREE SER-MCNC: 16.2 MG/L (ref 3.3–19.4)
KAPPA LC FREE/LAMBDA FREE SER: 0.11 {RATIO} (ref 0.26–1.65)
LAMBDA LC FREE SERPL-MCNC: 144.9 MG/L (ref 5.7–26.3)

## 2024-11-21 LAB
ALBUMIN SERPL ELPH-MCNC: 3.14 G/DL (ref 3.2–5.1)
ALBUMIN SERPL ELPH-MCNC: 47.5 % (ref 48–70)
ALPHA1 GLOB SERPL ELPH-MCNC: 0.33 G/DL (ref 0.15–0.47)
ALPHA1 GLOB SERPL ELPH-MCNC: 5 % (ref 1.8–7)
ALPHA2 GLOB SERPL ELPH-MCNC: 0.71 G/DL (ref 0.42–1.04)
ALPHA2 GLOB SERPL ELPH-MCNC: 10.8 % (ref 5.9–14.9)
BETA GLOB ABNORMAL SERPL ELPH-MCNC: 0.36 G/DL (ref 0.31–0.57)
BETA1 GLOB SERPL ELPH-MCNC: 5.4 % (ref 4.7–7.7)
BETA2 GLOB SERPL ELPH-MCNC: 26.2 % (ref 3.1–7.9)
BETA2+GAMMA GLOB SERPL ELPH-MCNC: 1.73 G/DL (ref 0.2–0.58)
GAMMA GLOB ABNORMAL SERPL ELPH-MCNC: 0.34 G/DL (ref 0.4–1.66)
GAMMA GLOB SERPL ELPH-MCNC: 5.1 % (ref 6.9–22.3)
IGG/ALB SER: 0.9 {RATIO} (ref 1.1–1.8)
INTERPRETATION UR IFE-IMP: NORMAL
M PROTEIN 1 MFR SERPL ELPH: 24 %
M PROTEIN 1 SERPL ELPH-MCNC: 1.58 G/DL
PROT PATTERN SERPL ELPH-IMP: ABNORMAL
PROT SERPL-MCNC: 6.6 G/DL (ref 6.4–8.2)

## 2024-11-21 PROCEDURE — 86334 IMMUNOFIX E-PHORESIS SERUM: CPT | Performed by: STUDENT IN AN ORGANIZED HEALTH CARE EDUCATION/TRAINING PROGRAM

## 2024-11-21 PROCEDURE — 84165 PROTEIN E-PHORESIS SERUM: CPT | Performed by: STUDENT IN AN ORGANIZED HEALTH CARE EDUCATION/TRAINING PROGRAM

## 2024-11-22 ENCOUNTER — HOSPITAL ENCOUNTER (OUTPATIENT)
Dept: INFUSION CENTER | Facility: CLINIC | Age: 69
End: 2024-11-22
Payer: COMMERCIAL

## 2024-11-22 ENCOUNTER — TELEPHONE (OUTPATIENT)
Dept: HEMATOLOGY ONCOLOGY | Facility: CLINIC | Age: 69
End: 2024-11-22

## 2024-11-22 VITALS
RESPIRATION RATE: 18 BRPM | HEIGHT: 64 IN | WEIGHT: 187 LBS | BODY MASS INDEX: 31.92 KG/M2 | OXYGEN SATURATION: 98 % | SYSTOLIC BLOOD PRESSURE: 138 MMHG | DIASTOLIC BLOOD PRESSURE: 73 MMHG | HEART RATE: 87 BPM | TEMPERATURE: 97.6 F

## 2024-11-22 DIAGNOSIS — C90.00 MULTIPLE MYELOMA NOT HAVING ACHIEVED REMISSION (HCC): Primary | ICD-10-CM

## 2024-11-22 LAB
BLOOD GROUP ANTIBODIES SERPL: NORMAL
SCAN RESULT: NORMAL

## 2024-11-22 PROCEDURE — 96401 CHEMO ANTI-NEOPL SQ/IM: CPT

## 2024-11-22 RX ORDER — DIPHENHYDRAMINE HCL 25 MG
25 TABLET ORAL ONCE
Status: COMPLETED | OUTPATIENT
Start: 2024-11-22 | End: 2024-11-22

## 2024-11-22 RX ORDER — DEXAMETHASONE 4 MG/1
20 TABLET ORAL ONCE
Status: COMPLETED | OUTPATIENT
Start: 2024-11-22 | End: 2024-11-22

## 2024-11-22 RX ORDER — ACETAMINOPHEN 325 MG/1
650 TABLET ORAL ONCE
Status: COMPLETED | OUTPATIENT
Start: 2024-11-22 | End: 2024-11-22

## 2024-11-22 RX ADMIN — BORTEZOMIB 2.5 MG: 3.5 INJECTION, POWDER, LYOPHILIZED, FOR SOLUTION INTRAVENOUS; SUBCUTANEOUS at 11:43

## 2024-11-22 RX ADMIN — DIPHENHYDRAMINE HYDROCHLORIDE 25 MG: 25 TABLET ORAL at 10:37

## 2024-11-22 RX ADMIN — ACETAMINOPHEN 650 MG: 325 TABLET ORAL at 10:37

## 2024-11-22 RX ADMIN — DEXAMETHASONE 20 MG: 4 TABLET ORAL at 10:37

## 2024-11-22 RX ADMIN — DARATUMUMAB AND HYALURONIDASE-FIHJ (HUMAN RECOMBINANT) 1800 MG: 1800; 30000 INJECTION SUBCUTANEOUS at 11:43

## 2024-11-22 NOTE — PROGRESS NOTES
Patient presents to Infusion Center for Velcade and Darzalex Faspro. No complaints offered at this time. Labs reviewed from 11/19- platelet count of 85 noted. Pza CADET RN made aware. Per Fabiano CADET RN, Dr. Valle is Ok to proceed with treatment and OK to proceed with platelet level of 85.

## 2024-11-22 NOTE — PROGRESS NOTES
Patient tolerated treatment without incident. Next appt confirmed with patient for 11/29 at 11:30 at [place]. AVS declined.

## 2024-11-22 NOTE — TELEPHONE ENCOUNTER
Called [facility] and confirmed they have the Revlimid script sent 11/15 on file. Pt just needs to call to schedule delivery. Used [employer]  service to leave a voicemail. Requested pt call [facility] to schedule [phone].

## 2024-11-29 ENCOUNTER — HOSPITAL ENCOUNTER (OUTPATIENT)
Dept: INFUSION CENTER | Facility: CLINIC | Age: 69
End: 2024-11-29
Payer: COMMERCIAL

## 2024-11-29 VITALS
BODY MASS INDEX: 31.92 KG/M2 | WEIGHT: 186.95 LBS | SYSTOLIC BLOOD PRESSURE: 154 MMHG | HEART RATE: 82 BPM | HEIGHT: 64 IN | TEMPERATURE: 97.4 F | RESPIRATION RATE: 18 BRPM | DIASTOLIC BLOOD PRESSURE: 73 MMHG | OXYGEN SATURATION: 98 %

## 2024-11-29 DIAGNOSIS — C90.00 MULTIPLE MYELOMA NOT HAVING ACHIEVED REMISSION (HCC): Primary | ICD-10-CM

## 2024-11-29 PROCEDURE — 96401 CHEMO ANTI-NEOPL SQ/IM: CPT

## 2024-11-29 RX ORDER — DEXAMETHASONE 4 MG/1
20 TABLET ORAL ONCE
Status: COMPLETED | OUTPATIENT
Start: 2024-11-29 | End: 2024-11-29

## 2024-11-29 RX ORDER — ACETAMINOPHEN 325 MG/1
650 TABLET ORAL ONCE
Status: COMPLETED | OUTPATIENT
Start: 2024-11-29 | End: 2024-11-29

## 2024-11-29 RX ORDER — DIPHENHYDRAMINE HCL 25 MG
25 TABLET ORAL ONCE
Status: COMPLETED | OUTPATIENT
Start: 2024-11-29 | End: 2024-11-29

## 2024-11-29 RX ADMIN — ACETAMINOPHEN 650 MG: 325 TABLET ORAL at 11:45

## 2024-11-29 RX ADMIN — DIPHENHYDRAMINE HYDROCHLORIDE 25 MG: 25 TABLET ORAL at 11:45

## 2024-11-29 RX ADMIN — DEXAMETHASONE 20 MG: 4 TABLET ORAL at 11:45

## 2024-11-29 RX ADMIN — DARATUMUMAB AND HYALURONIDASE-FIHJ (HUMAN RECOMBINANT) 1800 MG: 1800; 30000 INJECTION SUBCUTANEOUS at 12:46

## 2024-11-29 RX ADMIN — BORTEZOMIB 2.5 MG: 3.5 INJECTION, POWDER, LYOPHILIZED, FOR SOLUTION INTRAVENOUS; SUBCUTANEOUS at 12:51

## 2024-11-29 NOTE — PROGRESS NOTES
Pt tolerated treatment well. Aware of next appt 12/6 at 1200. AVS declined. Calendar print out given.

## 2024-12-03 LAB — SCAN RESULT: NORMAL

## 2024-12-06 ENCOUNTER — HOSPITAL ENCOUNTER (OUTPATIENT)
Dept: INFUSION CENTER | Facility: CLINIC | Age: 69
End: 2024-12-06
Payer: COMMERCIAL

## 2024-12-06 ENCOUNTER — OFFICE VISIT (OUTPATIENT)
Dept: NEPHROLOGY | Facility: CLINIC | Age: 69
End: 2024-12-06
Payer: COMMERCIAL

## 2024-12-06 VITALS
HEART RATE: 86 BPM | DIASTOLIC BLOOD PRESSURE: 68 MMHG | BODY MASS INDEX: 31.76 KG/M2 | SYSTOLIC BLOOD PRESSURE: 158 MMHG | WEIGHT: 186 LBS | HEIGHT: 64 IN

## 2024-12-06 VITALS
WEIGHT: 191.5 LBS | BODY MASS INDEX: 32.69 KG/M2 | OXYGEN SATURATION: 100 % | TEMPERATURE: 96.6 F | HEART RATE: 71 BPM | SYSTOLIC BLOOD PRESSURE: 148 MMHG | DIASTOLIC BLOOD PRESSURE: 81 MMHG | HEIGHT: 64 IN

## 2024-12-06 DIAGNOSIS — I12.9 BENIGN HYPERTENSION WITH CKD (CHRONIC KIDNEY DISEASE) STAGE III (HCC): ICD-10-CM

## 2024-12-06 DIAGNOSIS — N18.30 BENIGN HYPERTENSION WITH CKD (CHRONIC KIDNEY DISEASE) STAGE III (HCC): ICD-10-CM

## 2024-12-06 DIAGNOSIS — N18.32 ANEMIA IN STAGE 3B CHRONIC KIDNEY DISEASE  (HCC): ICD-10-CM

## 2024-12-06 DIAGNOSIS — N18.32 STAGE 3B CHRONIC KIDNEY DISEASE (HCC): Primary | ICD-10-CM

## 2024-12-06 DIAGNOSIS — N20.0 NEPHROLITHIASIS: ICD-10-CM

## 2024-12-06 DIAGNOSIS — D89.89 LAMBDA LIGHT CHAIN DEPOSITION DISEASE (HCC): ICD-10-CM

## 2024-12-06 DIAGNOSIS — D63.1 ANEMIA IN STAGE 3B CHRONIC KIDNEY DISEASE  (HCC): ICD-10-CM

## 2024-12-06 DIAGNOSIS — C90.00 MULTIPLE MYELOMA NOT HAVING ACHIEVED REMISSION (HCC): Primary | ICD-10-CM

## 2024-12-06 PROCEDURE — 96401 CHEMO ANTI-NEOPL SQ/IM: CPT

## 2024-12-06 PROCEDURE — 99214 OFFICE O/P EST MOD 30 MIN: CPT | Performed by: INTERNAL MEDICINE

## 2024-12-06 RX ORDER — DIPHENHYDRAMINE HCL 25 MG
25 TABLET ORAL ONCE
Status: COMPLETED | OUTPATIENT
Start: 2024-12-06 | End: 2024-12-06

## 2024-12-06 RX ORDER — DOXAZOSIN 2 MG/1
2 TABLET ORAL
Qty: 90 TABLET | Refills: 3 | Status: SHIPPED | OUTPATIENT
Start: 2024-12-06 | End: 2025-03-06

## 2024-12-06 RX ORDER — DEXAMETHASONE 4 MG/1
20 TABLET ORAL ONCE
Status: COMPLETED | OUTPATIENT
Start: 2024-12-06 | End: 2024-12-06

## 2024-12-06 RX ORDER — ACETAMINOPHEN 325 MG/1
650 TABLET ORAL ONCE
Status: COMPLETED | OUTPATIENT
Start: 2024-12-06 | End: 2024-12-06

## 2024-12-06 RX ADMIN — ACETAMINOPHEN 650 MG: 325 TABLET ORAL at 12:00

## 2024-12-06 RX ADMIN — BORTEZOMIB 2.5 MG: 3.5 INJECTION, POWDER, LYOPHILIZED, FOR SOLUTION INTRAVENOUS; SUBCUTANEOUS at 13:13

## 2024-12-06 RX ADMIN — DARATUMUMAB AND HYALURONIDASE-FIHJ (HUMAN RECOMBINANT) 1800 MG: 1800; 30000 INJECTION SUBCUTANEOUS at 13:05

## 2024-12-06 RX ADMIN — DEXAMETHASONE 20 MG: 4 TABLET ORAL at 12:00

## 2024-12-06 RX ADMIN — DIPHENHYDRAMINE HYDROCHLORIDE 25 MG: 25 TABLET ORAL at 12:00

## 2024-12-06 NOTE — ASSESSMENT & PLAN NOTE
- Kidney ultrasound and CT stone study showing 6 to 7 mm right renal stone without hydronephrosis  - Follows with urology

## 2024-12-06 NOTE — PROGRESS NOTES
Darzalex FasPro per MD order: Injections ( x2 ) given in Abdomen without incident: No adverse reactions noted

## 2024-12-06 NOTE — ASSESSMENT & PLAN NOTE
- Etiology: Lambda light chain deposition disease  - Baseline Cr: 1.17 in 03/2022, 1.36 in 08/2023, 1.1-1.3 early 2024, most recently in the range of 1.5-1.8, most recently 1.95 11/19/2024  - Urinalysis: 2+ protein, 1-2 RBC, 2-4 WBC 05/2024  - Proteinuria: UACR 511 mg/g 11/2024 improved from 2200 mg/g 08/2024, UPCR 1.2 g 11/2024 improved from 4.24 g 08/2024  - Imaging: Bilateral simple cysts, right kidney 9.9 cm/left kidney 10.9 cm/normal echogenicity 05/2024  -No hypocomplementemia, KATELIN/dsDNA negative  SPEP IgG lambda monoclonal immunoglobulin  UPEP IgG lambda monoclonal immunoglobulin  Serum KL ratio 0.04, most recently improved to 0.11 11/19/2024 (Ig lambda free light chain decreased to 144 from 511 in 05/2024)  - Kidney biopsy 05/2024: Lambda light chain deposition disease, early and mild/tubular atrophy and interstitial fibrosis, mild to moderate, arterial/arteriolosclerosis and hyalinosis mild  - Bone marrow biopsy 06/2024: Lambda light chain restricted plasma cell neoplasm (15-17%) compatible with plasma cell myeloma  Bone marrow biopsy 10/2024: 30% lambda restricted plasma cells, congo red staining negative   - CT low-dose myeloma scan negative 05/2024  - Current Rx: Daratumumab, bortezomib and lenalidomide per oncology  - Check renal function panel every 4 weeks  - Check UACR/UPCR every 12 weeks  - Discussed risk factor reduction to slow progression of chronic kidney disease  Intensive blood pressure control as below   Glycemic control, he is non-diabetic   Lifestyle modifications (weight loss/exercise)  Avoidance of NSAIDs      # Electrolytes/Acid Base status   - Electrolytes and acid base status within normal limits     # CKD Mineral and Bone parameters   - Goal Ca 8.5-10 mg/dL, goal Phos 2.7-4.6 mg/dL, goal iPTH 30-70 pg/mL  - PTH 55.1 05/2020/calcium 9.0/phosphorus 2.4 08/2024 all at goal  - 25-hydroxy vitamin D level 16.1 status post ergocalciferol 50,000 units weekly for 8 doses  - Current Rx:  Cholecalciferol 1000 units daily

## 2024-12-06 NOTE — PROGRESS NOTES
Velcade per MD order: Injection given in Abdomen ( RUQ ) without incident: No adverse reactions noted: Verified follow up appt with patient ( 12/13/24 ): AVS offered and declined

## 2024-12-06 NOTE — PROGRESS NOTES
Name: Zack Partida      : 1955      MRN: [id number]  Encounter Provider: Vega Landry MD  Encounter Date: 2024   Encounter department: [facility]  :  Assessment & Plan  Stage 3b chronic kidney disease (HCC)  - Etiology: Lambda light chain deposition disease  - Baseline Cr: 1.17 in 2022, 1.36 in 2023, 1.1-1.3 early , most recently in the range of 1.5-1.8, most recently 1.95 2024  - Urinalysis: 2+ protein, 1-2 RBC, 2-4 WBC 2024  - Proteinuria: UACR 511 mg/g 2024 improved from 2200 mg/g 2024, UPCR 1.2 g 2024 improved from 4.24 g 2024  - Imaging: Bilateral simple cysts, right kidney 9.9 cm/left kidney 10.9 cm/normal echogenicity 2024  -No hypocomplementemia, KATELIN/dsDNA negative  SPEP IgG lambda monoclonal immunoglobulin  UPEP IgG lambda monoclonal immunoglobulin  Serum KL ratio 0.04, most recently improved to 0.11 2024 (Ig lambda free light chain decreased to 144 from 511 in 2024)  - Kidney biopsy 2024: Lambda light chain deposition disease, early and mild/tubular atrophy and interstitial fibrosis, mild to moderate, arterial/arteriolosclerosis and hyalinosis mild  - Bone marrow biopsy 2024: Lambda light chain restricted plasma cell neoplasm (15-17%) compatible with plasma cell myeloma  Bone marrow biopsy 10/2024: 30% lambda restricted plasma cells, congo red staining negative   - CT low-dose myeloma scan negative 2024  - Current Rx: Daratumumab, bortezomib and lenalidomide per oncology  - Check renal function panel every 4 weeks  - Check UACR/UPCR every 12 weeks  - Discussed risk factor reduction to slow progression of chronic kidney disease  Intensive blood pressure control as below   Glycemic control, he is non-diabetic   Lifestyle modifications (weight loss/exercise)  Avoidance of NSAIDs      # Electrolytes/Acid Base status   - Electrolytes and acid base status within normal limits     # CKD Mineral and Bone  parameters   - Goal Ca 8.5-10 mg/dL, goal Phos 2.7-4.6 mg/dL, goal iPTH 30-70 pg/mL  - PTH 55.1 05/2020/calcium 9.0/phosphorus 2.4 08/2024 all at goal  - 25-hydroxy vitamin D level 16.1 status post ergocalciferol 50,000 units weekly for 8 doses  - Current Rx: Cholecalciferol 1000 units daily  Lambda light chain deposition disease (HCC)  - Management as above  - Plans noted for stem cell transplantation, recently seen [facility]  Benign hypertension with CKD (chronic kidney disease) stage III (HCC)  - Goal BP <120/80 per KDIGO guidelines   - Volume status: Euvolemic  - Status: Blood pressure currently above goal  - Current antihypertensive regimen: Irbesartan 300 mg daily, amlodipine 10 mg daily chlorthalidone 25 mg daily  - Changes: Add Cardura 2 mg daily at bedtime  Anemia in stage 3b chronic kidney disease  (HCC)  - He has anemia and thrombocytopenia in setting of lambda light chain myeloma  - Management of anemia and thrombocytopenia per oncology  Nephrolithiasis  - Kidney ultrasound and CT stone study showing 6 to 7 mm right renal stone without hydronephrosis  - Follows with urology      History of Present Illness   HPI  Zack Partida is a 69 y.o. male     Since last visit: He has started chemotherapy for multiple myeloma.  He is also being evaluated for stem cell transplantation.  He complains of some swelling in his right leg which is a chronic issue.  He denies dyspnea.  He denies any trouble with urination.     Zack Partida is a 69 y.o. male who has history of hypertension since 2013.  He tells me that he has history of kidney disease as a child but cannot tell the diagnosis.  He has history of nephrolithiasis.  He does not have diabetes.  He was in [place] in 03/2024 and workup revealed 24-hour urine protein of around 530 mg.     >> Major risk factors for CKD  - Diabetes: No  - Hypertension: Yes since 2013   - Age >= 55 years: Yes   - Family history of  kidney disease: No  - Obesity or metabolic syndrome: Yes      >> Medical history evaluation   - Prior kidney disease or dialysis: Unknown kidney disease as a child   - Incidental hematuria in the past: Yes with kidney stones   - Urinary symptoms: No   - History of foamy or frothy urine: Nocturia x3-4  - History of nephrolithiasis: Yes   - Diseases that share risk factors with CKD: HTN  - Systemic diseases that might affect kidney: No  - History of use of medications that might affect renal function: No  History obtained from: patient    Review of Systems   Constitutional:  Negative for chills and fever.   HENT:  Negative for ear pain and sore throat.    Eyes:  Negative for pain and visual disturbance.   Respiratory:  Negative for cough and shortness of breath.    Cardiovascular:  Negative for chest pain and palpitations.   Gastrointestinal:  Negative for abdominal pain and vomiting.   Genitourinary:  Negative for dysuria and hematuria.   Musculoskeletal:  Negative for arthralgias and back pain.   Skin:  Negative for color change and rash.   Neurological:  Negative for seizures and syncope.   All other systems reviewed and are negative.    Past Medical History   Past Medical History:   Diagnosis Date    Hypertension     Multiple myeloma (HCC)     Polio      Past Surgical History:   Procedure Laterality Date    IR BIOPSY BONE MARROW  6/11/2024    IR BIOPSY BONE MARROW  10/4/2024    IR BIOPSY KIDNEY RANDOM  5/29/2024    LEG SURGERY      for polio     Family History   Problem Relation Age of Onset    Hypertension Mother     Diabetes Mother     Hypertension Father     Diabetes Father       reports that he has never smoked. He has never used smokeless tobacco. He reports that he does not drink alcohol and does not use drugs.  Current Outpatient Medications on File Prior to Visit   Medication Sig Dispense Refill    acyclovir (ZOVIRAX) 400 MG tablet Take 1 tablet (400 mg total) by mouth 2 (two) times a day 60 tablet 4     "amLODIPine (NORVASC) 5 mg tablet Take 1 tablet (5 mg total) by mouth daily (Patient taking differently: Take 10 mg by mouth daily) 90 tablet 3    chlorthalidone 25 mg tablet Take 0.5 tablets (12.5 mg total) by mouth daily (Patient taking differently: Take 25 mg by mouth daily) 45 tablet 3    Cholecalciferol (VITAMIN D3) 1,000 units tablet Take 1 tablet (1,000 Units total) by mouth daily 90 tablet 0    irbesartan (AVAPRO) 300 mg tablet Take 300 mg by mouth daily DAILY IN THE AM      lenalidomide (REVLIMID) 15 MG CAPS TAKE 1 CAPSULE BY MOUTH 1 TIME A DAY FOR 14 DAYS ON THEN 7 DAYS OFF 14 capsule 0    ergocalciferol (ERGOCALCIFEROL) 1.25 MG (67973 UT) capsule Take 1 capsule (50,000 Units total) by mouth once a week for 8 doses (Patient not taking: Reported on 10/18/2024) 8 capsule 0    Multiple Vitamin (multivitamin) capsule Take 1 capsule by mouth daily (Patient not taking: Reported on 10/18/2024)       No current facility-administered medications on file prior to visit.   No Known Allergies      Objective   /68 (BP Location: Left arm, Patient Position: Sitting, Cuff Size: Standard)   Pulse 86   Ht 5' 4\" (1.626 m)   Wt 84.4 kg (186 lb)   BMI 31.93 kg/m²      Physical Exam  Vitals and nursing note reviewed.   Constitutional:       General: He is not in acute distress.     Appearance: He is well-developed.   HENT:      Head: Normocephalic and atraumatic.   Eyes:      Conjunctiva/sclera: Conjunctivae normal.   Cardiovascular:      Rate and Rhythm: Normal rate and regular rhythm.      Pulses: Normal pulses.      Heart sounds: Normal heart sounds. No murmur heard.  Pulmonary:      Effort: Pulmonary effort is normal. No respiratory distress.      Breath sounds: Normal breath sounds.   Abdominal:      Tenderness: There is no right CVA tenderness or left CVA tenderness.   Musculoskeletal:         General: No swelling.      Cervical back: Neck supple.      Comments: Bilateral nonpitting lower extremity edema "   Skin:     General: Skin is warm and dry.      Capillary Refill: Capillary refill takes less than 2 seconds.   Neurological:      Mental Status: He is alert.   Psychiatric:         Mood and Affect: Mood normal.

## 2024-12-06 NOTE — PATIENT INSTRUCTIONS
Your kidney numbers are currently at stage III kidney disease.    Treatment for kidney disease would be treatment for your myeloma.    Continue treatment for myeloma with Dr. Flores.    Check renal function panel every 4 weeks.    Check urine test every 3 months.    Blood pressure is currently above goal and start Cardura 2 mg daily at bedtime.

## 2024-12-10 ENCOUNTER — APPOINTMENT (OUTPATIENT)
Dept: LAB | Facility: CLINIC | Age: 69
End: 2024-12-10
Payer: COMMERCIAL

## 2024-12-10 DIAGNOSIS — N18.32 STAGE 3B CHRONIC KIDNEY DISEASE (HCC): ICD-10-CM

## 2024-12-10 DIAGNOSIS — N18.30 BENIGN HYPERTENSION WITH CKD (CHRONIC KIDNEY DISEASE) STAGE III (HCC): ICD-10-CM

## 2024-12-10 DIAGNOSIS — C90.00 MULTIPLE MYELOMA NOT HAVING ACHIEVED REMISSION (HCC): ICD-10-CM

## 2024-12-10 DIAGNOSIS — D89.89 LAMBDA LIGHT CHAIN DEPOSITION DISEASE (HCC): ICD-10-CM

## 2024-12-10 DIAGNOSIS — I12.9 BENIGN HYPERTENSION WITH CKD (CHRONIC KIDNEY DISEASE) STAGE III (HCC): ICD-10-CM

## 2024-12-10 LAB
ALBUMIN SERPL BCG-MCNC: 3.2 G/DL (ref 3.5–5)
ALP SERPL-CCNC: 65 U/L (ref 34–104)
ALT SERPL W P-5'-P-CCNC: 14 U/L (ref 7–52)
ANION GAP SERPL CALCULATED.3IONS-SCNC: 4 MMOL/L (ref 4–13)
AST SERPL W P-5'-P-CCNC: 9 U/L (ref 13–39)
BASOPHILS # BLD AUTO: 0.04 THOUSANDS/ÂΜL (ref 0–0.1)
BASOPHILS NFR BLD AUTO: 1 % (ref 0–1)
BILIRUB SERPL-MCNC: 0.35 MG/DL (ref 0.2–1)
BUN SERPL-MCNC: 37 MG/DL (ref 5–25)
CALCIUM ALBUM COR SERPL-MCNC: 9.3 MG/DL (ref 8.3–10.1)
CALCIUM SERPL-MCNC: 8.7 MG/DL (ref 8.4–10.2)
CHLORIDE SERPL-SCNC: 111 MMOL/L (ref 96–108)
CO2 SERPL-SCNC: 24 MMOL/L (ref 21–32)
CREAT SERPL-MCNC: 1.44 MG/DL (ref 0.6–1.3)
EOSINOPHIL # BLD AUTO: 0.29 THOUSAND/ÂΜL (ref 0–0.61)
EOSINOPHIL NFR BLD AUTO: 3 % (ref 0–6)
ERYTHROCYTE [DISTWIDTH] IN BLOOD BY AUTOMATED COUNT: 14.8 % (ref 11.6–15.1)
GFR SERPL CREATININE-BSD FRML MDRD: 49 ML/MIN/1.73SQ M
GLUCOSE P FAST SERPL-MCNC: 84 MG/DL (ref 65–99)
HCT VFR BLD AUTO: 28.1 % (ref 36.5–49.3)
HGB BLD-MCNC: 9.1 G/DL (ref 12–17)
IMM GRANULOCYTES # BLD AUTO: 0.03 THOUSAND/UL (ref 0–0.2)
IMM GRANULOCYTES NFR BLD AUTO: 0 % (ref 0–2)
LYMPHOCYTES # BLD AUTO: 1.1 THOUSANDS/ÂΜL (ref 0.6–4.47)
LYMPHOCYTES NFR BLD AUTO: 13 % (ref 14–44)
MCH RBC QN AUTO: 30 PG (ref 26.8–34.3)
MCHC RBC AUTO-ENTMCNC: 32.4 G/DL (ref 31.4–37.4)
MCV RBC AUTO: 93 FL (ref 82–98)
MONOCYTES # BLD AUTO: 0.85 THOUSAND/ÂΜL (ref 0.17–1.22)
MONOCYTES NFR BLD AUTO: 10 % (ref 4–12)
NEUTROPHILS # BLD AUTO: 6.18 THOUSANDS/ÂΜL (ref 1.85–7.62)
NEUTS SEG NFR BLD AUTO: 73 % (ref 43–75)
NRBC BLD AUTO-RTO: 0 /100 WBCS
PLATELET # BLD AUTO: 102 THOUSANDS/UL (ref 149–390)
PMV BLD AUTO: 13.2 FL (ref 8.9–12.7)
POTASSIUM SERPL-SCNC: 3.7 MMOL/L (ref 3.5–5.3)
PROT SERPL-MCNC: 6 G/DL (ref 6.4–8.4)
RBC # BLD AUTO: 3.03 MILLION/UL (ref 3.88–5.62)
SODIUM SERPL-SCNC: 139 MMOL/L (ref 135–147)
WBC # BLD AUTO: 8.49 THOUSAND/UL (ref 4.31–10.16)

## 2024-12-10 PROCEDURE — 84165 PROTEIN E-PHORESIS SERUM: CPT

## 2024-12-10 PROCEDURE — 82784 ASSAY IGA/IGD/IGG/IGM EACH: CPT

## 2024-12-10 PROCEDURE — 82043 UR ALBUMIN QUANTITATIVE: CPT

## 2024-12-10 PROCEDURE — 80053 COMPREHEN METABOLIC PANEL: CPT

## 2024-12-10 PROCEDURE — 83521 IG LIGHT CHAINS FREE EACH: CPT

## 2024-12-10 PROCEDURE — 82570 ASSAY OF URINE CREATININE: CPT

## 2024-12-10 PROCEDURE — 85025 COMPLETE CBC W/AUTO DIFF WBC: CPT

## 2024-12-10 PROCEDURE — 36415 COLL VENOUS BLD VENIPUNCTURE: CPT

## 2024-12-11 ENCOUNTER — RESULTS FOLLOW-UP (OUTPATIENT)
Dept: OTHER | Facility: HOSPITAL | Age: 69
End: 2024-12-11

## 2024-12-11 LAB
CREAT UR-MCNC: 43.5 MG/DL
IGA SERPL-MCNC: 16 MG/DL (ref 66–433)
IGG SERPL-MCNC: 1406 MG/DL (ref 635–1741)
IGM SERPL-MCNC: <20 MG/DL (ref 45–281)
KAPPA LC FREE SER-MCNC: 10.5 MG/L (ref 3.3–19.4)
KAPPA LC FREE/LAMBDA FREE SER: 0.13 {RATIO} (ref 0.26–1.65)
LAMBDA LC FREE SERPL-MCNC: 81.8 MG/L (ref 5.7–26.3)
MICROALBUMIN UR-MCNC: 103.2 MG/L
MICROALBUMIN/CREAT 24H UR: 237 MG/G CREATININE (ref 0–30)

## 2024-12-12 LAB
ALBUMIN SERPL ELPH-MCNC: 2.98 G/DL (ref 3.2–5.1)
ALBUMIN SERPL ELPH-MCNC: 52.3 % (ref 48–70)
ALPHA1 GLOB SERPL ELPH-MCNC: 0.28 G/DL (ref 0.15–0.47)
ALPHA1 GLOB SERPL ELPH-MCNC: 4.9 % (ref 1.8–7)
ALPHA2 GLOB SERPL ELPH-MCNC: 0.62 G/DL (ref 0.42–1.04)
ALPHA2 GLOB SERPL ELPH-MCNC: 10.9 % (ref 5.9–14.9)
BETA GLOB ABNORMAL SERPL ELPH-MCNC: 0.32 G/DL (ref 0.31–0.57)
BETA1 GLOB SERPL ELPH-MCNC: 5.7 % (ref 4.7–7.7)
BETA2 GLOB SERPL ELPH-MCNC: 21.3 % (ref 3.1–7.9)
BETA2+GAMMA GLOB SERPL ELPH-MCNC: 1.21 G/DL (ref 0.2–0.58)
GAMMA GLOB ABNORMAL SERPL ELPH-MCNC: 0.28 G/DL (ref 0.4–1.66)
GAMMA GLOB SERPL ELPH-MCNC: 4.9 % (ref 6.9–22.3)
IGG/ALB SER: 1.1 {RATIO} (ref 1.1–1.8)
M PEAK ID 2: 1.1 %
M PROTEIN 1 MFR SERPL ELPH: 17 %
M PROTEIN 1 SERPL ELPH-MCNC: 0.97 G/DL
M PROTEIN 2 SERPL ELPH-MCNC: 0.06 G/DL
PROT PATTERN SERPL ELPH-IMP: ABNORMAL
PROT SERPL-MCNC: 5.7 G/DL (ref 6.4–8.2)

## 2024-12-12 PROCEDURE — 84165 PROTEIN E-PHORESIS SERUM: CPT | Performed by: STUDENT IN AN ORGANIZED HEALTH CARE EDUCATION/TRAINING PROGRAM

## 2024-12-12 RX ORDER — DIPHENHYDRAMINE HCL 25 MG
25 TABLET ORAL ONCE
OUTPATIENT
Start: 2024-12-20

## 2024-12-12 RX ORDER — DIPHENHYDRAMINE HCL 25 MG
25 TABLET ORAL ONCE
Status: CANCELLED | OUTPATIENT
Start: 2024-12-13

## 2024-12-12 RX ORDER — ACETAMINOPHEN 325 MG/1
650 TABLET ORAL ONCE
Status: CANCELLED | OUTPATIENT
Start: 2024-12-13

## 2024-12-12 RX ORDER — DIPHENHYDRAMINE HCL 25 MG
25 TABLET ORAL ONCE
OUTPATIENT
Start: 2024-12-27

## 2024-12-12 RX ORDER — ACETAMINOPHEN 325 MG/1
650 TABLET ORAL ONCE
OUTPATIENT
Start: 2024-12-27

## 2024-12-12 RX ORDER — DEXAMETHASONE 4 MG/1
20 TABLET ORAL ONCE
OUTPATIENT
Start: 2024-12-27

## 2024-12-12 RX ORDER — DEXAMETHASONE 4 MG/1
20 TABLET ORAL ONCE
Status: CANCELLED | OUTPATIENT
Start: 2024-12-13

## 2024-12-12 RX ORDER — ACETAMINOPHEN 325 MG/1
650 TABLET ORAL ONCE
OUTPATIENT
Start: 2024-12-20

## 2024-12-12 RX ORDER — DEXAMETHASONE 4 MG/1
20 TABLET ORAL ONCE
OUTPATIENT
Start: 2024-12-20

## 2024-12-13 ENCOUNTER — HOSPITAL ENCOUNTER (OUTPATIENT)
Dept: INFUSION CENTER | Facility: CLINIC | Age: 69
End: 2024-12-13
Payer: COMMERCIAL

## 2024-12-13 VITALS
HEART RATE: 105 BPM | SYSTOLIC BLOOD PRESSURE: 132 MMHG | HEIGHT: 64 IN | DIASTOLIC BLOOD PRESSURE: 72 MMHG | OXYGEN SATURATION: 97 % | WEIGHT: 191 LBS | BODY MASS INDEX: 32.61 KG/M2 | TEMPERATURE: 98.7 F

## 2024-12-13 DIAGNOSIS — C90.00 MULTIPLE MYELOMA NOT HAVING ACHIEVED REMISSION (HCC): Primary | ICD-10-CM

## 2024-12-13 PROCEDURE — 96401 CHEMO ANTI-NEOPL SQ/IM: CPT

## 2024-12-13 RX ORDER — DEXAMETHASONE 4 MG/1
20 TABLET ORAL ONCE
Status: COMPLETED | OUTPATIENT
Start: 2024-12-13 | End: 2024-12-13

## 2024-12-13 RX ORDER — DIPHENHYDRAMINE HCL 25 MG
25 TABLET ORAL ONCE
Status: COMPLETED | OUTPATIENT
Start: 2024-12-13 | End: 2024-12-13

## 2024-12-13 RX ORDER — ACETAMINOPHEN 325 MG/1
650 TABLET ORAL ONCE
Status: COMPLETED | OUTPATIENT
Start: 2024-12-13 | End: 2024-12-13

## 2024-12-13 RX ADMIN — ACETAMINOPHEN 650 MG: 325 TABLET ORAL at 10:09

## 2024-12-13 RX ADMIN — BORTEZOMIB 2.5 MG: 3.5 INJECTION, POWDER, LYOPHILIZED, FOR SOLUTION INTRAVENOUS; SUBCUTANEOUS at 11:34

## 2024-12-13 RX ADMIN — DARATUMUMAB AND HYALURONIDASE-FIHJ (HUMAN RECOMBINANT) 1800 MG: 1800; 30000 INJECTION SUBCUTANEOUS at 11:23

## 2024-12-13 RX ADMIN — DIPHENHYDRAMINE HYDROCHLORIDE 25 MG: 25 TABLET ORAL at 10:09

## 2024-12-13 RX ADMIN — DEXAMETHASONE 20 MG: 4 TABLET ORAL at 10:09

## 2024-12-13 NOTE — PROGRESS NOTES
[facility] for the treatment of Velcade / Darzalex Faspro. He offers no concerns at this time. Patient reports diarrhea the day after treatment- MD aware and is well controlled with medication. Labs reviewed form 12/10/204 and within parameters for treatment. Patient is resting comfortably in the chair, call bell within reach.

## 2024-12-13 NOTE — PROGRESS NOTES
Velcade given  LLQ and darzalex given via 2 injections one in LLQ and one in RLQ and he tolerated it well. Next appointment confirmed 12/20/24 at 0900 at [place]. Patient and grandson declined avs

## 2024-12-17 ENCOUNTER — OFFICE VISIT (OUTPATIENT)
Dept: HEMATOLOGY ONCOLOGY | Facility: CLINIC | Age: 69
End: 2024-12-17
Payer: COMMERCIAL

## 2024-12-17 ENCOUNTER — TELEPHONE (OUTPATIENT)
Dept: HEMATOLOGY ONCOLOGY | Facility: CLINIC | Age: 69
End: 2024-12-17

## 2024-12-17 ENCOUNTER — APPOINTMENT (EMERGENCY)
Dept: RADIOLOGY | Facility: HOSPITAL | Age: 69
DRG: 871 | End: 2024-12-17
Payer: COMMERCIAL

## 2024-12-17 ENCOUNTER — HOSPITAL ENCOUNTER (INPATIENT)
Facility: HOSPITAL | Age: 69
LOS: 13 days | Discharge: HOME HEALTH | DRG: 871 | End: 2024-12-30
Attending: EMERGENCY MEDICINE | Admitting: INTERNAL MEDICINE
Payer: COMMERCIAL

## 2024-12-17 VITALS
HEIGHT: 64 IN | DIASTOLIC BLOOD PRESSURE: 78 MMHG | WEIGHT: 194 LBS | SYSTOLIC BLOOD PRESSURE: 132 MMHG | RESPIRATION RATE: 18 BRPM | OXYGEN SATURATION: 92 % | TEMPERATURE: 101.6 F | BODY MASS INDEX: 33.12 KG/M2 | HEART RATE: 112 BPM

## 2024-12-17 DIAGNOSIS — A41.9 SEPSIS (HCC): ICD-10-CM

## 2024-12-17 DIAGNOSIS — N18.31 STAGE 3A CHRONIC KIDNEY DISEASE (HCC): ICD-10-CM

## 2024-12-17 DIAGNOSIS — N17.9 AKI (ACUTE KIDNEY INJURY) (HCC): ICD-10-CM

## 2024-12-17 DIAGNOSIS — C90.00 MULTIPLE MYELOMA NOT HAVING ACHIEVED REMISSION (HCC): ICD-10-CM

## 2024-12-17 DIAGNOSIS — I10 ESSENTIAL HYPERTENSION: ICD-10-CM

## 2024-12-17 DIAGNOSIS — R80.9 PROTEINURIA, UNSPECIFIED TYPE: ICD-10-CM

## 2024-12-17 DIAGNOSIS — J18.9 PNEUMONIA: Primary | ICD-10-CM

## 2024-12-17 DIAGNOSIS — D47.2 IGG LAMBDA MONOCLONAL GAMMOPATHY: Primary | ICD-10-CM

## 2024-12-17 DIAGNOSIS — I12.9 BENIGN HYPERTENSION WITH CKD (CHRONIC KIDNEY DISEASE) STAGE III (HCC): ICD-10-CM

## 2024-12-17 DIAGNOSIS — N18.30 BENIGN HYPERTENSION WITH CKD (CHRONIC KIDNEY DISEASE) STAGE III (HCC): ICD-10-CM

## 2024-12-17 PROBLEM — D64.9 ANEMIA: Status: ACTIVE | Noted: 2024-12-17

## 2024-12-17 PROBLEM — J96.01 ACUTE RESPIRATORY FAILURE WITH HYPOXIA (HCC): Status: ACTIVE | Noted: 2024-12-17

## 2024-12-17 PROBLEM — R04.2 HEMOPTYSIS: Status: ACTIVE | Noted: 2024-12-17

## 2024-12-17 PROBLEM — N18.9 ACUTE KIDNEY INJURY SUPERIMPOSED ON CKD  (HCC): Status: ACTIVE | Noted: 2024-12-17

## 2024-12-17 LAB
ACANTHOCYTES BLD QL SMEAR: PRESENT
ALBUMIN SERPL BCG-MCNC: 2.9 G/DL (ref 3.5–5)
ALP SERPL-CCNC: 96 U/L (ref 34–104)
ALT SERPL W P-5'-P-CCNC: 21 U/L (ref 7–52)
AMORPH URATE CRY URNS QL MICRO: ABNORMAL
ANION GAP SERPL CALCULATED.3IONS-SCNC: 11 MMOL/L (ref 4–13)
ANISOCYTOSIS BLD QL SMEAR: PRESENT
APTT PPP: 37 SECONDS (ref 23–34)
AST SERPL W P-5'-P-CCNC: 25 U/L (ref 13–39)
ATRIAL RATE: 100 BPM
BACTERIA UR QL AUTO: ABNORMAL /HPF
BASOPHILS # BLD MANUAL: 0 THOUSAND/UL (ref 0–0.1)
BASOPHILS NFR MAR MANUAL: 0 % (ref 0–1)
BILIRUB SERPL-MCNC: 0.65 MG/DL (ref 0.2–1)
BILIRUB UR QL STRIP: NEGATIVE
BUN SERPL-MCNC: 69 MG/DL (ref 5–25)
CALCIUM ALBUM COR SERPL-MCNC: 9.6 MG/DL (ref 8.3–10.1)
CALCIUM SERPL-MCNC: 8.7 MG/DL (ref 8.4–10.2)
CHLORIDE SERPL-SCNC: 105 MMOL/L (ref 96–108)
CLARITY UR: CLEAR
CO2 SERPL-SCNC: 19 MMOL/L (ref 21–32)
COLOR UR: YELLOW
CREAT SERPL-MCNC: 3.67 MG/DL (ref 0.6–1.3)
DOHLE BOD BLD QL SMEAR: PRESENT
EOSINOPHIL # BLD MANUAL: 0 THOUSAND/UL (ref 0–0.4)
EOSINOPHIL NFR BLD MANUAL: 0 % (ref 0–6)
ERYTHROCYTE [DISTWIDTH] IN BLOOD BY AUTOMATED COUNT: 15.8 % (ref 11.6–15.1)
FLUAV AG UPPER RESP QL IA.RAPID: NEGATIVE
FLUBV AG UPPER RESP QL IA.RAPID: NEGATIVE
GFR SERPL CREATININE-BSD FRML MDRD: 15 ML/MIN/1.73SQ M
GLUCOSE SERPL-MCNC: 126 MG/DL (ref 65–140)
GLUCOSE UR STRIP-MCNC: NEGATIVE MG/DL
HCT VFR BLD AUTO: 24.5 % (ref 36.5–49.3)
HGB BLD-MCNC: 8 G/DL (ref 12–17)
HGB UR QL STRIP.AUTO: ABNORMAL
INR PPP: 1.46 (ref 0.85–1.19)
KETONES UR STRIP-MCNC: NEGATIVE MG/DL
L PNEUMO1 AG UR QL IA.RAPID: POSITIVE
LACTATE SERPL-SCNC: 1.1 MMOL/L (ref 0.5–2)
LEUKOCYTE ESTERASE UR QL STRIP: NEGATIVE
LG PLATELETS BLD QL SMEAR: PRESENT
LYMPHOCYTES # BLD AUTO: 0.42 THOUSAND/UL (ref 0.6–4.47)
LYMPHOCYTES # BLD AUTO: 2 % (ref 14–44)
MCH RBC QN AUTO: 29.5 PG (ref 26.8–34.3)
MCHC RBC AUTO-ENTMCNC: 32.7 G/DL (ref 31.4–37.4)
MCV RBC AUTO: 90 FL (ref 82–98)
MONOCYTES # BLD AUTO: 0.21 THOUSAND/UL (ref 0–1.22)
MONOCYTES NFR BLD: 1 % (ref 4–12)
MUCOUS THREADS UR QL AUTO: ABNORMAL
NEUTROPHILS # BLD MANUAL: 20.45 THOUSAND/UL (ref 1.85–7.62)
NEUTS BAND NFR BLD MANUAL: 8 % (ref 0–8)
NEUTS SEG NFR BLD AUTO: 89 % (ref 43–75)
NITRITE UR QL STRIP: NEGATIVE
NON-SQ EPI CELLS URNS QL MICRO: ABNORMAL /HPF
P AXIS: 38 DEGREES
PH UR STRIP.AUTO: 5.5 [PH]
PLATELET # BLD AUTO: 49 THOUSANDS/UL (ref 149–390)
PLATELET BLD QL SMEAR: ABNORMAL
PMV BLD AUTO: 13.7 FL (ref 8.9–12.7)
POIKILOCYTOSIS BLD QL SMEAR: PRESENT
POTASSIUM SERPL-SCNC: 3.5 MMOL/L (ref 3.5–5.3)
PR INTERVAL: 80 MS
PROCALCITONIN SERPL-MCNC: 114.23 NG/ML
PROT SERPL-MCNC: 6.4 G/DL (ref 6.4–8.4)
PROT UR STRIP-MCNC: ABNORMAL MG/DL
PROTHROMBIN TIME: 18 SECONDS (ref 12.3–15)
QRS AXIS: 10 DEGREES
QRSD INTERVAL: 78 MS
QT INTERVAL: 316 MS
QTC INTERVAL: 408 MS
RBC # BLD AUTO: 2.71 MILLION/UL (ref 3.88–5.62)
RBC #/AREA URNS AUTO: ABNORMAL /HPF
RBC MORPH BLD: PRESENT
S PNEUM AG UR QL: NEGATIVE
SARS-COV+SARS-COV-2 AG RESP QL IA.RAPID: NEGATIVE
SODIUM SERPL-SCNC: 135 MMOL/L (ref 135–147)
SP GR UR STRIP.AUTO: 1.02 (ref 1–1.03)
T WAVE AXIS: -21 DEGREES
UROBILINOGEN UR STRIP-ACNC: <2 MG/DL
VENTRICULAR RATE: 100 BPM
WBC # BLD AUTO: 21.08 THOUSAND/UL (ref 4.31–10.16)
WBC #/AREA URNS AUTO: ABNORMAL /HPF

## 2024-12-17 PROCEDURE — 85007 BL SMEAR W/DIFF WBC COUNT: CPT

## 2024-12-17 PROCEDURE — 80053 COMPREHEN METABOLIC PANEL: CPT

## 2024-12-17 PROCEDURE — 81001 URINALYSIS AUTO W/SCOPE: CPT

## 2024-12-17 PROCEDURE — 87040 BLOOD CULTURE FOR BACTERIA: CPT

## 2024-12-17 PROCEDURE — 85730 THROMBOPLASTIN TIME PARTIAL: CPT

## 2024-12-17 PROCEDURE — 94664 DEMO&/EVAL PT USE INHALER: CPT

## 2024-12-17 PROCEDURE — 71045 X-RAY EXAM CHEST 1 VIEW: CPT

## 2024-12-17 PROCEDURE — 93010 ELECTROCARDIOGRAM REPORT: CPT | Performed by: INTERNAL MEDICINE

## 2024-12-17 PROCEDURE — 84145 PROCALCITONIN (PCT): CPT

## 2024-12-17 PROCEDURE — 87811 SARS-COV-2 COVID19 W/OPTIC: CPT

## 2024-12-17 PROCEDURE — 85610 PROTHROMBIN TIME: CPT

## 2024-12-17 PROCEDURE — 99285 EMERGENCY DEPT VISIT HI MDM: CPT

## 2024-12-17 PROCEDURE — 96365 THER/PROPH/DIAG IV INF INIT: CPT

## 2024-12-17 PROCEDURE — 36415 COLL VENOUS BLD VENIPUNCTURE: CPT

## 2024-12-17 PROCEDURE — 83605 ASSAY OF LACTIC ACID: CPT

## 2024-12-17 PROCEDURE — 99214 OFFICE O/P EST MOD 30 MIN: CPT | Performed by: INTERNAL MEDICINE

## 2024-12-17 PROCEDURE — 99291 CRITICAL CARE FIRST HOUR: CPT | Performed by: EMERGENCY MEDICINE

## 2024-12-17 PROCEDURE — 87804 INFLUENZA ASSAY W/OPTIC: CPT

## 2024-12-17 PROCEDURE — 93005 ELECTROCARDIOGRAM TRACING: CPT

## 2024-12-17 PROCEDURE — 85027 COMPLETE CBC AUTOMATED: CPT

## 2024-12-17 PROCEDURE — 99223 1ST HOSP IP/OBS HIGH 75: CPT | Performed by: INTERNAL MEDICINE

## 2024-12-17 PROCEDURE — 87449 NOS EACH ORGANISM AG IA: CPT | Performed by: INTERNAL MEDICINE

## 2024-12-17 PROCEDURE — 87081 CULTURE SCREEN ONLY: CPT | Performed by: INTERNAL MEDICINE

## 2024-12-17 RX ORDER — ACETAMINOPHEN 325 MG/1
650 TABLET ORAL EVERY 6 HOURS PRN
Status: DISCONTINUED | OUTPATIENT
Start: 2024-12-17 | End: 2024-12-30 | Stop reason: HOSPADM

## 2024-12-17 RX ORDER — HEPARIN SODIUM 5000 [USP'U]/ML
5000 INJECTION, SOLUTION INTRAVENOUS; SUBCUTANEOUS EVERY 8 HOURS SCHEDULED
Status: DISCONTINUED | OUTPATIENT
Start: 2024-12-17 | End: 2024-12-17

## 2024-12-17 RX ORDER — BENZONATATE 100 MG/1
100 CAPSULE ORAL 3 TIMES DAILY PRN
Status: DISCONTINUED | OUTPATIENT
Start: 2024-12-17 | End: 2024-12-30 | Stop reason: HOSPADM

## 2024-12-17 RX ORDER — VANCOMYCIN HYDROCHLORIDE 1 G/200ML
12.5 INJECTION, SOLUTION INTRAVENOUS DAILY PRN
Status: DISCONTINUED | OUTPATIENT
Start: 2024-12-18 | End: 2024-12-18

## 2024-12-17 RX ORDER — SODIUM CHLORIDE 9 MG/ML
100 INJECTION, SOLUTION INTRAVENOUS CONTINUOUS
Status: DISCONTINUED | OUTPATIENT
Start: 2024-12-17 | End: 2024-12-19

## 2024-12-17 RX ADMIN — CEFEPIME 1000 MG: 1 INJECTION, POWDER, FOR SOLUTION INTRAMUSCULAR; INTRAVENOUS at 23:47

## 2024-12-17 RX ADMIN — CEFEPIME 2000 MG: 2 INJECTION, POWDER, FOR SOLUTION INTRAVENOUS at 11:07

## 2024-12-17 RX ADMIN — ACETAMINOPHEN 650 MG: 325 TABLET, FILM COATED ORAL at 13:04

## 2024-12-17 RX ADMIN — SODIUM CHLORIDE 100 ML/HR: 0.9 INJECTION, SOLUTION INTRAVENOUS at 12:37

## 2024-12-17 RX ADMIN — SODIUM CHLORIDE 1000 ML: 0.9 INJECTION, SOLUTION INTRAVENOUS at 11:07

## 2024-12-17 RX ADMIN — ACETAMINOPHEN 650 MG: 325 TABLET, FILM COATED ORAL at 19:03

## 2024-12-17 RX ADMIN — VANCOMYCIN HYDROCHLORIDE 1750 MG: 10 INJECTION, POWDER, LYOPHILIZED, FOR SOLUTION INTRAVENOUS at 12:37

## 2024-12-17 NOTE — ED ATTENDING ATTESTATION
12/17/2024  I, Shalini Mcdonald DO, saw and evaluated the patient. I have discussed the patient with the resident/non-physician practitioner and agree with the resident's/non-physician practitioner's findings, Plan of Care, and MDM as documented in the resident's/non-physician practitioner's note, except where noted. All available labs and Radiology studies were reviewed.  I was present for key portions of any procedure(s) performed by the resident/non-physician practitioner and I was immediately available to provide assistance.       At this point I agree with the current assessment done in the Emergency Department.  I have conducted an independent evaluation of this patient a history and physical is as follows:    69-year-old male presents with fever.  Patient went to his oncologist appointment was found to have a fever there.  Has had 3 days of fever, malaise, cough.  Denies abdominal pain, has had diarrhea but no vomiting.  Also reports just generalized weakness.  On exam-no acute distress, heart regular, no respiratory distress however noted to have O2 sat 89 to 90% on room air, trace pitting edema bilateral lower extremities, abdomen soft and nontender.  Plan-patient on chemo with concern for fevers and cough, also found to be hypoxic.  Differential includes pneumonia, at risk for infection.  Will do labs, chest x-ray.  Patient placed on oxygen via nasal cannula.  Will require admission    ED Course         Critical Care Time  CriticalCare Time    Date/Time: 12/17/2024 12:41 PM    Performed by: Shalini Mcdonald DO  Authorized by: Shalini Mcdonald DO    Critical care provider statement:     Critical care time (minutes):  31    Critical care time was exclusive of:  Separately billable procedures and treating other patients and teaching time    Critical care was necessary to treat or prevent imminent or life-threatening deterioration of the following conditions:  Respiratory failure    Critical  care was time spent personally by me on the following activities:  Obtaining history from patient or surrogate, evaluation of patient's response to treatment, examination of patient, re-evaluation of patient's condition, review of old charts and ordering and review of laboratory studies    I assumed direction of critical care for this patient from another provider in my specialty: no

## 2024-12-17 NOTE — PROGRESS NOTES
Zack Partida is a 69 y.o. male who is currently ordered Vancomycin IV with management by the Pharmacy Consult service.  Relevant clinical data and objective / subjective history reviewed.  Vancomycin Assessment:  Indication and Goal AUC/Trough: Pneumonia (goal -600, trough >10)  Clinical Status: stable  Micro:    bld cx x2: in process          flu/covid: negative          sputum culture, strep pneumo, legionella , MRSA swab: to be collected   Renal Function:  SCr: 3.67 mg/dL  CrCl: 19.3 mL/min  Renal replacement: Not on dialysis  Days of Therapy: 1  Current Dose: New Start   Vancomycin Plan:  New Dosin mg x1 followed by pulse dosing of 1000 mg prn random level <15 mcg/ml   Next Level:  with AM labs   Renal Function Monitoring: Daily BMP and UOP  Pharmacy will continue to follow closely for s/sx of nephrotoxicity, infusion reactions and appropriateness of therapy.  BMP and CBC will be ordered per protocol. We will continue to follow the patient’s culture results and clinical progress daily.    Roxanna Nur, Pharmacist

## 2024-12-17 NOTE — LETTER
[facility]  [address] [address]  Dept: [phone]    December 23, 2024     Patient: Zack Partida   YOB: 1955   Date of Visit: 12/17/2024       To Whom it May Concern:    Zack Partida is under my professional care. He was seen in the hospital from 12/17/2024 to 12/23/24. He will be discharged from the hospital directly to a rehab facility before returning home. He may return to work after he returns from physical rehab with further instructions from the rehab center to determine exactly when he can go back to work. He will not likely be able to be home/return to work until at least January 2025.     If you have any questions or concerns, please don't hesitate to call.         Sincerely,          Radha Irvin MD

## 2024-12-17 NOTE — ASSESSMENT & PLAN NOTE
Acute hypoxic respiratory failure secondary to left lung pneumonia  Currently on 2 L of oxygen  Monitor and wean as tolerated  Goal oxygen saturation 90% and above

## 2024-12-17 NOTE — ED PROVIDER NOTES
Time reflects when diagnosis was documented in both MDM as applicable and the Disposition within this note       Time User Action Codes Description Comment    12/17/2024 10:51 AM Neal Shannon [J18.9] Pneumonia     12/17/2024 10:55 AM Neal Shannon [A41.9] Sepsis (HCC)     12/17/2024 10:55 AM Neal Shannon [N17.9] MARCI (acute kidney injury) (HCC)           ED Disposition       ED Disposition   Admit    Condition   Stable    Date/Time   Tue Dec 17, 2024 11:24 AM    Comment   Case was discussed with AUSTIN and the patient's admission status was agreed to be Admission Status: inpatient status to the service of Dr. Carreno  .               Assessment & Plan       Medical Decision Making  69-year-old male with past medical history myeloma, current on chemotherapy, presents ED for evaluation of fever at the heme-onc office.  3 days of fever, malaise, cough with blood-tinged sputum.  On exam, patient is chronically ill-appearing, but in no acute distress.  Vitals show temperature of 99.  Heart tachycardic but regular rhythm.  Lungs clear to auscultation bilaterally.  Mild tachypnea but no respiratory distress.  Abdomen soft nontender nondistended.  Differential diagnosis: Given patient's immunocompromise state on chemotherapy, suspect neutropenic fever versus infectious.  Considering sepsis, electrolyte abnormality.  Plan: Septic workup including CBC, BMP, lactic acid, cultures, UA, chest x-ray.  Please see ED course for additional information.  Disposition: This patient will need to be admitted    Amount and/or Complexity of Data Reviewed  Labs: ordered. Decision-making details documented in ED Course.  Radiology: ordered. Decision-making details documented in ED Course.    Risk  Decision regarding hospitalization.        ED Course as of 12/17/24 1244   Tue Dec 17, 2024   0859 EKG read and interpreted by me.  Tachycardia at 100 bpm.  Normal axis.  Short MN interval.  Normal QT.  T wave flattening in leads  II, 3, aVF.  No STEMI   1050 Procalcitonin(!): 114.23   1051 XR chest portable - 1 view  Left lower lobe pneumonia   1051 FLU/COVID Rapid Antigen (30 min. TAT) - Preferred screening test in ED  negative   1051 LACTIC ACID: 1.1   1053 Hemoglobin(!): 8.0  downtrending   1054 Creatinine(!): 3.67  MARCI.  Increased from 1.44. NS bolus ordered.   1055 Will admit to [facility] for sepsis, pneumonia, MARCI       Medications   sodium chloride 0.9 % bolus 1,000 mL (1,000 mL Intravenous New Bag 12/17/24 1107)   sodium chloride 0.9 % infusion (100 mL/hr Intravenous New Bag 12/17/24 1237)   acetaminophen (TYLENOL) tablet 650 mg (has no administration in time range)   benzonatate (TESSALON PERLES) capsule 100 mg (has no administration in time range)   cefepime (MAXIPIME) 1,000 mg in dextrose 5 % 50 mL IVPB (has no administration in time range)   vancomycin (VANCOCIN) 1,750 mg in sodium chloride 0.9 % 500 mL IVPB (1,750 mg Intravenous New Bag 12/17/24 1237)   vancomycin (VANCOCIN) IVPB (premix in dextrose) 1,000 mg 200 mL (has no administration in time range)   cefepime (MAXIPIME) 2 g/50 mL dextrose IVPB (0 mg Intravenous Stopped 12/17/24 1137)       ED Risk Strat Scores                                              History of Present Illness       Chief Complaint   Patient presents with    Fever     Pt getting active chemo for multiple myeloma, having fever x 3 days, reports coughing up some blood in sputum, sent over from hematology office        Past Medical History:   Diagnosis Date    Hypertension     Multiple myeloma (HCC)     Polio       Past Surgical History:   Procedure Laterality Date    IR BIOPSY BONE MARROW  6/11/2024    IR BIOPSY BONE MARROW  10/4/2024    IR BIOPSY KIDNEY RANDOM  5/29/2024    LEG SURGERY      for polio      Family History   Problem Relation Age of Onset    Hypertension Mother     Diabetes Mother     Hypertension Father     Diabetes Father       Social History     Tobacco Use    Smoking status: Never     Smokeless tobacco: Never   Vaping Use    Vaping status: Never Used   Substance Use Topics    Alcohol use: Never    Drug use: Never      E-Cigarette/Vaping    E-Cigarette Use Never User       E-Cigarette/Vaping Substances    Nicotine No     THC No     CBD No     Flavoring No     Other No     Unknown No       I have reviewed and agree with the history as documented.     69-year-old male with past medical history of IgG lambda multiple myeloma, currently on daratumumab, presents for fever.  Patient was noted to be febrile at heme-onc office today, with a fever of 101 and tachycardic. Patient complains of rigors, cough productive of blood streaked sputum x 3 days.  He states he is been more fatigued, had generalized weakness, and had decreased p.o. intake over the past 3 days as well.  Denies chest pain, abdominal pain, nausea, vomiting, diarrhea, urinary complaints.  He has no known sick contacts.        Review of Systems   Constitutional:  Positive for chills and fever.   HENT:  Negative for congestion.    Respiratory:  Positive for cough. Negative for shortness of breath.    Cardiovascular:  Negative for chest pain.   Gastrointestinal:  Negative for abdominal pain, diarrhea, nausea and vomiting.   Genitourinary:  Negative for difficulty urinating and dysuria.   Neurological:  Negative for headaches.           Objective       ED Triage Vitals [12/17/24 0852]   Temperature Pulse Blood Pressure Respirations SpO2 Patient Position - Orthostatic VS   99 °F (37.2 °C) 100 108/58 18 93 % Sitting      Temp Source Heart Rate Source BP Location FiO2 (%) Pain Score    Oral Monitor Left arm -- --      Vitals      Date and Time Temp Pulse SpO2 Resp BP Pain Score FACES Pain Rating User   12/17/24 0939 -- -- 95 % -- -- -- -- KV   12/17/24 0915 -- -- 88 % -- -- -- -- KV   12/17/24 0852 99 °F (37.2 °C) 100 93 % 18 108/58 -- -- MV            Physical Exam  Vitals and nursing note reviewed.   Constitutional:       General: He is not in  acute distress.     Appearance: He is normal weight. He is not toxic-appearing or diaphoretic.      Comments: Chronically ill-appearing   HENT:      Head: Normocephalic and atraumatic.      Nose: No rhinorrhea.      Mouth/Throat:      Mouth: Mucous membranes are moist.   Eyes:      Conjunctiva/sclera: Conjunctivae normal.   Cardiovascular:      Rate and Rhythm: Regular rhythm. Tachycardia present.      Pulses: Normal pulses.      Heart sounds: Normal heart sounds.   Pulmonary:      Breath sounds: Normal breath sounds.      Comments: Tachypnea  Abdominal:      Palpations: Abdomen is soft.      Tenderness: There is no abdominal tenderness.   Musculoskeletal:      Cervical back: Neck supple.   Skin:     General: Skin is warm and dry.   Neurological:      Mental Status: He is alert and oriented to person, place, and time.         Results Reviewed       Procedure Component Value Units Date/Time    MRSA culture [[id number]] Collected: 12/17/24 1236    Lab Status: In process Specimen: Nares from Nose Updated: 12/17/24 1244    Sputum culture and Gram stain [[id number]]     Lab Status: No result Specimen: Sputum     Strep Pneumoniae, Urine [[id number]]     Lab Status: No result Specimen: Urine     Legionella antigen, Urine [[id number]]     Lab Status: No result Specimen: Urine     RBC Morphology Reflex Test [[id number]] Collected: 12/17/24 0931    Lab Status: Final result Specimen: Blood from Arm, Left Updated: 12/17/24 1101    CBC and differential [[id number]]  (Abnormal) Collected: 12/17/24 0931    Lab Status: Final result Specimen: Blood from Arm, Left Updated: 12/17/24 1056     WBC 21.08 Thousand/uL      RBC 2.71 Million/uL      Hemoglobin 8.0 g/dL      Hematocrit 24.5 %      MCV 90 fL      MCH 29.5 pg      MCHC 32.7 g/dL      RDW 15.8 %      MPV 13.7 fL      Platelets 49 Thousands/uL     Narrative:      This is an appended report.  These results have been appended to a previously verified report.    Manual Differential(PHLEBS  Do Not Order) [[id number]]  (Abnormal) Collected: 12/17/24 0931    Lab Status: Final result Specimen: Blood from Arm, Left Updated: 12/17/24 1056     Segmented % 89 %      Bands % 8 %      Lymphocytes % 2 %      Monocytes % 1 %      Eosinophils % 0 %      Basophils % 0 %      Absolute Neutrophils 20.45 Thousand/uL      Absolute Lymphocytes 0.42 Thousand/uL      Absolute Monocytes 0.21 Thousand/uL      Absolute Eosinophils 0.00 Thousand/uL      Absolute Basophils 0.00 Thousand/uL      Total Counted --     Dohle Bodies Present     RBC Morphology Present     Platelet Estimate Decreased     Large Platelet Present     Acanthocytes Present     Anisocytosis Present     Poikilocytes Present    Procalcitonin [[id number]]  (Abnormal) Collected: 12/17/24 0931    Lab Status: Final result Specimen: Blood from Arm, Left Updated: 12/17/24 1034     Procalcitonin 114.23 ng/ml     FLU/COVID Rapid Antigen (30 min. TAT) - Preferred screening test in ED [[id number]]  (Normal) Collected: 12/17/24 0931    Lab Status: Final result Specimen: Nares from Nose Updated: 12/17/24 1014     SARS COV Rapid Antigen Negative     Influenza A Rapid Antigen Negative     Influenza B Rapid Antigen Negative    Narrative:      This test has been performed using the [employer] Karla 2 FLU+SARS Antigen test under the Emergency Use Authorization (EUA). This test has been validated by the  and verified by the performing laboratory. The Karla uses lateral flow immunofluorescent sandwich assay to detect SARS-COV, Influenza A and Influenza B Antigen.     The [employer] Karla 2 SARS Antigen test does not differentiate between SARS-CoV and SARS-CoV-2.     Negative results are presumptive and may be confirmed with a molecular assay, if necessary, for patient management. Negative results do not rule out SARS-CoV-2 or influenza infection and should not be used as the sole basis for treatment or patient management decisions. A negative test result may occur if the  level of antigen in a sample is below the limit of detection of this test.     Positive results are indicative of the presence of viral antigens, but do not rule out bacterial infection or co-infection with other viruses.     All test results should be used as an adjunct to clinical observations and other information available to the provider.    FOR PEDIATRIC PATIENTS - copy/paste COVID Guidelines URL to browser: [email or web address]    Lactic acid [[id number]]  (Normal) Collected: 12/17/24 0931    Lab Status: Final result Specimen: Blood from Arm, Left Updated: 12/17/24 1005     LACTIC ACID 1.1 mmol/L     Narrative:      Result may be elevated if tourniquet was used during collection.    Comprehensive metabolic panel [[id number]]  (Abnormal) Collected: 12/17/24 0931    Lab Status: Final result Specimen: Blood from Arm, Left Updated: 12/17/24 1004     Sodium 135 mmol/L      Potassium 3.5 mmol/L      Chloride 105 mmol/L      CO2 19 mmol/L      ANION GAP 11 mmol/L      BUN 69 mg/dL      Creatinine 3.67 mg/dL      Glucose 126 mg/dL      Calcium 8.7 mg/dL      Corrected Calcium 9.6 mg/dL      AST 25 U/L      ALT 21 U/L      Alkaline Phosphatase 96 U/L      Total Protein 6.4 g/dL      Albumin 2.9 g/dL      Total Bilirubin 0.65 mg/dL      eGFR 15 ml/min/1.73sq m     Narrative:      National Kidney Disease Foundation guidelines for Chronic Kidney Disease (CKD):     Stage 1 with normal or high GFR (GFR > 90 mL/min/1.73 square meters)    Stage 2 Mild CKD (GFR = 60-89 mL/min/1.73 square meters)    Stage 3A Moderate CKD (GFR = 45-59 mL/min/1.73 square meters)    Stage 3B Moderate CKD (GFR = 30-44 mL/min/1.73 square meters)    Stage 4 Severe CKD (GFR = 15-29 mL/min/1.73 square meters)    Stage 5 End Stage CKD (GFR <15 mL/min/1.73 square meters)  Note: GFR calculation is accurate only with a steady state creatinine    Protime-INR [[id number]]  (Abnormal) Collected: 12/17/24  0931    Lab Status: Final result Specimen: Blood from Arm, Left Updated: 12/17/24 1003     Protime 18.0 seconds      INR 1.46    Narrative:      INR Therapeutic Range    Indication                                             INR Range      Atrial Fibrillation                                               2.0-3.0  Hypercoagulable State                                    2.0.2.3  Left Ventricular Asist Device                            2.0-3.0  Mechanical Heart Valve                                  -    Aortic(with afib, MI, embolism, HF, LA enlargement,    and/or coagulopathy)                                     2.0-3.0 (2.5-3.5)     Mitral                                                             2.5-3.5  Prosthetic/Bioprosthetic Heart Valve               2.0-3.0  Venous thromboembolism (VTE: VT, PE        2.0-3.0    APTT [[id number]]  (Abnormal) Collected: 12/17/24 0931    Lab Status: Final result Specimen: Blood from Arm, Left Updated: 12/17/24 1003     PTT 37 seconds     Blood culture #1 [[id number]] Collected: 12/17/24 0931    Lab Status: In process Specimen: Blood from Hand, Left Updated: 12/17/24 0942    Blood culture #2 [[id number]] Collected: 12/17/24 0931    Lab Status: In process Specimen: Blood from Arm, Left Updated: 12/17/24 0942    UA w Reflex to Microscopic w Reflex to Culture [[id number]]     Lab Status: No result Specimen: Urine             XR chest portable - 1 view   Final Interpretation by Carmelo Hyman MD (12/17 1039)      Left lower lung pneumonia.            Workstation performed: [id number]             Procedures    ED Medication and Procedure Management   Prior to Admission Medications   Prescriptions Last Dose Informant Patient Reported? Taking?   Cholecalciferol (VITAMIN D3) 1,000 units tablet   No No   Sig: Take 1 tablet (1,000 Units total) by mouth daily   Multiple Vitamin (multivitamin) capsule  Family Member Yes No   Sig: Take 1 capsule by mouth daily   Patient not taking: Reported  on 10/18/2024   acyclovir (ZOVIRAX) 400 MG tablet   No No   Sig: Take 1 tablet (400 mg total) by mouth 2 (two) times a day   amLODIPine (NORVASC) 5 mg tablet  Family Member No No   Sig: Take 1 tablet (5 mg total) by mouth daily   Patient taking differently: Take 10 mg by mouth daily   chlorthalidone 25 mg tablet  Family Member No No   Sig: Take 0.5 tablets (12.5 mg total) by mouth daily   Patient taking differently: Take 25 mg by mouth daily   doxazosin (CARDURA) 2 mg tablet   No No   Sig: Take 1 tablet (2 mg total) by mouth daily at bedtime   ergocalciferol (ERGOCALCIFEROL) 1.25 MG (17292 UT) capsule  Family Member No No   Sig: Take 1 capsule (50,000 Units total) by mouth once a week for 8 doses   Patient not taking: Reported on 10/18/2024   irbesartan (AVAPRO) 300 mg tablet  Family Member Yes No   Sig: Take 300 mg by mouth daily DAILY IN THE AM   lenalidomide (REVLIMID) 15 MG CAPS   No No   Sig: TAKE 1 CAPSULE BY MOUTH 1 TIME A DAY FOR 14 DAYS ON THEN 7 DAYS OFF      Facility-Administered Medications: None     Patient's Medications   Discharge Prescriptions    No medications on file     No discharge procedures on file.  ED SEPSIS DOCUMENTATION   Time reflects when diagnosis was documented in both MDM as applicable and the Disposition within this note       Time User Action Codes Description Comment    12/17/2024 10:51 AM Neal Shannon [J18.9] Pneumonia     12/17/2024 10:55 AM Neal Shannon [A41.9] Sepsis (HCC)     12/17/2024 10:55 AM Neal Shannon [N17.9] MARCI (acute kidney injury) ([facility])            Initial Sepsis Screening       Row Name 12/17/24 3230                Is the patient's history suggestive of a new or worsening infection? Yes (Proceed)  -TC        Suspected source of infection pneumonia  -TC        Indicate SIRS criteria Leukocytosis (WBC > 89796 IJL) OR Leukopenia (WBC <4000 IJL) OR Bandemia (WBC >10% bands);Tachycardia > 90 bpm  -TC        Are two or more of the above signs & symptoms  "of infection both present and new to the patient? Yes (Proceed)  -TC        Assess for evidence of organ dysfunction: Are any of the below criteria present within 6 hours of suspected infection and SIRS criteria that are NOT considered to be chronic conditions? Creatinine > 2.0 AND > 0.5 above baseline  -TC        Date of presentation of severe sepsis --        Time of presentation of severe sepsis --        Sepsis Note: Click \"NEXT\" below (NOT \"close\") to generate sepsis note based on above information. --                  User Key  (r) = Recorded By, (t) = Taken By, (c) = Cosigned By      Initials Name Provider Type    TC Neal Shannon DO Resident                       Neal Shannon DO  12/17/24 9702    "

## 2024-12-17 NOTE — Clinical Note
Case was discussed with AUSTIN and the patient's admission status was agreed to be Admission Status: inpatient status to the service of Dr. Barajas

## 2024-12-17 NOTE — ASSESSMENT & PLAN NOTE
Home regimen amlodipine 10 mg daily, chlorthalidone 25 mg daily, doxazosin 2 mg daily and irbesartan 300 mg daily  Hold antihypertensive in the setting of sepsis  Monitor blood pressure

## 2024-12-17 NOTE — PROGRESS NOTES
Medical Oncology: Outpatient Progress Note:        Name: Zack Partida      : 1955      MRN: [id number]         Encounter Provider: Cathy Flores DO  Encounter Date: 2024 Encounter department: [facility] [place]  Assessment & Plan  IgG lambda monoclonal gammopathy  Patient is a 69-year-old French-speaking male, with an established history of IgG lambda multiple myeloma, with light-chain deposition in the kidneys (Currently on Daratumumab-VRd - Cycle 3 Day 1 administered on 2024); who presents today for interval follow-up. Of note, Cycle 1 Day 1 of Daratumumab-VRd was initiated on 2024. Patient has since completed 2-cycles of the above-mentioned, and was most recently administered Cycle 3 Day 1 of Daratumumab 1,800 mg and Bortezomib 2.5 mg most recently, on 2024. Review of hematologic- and metabolic-parameters demonstrates worsening normocytic anemia (Hemoglobin: 9.1 MCV: 93); with otherwise stable thrombocytopenia (Platelet Count: 102), and improved renal-function (Creatinine: 1.44 eGFR: 49). Repeat SPEP shows persistent IgG lambda monoclonal gammopathy (M-Peak: 0.97). Quantitative Immunoglobulins: IgA: 16 Ig,406 IgM: Less than 20 (Downtrending from previous). Free Light Chains: Kappa: 10.5 Lambda: 81.8 Ratio: 7.7 (Downtrending from previous). Given fever (Tm: 101.6 F), tachycardia (HR: 112), rigors, and cough productive of blood-streaked sputum; have high-suspicion of an acute infectious process (e.g. Pneumonia). Recommend holding systemic-therapy at this time. Will transfer patient directly to the [facility] Emergency Department for further evaluation and management. Plan to follow-up in 2-3-weeks, prior to resumption of therapy. In the interim, will be in close communication with [facility], regarding plan for autologous SCT. Patient and his grandson expressed understanding and  agreement with the above-mentioned plan.     Summary of Recommendations:  Recommend holding systemic-therapy given high-suspicion of an acute infectious process:  Will transfer patient directly to the [facility] Emergency Department.  Will communicate with [facility], regarding plan for autologous SCT.  Plan for close interval follow-up in approximately 2-3-weeks.  Plan to resume treatment, following re-evaluation.    Orders:    Transfer to other facility    History of Present Illness     Chief Complaint: Follow-up.  Interval Events: Zack Partida is a 69-year-old Macedonian-speaking male, with an established history of IgG lambda multiple myeloma, with light-chain deposition in the kidneys (Currently on Daratumumab-VRd - Cycle 3 Day 1 administered on December 13th, 2024); who presents today for interval follow-up. Of note, patient characterizes onset of profound fatigue on Sunday, December 15th, 2024; prompting him to remain largely bedbound for 24-48 hours. Upon return to work, patient states that he sustained a mechanical-fall from standing, without associated loss of consciousness, or head-strike. He is on Aspirin 81 mg Daily prophylaxis, in the setting of ongoing Revlimid-therapy. Patient further describes difficulty recovering from the above-mentioned fall, due to generalized weakness. In addition to the above-mentioned, patient notes accompanying rigors ('Shaking-chills'), loss of appetite, cough productive of scant-hemoptysis (e.g. Streaked in sputum), dyspnea with exertion, and intermittent headaches and sinus congestion. He denies any obvious sick-exposures. Patient has not self-tested for COVID-19 or Influenza. Despite poor appetite, patient states that he is hydrating appropriately. Remaining review of systems were unremarkable for myalgias, or pharyngitis.    Note: Our encounter was facilitated by translation via patient's grandson (Jose Elias), at his request.    Oncology History    Oncology History Overview Note   5/2024 - kidney biopsy - LCDD - conge red stainging negative    6/2024 - BMBX with 15-17% lambda restricted plasma cells    10/2024 - BMBX with 30% lambda restricted plasma cells, congo red staining negative, FISH - dup 1q - monoclonal protein is IgG lambda     Multiple myeloma not having achieved remission (HCC)   10/18/2024 Initial Diagnosis    Multiple myeloma not having achieved remission (HCC)     11/1/2024 -  Chemotherapy    alteplase (CATHFLO), 2 mg, Intracatheter, Every 1 Minute as needed, 3 of 14 cycles  daratumumab-hyaluronidase (DARZALEX FASPRO), 1,800 mg, Subcutaneous daratumumab-hyaluronidase, Once, 3 of 14 cycles  Administration: 1,800 mg (11/1/2024), 1,800 mg (11/7/2024), 1,800 mg (11/15/2024), 1,800 mg (11/22/2024), 1,800 mg (11/29/2024), 1,800 mg (12/6/2024), 1,800 mg (12/13/2024)  bortezomib (VELCADE), 1.3 mg/m2 = 2.5 mg, Subcutaneous, Once, 3 of 8 cycles  Administration: 2.5 mg (11/1/2024), 2.5 mg (11/7/2024), 2.5 mg (11/22/2024), 2.5 mg (11/29/2024), 2.5 mg (11/15/2024), 2.5 mg (12/6/2024), 2.5 mg (12/13/2024)        Review of Systems  Review of Systems:  All systems reviewed and negative except otherwise listed in the History of Present Illness.      Objective     Vitals:    12/17/24 0802   BP: 132/78   Pulse: (!) 112   Resp: 18   Temp: (!) 101.6 °F (38.7 °C)   SpO2: 92%     ECOG   1-Points.    Physical Exam:  General: Alert, and oriented; Ill-Appearing; Rigoring Throughout Our Encounter  HEENT: Atraumatic, and normocephalic; PERRLA; EOMI; Moist mucosal membranes  Neck: Trachea midline; No neck masses, thyromegaly, or cervical lymphadenopathy  Cardiovascular: Tachycardic (Regular); No murmurs, rubs, or gallops; Lower Extremity Edema  Respiratory: Scattered Rales on the Left-Side; Otherwise Clear to Auscultation; Adequate aeration; No supplemental oxygen; Note: Vitals Show Mild Hypoxia on Room Air (92%)  Abdomen: Soft, non-tender; Non-distended; No organomegaly;  Bowel sounds present  Extremities: No obvious deformities; 1+ Bilateral Lower Extremity Edema; Skin Changes (Diffuse Dryness Involving the Anterior Aspect of Distal Lower Extremities)  Neurologic: Appropriately alert, and oriented to Person, Place, and Time; No focal neurologic deficits    Labs: I have reviewed the following labs:  Lab Results   Component Value Date/Time    WBC 8.49 12/10/2024 09:23 AM    RBC 3.03 (L) 12/10/2024 09:23 AM    Hemoglobin 9.1 (L) 12/10/2024 09:23 AM    Hematocrit 28.1 (L) 12/10/2024 09:23 AM    MCV 93 12/10/2024 09:23 AM    MCH 30.0 12/10/2024 09:23 AM    RDW 14.8 12/10/2024 09:23 AM    Platelets 102 (L) 12/10/2024 09:23 AM    Segmented % 73 12/10/2024 09:23 AM    Lymphocytes % 13 (L) 12/10/2024 09:23 AM    Monocytes % 10 12/10/2024 09:23 AM    Eosinophils Relative 3 12/10/2024 09:23 AM    Basophils Relative 1 12/10/2024 09:23 AM    Immature Grans % 0 12/10/2024 09:23 AM    Absolute Neutrophils 6.18 12/10/2024 09:23 AM     Lab Results   Component Value Date/Time    Potassium 3.7 12/10/2024 09:23 AM    Chloride 111 (H) 12/10/2024 09:23 AM    CO2 24 12/10/2024 09:23 AM    BUN 37 (H) 12/10/2024 09:23 AM    Creatinine 1.44 (H) 12/10/2024 09:23 AM    Glucose, Fasting 84 12/10/2024 09:23 AM    Calcium 8.7 12/10/2024 09:23 AM    Corrected Calcium 9.3 12/10/2024 09:23 AM    AST 9 (L) 12/10/2024 09:23 AM    ALT 14 12/10/2024 09:23 AM    Alkaline Phosphatase 65 12/10/2024 09:23 AM    Total Protein 5.7 (L) 12/10/2024 09:23 AM    Total Protein 6.0 (L) 12/10/2024 09:23 AM    Albumin 3.2 (L) 12/10/2024 09:23 AM    Total Bilirubin 0.35 12/10/2024 09:23 AM    eGFR 49 12/10/2024 09:23 AM     Patient was seen and discussed with attending physician, EDUARDO Jimenez D.O.  Hematology-Oncology Fellow (PGY-5)

## 2024-12-17 NOTE — ASSESSMENT & PLAN NOTE
Productive cough for 4 days, intermittent minimal hemoptysis per patient, most likely secondary to pneumonia  Collect sputum to quantify hemoptysis  If significant consult pulmonology

## 2024-12-17 NOTE — ASSESSMENT & PLAN NOTE
Patient is a 69-year-old Senegalese-speaking male, with an established history of IgG lambda multiple myeloma, with light-chain deposition in the kidneys (Currently on Daratumumab-VRd - Cycle 3 Day 1 administered on 2024); who presents today for interval follow-up. Of note, Cycle 1 Day 1 of Daratumumab-VRd was initiated on 2024. Patient has since completed 2-cycles of the above-mentioned, and was most recently administered Cycle 3 Day 1 of Daratumumab 1,800 mg and Bortezomib 2.5 mg most recently, on 2024. Review of hematologic- and metabolic-parameters demonstrates worsening normocytic anemia (Hemoglobin: 9.1 MCV: 93); with otherwise stable thrombocytopenia (Platelet Count: 102), and improved renal-function (Creatinine: 1.44 eGFR: 49). Repeat SPEP shows persistent IgG lambda monoclonal gammopathy (M-Peak: 0.97). Quantitative Immunoglobulins: IgA: 16 Ig,406 IgM: Less than 20 (Downtrending from previous). Free Light Chains: Kappa: 10.5 Lambda: 81.8 Ratio: 7.7 (Downtrending from previous). Given fever (Tm: 101.6 F), tachycardia (HR: 112), rigors, and cough productive of blood-streaked sputum; have high-suspicion of an acute infectious process (e.g. Pneumonia). Recommend holding systemic-therapy at this time. Will transfer patient directly to the [facility] Emergency Department for further evaluation and management. Plan to follow-up in 2-3-weeks, prior to resumption of therapy. In the interim, will be in close communication with [facility], regarding plan for autologous SCT. Patient and his grandson expressed understanding and agreement with the above-mentioned plan.     Summary of Recommendations:  Recommend holding systemic-therapy given high-suspicion of an acute infectious process:  Will transfer patient directly to the [facility] Emergency Department.  Will communicate with [facility], regarding plan for autologous SCT.  Plan for close  interval follow-up in approximately 2-3-weeks.  Plan to resume treatment, following re-evaluation.    Orders:    Transfer to other facility

## 2024-12-17 NOTE — ASSESSMENT & PLAN NOTE
Baseline hemoglobin 10-11, most recent 9.1, on admission 8.0  Thrombocytopenia with platelets of 49  Patient has multiple myeloma currently undergoing chemo  Also acute illness can play a role  Patient complaining of minimal hemoptysis intermittently  Continue to monitor

## 2024-12-17 NOTE — ASSESSMENT & PLAN NOTE
Lab Results   Component Value Date    EGFR 15 12/17/2024    EGFR 49 12/10/2024    EGFR 34 11/19/2024    CREATININE 3.67 (H) 12/17/2024    CREATININE 1.44 (H) 12/10/2024    CREATININE 1.95 (H) 11/19/2024   Recent baseline creatinine from 1.5-1.9  Creatinine today 3.67  Suspect prerenal due to poor oral intake  Received 1 L of IV fluids, continue sodium chloride and 100 cc/h  Bladder scan  Avoid nephrotoxins  Recheck labs tomorrow

## 2024-12-17 NOTE — ASSESSMENT & PLAN NOTE
Patient is a 69-year-old male with a past medical history of multiple myeloma currently undergoing chemo, hypertension who presented to the hospital with productive cough and poor oral intake for 4 days, developed fever this morning  Met sepsis criteria with tachycardia and leukocytosis secondary to pneumonia  Chest x-ray showed left lower lung pneumonia  COVID/flu/RSV negative  WBC 21, procalcitonin 114, lactic acid normal  Received IV fluids, blood cultures drawn, received IV cefepime  Continue cefepime and vancomycin  Continue IV fluids with sodium chloride 100 cc/h  Check sputum culture  Check strep and Legionella antigen  Follow-up blood cultures

## 2024-12-17 NOTE — H&P
H&P - Hospitalist   Name: Zack Partida 69 y.o. male I MRN: [id number]  Unit/Bed#: ED 01 I Date of Admission: 12/17/2024   Date of Service: 12/17/2024 I Hospital Day: 0     Assessment & Plan  Sepsis (HCC)  Patient is a 69-year-old male with a past medical history of multiple myeloma currently undergoing chemo, hypertension who presented to the hospital with productive cough and poor oral intake for 4 days, developed fever this morning  Met sepsis criteria with tachycardia and leukocytosis secondary to pneumonia  Chest x-ray showed left lower lung pneumonia  COVID/flu/RSV negative  WBC 21, procalcitonin 114, lactic acid normal  Received IV fluids, blood cultures drawn, received IV cefepime  Continue cefepime and vancomycin  Continue IV fluids with sodium chloride 100 cc/h  Check sputum culture  Check strep and Legionella antigen  Follow-up blood cultures  Acute kidney injury superimposed on CKD  ([facility])  Lab Results   Component Value Date    EGFR 15 12/17/2024    EGFR 49 12/10/2024    EGFR 34 11/19/2024    CREATININE 3.67 (H) 12/17/2024    CREATININE 1.44 (H) 12/10/2024    CREATININE 1.95 (H) 11/19/2024   Recent baseline creatinine from 1.5-1.9  Creatinine today 3.67  Suspect prerenal due to poor oral intake  Received 1 L of IV fluids, continue sodium chloride and 100 cc/h  Bladder scan  Avoid nephrotoxins  Recheck labs tomorrow  Acute respiratory failure with hypoxia ([facility])  Acute hypoxic respiratory failure secondary to left lung pneumonia  Currently on 2 L of oxygen  Monitor and wean as tolerated  Goal oxygen saturation 90% and above  Anemia and thrombocytopenia  Baseline hemoglobin 10-11, most recent 9.1, on admission 8.0  Thrombocytopenia with platelets of 49  Patient has multiple myeloma currently undergoing chemo  Also acute illness can play a role  Patient complaining of minimal hemoptysis intermittently  Continue to monitor  Essential hypertension  Home regimen amlodipine 10 mg daily, chlorthalidone 25 mg  daily, doxazosin 2 mg daily and irbesartan 300 mg daily  Hold antihypertensive in the setting of sepsis  Monitor blood pressure  Multiple myeloma not having achieved remission (HCC)  History of multiple myeloma currently undergoing chemo  Hemoptysis  Productive cough for 4 days, intermittent minimal hemoptysis per patient, most likely secondary to pneumonia  Collect sputum to quantify hemoptysis  If significant consult pulmonology      VTE Pharmacologic Prophylaxis: VTE Score: 7 High Risk (Score >/= 5) - Pharmacological DVT Prophylaxis Contraindicated. Sequential Compression Devices Ordered.  Code Status: Level 1 - Full Code patient and grandson  Discussion with family: Updated  (grandson) at bedside.    Anticipated Length of Stay: Patient will be admitted on an inpatient basis with an anticipated length of stay of greater than 2 midnights secondary to sepsis secondary to pneumonia.    History of Present Illness   Chief Complaint: Productive cough, weakness, poor oral intake    Zack Partida is a 69 y.o. male with a PMH of multiple myeloma currently undergoing chemo, hypertension who presents with productive cough, poor oral intake for 4 days, seen by heme-onc today for follow-up, had a fever rigors.  Patient reports about 4 days ago started to develop a cough which became productive with intermittent small amount of hemoptysis, just streaks of blood occasionally.  Denies any sick contacts.  Poor oral intake for the past 2 days.  Weakness.  He was seen by heme-onc today in the office and found to have a fever of 101 and sent to [place] for further evaluation.  2 days ago he had 2 episodes of diarrhea.    Review of Systems   Constitutional:  Positive for activity change, appetite change, chills, fatigue and fever.   HENT: Negative.     Respiratory:  Positive for cough and shortness of breath.    Cardiovascular:  Negative for chest pain, palpitations and leg swelling.   Gastrointestinal:  Negative  for abdominal pain.   Genitourinary:  Negative for dysuria.   Musculoskeletal: Negative.    Skin: Negative.    Neurological:  Negative for dizziness.   Hematological: Negative.    Psychiatric/Behavioral: Negative.         Historical Information   Past Medical History:   Diagnosis Date    Hypertension     Multiple myeloma (HCC)     Polio      Past Surgical History:   Procedure Laterality Date    IR BIOPSY BONE MARROW  6/11/2024    IR BIOPSY BONE MARROW  10/4/2024    IR BIOPSY KIDNEY RANDOM  5/29/2024    LEG SURGERY      for polio     Social History     Tobacco Use    Smoking status: Never    Smokeless tobacco: Never   Vaping Use    Vaping status: Never Used   Substance and Sexual Activity    Alcohol use: Never    Drug use: Never    Sexual activity: Not on file     E-Cigarette/Vaping    E-Cigarette Use Never User      E-Cigarette/Vaping Substances    Nicotine No     THC No     CBD No     Flavoring No     Other No     Unknown No      Family history non-contributory  Social History:  Marital Status: /Civil Union     Meds/Allergies   I have reviewed home medications with patient family member.  Prior to Admission medications    Medication Sig Start Date End Date Taking? Authorizing Provider   acyclovir (ZOVIRAX) 400 MG tablet Take 1 tablet (400 mg total) by mouth 2 (two) times a day 10/18/24 3/17/25  Cathy Flores DO   amLODIPine (NORVASC) 5 mg tablet Take 1 tablet (5 mg total) by mouth daily  Patient taking differently: Take 10 mg by mouth daily 10/6/23   Ayanna Paz MD   chlorthalidone 25 mg tablet Take 0.5 tablets (12.5 mg total) by mouth daily  Patient taking differently: Take 25 mg by mouth daily 5/7/24 12/6/24  Vega Landry MD   Cholecalciferol (VITAMIN D3) 1,000 units tablet Take 1 tablet (1,000 Units total) by mouth daily 9/10/24 12/9/24  Vega Landry MD   doxazosin (CARDURA) 2 mg tablet Take 1 tablet (2 mg total) by mouth daily at bedtime 12/6/24 3/6/25  Vega Landry MD    ergocalciferol (ERGOCALCIFEROL) 1.25 MG (70865 UT) capsule Take 1 capsule (50,000 Units total) by mouth once a week for 8 doses  Patient not taking: Reported on 10/18/2024 5/8/24 6/27/24  Vega Landry MD   irbesartan (AVAPRO) 300 mg tablet Take 300 mg by mouth daily DAILY IN THE AM    Historical Provider, MD   lenalidomide (REVLIMID) 15 MG CAPS TAKE 1 CAPSULE BY MOUTH 1 TIME A DAY FOR 14 DAYS ON THEN 7 DAYS OFF 11/13/24   Cathy Flores DO   Multiple Vitamin (multivitamin) capsule Take 1 capsule by mouth daily  Patient not taking: Reported on 10/18/2024    Historical Provider, MD     No Known Allergies    Objective :  Temp:  [99 °F (37.2 °C)-101.6 °F (38.7 °C)] 99 °F (37.2 °C)  HR:  [100-112] 100  BP: (108-132)/(58-78) 108/58  Resp:  [18] 18  SpO2:  [88 %-95 %] 95 %  O2 Device: Nasal cannula    Physical Exam  Constitutional:       General: He is not in acute distress.  HENT:      Head: Atraumatic.   Cardiovascular:      Rate and Rhythm: Normal rate and regular rhythm.      Heart sounds: No murmur heard.  Pulmonary:      Effort: Pulmonary effort is normal. No respiratory distress.      Breath sounds: Normal breath sounds. No wheezing.   Abdominal:      General: Bowel sounds are normal. There is no distension.      Palpations: Abdomen is soft.      Tenderness: There is no abdominal tenderness.   Musculoskeletal:         General: No swelling.      Cervical back: Neck supple.   Skin:     General: Skin is warm and dry.   Neurological:      General: No focal deficit present.      Mental Status: He is alert.   Psychiatric:         Mood and Affect: Mood normal.          Lines/Drains:            Lab Results: I have reviewed the following results:  Results from last 7 days   Lab Units 12/17/24  0931   WBC Thousand/uL 21.08*   HEMOGLOBIN g/dL 8.0*   HEMATOCRIT % 24.5*   PLATELETS Thousands/uL 49*   BANDS PCT % 8   LYMPHO PCT % 2*   MONO PCT % 1*   EOS PCT % 0     Results from last 7 days   Lab Units 12/17/24  0931    SODIUM mmol/L 135   POTASSIUM mmol/L 3.5   CHLORIDE mmol/L 105   CO2 mmol/L 19*   BUN mg/dL 69*   CREATININE mg/dL 3.67*   ANION GAP mmol/L 11   CALCIUM mg/dL 8.7   ALBUMIN g/dL 2.9*   TOTAL BILIRUBIN mg/dL 0.65   ALK PHOS U/L 96   ALT U/L 21   AST U/L 25   GLUCOSE RANDOM mg/dL 126     Results from last 7 days   Lab Units 12/17/24  0931   INR  1.46*         Lab Results   Component Value Date    HGBA1C 5.9 10/06/2023     Results from last 7 days   Lab Units 12/17/24  0931   LACTIC ACID mmol/L 1.1   PROCALCITONIN ng/ml 114.23*       Imaging Results Review: I reviewed radiology reports from this admission including: chest xray.  Other Study Results Review: EKG was reviewed.     Administrative Statements     ** Please Note: This note has been constructed using a voice recognition system. **

## 2024-12-17 NOTE — LETTER
[facility] [address]  [address] [id number]  Dept: [phone]    December 22, 2024     Patient: Zack Partida   YOB: 1955   Date of Visit: 12/17/2024       To Whom it May Concern:    Zack Partida is under my professional care. He was seen in the hospital from 12/17/2024 to 12/22/24. He may return to work on discharge from the hospital with the following limitations no heavy lifting more than 25 pounds for the first week . He will also be going to a rehabilitation facility after hospital discharge before returning home, so they may update you with further recommendations.     If you have any questions or concerns, please don't hesitate to call.         Sincerely,          Radha Irvin MD  [facility] Internal Medicine  [facility]  [phone]

## 2024-12-17 NOTE — RESPIRATORY THERAPY NOTE
RT Protocol Note  Zack Partida 69 y.o. male MRN: [id number]  Unit/Bed#: ED 01 Encounter: [id number]    Assessment    Principal Problem:    Sepsis (HCC)  Active Problems:    Essential hypertension    Multiple myeloma not having achieved remission (HCC)    Acute kidney injury superimposed on CKD  (HCC)    Acute respiratory failure with hypoxia (HCC)    Anemia and thrombocytopenia    Hemoptysis      Home Pulmonary Medications:  None       Past Medical History:   Diagnosis Date    Hypertension     Multiple myeloma (HCC)     Polio      Social History     Socioeconomic History    Marital status: /Civil Union     Spouse name: None    Number of children: None    Years of education: None    Highest education level: None   Occupational History    None   Tobacco Use    Smoking status: Never    Smokeless tobacco: Never   Vaping Use    Vaping status: Never Used   Substance and Sexual Activity    Alcohol use: Never    Drug use: Never    Sexual activity: None   Other Topics Concern    None   Social History Narrative    None     Social Drivers of Health     Financial Resource Strain: Not on file   Food Insecurity: Not on file   Transportation Needs: Not on file   Physical Activity: Not on file   Stress: Not on file   Social Connections: Not on file   Intimate Partner Violence: Not on file   Housing Stability: Not on file       Subjective         Objective    Physical Exam:   Assessment Type: Assess only  General Appearance: Alert, Awake  Respiratory Pattern: Normal  Chest Assessment: Chest expansion symmetrical  Bilateral Breath Sounds: Clear, Diminished    Vitals:  Blood pressure 130/75, pulse (!) 109, temperature (!) 102.5 °F (39.2 °C), temperature source Oral, resp. rate 20, SpO2 93%.          Imaging and other studies: Results Review Statement: I personally reviewed the following image studies/reports in PACS and discussed pertinent findings with Radiology: chest xray. My interpretation of the radiology  images/reports is:  .        Plan    Respiratory Plan: Discontinue Protocol  Airway Clearance Plan: Incentive Spirometer     Resp Comments: (P) Pt evaluated for resp protocol. Pt came to hospital for evaluation of a fever. Spoke with pt, pt grandson translated, pt denies any history of asthma or COPD, pt does not take any pulm meds at home. When asked, pt denies any issues with SOB or his breathing at this time. BS clear/diminished, pt on 2L and does not appear in resp distress. pt chest x ray shows left lower lung pneumonia. Pt given IS and shown how to use it, pt displayed proper technique. Will dc resp  protocol and order acp for IS.

## 2024-12-18 PROBLEM — E66.9 OBESITY: Status: ACTIVE | Noted: 2021-04-27

## 2024-12-18 PROBLEM — A48.1 LEGIONELLA PNEUMONIA (HCC): Status: ACTIVE | Noted: 2024-12-18

## 2024-12-18 PROBLEM — N18.30 STAGE 3 CHRONIC KIDNEY DISEASE (HCC): Status: ACTIVE | Noted: 2023-09-19

## 2024-12-18 PROBLEM — Z86.0100 HISTORY OF COLONIC POLYPS: Status: ACTIVE | Noted: 2023-01-18

## 2024-12-18 LAB
ANION GAP SERPL CALCULATED.3IONS-SCNC: 12 MMOL/L (ref 4–13)
BUN SERPL-MCNC: 61 MG/DL (ref 5–25)
CALCIUM SERPL-MCNC: 7.9 MG/DL (ref 8.4–10.2)
CHLORIDE SERPL-SCNC: 109 MMOL/L (ref 96–108)
CO2 SERPL-SCNC: 16 MMOL/L (ref 21–32)
CREAT SERPL-MCNC: 2.79 MG/DL (ref 0.6–1.3)
ERYTHROCYTE [DISTWIDTH] IN BLOOD BY AUTOMATED COUNT: 15.9 % (ref 11.6–15.1)
GFR SERPL CREATININE-BSD FRML MDRD: 22 ML/MIN/1.73SQ M
GLUCOSE SERPL-MCNC: 108 MG/DL (ref 65–140)
HCT VFR BLD AUTO: 21.7 % (ref 36.5–49.3)
HGB BLD-MCNC: 7.1 G/DL (ref 12–17)
MCH RBC QN AUTO: 30 PG (ref 26.8–34.3)
MCHC RBC AUTO-ENTMCNC: 32.7 G/DL (ref 31.4–37.4)
MCV RBC AUTO: 92 FL (ref 82–98)
MRSA NOSE QL CULT: NORMAL
PLATELET # BLD AUTO: 46 THOUSANDS/UL (ref 149–390)
POTASSIUM SERPL-SCNC: 3.4 MMOL/L (ref 3.5–5.3)
PROCALCITONIN SERPL-MCNC: 100.11 NG/ML
RBC # BLD AUTO: 2.37 MILLION/UL (ref 3.88–5.62)
SODIUM SERPL-SCNC: 137 MMOL/L (ref 135–147)
VANCOMYCIN SERPL-MCNC: 13.4 UG/ML (ref 10–20)
WBC # BLD AUTO: 14.26 THOUSAND/UL (ref 4.31–10.16)

## 2024-12-18 PROCEDURE — 80048 BASIC METABOLIC PNL TOTAL CA: CPT | Performed by: INTERNAL MEDICINE

## 2024-12-18 PROCEDURE — 84145 PROCALCITONIN (PCT): CPT | Performed by: INTERNAL MEDICINE

## 2024-12-18 PROCEDURE — 94664 DEMO&/EVAL PT USE INHALER: CPT

## 2024-12-18 PROCEDURE — 85027 COMPLETE CBC AUTOMATED: CPT | Performed by: INTERNAL MEDICINE

## 2024-12-18 PROCEDURE — 80202 ASSAY OF VANCOMYCIN: CPT | Performed by: INTERNAL MEDICINE

## 2024-12-18 PROCEDURE — 99223 1ST HOSP IP/OBS HIGH 75: CPT | Performed by: INTERNAL MEDICINE

## 2024-12-18 PROCEDURE — 99232 SBSQ HOSP IP/OBS MODERATE 35: CPT

## 2024-12-18 RX ORDER — POTASSIUM CHLORIDE 1500 MG/1
40 TABLET, EXTENDED RELEASE ORAL 4 TIMES DAILY
Status: COMPLETED | OUTPATIENT
Start: 2024-12-18 | End: 2024-12-18

## 2024-12-18 RX ORDER — GUAIFENESIN/DEXTROMETHORPHAN 100-10MG/5
10 SYRUP ORAL EVERY 4 HOURS PRN
Status: DISCONTINUED | OUTPATIENT
Start: 2024-12-18 | End: 2024-12-18

## 2024-12-18 RX ORDER — GUAIFENESIN/DEXTROMETHORPHAN 100-10MG/5
10 SYRUP ORAL EVERY 4 HOURS PRN
Status: DISCONTINUED | OUTPATIENT
Start: 2024-12-18 | End: 2024-12-30 | Stop reason: HOSPADM

## 2024-12-18 RX ORDER — VANCOMYCIN HYDROCHLORIDE 1 G/200ML
1000 INJECTION, SOLUTION INTRAVENOUS ONCE
Status: DISCONTINUED | OUTPATIENT
Start: 2024-12-18 | End: 2024-12-18

## 2024-12-18 RX ORDER — LEVOFLOXACIN 750 MG/1
750 TABLET, FILM COATED ORAL EVERY OTHER DAY
Status: DISCONTINUED | OUTPATIENT
Start: 2024-12-18 | End: 2024-12-24

## 2024-12-18 RX ORDER — AZITHROMYCIN 250 MG/1
500 TABLET, FILM COATED ORAL EVERY 24 HOURS
Status: DISCONTINUED | OUTPATIENT
Start: 2024-12-18 | End: 2024-12-18

## 2024-12-18 RX ADMIN — AZITHROMYCIN MONOHYDRATE 500 MG: 500 INJECTION, POWDER, LYOPHILIZED, FOR SOLUTION INTRAVENOUS at 00:36

## 2024-12-18 RX ADMIN — GUAIFENESIN AND DEXTROMETHORPHAN 10 ML: 100; 10 SYRUP ORAL at 22:43

## 2024-12-18 RX ADMIN — SODIUM CHLORIDE 100 ML/HR: 0.9 INJECTION, SOLUTION INTRAVENOUS at 09:54

## 2024-12-18 RX ADMIN — SODIUM CHLORIDE 100 ML/HR: 0.9 INJECTION, SOLUTION INTRAVENOUS at 00:08

## 2024-12-18 RX ADMIN — ACETAMINOPHEN 650 MG: 325 TABLET, FILM COATED ORAL at 01:17

## 2024-12-18 RX ADMIN — LEVOFLOXACIN 750 MG: 750 TABLET, FILM COATED ORAL at 12:00

## 2024-12-18 RX ADMIN — POTASSIUM CHLORIDE 40 MEQ: 1500 TABLET, EXTENDED RELEASE ORAL at 12:00

## 2024-12-18 RX ADMIN — ACETAMINOPHEN 650 MG: 325 TABLET, FILM COATED ORAL at 22:43

## 2024-12-18 RX ADMIN — POTASSIUM CHLORIDE 40 MEQ: 1500 TABLET, EXTENDED RELEASE ORAL at 09:54

## 2024-12-18 RX ADMIN — SODIUM CHLORIDE 100 ML/HR: 0.9 INJECTION, SOLUTION INTRAVENOUS at 20:32

## 2024-12-18 NOTE — UTILIZATION REVIEW
NOTIFICATION OF INPATIENT ADMISSION   AUTHORIZATION REQUEST   SERVICING FACILITY:   [facility]  Address: [address] [place] [address]  Tax ID: [id number]  NPI: [id number] ATTENDING PROVIDER:  Attending Name and NPI#: Radha Irvin Md [[id number]]  Address: [address] [place] [address]  Phone: [phone]   ADMISSION INFORMATION:  Place of Service: Inpatient [facility]  Place of Service Code: 21  Inpatient Admission Date/Time: 12/17/24 11:24 AM  Discharge Date/Time: No discharge date for patient encounter.  Admitting Diagnosis Code/Description:  Pneumonia [J18.9]  MARCI (acute kidney injury) (Formerly [facility]) [N17.9]  Sepsis (HCC) [A41.9]     UTILIZATION REVIEW CONTACT:  Karlo Caraballo Utilization   Network Utilization Review Department  Phone: [phone]  Fax: [phone]  Email: [email or web address]  Contact for approvals/pending authorizations, clinical reviews, and discharge.     PHYSICIAN ADVISORY SERVICES:  Medical Necessity Denial & Nviq-ko-Vfbt Review  Phone: [phone]  Fax: [phone]  Email: [email or web address]     DISCHARGE SUPPORT TEAM:  For Patients Discharge Needs & Updates  Phone: [phone] opt. 2 Fax: [phone]  Email: [email or web address]

## 2024-12-18 NOTE — PLAN OF CARE
Problem: INFECTION - ADULT  Goal: Absence or prevention of progression during hospitalization  Description: INTERVENTIONS:  - Assess and monitor for signs and symptoms of infection  - Monitor lab/diagnostic results  - Monitor all insertion sites, i.e. indwelling lines, tubes, and drains  - Monitor endotracheal if appropriate and nasal secretions for changes in amount and color  - [place] appropriate cooling/warming therapies per order  - Administer medications as ordered  - Instruct and encourage patient and family to use good hand hygiene technique  - Identify and instruct in appropriate isolation precautions for identified infection/condition  12/18/2024 1023 by Hansa Ybarra RN  Outcome: Progressing  12/18/2024 1023 by Hansa Ybarra RN  Outcome: Progressing  Goal: Absence of fever/infection during neutropenic period  Description: INTERVENTIONS:  - Monitor WBC    12/18/2024 1023 by Hansa Ybarra RN  Outcome: Progressing  12/18/2024 1023 by Hansa Ybarra RN  Outcome: Progressing     Problem: DISCHARGE PLANNING  Goal: Discharge to home or other facility with appropriate resources  Description: INTERVENTIONS:  - Identify barriers to discharge w/patient and caregiver  - Arrange for needed discharge resources and transportation as appropriate  - Identify discharge learning needs (meds, wound care, etc.)  - Arrange for interpretive services to assist at discharge as needed  - Refer to Case Management Department for coordinating discharge planning if the patient needs post-hospital services based on physician/advanced practitioner order or complex needs related to functional status, cognitive ability, or social support system  12/18/2024 1023 by Hansa Ybarra RN  Outcome: Progressing  12/18/2024 1023 by Hansa Ybarra RN  Outcome: Progressing     Problem: PAIN - ADULT  Goal: Verbalizes/displays adequate comfort level or baseline comfort level  Description: Interventions:  - Encourage patient to monitor pain and request  assistance  - Assess pain using appropriate pain scale  - Administer analgesics based on type and severity of pain and evaluate response  - Implement non-pharmacological measures as appropriate and evaluate response  - Consider cultural and social influences on pain and pain management  - Notify physician/advanced practitioner if interventions unsuccessful or patient reports new pain  Outcome: Progressing

## 2024-12-18 NOTE — ASSESSMENT & PLAN NOTE
Due to new/acute legionella left lung pneumonia  On midflow at present  Plan:  Respiratory protocl  Monitor and wean as tolerated  Goal oxygen saturation 90% and above

## 2024-12-18 NOTE — CONSULTS
Zack Partida is a 69 y.o. male who has been receiving Vancomycin IV with management by the Pharmacy Consult service.  The patient's vancomycin therapy has been discontinued. Thank you for allowing us to take part in this patient's care. Pharmacy will sign off now.    Mary White, Pharmacist

## 2024-12-18 NOTE — CONSULTS
Consultation - Infectious Disease   Name: Zack Partida 69 y.o. male I MRN: [id number]  [facility] Date of Admission: 12/17/2024   Date of Service: 12/18/2024 I Hospital Day: 1   Inpatient consult to Infectious Diseases  Consult performed by: Brittany Mendoza MD  Consult ordered by: Deidre Carreno MD        Physician Requesting Evaluation: Radha Irvin MD   Reason for Evaluation / Principal Problem: Legionella pneumonia    Assessment & Plan  Sepsis (HCC)  Fever, HR.  Due to pneumonia.  No other appreciable source.  Flu/RSV/COVID antigen, blood cultures negative.    Continue antibiotics as below  Follow up final blood cultures  Follow temperatures closely  Recheck WBC in AM to monitor infection  Supportive care as per the primary service  Legionella pneumonia ([facility])  Unclear risk factor/exposure.  While antigen has high specificity, consider other respiratory pathogens    Change antibiotics to levofloxacin to also cover other respiratory pathogens  Follow respiratory status closely  Acute respiratory failure with hypoxia ([facility])  Due to pneumonia.    Continue antibiotics as above  Follow respiratory status closely  Multiple myeloma not having achieved remission ([facility])  Currently on Daratumumab-VRd since November 2024.  Being evaluated for possible SCT.  Acute kidney injury superimposed on CKD  ([facility])  Baseline Cr. 1.1-1.3.  Likely multifactorial due to sepsis, poor PO intake.  Improving.    Follow creatinine closely and dose-adjust antibiotics as indicated  Recheck BMP in AM  Anemia and thrombocytopenia  Due to MM      Antibiotics:  Vancomycin  Cefepime  Azithromycin  Antibiotics #1    History of Present Illness   Zack Partida is a 69 y.o. year old male with MM on Daratumumab-VRd who came to the ED from his oncology appointment with several days of fever, cough with some blood.  Upon presentation had fever and WBC.  CXR showed LLL infiltrate.  Started on vancomycin and cefepime.  Legionella  urinary antigen came back positive.  Was started on azithromycin overnight.  We are asked to comment on further evaluation and management.      Complains of poor appetite, dry lips.  Asks for water.    A complete review of systems is negative other than that noted in the HPI.    I have reviewed the patient's PMH, PSH, Social History, Family History, Meds, and Allergies    Objective :  Temp:  [99.4 °F (37.4 °C)-102.5 °F (39.2 °C)] 100.1 °F (37.8 °C)  HR:  [] 100  BP: (106-142)/(53-75) 118/74  Resp:  [14-22] 14  SpO2:  [90 %-94 %] 93 %  O2 Device: Nasal cannula  Nasal Cannula O2 Flow Rate (L/min):  [2 L/min-4 L/min] 4 L/min    General:  No acute distress  Psychiatric:  Awake and alert  Pulmonary:  Left lung coarse crackles  Abdomen:  Soft, nontender  Extremities:  No edema  Skin:  No rashes      Lab Results: I have reviewed the following results:  Results from last 7 days   Lab Units 12/18/24  0509 12/17/24  0931   WBC Thousand/uL 14.26* 21.08*   HEMOGLOBIN g/dL 7.1* 8.0*   PLATELETS Thousands/uL 46* 49*     Results from last 7 days   Lab Units 12/18/24  0509 12/17/24  0931   SODIUM mmol/L 137 135   POTASSIUM mmol/L 3.4* 3.5   CHLORIDE mmol/L 109* 105   CO2 mmol/L 16* 19*   BUN mg/dL 61* 69*   CREATININE mg/dL 2.79* 3.67*   EGFR ml/min/1.73sq m 22 15   CALCIUM mg/dL 7.9* 8.7   AST U/L  --  25   ALT U/L  --  21   ALK PHOS U/L  --  96   ALBUMIN g/dL  --  2.9*     Results from last 7 days   Lab Units 12/17/24  1248 12/17/24  0931   BLOOD CULTURE   --  Received in Microbiology Lab. Culture in Progress.  Received in Microbiology Lab. Culture in Progress.   LEGIONELLA URINARY ANTIGEN  Positive*  --      Results from last 7 days   Lab Units 12/18/24  0509 12/17/24  0931   PROCALCITONIN ng/ml 100.11* 114.23*                   Imaging Results Review: I personally reviewed the following image studies in PACS and associated radiology reports: chest xray. My interpretation of the radiology images/reports is: large LLL  infitrate.

## 2024-12-18 NOTE — PLAN OF CARE
Problem: INFECTION - ADULT  Goal: Absence or prevention of progression during hospitalization  Description: INTERVENTIONS:  - Assess and monitor for signs and symptoms of infection  - Monitor lab/diagnostic results  - Monitor all insertion sites, i.e. indwelling lines, tubes, and drains  - Monitor endotracheal if appropriate and nasal secretions for changes in amount and color  - [place] appropriate cooling/warming therapies per order  - Administer medications as ordered  - Instruct and encourage patient and family to use good hand hygiene technique  - Identify and instruct in appropriate isolation precautions for identified infection/condition  Outcome: Progressing  Goal: Absence of fever/infection during neutropenic period  Description: INTERVENTIONS:  - Monitor WBC    Outcome: Progressing     Problem: SAFETY ADULT  Goal: Patient will remain free of falls  Description: INTERVENTIONS:  - Educate patient/family on patient safety including physical limitations  - Instruct patient to call for assistance with activity   - Consult OT/PT to assist with strengthening/mobility   - Keep Call bell within reach  - Keep bed low and locked with side rails adjusted as appropriate  - Keep care items and personal belongings within reach  - Initiate and maintain comfort rounds  - Make Fall Risk Sign visible to staff  - Offer Toileting every 2 Hours, in advance of need  - Initiate/Maintain bed alarm  - Obtain necessary fall risk management equipment: yellow socks  - Apply yellow socks and bracelet for high fall risk patients  - Consider moving patient to room near nurses station  Outcome: Progressing     Problem: DISCHARGE PLANNING  Goal: Discharge to home or other facility with appropriate resources  Description: INTERVENTIONS:  - Identify barriers to discharge w/patient and caregiver  - Arrange for needed discharge resources and transportation as appropriate  - Identify discharge learning needs (meds, wound care, etc.)  -  Arrange for interpretive services to assist at discharge as needed  - Refer to Case Management Department for coordinating discharge planning if the patient needs post-hospital services based on physician/advanced practitioner order or complex needs related to functional status, cognitive ability, or social support system  Outcome: Progressing

## 2024-12-18 NOTE — PROGRESS NOTES
Progress Note - Hospitalist   Name: Zack Partida 69 y.o. male I MRN: [id number]  Unit/Bed#: [facility] 823-01 I Date of Admission: 12/17/2024   Date of Service: 12/18/2024 I Hospital Day: 1    Assessment & Plan  Sepsis (HCC)  Patient is a 69-year-old male with a past medical history of multiple myeloma currently undergoing chemo, hypertension who presented to the hospital with productive cough and poor oral intake for 4 days, developed fever day of admission  Met sepsis criteria with , Tmax 102.9, RR 20 on admission  Chest x-ray showed left lower lung pneumonia  COVID/flu/RSV negative  WBC 21, procalcitonin 114, lactic acid normal  In ER, received IV fluids, blood cultures drawn, received IV cefepime and vanc    Plan:  Tx as below - on fluids for overnight and abx as under legionella     Legionella pneumonia (HCC)  With sepsis on admission. CXR findings consistent w LLL PNA.     Admission 102.5F, 109 HR, 88% O2, procal 114.     Started on CTX, d/c'd after legionella (+). Started on azithromycin 12/17, d/c'd next day for broader coverage for atypicals w levofloxacin.     Plan:  Continue levofloxacin 12/18--  ID has been consulted, appreciate recs  IS  Resp protocol  PT OT  Acute kidney injury superimposed on CKD  (HCC)  Lab Results   Component Value Date    EGFR 22 12/18/2024    EGFR 15 12/17/2024    EGFR 49 12/10/2024    CREATININE 2.79 (H) 12/18/2024    CREATININE 3.67 (H) 12/17/2024    CREATININE 1.44 (H) 12/10/2024   Recent baseline creatinine from 1.5-1.9  Suspect prerenal due to poor oral intake  Received 1 L of IV fluids, continue sodium chloride and 100 cc/h  Bladder scan  Avoid nephrotoxins  Continue fluids for now until po intake improves - states his appetite is slowly returning  Acute respiratory failure with hypoxia (HCC)  Due to new/acute legionella left lung pneumonia  On midflow at present  Plan:  Respiratory protocl  Monitor and wean as tolerated  Goal oxygen saturation 90% and above  Anemia and  thrombocytopenia  Likely secondary to chemo as he is actively on treatment this month  Baseline hemoglobin 10-11, most recent 9.1, on admission 8.0  Thrombocytopenia with platelets of 49  Patient has multiple myeloma currently undergoing chemo - chem DVT ppx on hold  Also acute illness can play a role  Patient complaining of minimal hemoptysis intermittently  Continue to monitor w daily labs  Essential hypertension  Blood Pressure: 119/73    Home regimen amlodipine 10 mg daily, chlorthalidone 25 mg daily, doxazosin 2 mg daily and irbesartan 300 mg daily  Hold antihypertensive in the setting of sepsis  Monitor blood pressure  Multiple myeloma not having achieved remission (HCC)  History of multiple myeloma currently undergoing chemo  Hemoptysis  Productive cough for 4 days, intermittent minimal hemoptysis per patient, most likely secondary to pneumonia  Collect sputum to quantify hemoptysis    VTE Pharmacologic Prophylaxis: VTE Score: 7 High Risk (Score >/= 5) - Pharmacological DVT Prophylaxis Contraindicated. Sequential Compression Devices Ordered.  Due to plt <100k      Mobility:   Basic Mobility Inpatient Raw Score: 17  JH-HLM Goal: 5: Stand one or more mins  JH-HLM Achieved: 1: Laying in bed  JH-HLM Goal NOT achieved. Continue with multidisciplinary rounding and encourage appropriate mobility to improve upon JH-HLM goals.    Patient Centered Rounds: I performed bedside rounds with nursing staff today.   Discussions with Specialists or Other Care Team Provider: ID    Education and Discussions with Family / Patient: Patient declined call to .     Current Length of Stay: 1 day(s)  Current Patient Status: Inpatient   Certification Statement: The patient will continue to require additional inpatient hospital stay due to sepsis due to legionella PNA  Discharge Plan: Anticipate discharge in 48-72 hrs to home vs. Rehab pending PT OT recs    Code Status: Level 1 - Full Code    Subjective   Patient reports  feeling warm earlier, w ongoing clear phlegm cough. He reports abdominal swelling. He is requesting cough medicine. He denies  chest pain, shortness of breath, heart palpitations, nausea, vomiting, abdominal pain.       Objective :  Temp:  [99.4 °F (37.4 °C)-102.4 °F (39.1 °C)] 100.5 °F (38.1 °C)  HR:  [] 100  BP: (106-131)/(62-74) 119/73  Resp:  [14-20] 17  SpO2:  [89 %-94 %] 89 %  O2 Device: Nasal cannula  Nasal Cannula O2 Flow Rate (L/min):  [2 L/min-4 L/min] 4 L/min    Body mass index is 32.62 kg/m².     Input and Output Summary (last 24 hours):     Intake/Output Summary (Last 24 hours) at 12/18/2024 1858  Last data filed at 12/18/2024 1200  Gross per 24 hour   Intake 1686 ml   Output 1220 ml   Net 466 ml       Physical Exam  Vitals reviewed.   Constitutional:       General: He is not in acute distress.     Appearance: He is ill-appearing. He is not toxic-appearing or diaphoretic.   HENT:      Head: Normocephalic and atraumatic.      Mouth/Throat:      Mouth: Mucous membranes are moist.      Pharynx: Oropharynx is clear.   Eyes:      General: No scleral icterus.     Extraocular Movements: Extraocular movements intact.   Cardiovascular:      Rate and Rhythm: Normal rate and regular rhythm.      Heart sounds: No murmur heard.     No friction rub. No gallop.   Pulmonary:      Effort: Pulmonary effort is normal. No respiratory distress.      Breath sounds: No stridor. No wheezing or rales.      Comments: Dry cough  Abdominal:      General: There is distension (mildly distended, firm w edema).      Palpations: There is no mass.      Tenderness: There is no abdominal tenderness. There is no guarding.   Musculoskeletal:         General: Normal range of motion.      Right lower leg: No edema.      Left lower leg: No edema.   Skin:     General: Skin is warm and dry.      Findings: No rash.   Neurological:      Mental Status: He is alert and oriented to person, place, and time.      Sensory: No sensory deficit.    Psychiatric:         Mood and Affect: Mood normal.         Behavior: Behavior normal.         Thought Content: Thought content normal.           Lines/Drains: none              Lab Results: I have reviewed the following results:   Results from last 7 days   Lab Units 12/18/24  0509 12/17/24  0931   WBC Thousand/uL 14.26* 21.08*   HEMOGLOBIN g/dL 7.1* 8.0*   HEMATOCRIT % 21.7* 24.5*   PLATELETS Thousands/uL 46* 49*   BANDS PCT %  --  8   LYMPHO PCT %  --  2*   MONO PCT %  --  1*   EOS PCT %  --  0     Results from last 7 days   Lab Units 12/18/24  0509 12/17/24  0931   SODIUM mmol/L 137 135   POTASSIUM mmol/L 3.4* 3.5   CHLORIDE mmol/L 109* 105   CO2 mmol/L 16* 19*   BUN mg/dL 61* 69*   CREATININE mg/dL 2.79* 3.67*   ANION GAP mmol/L 12 11   CALCIUM mg/dL 7.9* 8.7   ALBUMIN g/dL  --  2.9*   TOTAL BILIRUBIN mg/dL  --  0.65   ALK PHOS U/L  --  96   ALT U/L  --  21   AST U/L  --  25   GLUCOSE RANDOM mg/dL 108 126     Results from last 7 days   Lab Units 12/17/24  0931   INR  1.46*             Results from last 7 days   Lab Units 12/18/24  0509 12/17/24  0931   LACTIC ACID mmol/L  --  1.1   PROCALCITONIN ng/ml 100.11* 114.23*       Recent Cultures (last 7 days):   Results from last 7 days   Lab Units 12/17/24  1248 12/17/24  0931   BLOOD CULTURE   --  No Growth at 24 hrs.  No Growth at 24 hrs.   LEGIONELLA URINARY ANTIGEN  Positive*  --        Imaging Results Review: I reviewed radiology reports from this admission including: chest xray.  Other Study Results Review: EKG was reviewed.     Last 24 Hours Medication List:     Current Facility-Administered Medications:     acetaminophen (TYLENOL) tablet 650 mg, Q6H PRN    benzonatate (TESSALON PERLES) capsule 100 mg, TID PRN    levofloxacin (LEVAQUIN) tablet 750 mg, Every Other Day    sodium chloride 0.9 % infusion, Continuous, Last Rate: 100 mL/hr (12/18/24 0954)    Administrative Statements   Today, Patient Was Seen By: Radha Irvin MD  I have spent a total time of  40 minutes in caring for this patient on the day of the visit/encounter including Diagnostic results, Patient and family education, Impressions, Counseling / Coordination of care, Documenting in the medical record, Reviewing / ordering tests, medicine, procedures  , and Obtaining or reviewing history  .    **Please Note: This note may have been constructed using a voice recognition system.**

## 2024-12-18 NOTE — ASSESSMENT & PLAN NOTE
Fever, HR.  Due to pneumonia.  No other appreciable source.  Flu/RSV/COVID antigen, blood cultures negative.    Continue antibiotics as below  Follow up final blood cultures  Follow temperatures closely  Recheck WBC in AM to monitor infection  Supportive care as per the primary service

## 2024-12-18 NOTE — ASSESSMENT & PLAN NOTE
Likely secondary to chemo as he is actively on treatment this month  Baseline hemoglobin 10-11, most recent 9.1, on admission 8.0  Thrombocytopenia with platelets of 49  Patient has multiple myeloma currently undergoing chemo - chem DVT ppx on hold  Also acute illness can play a role  Patient complaining of minimal hemoptysis intermittently  Continue to monitor w daily labs

## 2024-12-18 NOTE — ASSESSMENT & PLAN NOTE
With sepsis on admission. CXR findings consistent w LLL PNA.     Admission 102.5F, 109 HR, 88% O2, procal 114.     Started on CTX, d/c'd after legionella (+). Started on azithromycin 12/17, d/c'd next day for broader coverage for atypicals w levofloxacin.     Plan:  Continue levofloxacin 12/18--  ID has been consulted, appreciate recs  IS  Resp protocol  PT OT

## 2024-12-18 NOTE — SEPSIS NOTE
"  Sepsis Note   Zack Partida 69 y.o. male MRN: [id number]  [facility] Encounter: [id number]       Initial Sepsis Screening       Row Name 12/18/24 1102 12/17/24 1054             Is the patient's history suggestive of a new or worsening infection? No  -AG Yes (Proceed)  -TC       Suspected source of infection pneumonia  -AG pneumonia  -TC       Indicate SIRS criteria Leukocytosis (WBC > 35044 IJL) OR Leukopenia (WBC <4000 IJL) OR Bandemia (WBC >10% bands);Tachycardia > 90 bpm;Hyperthemia > 38.3C (100.9F) OR Hypothermia <36C (96.8F)  -AG Leukocytosis (WBC > 80104 IJL) OR Leukopenia (WBC <4000 IJL) OR Bandemia (WBC >10% bands);Tachycardia > 90 bpm  -TC       Are two or more of the above signs & symptoms of infection both present and new to the patient? No  -AG Yes (Proceed)  -TC       Assess for evidence of organ dysfunction: Are any of the below criteria present within 6 hours of suspected infection and SIRS criteria that are NOT considered to be chronic conditions? -- Creatinine > 2.0 AND > 0.5 above baseline  -TC       Date of presentation of severe sepsis -- --       Time of presentation of severe sepsis -- --       Sepsis Note: Click \"NEXT\" below (NOT \"close\") to generate sepsis note based on above information. YES (proceed by clicking \"NEXT\")  -AG --                 User Key  (r) = Recorded By, (t) = Taken By, (c) = Cosigned By      Initials Name Provider Type    AG Radha Irvin MD Physician    TC Neal Shannon DO Resident                        Body mass index is 32.62 kg/m².  Wt Readings from Last 1 Encounters:   12/17/24 86.2 kg (190 lb 0.6 oz)     IBW (Ideal Body Weight): 59.2 kg    Ideal body weight: 59.2 kg (130 lb 8.2 oz)  Adjusted ideal body weight: 70 kg (154 lb 5.2 oz)      Received sepsis alert after 11 AM. Suspect fever, tachycardia, acute hypoxic resp failure all all related to confirmed Legionella pneumonia. Patient continues on azithromycin and is starting to show signs of " clinical improvement w downtrending leukocytosis, procalcitonin.    No changes in management for now. ID has been consulted and we appreciate their recs.    Radha Irvin MD  [facility] ([facility])  [facility]

## 2024-12-18 NOTE — UTILIZATION REVIEW
Initial Clinical Review    Admission: Date/Time/Statement:   Admission Orders (From admission, onward)       Ordered        12/17/24 1124  INPATIENT ADMISSION  Once                          Orders Placed This Encounter   Procedures    INPATIENT ADMISSION     Standing Status:   Standing     Number of Occurrences:   1     Level of Care:   Med Surg [16]     Estimated length of stay:   More than 2 Midnights     Certification:   I certify that inpatient services are medically necessary for this patient for a duration of greater than two midnights. See H&P and MD Progress Notes for additional information about the patient's course of treatment.     ED Arrival Information       Expected   12/17/2024     Arrival   12/17/2024 08:48    Acuity   Urgent              Means of arrival   Walk-In    Escorted by   Family Member    Service   Hospitalist    Admission type   Emergency              Arrival complaint   Fever, rigors             Chief Complaint   Patient presents with    Fever     Pt getting active chemo for multiple myeloma, having fever x 3 days, reports coughing up some blood in sputum, sent over from hematology office        Initial Presentation: 69 y.o. male with PMHx includes multiple myeloma currently undergoing chemo, HTN who presented on 12/17/24 to [facility] ED due to productive cough with intermittent small amount of hemoptysis, poor oral intake for 4 days, seen by heme-onc today for follow-up, had a fever rigors. Also states of diarrhea two days prior. He was seen by heme-onc on 12/17 in the office and found to have a fever of 101 and sent to [place] ED or further evaluation. In the ED, O2 sat 88% on room air, supplemental O2 applied, febrile at 102.5, tachycardic at 109.  Labs revealed WBC 21.08, hgb 8.0, hct 24.5, BUN 69, Cr 3.67, alb 2.9, procal 114.23. UA blood, protein. CXR revealed LLL pneumonia. In ED, given 1L IVF bolus followed by infusion, started on IV cefepime and IV Vanco.    Plan:   Admit Inpatient status Dx Sepsis secondary to Pneumonia:   continue IVF and IV ABX, fu on blood and sputum cxs, check strp and Legionella antigen. Monitor VS, O2 sats and labs.    Anticipated Length of Stay/Certification Statement: Patient will be admitted on an inpatient basis with an anticipated length of stay of greater than 2 midnights secondary to sepsis secondary to pneumonia.     12/17 Hospitalist Update: Legionella urine antigen came back positive. Start azithromycin 500 mg daily, Qtc 408. Consult infectious disease.    Date: 12/18   Day 2:   Left lung coarse crackles. Continue IVF, monitor resp status, O2, VS and labs, fu on cxs.     12/18 Per ID: Change antibiotics to levofloxacin. Follow respiratory status closely. FU on final blood cxs, monitor temp closely, recheck WBC in AM.     ED Treatment-Medication Administration from 12/17/2024 0842 to 12/17/2024 2100         Date/Time Order Dose Route Action     12/17/2024 1107 cefepime (MAXIPIME) 2 g/50 mL dextrose IVPB 2,000 mg Intravenous New Bag     12/17/2024 1107 sodium chloride 0.9 % bolus 1,000 mL 1,000 mL Intravenous New Bag     12/17/2024 1237 sodium chloride 0.9 % infusion 100 mL/hr Intravenous New Bag     12/17/2024 1304 acetaminophen (TYLENOL) tablet 650 mg 650 mg Oral Given     12/17/2024 1903 acetaminophen (TYLENOL) tablet 650 mg 650 mg Oral Given     12/17/2024 1237 vancomycin (VANCOCIN) 1,750 mg in sodium chloride 0.9 % 500 mL IVPB 1,750 mg Intravenous New Bag            Scheduled Medications:  azithromycin, 500 mg, Oral, Q24H  cefepime, 1,000 mg, Intravenous, Q12H      Continuous IV Infusions:  sodium chloride, 100 mL/hr, Intravenous, Continuous      PRN Meds:  acetaminophen, 650 mg, Oral, Q6H PRN  benzonatate, 100 mg, Oral, TID PRN  vancomycin, 12.5 mg/kg (Adjusted), Intravenous, Daily PRN      ED Triage Vitals   Temperature Pulse Respirations Blood Pressure SpO2 Pain Score   12/17/24 0852 12/17/24 0852 12/17/24 0852 12/17/24 0852 12/17/24 0852  12/17/24 1304   99 °F (37.2 °C) 100 18 108/58 93 % Med Not Given for Pain - for MAR use only     Weight (last 2 days)       Date/Time Weight    12/17/24 22:08:51 86.2 (190.04)            Vital Signs (last 3 days)       Date/Time Temp Pulse Resp BP MAP (mmHg) SpO2 Calculated FIO2 (%) - Nasal Cannula Nasal Cannula O2 Flow Rate (L/min) O2 Device Patient Position - Orthostatic VS Pain    12/18/24 07:12:32 100.1 °F (37.8 °C) 100 14 118/74 89 93 % -- -- -- -- --    12/18/24 05:05:37 99.4 °F (37.4 °C) 95 -- -- -- 93 % -- -- -- -- --    12/18/24 02:03:17 101 °F (38.3 °C) 98 -- -- -- 90 % -- -- -- -- --    12/18/24 0117 -- -- -- -- -- -- -- -- -- -- Med Not Given for Pain - for MAR use only    12/18/24 01:01:17 101.3 °F (38.5 °C) 104 -- -- -- 94 % -- -- -- -- --    12/17/24 2328 -- -- -- -- -- -- 36 4 L/min Nasal cannula -- No Pain    12/17/24 22:08:51 99.5 °F (37.5 °C) 97 20 122/69 87 92 % 36 4 L/min Nasal cannula Lying No Pain    12/17/24 21:09:02 102.4 °F (39.1 °C) 100 18 106/64 78 91 % -- -- -- -- --    12/17/24 1930 -- 105 20 131/62 89 91 % 28 2 L/min Nasal cannula Lying --    12/17/24 1830 100.1 °F (37.8 °C) 107 22 137/67 96 90 % -- -- Nasal cannula Lying --    12/17/24 1800 -- 103 22 124/59 85 92 % -- -- Nasal cannula Lying --    12/17/24 1700 -- 97 22 142/71 100 94 % -- -- Nasal cannula Lying --    12/17/24 1600 -- 92 16 119/59 -- 92 % -- -- Nasal cannula Lying --    12/17/24 1403 100.1 °F (37.8 °C) -- -- -- -- -- -- -- -- -- --    12/17/24 1330 -- 102 -- 108/53 77 90 % -- -- Nasal cannula Lying --    12/17/24 1304 -- -- -- -- -- -- -- -- -- -- Med Not Given for Pain - for MAR use only    12/17/24 1245 102.5 °F (39.2 °C) 109 20 130/75 95 93 % 28 2 L/min Nasal cannula Lying --    12/17/24 0939 -- -- -- -- -- 95 % -- -- Nasal cannula -- --    12/17/24 0915 -- -- -- -- -- 88 % -- -- None (Room air) -- --    12/17/24 0852 99 °F (37.2 °C) 100 18 108/58 -- 93 % -- -- None (Room air) Sitting --              Pertinent  Labs/Diagnostic Test Results:   Radiology:  XR chest portable - 1 view   Final Interpretation by Carmelo Hyman MD (12/17 1039)      Left lower lung pneumonia.            Workstation performed: [id number]           Cardiology:  ECG 12 lead   Final Result by Martine Landry MD (12/17 1657)   Sinus rhythm with short RI   Minimal voltage criteria for LVH, may be normal variant   Borderline ECG   When compared with ECG of 02-Nov-2024 09:04,   RI interval has decreased   Confirmed by Martine Landry ([id number]) on 12/17/2024 4:57:16 PM        GI:  No orders to display           Results from last 7 days   Lab Units 12/18/24  0509 12/17/24  0931   WBC Thousand/uL 14.26* 21.08*   HEMOGLOBIN g/dL 7.1* 8.0*   HEMATOCRIT % 21.7* 24.5*   PLATELETS Thousands/uL 46* 49*   BANDS PCT %  --  8         Results from last 7 days   Lab Units 12/18/24  0509 12/17/24  0931   SODIUM mmol/L 137 135   POTASSIUM mmol/L 3.4* 3.5   CHLORIDE mmol/L 109* 105   CO2 mmol/L 16* 19*   ANION GAP mmol/L 12 11   BUN mg/dL 61* 69*   CREATININE mg/dL 2.79* 3.67*   EGFR ml/min/1.73sq m 22 15   CALCIUM mg/dL 7.9* 8.7     Results from last 7 days   Lab Units 12/17/24  0931   AST U/L 25   ALT U/L 21   ALK PHOS U/L 96   TOTAL PROTEIN g/dL 6.4   ALBUMIN g/dL 2.9*   TOTAL BILIRUBIN mg/dL 0.65         Results from last 7 days   Lab Units 12/18/24  0509 12/17/24  0931   GLUCOSE RANDOM mg/dL 108 126       Results from last 7 days   Lab Units 12/17/24  0931   PROTIME seconds 18.0*   INR  1.46*   PTT seconds 37*         Results from last 7 days   Lab Units 12/18/24  0509 12/17/24  0931   PROCALCITONIN ng/ml 100.11* 114.23*     Results from last 7 days   Lab Units 12/17/24  0931   LACTIC ACID mmol/L 1.1     Results from last 7 days   Lab Units 12/17/24  1247   CLARITY UA  Clear   COLOR UA  Yellow   SPEC GRAV UA  1.020   PH UA  5.5   GLUCOSE UA mg/dl Negative   KETONES UA mg/dl Negative   BLOOD UA  Moderate*   PROTEIN UA mg/dl 100 (2+)*   NITRITE UA  Negative   BILIRUBIN  UA  Negative   UROBILINOGEN UA (BE) mg/dl <2.0   LEUKOCYTES UA  Negative   WBC UA /hpf 2-4*   RBC UA /hpf 2-4*   BACTERIA UA /hpf Occasional   EPITHELIAL CELLS WET PREP /hpf Occasional   MUCUS THREADS  Occasional*     Results from last 7 days   Lab Units 12/17/24  1248   STREP PNEUMONIAE ANTIGEN, URINE  Negative   LEGIONELLA URINARY ANTIGEN  Positive*       Results from last 7 days   Lab Units 12/17/24  0931   BLOOD CULTURE  Received in Microbiology Lab. Culture in Progress.  Received in Microbiology Lab. Culture in Progress.       Results from last 7 days   Lab Units 12/18/24  0509   VANCOMYCIN RM ug/mL 13.4       Past Medical History:   Diagnosis Date    Hypertension     Multiple myeloma ([facility])     Polio      Present on Admission:   Essential hypertension   Multiple myeloma not having achieved remission ([facility])      Admitting Diagnosis: Pneumonia [J18.9]  MARCI (acute kidney injury) ([facility]) [N17.9]  Sepsis ([facility]) [A41.9]  Age/Sex: 69 y.o. male    Network Utilization Review Department  ATTENTION: Please call with any questions or concerns to [phone] and carefully listen to the prompts so that you are directed to the right person. All voicemails are confidential.   For Discharge needs, contact Care Management DC Support Team at [phone] opt. [phone]  Send all requests for admission clinical reviews, approved or denied determinations and any other requests to dedicated fax number below belonging to the campus where the patient is receiving treatment. List of dedicated fax numbers for the Facilities:  FACILITY NAME UR FAX NUMBER   ADMISSION DENIALS (Administrative/Medical Necessity) [phone]   DISCHARGE SUPPORT TEAM (NETWORK) [phone]   PARENT CHILD HEALTH (Maternity/NICU/Pediatrics) [phone]   [facility] [phone]   [facility] [phone]   [facility] [phone]   [facility] [phone]   [facility]  [facility] [phone]   [facility] [phone]   [facility] [phone]   [facility] [phone]   [facility] [phone]   Critical access hospital [phone]   [facility] [phone]   [facility] [phone]

## 2024-12-18 NOTE — ASSESSMENT & PLAN NOTE
Baseline Cr. 1.1-1.3.  Likely multifactorial due to sepsis, poor PO intake.  Improving.    Follow creatinine closely and dose-adjust antibiotics as indicated  Recheck BMP in AM

## 2024-12-18 NOTE — ASSESSMENT & PLAN NOTE
Unclear risk factor/exposure.  While antigen has high specificity, consider other respiratory pathogens    Change antibiotics to levofloxacin to also cover other respiratory pathogens  Follow respiratory status closely

## 2024-12-18 NOTE — ASSESSMENT & PLAN NOTE
Productive cough for 4 days, intermittent minimal hemoptysis per patient, most likely secondary to pneumonia  Collect sputum to quantify hemoptysis

## 2024-12-18 NOTE — QUICK NOTE
Legionella urine antigen came back positive  Start azithromycin 500 mg daily, Qtc 408  Consult infectious disease

## 2024-12-19 LAB
ANION GAP SERPL CALCULATED.3IONS-SCNC: 9 MMOL/L (ref 4–13)
BUN SERPL-MCNC: 49 MG/DL (ref 5–25)
CALCIUM SERPL-MCNC: 8.1 MG/DL (ref 8.4–10.2)
CHLORIDE SERPL-SCNC: 115 MMOL/L (ref 96–108)
CO2 SERPL-SCNC: 16 MMOL/L (ref 21–32)
CREAT SERPL-MCNC: 2.05 MG/DL (ref 0.6–1.3)
ERYTHROCYTE [DISTWIDTH] IN BLOOD BY AUTOMATED COUNT: 16.3 % (ref 11.6–15.1)
GFR SERPL CREATININE-BSD FRML MDRD: 32 ML/MIN/1.73SQ M
GLUCOSE SERPL-MCNC: 127 MG/DL (ref 65–140)
HCT VFR BLD AUTO: 23.1 % (ref 36.5–49.3)
HGB BLD-MCNC: 7.4 G/DL (ref 12–17)
MCH RBC QN AUTO: 29.5 PG (ref 26.8–34.3)
MCHC RBC AUTO-ENTMCNC: 32 G/DL (ref 31.4–37.4)
MCV RBC AUTO: 92 FL (ref 82–98)
NRBC BLD AUTO-RTO: 0 /100 WBCS
PLATELET # BLD AUTO: 69 THOUSANDS/UL (ref 149–390)
POTASSIUM SERPL-SCNC: 3.6 MMOL/L (ref 3.5–5.3)
RBC # BLD AUTO: 2.51 MILLION/UL (ref 3.88–5.62)
SODIUM SERPL-SCNC: 140 MMOL/L (ref 135–147)
WBC # BLD AUTO: 9.74 THOUSAND/UL (ref 4.31–10.16)

## 2024-12-19 PROCEDURE — 87106 FUNGI IDENTIFICATION YEAST: CPT | Performed by: INTERNAL MEDICINE

## 2024-12-19 PROCEDURE — 99232 SBSQ HOSP IP/OBS MODERATE 35: CPT | Performed by: INTERNAL MEDICINE

## 2024-12-19 PROCEDURE — 85027 COMPLETE CBC AUTOMATED: CPT

## 2024-12-19 PROCEDURE — 80048 BASIC METABOLIC PNL TOTAL CA: CPT

## 2024-12-19 PROCEDURE — 99232 SBSQ HOSP IP/OBS MODERATE 35: CPT

## 2024-12-19 PROCEDURE — 87070 CULTURE OTHR SPECIMN AEROBIC: CPT | Performed by: INTERNAL MEDICINE

## 2024-12-19 PROCEDURE — 97163 PT EVAL HIGH COMPLEX 45 MIN: CPT

## 2024-12-19 PROCEDURE — 87205 SMEAR GRAM STAIN: CPT | Performed by: INTERNAL MEDICINE

## 2024-12-19 PROCEDURE — 97535 SELF CARE MNGMENT TRAINING: CPT

## 2024-12-19 RX ORDER — AMLODIPINE BESYLATE 5 MG/1
5 TABLET ORAL DAILY
Status: DISCONTINUED | OUTPATIENT
Start: 2024-12-20 | End: 2024-12-27

## 2024-12-19 RX ORDER — POTASSIUM CHLORIDE 1500 MG/1
40 TABLET, EXTENDED RELEASE ORAL ONCE
Status: COMPLETED | OUTPATIENT
Start: 2024-12-19 | End: 2024-12-19

## 2024-12-19 RX ADMIN — BENZONATATE 100 MG: 100 CAPSULE ORAL at 01:28

## 2024-12-19 RX ADMIN — SODIUM CHLORIDE 100 ML/HR: 0.9 INJECTION, SOLUTION INTRAVENOUS at 06:28

## 2024-12-19 RX ADMIN — POTASSIUM CHLORIDE 40 MEQ: 1500 TABLET, EXTENDED RELEASE ORAL at 08:51

## 2024-12-19 RX ADMIN — SODIUM CHLORIDE 100 ML/HR: 0.9 INJECTION, SOLUTION INTRAVENOUS at 16:46

## 2024-12-19 NOTE — PLAN OF CARE
Problem: PHYSICAL THERAPY ADULT  Goal: Performs mobility at highest level of function for planned discharge setting.  See evaluation for individualized goals.  Description: Treatment/Interventions: Functional transfer training, Therapeutic exercise, Elevations, Bed mobility, Gait training, Spoke to nursing, Spoke to case management, OT  Equipment Recommended: Walker       See flowsheet documentation for full assessment, interventions and recommendations.  Note: Prognosis: Good  Problem List: Decreased strength, Decreased endurance, Impaired balance, Decreased mobility  Assessment: Pt is a 69 y.o. male seen for PT evaluation s/p admit to [facility] on 12/17/2024. Pt was admitted with a primary dx of: Legionella pneumonia, Hemoptysis, Anemia, Thrombocytopenia, Acute respiratory failure with hypoxia, MARCI on CKD, Sepsis.Pt has a past medical history of Hypertension, Multiple myeloma (HCC), and Polio.   PT now consulted for assessment of mobility and d/c needs. Pts current clinical presentation is Unstable/ Unpredictable (high complexity) due to Ongoing medical management for primary dx, Decreased activity tolerance compared to baseline, Fall risk, Increased assistance needed from caregiver at current time, Trending lab values. Prior to admission, pt was Ind for mobility and ADLs, crutches used when not having LE brace on.Pt lives with supportive family , who work part of the day. Upon evaluation, pt currently is requiring min assist fro bed mobility , then completing transfers to recliner with assist X 2 with HHA.  Pt presents at PT eval functioning below baseline and currently w/ overall mobility deficits 2* to: RLE weakness, impaired balance, decreased endurance, decreased activity tolerance compared to baseline, decreased functional mobility tolerance compared to baseline, fall risk. Pt currently at a fall risk 2* to impairments listed above.  Pt will continue to benefit from skilled acute PT  interventions to address stated impairments; to maximize functional mobility; for ongoing pt/ family training; and DME needs. At conclusion of PT session chair alarm engaged, all needs in reach, RN notified of session findings/recommendations, and pt returned back in recliner chair with phone and call bell within reach. Pt denies any further questions at this time. The patient's AM-PAC Basic Mobility Inpatient Short Form Raw Score is 11. A Raw score of less than or equal to 16 suggests the patient may benefit from discharge to post-acute rehabilitation services. Please also refer to the recommendation of the Physical Therapist for safe discharge planning. Recommend Level II upon hospital D/C.  Barriers to Discharge: Decreased caregiver support, Inaccessible home environment     Rehab Resource Intensity Level, PT: I (Maximum Resource Intensity)    See flowsheet documentation for full assessment.

## 2024-12-19 NOTE — ASSESSMENT & PLAN NOTE
Blood Pressure: 119/73    Home regimen amlodipine 10 mg daily, chlorthalidone 25 mg daily, doxazosin 2 mg daily and irbesartan 300 mg daily  Hold antihypertensive in the setting of sepsis  Monitor blood pressure

## 2024-12-19 NOTE — ASSESSMENT & PLAN NOTE
Unclear risk factor/exposure.  While antigen has high specificity, consider other respiratory pathogens    Continue levofloxacin, likely for 10 days total  Follow respiratory status closely

## 2024-12-19 NOTE — ASSESSMENT & PLAN NOTE
Patient is a 69-year-old male with a past medical history of multiple myeloma currently undergoing chemo, hypertension who presented to the hospital with productive cough and poor oral intake for 4 days, developed fever day of admission  Met sepsis criteria with , Tmax 102.9, RR 20 on admission  Chest x-ray showed left lower lung pneumonia  COVID/flu/RSV negative  WBC 21, procalcitonin 114, lactic acid normal  In ER, received IV fluids, blood cultures drawn, received IV cefepime and vanc    Plan:  Tx as below - on fluids for overnight and abx as under legionella

## 2024-12-19 NOTE — PHYSICAL THERAPY NOTE
Physical Therapy Evaluation     Patient's Name: Zakc Partida    Admitting Diagnosis  Pneumonia [J18.9]  MARCI (acute kidney injury) ([facility]) [N17.9]  Sepsis ([facility]) [A41.9]    Problem List  Patient Active Problem List   Diagnosis    Muscle weakness of extremity following poliomyelitis    Prediabetes    Obesity    Essential hypertension    History of poliomyelitis    Unspecified acquired deformity of unspecified lower leg    Stage 3 chronic kidney disease (HCC)    History of colonic polyps    Nephrolithiasis    Difficulty walking    Smoldering myeloma    IgG lambda monoclonal gammopathy    Multiple myeloma not having achieved remission (HCC)    Sepsis (HCC)    Acute kidney injury superimposed on CKD  (HCC)    Acute respiratory failure with hypoxia (HCC)    Anemia and thrombocytopenia    Hemoptysis    Legionella pneumonia (HCC)       Past Medical History  Past Medical History:   Diagnosis Date    Hypertension     Multiple myeloma (HCC)     Polio        Past Surgical History  Past Surgical History:   Procedure Laterality Date    IR BIOPSY BONE MARROW  6/11/2024    IR BIOPSY BONE MARROW  10/4/2024    IR BIOPSY KIDNEY RANDOM  5/29/2024    LEG SURGERY      for polio          12/19/24 0944   PT Last Visit   PT Visit Date 12/19/24   Note Type   Note type Evaluation   Pain Assessment   Pain Assessment Tool 0-10   Pain Score No Pain   Restrictions/Precautions   Weight Bearing Precautions Per Order No   Braces or Orthoses Other (Comment)  (RLE KAFO brace 2* h/o poliomyelitis)   Other Precautions Chair Alarm;Bed Alarm;O2;Multiple lines;Fall Risk  (4LO2. Primarily Kyrgyz speaking [facility]  services used)   Home Living   Type of Home House   Home Layout Two level;Bed/bath upstairs;1/2 bath on main level;Stairs to enter with rails   Bathroom Shower/Tub Tub/shower unit  (+ walk in shower)   Bathroom Toilet Standard   Bathroom Equipment Grab bars in shower   Home Equipment Crutches  (Crutches used when not using brace)  "  Additional Comments 2STH with bed/bath 2nd floor   Prior Function   Level of [place] Independent with ADLs;Independent with functional mobility;Needs assistance with IADLS   Lives With Daughter  (+Grandson)   Receives Help From Family   IADLs Family/Friend/Other provides transportation;Family/Friend/Other provides meals;Family/Friend/Other provides medication management   Falls in the last 6 months 1 to 4   Vocational Full time employment   Comments Lives with Dtr and grandson who assist PRN. They go to work and school.   General   Family/Caregiver Present No   Cognition   Overall Cognitive Status WFL   Arousal/Participation Alert   Attention Attends with cues to redirect   Orientation Level Oriented X4   Following Commands Follows one step commands without difficulty   Comments Pt cooperative and very pleasant during session.   Subjective   Subjective \"My breathing feels agitated\"   RLE Assessment   RLE Assessment X  (residual weakness 2* h/o polio , has KAFO brace. Pt states does not use brace at home,uses for outside activity)   LLE Assessment   LLE Assessment WFL   Bed Mobility   Supine to Sit 4  Minimal assistance   Additional items Assist x 1;HOB elevated;Bedrails;Increased time required;Verbal cues;LE management   Sit to Supine Unable to assess   Additional Comments Pt in bed upona rrival.   Transfers   Sit to Stand 3  Moderate assistance   Additional items Assist x 2;Increased time required;Verbal cues   Stand to Sit 3  Moderate assistance   Additional items Assist x 2;Increased time required;Verbal cues   Stand pivot 3  Moderate assistance   Additional items Assist x 2;Increased time required;Verbal cues   Additional Comments erasmo HHA for pivot transfer from bed to chair, 1-2 shuffling steps with L LE, pt declines to have brace on at this time. FARIA noted, with SPO2 at 87% on 4L O2, recovers to 92% with rest and breathing technqiues   Balance   Static Sitting Fair +   Dynamic Sitting Fair   Static " Standing Poor +   Dynamic Standing Poor +   Activity Tolerance   Activity Tolerance Patient limited by fatigue  (FARIA)   Medical Staff Made Aware Co-eval with OT 2* medical complexity and multiple co morbidities   Nurse Made Aware RN cleared pt for therapy   Assessment   Prognosis Good   Problem List Decreased strength;Decreased endurance;Impaired balance;Decreased mobility   Assessment Pt is a 69 y.o. male seen for PT evaluation s/p admit to [facility] on 12/17/2024. Pt was admitted with a primary dx of: Legionella pneumonia, Hemoptysis, Anemia, Thrombocytopenia, Acute respiratory failure with hypoxia, MARCI on CKD, Sepsis.Pt has a past medical history of Hypertension, Multiple myeloma (HCC), and Polio.   PT now consulted for assessment of mobility and d/c needs. Pts current clinical presentation is Unstable/ Unpredictable (high complexity) due to Ongoing medical management for primary dx, Decreased activity tolerance compared to baseline, Fall risk, Increased assistance needed from caregiver at current time, Trending lab values. Prior to admission, pt was Ind for mobility and ADLs, crutches used when not having LE brace on.Pt lives with supportive family , who work part of the day. Upon evaluation, pt currently is requiring min assist fro bed mobility , then completing transfers to recliner with assist X 2 with HHA.  Pt presents at PT eval functioning below baseline and currently w/ overall mobility deficits 2* to: RLE weakness, impaired balance, decreased endurance, decreased activity tolerance compared to baseline, decreased functional mobility tolerance compared to baseline, fall risk. Pt currently at a fall risk 2* to impairments listed above.  Pt will continue to benefit from skilled acute PT interventions to address stated impairments; to maximize functional mobility; for ongoing pt/ family training; and DME needs. At conclusion of PT session chair alarm engaged, all needs in reach, RN notified of  session findings/recommendations, and pt returned back in recliner chair with phone and call bell within reach. Pt denies any further questions at this time. The patient's [facility] Basic Mobility Inpatient Short Form Raw Score is 11. A Raw score of less than or equal to 16 suggests the patient may benefit from discharge to post-acute rehabilitation services. Please also refer to the recommendation of the Physical Therapist for safe discharge planning. Recommend Level I upon hospital D/C.   Barriers to Discharge Decreased caregiver support;Inaccessible home environment   Goals   Patient Goals to feel better   STG Expiration Date 01/02/25   Short Term Goal #1 STG 1. Pt will be able to perform bed mobility tasks with Sup in order to improve overall functional mobility and assist in safe d/c. STG 2. Pt with sit EOB for at least 25 minutes at Sup level in order to strengthen abdominal musculature and assist in future transfers/ ambulation. STG 3. Pt will be able to perform functional transfer with Min a in order to improve overall functional mobility and assist in safe d/c. STG 4. Pt will be able to ambulate at least 75 feet with least restrictive device with mIN A in order to improve overall functional mobility and assist in safe d/c. STG 5. Pt will improve sitting/standing static/dynamic balance 1/2 grade in order to improve functional mobility and assist in safe d/c. STG 6. Pt will improve LE strength by 1/2 grade in order to improve functional mobility and assist in safe d/c.   PT Treatment Day 0   Plan   Treatment/Interventions Functional transfer training;Therapeutic exercise;Elevations;Bed mobility;Gait training;Spoke to nursing;Spoke to case management;OT   PT Frequency 3-5x/wk   Discharge Recommendation   Rehab Resource Intensity Level, PT I (Maximum Resource Intensity)   Equipment Recommended Walker   [facility] Basic Mobility Inpatient   Turning in Flat Bed Without Bedrails 3   Lying on Back to Sitting on Edge of  Flat Bed Without Bedrails 2   Moving Bed to Chair 2   Standing Up From Chair Using Arms 2   Walk in Room 1   Climb 3-5 Stairs With Railing 1   Basic Mobility Inpatient Raw Score 11   Basic Mobility Standardized Score 30.25   [facility] Highest Level Of Mobility   -[facility] Goal 4: Move to chair/commode   -HL Achieved 4: Move to chair/commode   Modified Jocelyn Scale   Modified Jocelyn Scale 4   End of Consult   Patient Position at End of Consult All needs within reach;Bed/Chair alarm activated;Bedside chair         Kassie Burgess, PT DPT

## 2024-12-19 NOTE — ASSESSMENT & PLAN NOTE
Lab Results   Component Value Date    EGFR 22 12/18/2024    EGFR 15 12/17/2024    EGFR 49 12/10/2024    CREATININE 2.79 (H) 12/18/2024    CREATININE 3.67 (H) 12/17/2024    CREATININE 1.44 (H) 12/10/2024   Recent baseline creatinine from 1.5-1.9  Creatinine today 3.67  Suspect prerenal due to poor oral intake  Received 1 L of IV fluids, continue sodium chloride and 100 cc/h  Bladder scan  Avoid nephrotoxins  Recheck labs tomorrow

## 2024-12-19 NOTE — OCCUPATIONAL THERAPY NOTE
Occupational Therapy Evaluation     Patient Name: Zack Partida  Today's Date: 12/19/2024  Problem List  Principal Problem:    Legionella pneumonia (HCC)  Active Problems:    Essential hypertension    Multiple myeloma not having achieved remission (HCC)    Sepsis (HCC)    Acute kidney injury superimposed on CKD  (HCC)    Acute respiratory failure with hypoxia (HCC)    Anemia and thrombocytopenia    Hemoptysis    Past Medical History  Past Medical History:   Diagnosis Date    Hypertension     Multiple myeloma (HCC)     Polio      Past Surgical History  Past Surgical History:   Procedure Laterality Date    IR BIOPSY BONE MARROW  6/11/2024    IR BIOPSY BONE MARROW  10/4/2024    IR BIOPSY KIDNEY RANDOM  5/29/2024    LEG SURGERY      for polio             12/19/24 0945   OT Last Visit   OT Visit Date 12/19/24   Note Type   Note type Evaluation   Pain Assessment   Pain Assessment Tool 0-10   Pain Score No Pain   Restrictions/Precautions   Weight Bearing Precautions Per Order No   Braces or Orthoses Other (Comment)  (RLE KFO brace 2/2 h/o poliomyelitis)   Other Precautions Chair Alarm;Bed Alarm;Multiple lines;O2;Fall Risk  (on 4 L O2, RLE brace, not worn in session. Primarily Wallisian speaking, [employer]  services utilized.)   Home Living   Type of Home House   Home Layout Two level;Bed/bath upstairs;1/2 bath on main level;Stairs to enter with rails  (+SHAILESH)   Bathroom Shower/Tub Tub/shower unit  (+walkin shower)   Bathroom Toilet Standard   Bathroom Equipment Grab bars in shower   Home Equipment Crutches  (crutches used when not using RLE brace)   Additional Comments 2SH, +SHAILESH, bed/bath upstairs. No DME used normally but uses crutches when brace is not worn.   Prior Function   Level of [place] Independent with ADLs;Independent with functional mobility;Needs assistance with IADLS   Lives With Daughter;Other (Comment)  (Dtr + grandson)   Receives Help From Family   IADLs Family/Friend/Other provides  "transportation;Family/Friend/Other provides meals;Family/Friend/Other provides medication management   Falls in the last 6 months 1 to 4  (reports 1 fall outside in the rain)   Vocational Full time employment   Comments Lives with Dtr and grandson who assist PRN. Pt reports they work and go to school and are not home all the time.   Lifestyle   Autonomy IND PTA with all ADLs, assist PRN for IADLs. No DME used at baseline, crutches used if not wearing RLE brace.   Reciprocal Relationships Lives with supportive Dtr and grandson, provide assist PRN   Service to Others Works FT from 3pm to 2am.   Intrinsic Gratification Spending time with family   General   Family/Caregiver Present No   Additional General Comments Pt is primarily Vietnamese speaking, [employer]  services used.   Subjective   Subjective \"I would rather not go through the trouble\"   ADL   Where Assessed Edge of bed   Eating Assistance 5  Supervision/Setup   Grooming Assistance 5  Supervision/Setup   UB Bathing Assistance 5  Supervision/Setup   LB Bathing Assistance 4  Minimal Assistance   UB Dressing Assistance 5  Supervision/Setup   LB Dressing Assistance 4  Minimal Assistance   Toileting Assistance  4  Minimal Assistance   Bed Mobility   Supine to Sit 4  Minimal assistance   Additional items Assist x 1;HOB elevated;Bedrails;Increased time required;Verbal cues;LE management   Sit to Supine Unable to assess   Additional Comments Pt OOB in recliner post session, all needs met, call bell within reach. +chair alarm on   Transfers   Sit to Stand 3  Moderate assistance   Additional items Assist x 2;Increased time required;Verbal cues   Stand to Sit 3  Moderate assistance   Additional items Assist x 2;Increased time required;Verbal cues   Stand pivot 3  Moderate assistance   Additional items Assist x 2;Increased time required;Verbal cues   Additional Comments b/l HHA. Pt noted to be very fatigued with minimal activity. O2 sats dropped to 87% lowest on 4L " "NC. Required assist for shuffles over to chair.   Functional Mobility   Additional Comments shuffles over to chair with mod Ax2, deferred wearing RLE brace 2/2 hassle and comfort.   Balance   Static Sitting Fair +   Dynamic Sitting Fair -   Static Standing Poor +   Dynamic Standing Poor +   Activity Tolerance   Activity Tolerance Patient limited by fatigue;Other (Comment)  (SOB with small amounts of activity)   Medical Staff Made Aware Co-eval w PT 2/2 increased medical complexity and comorbidities.   Nurse Made Aware RN cleared for therapy   RUE Assessment   RUE Assessment WFL   LUE Assessment   LUE Assessment WFL   Hand Function   Gross Motor Coordination Functional   Fine Motor Coordination Functional   Cognition   Overall Cognitive Status WFL   Arousal/Participation Alert;Cooperative   Attention Within functional limits   Orientation Level Oriented X4   Memory Within functional limits   Following Commands Follows one step commands without difficulty   Comments Pt very pleasant and cooperative. Motivated to participate, however limited by SOB and dyspnea on exertion. Pt reports breathing feels \"agitated\"   Assessment   Limitation Decreased ADL status;Decreased Safe judgement during ADL;Decreased endurance   Prognosis Fair   Assessment 69 year old pt seen today for an OT evaluation following admission to [facility] 2/2 legionella pneumonia with present symptoms significant for SOB, fatigue, weakness, decreased ADL status, decreased functional mobility. Pt  has a past medical history of Hypertension, Multiple myeloma (HCC), and Polio. Pt reported living with supportive Dtr and grandson in a 2SH, bed/bath upstairs with +SHAILESH. Pt reports being IND with ADLs PTA with assist PRN for IADLs. No DME used normally but does use crutches when not wearing RLE brace. Works FT. Pt reports that he has had weakness and SOB ~10 days and often gets shaky while walking. Pt very pleasant and cooperative t/o session. " Pt completed functional bed mobility with min Ax1. Mod Ax2 for functional STS txfs and SPT to chair with RW. Pt was SUP for UB ADLs and Min A for LB ADLs. The patient's raw score on the AM-PAC Daily Activity Inpatient Short Form is 18. A raw score of less than 19 suggests the patient may benefit from discharge to post-acute rehabilitation services. Please refer to the recommendation of the Occupational Therapist for safe discharge planning.  Pt is functioning below baseline level of function and will continue to benefit from skilled acute OT to promote increased independence and return to PLOF. At this time, current OT recommendation is Level 2 resources pending progress and level of support/care at home. Will continue to follow and assess.   Goals   Patient Goals to feel better   LTG Time Frame 10-14   Long Term Goal #1 See below for goals   Plan   Treatment Interventions ADL retraining;Functional transfer training;UE strengthening/ROM;Endurance training;Patient/family training;Cognitive reorientation;Equipment evaluation/education;Fine motor coordination activities;Compensatory technique education;Continued evaluation;Energy conservation;Activityengagement   Goal Expiration Date 01/02/25   OT Frequency 2-3x/wk   Discharge Recommendation   Rehab Resource Intensity Level, OT II (Moderate Resource Intensity)  (pending progress and level of support/assist at home)   AM-PAC Daily Activity Inpatient   Lower Body Dressing 2   Bathing 3   Toileting 3   Upper Body Dressing 3   Grooming 3   Eating 4   Daily Activity Raw Score 18   Daily Activity Standardized Score (Calc for Raw Score >=11) 38.66   AM-PAC Applied Cognition Inpatient   Following a Speech/Presentation 4   Understanding Ordinary Conversation 4   Taking Medications 4   Remembering Where Things Are Placed or Put Away 4   Remembering List of 4-5 Errands 4   Taking Care of Complicated Tasks 4   Applied Cognition Raw Score 24   Applied Cognition Standardized Score  62.21   End of Consult   Education Provided Yes   Patient Position at End of Consult Bedside chair;Bed/Chair alarm activated;All needs within reach   Nurse Communication Nurse aware of consult         Occupational Therapy Goals    Pt will be Mod I with bed mobility with good sitting balance/tolerance to engage in self care tasks within this plan of care.      Pt will be Mod I for UB dressing and bathing with use of assistive devices PRN within this plan of care.     Pt will be Mod I for LB dressing and bathing with use of assistive devices PRN within this plan of care.     Pt will demonstrate standing tolerance of 2-3 minutes increase independence for toileting ADLs/tasks with use of assistive devices PRN within this plan of care.     Pt will complete functional transfers with mod I, with use of appropriate assistive devices within this plan of care.     Pt will demonstrate good carryover of safety awareness with use of appropriate assistive devices during functional tasks within this plan of care.     Pt will demonstrate increased activity tolerance to 30 mins for participation in ADLs and functional tasks within this plan of care.     Pt will complete further functional cognitive assessment with good attention/participation to assist with safe d/c planning recommendations.       Vishal Chinchilla, MS, OTR/L     [facility] CERTIFIED RATER ID [id number]

## 2024-12-19 NOTE — PROGRESS NOTES
Progress Note - Infectious Disease   Name: Zack Partida 69 y.o. male I MRN: [id number]  [facility] Date of Admission: 12/17/2024   Date of Service: 12/19/2024 I Hospital Day: 2    Assessment & Plan  Sepsis (HCC)  Fever, HR.  Due to pneumonia.  No other appreciable source.  Flu/RSV/COVID antigen, blood cultures negative.  Improving with antibiotics.    Continue antibiotics as below  Follow temperatures closely  Recheck WBC in AM to monitor infection  Supportive care as per the primary service  Legionella pneumonia (HCC)  Unclear risk factor/exposure.  While antigen has high specificity, consider other respiratory pathogens    Continue levofloxacin, likely for 10 days total  Follow respiratory status closely  Acute respiratory failure with hypoxia ([facility])  Due to pneumonia.    Continue antibiotics as above  Follow respiratory status closely  Wean O2 as tolerated  Multiple myeloma not having achieved remission ([facility])  Currently on Daratumumab-VRd since November 2024.  Being evaluated for possible SCT.  Acute kidney injury superimposed on CKD  ([facility])  Baseline Cr. 1.1-1.3.  Likely multifactorial due to sepsis, poor PO intake.  Improving.    Follow creatinine closely and dose-adjust antibiotics as indicated  Recheck BMP in AM  Anemia and thrombocytopenia  Due to MM        Antibiotics:  Levofloxacin #2    Subjective   Patient feels a bit better.  Appetite improved.  Still with a lot of cough.  Remains on 4L.  No N/V/D.    Objective :  Temp:  [98.1 °F (36.7 °C)-101.3 °F (38.5 °C)] 98.1 °F (36.7 °C)  HR:  [] 91  BP: (119-136)/(64-77) 136/77  Resp:  [17-18] 18  SpO2:  [89 %-93 %] 92 %  O2 Device: Nasal cannula  Nasal Cannula O2 Flow Rate (L/min):  [4 L/min] 4 L/min    General:  No acute distress  Psychiatric:  Awake and alert  Pulmonary:  Normal respiratory excursion without accessory muscle use  Abdomen:  Soft, nontender  Extremities:  No edema  Skin:  No rashes      Lab Results: I have reviewed the  following results:  Results from last 7 days   Lab Units 12/19/24  0551 12/18/24  0509 12/17/24  0931   WBC Thousand/uL 9.74 14.26* 21.08*   HEMOGLOBIN g/dL 7.4* 7.1* 8.0*   PLATELETS Thousands/uL 69* 46* 49*     Results from last 7 days   Lab Units 12/19/24  0551 12/18/24  0509 12/17/24  0931   SODIUM mmol/L 140 137 135   POTASSIUM mmol/L 3.6 3.4* 3.5   CHLORIDE mmol/L 115* 109* 105   CO2 mmol/L 16* 16* 19*   BUN mg/dL 49* 61* 69*   CREATININE mg/dL 2.05* 2.79* 3.67*   EGFR ml/min/1.73sq m 32 22 15   CALCIUM mg/dL 8.1* 7.9* 8.7   AST U/L  --   --  25   ALT U/L  --   --  21   ALK PHOS U/L  --   --  96   ALBUMIN g/dL  --   --  2.9*     Results from last 7 days   Lab Units 12/17/24  1248 12/17/24  1236 12/17/24  0931   BLOOD CULTURE   --   --  No Growth at 24 hrs.  No Growth at 24 hrs.   MRSA CULTURE ONLY   --  No Methicillin Resistant Staphlyococcus aureus (MRSA) isolated  --    LEGIONELLA URINARY ANTIGEN  Positive*  --   --      Results from last 7 days   Lab Units 12/18/24  0509 12/17/24  0931   PROCALCITONIN ng/ml 100.11* 114.23*

## 2024-12-19 NOTE — ASSESSMENT & PLAN NOTE
Due to pneumonia.    Continue antibiotics as above  Follow respiratory status closely  Wean O2 as tolerated

## 2024-12-19 NOTE — PLAN OF CARE
Problem: OCCUPATIONAL THERAPY ADULT  Goal: Performs self-care activities at highest level of function for planned discharge setting.  See evaluation for individualized goals.  Description: Treatment Interventions: ADL retraining, Functional transfer training, UE strengthening/ROM, Endurance training, Patient/family training, Cognitive reorientation, Equipment evaluation/education, Fine motor coordination activities, Compensatory technique education, Continued evaluation, Energy conservation, Activityengagement          See flowsheet documentation for full assessment, interventions and recommendations.   Note: Limitation: Decreased ADL status, Decreased Safe judgement during ADL, Decreased endurance  Prognosis: Fair  Assessment: 69 year old pt seen today for an OT evaluation following admission to [facility] 2/2 legionella pneumonia with present symptoms significant for SOB, fatigue, weakness, decreased ADL status, decreased functional mobility. Pt  has a past medical history of Hypertension, Multiple myeloma (HCC), and Polio. Pt reported living with supportive Dtr and grandson in a 2SH, bed/bath upstairs with +SHAILESH. Pt reports being IND with ADLs PTA with assist PRN for IADLs. No DME used normally but does use crutches when not wearing RLE brace. Works FT. Pt reports that he has had weakness and SOB ~10 days and often gets shaky while walking. Pt very pleasant and cooperative t/o session. Pt completed functional bed mobility with min Ax1. Mod Ax2 for functional STS txfs and SPT to chair with RW. Pt was SUP for UB ADLs and Min A for LB ADLs. The patient's raw score on the AM-PAC Daily Activity Inpatient Short Form is 18. A raw score of less than 19 suggests the patient may benefit from discharge to post-acute rehabilitation services. Please refer to the recommendation of the Occupational Therapist for safe discharge planning.  Pt is functioning below baseline level of function and will continue to  benefit from skilled acute OT to promote increased independence and return to PLOF. At this time, current OT recommendation is Level 2 resources pending progress and level of support/care at home. Will continue to follow and assess.     Rehab Resource Intensity Level, OT: II (Moderate Resource Intensity) (pending progress and level of support/assist at home)

## 2024-12-19 NOTE — PLAN OF CARE
Problem: INFECTION - ADULT  Goal: Absence or prevention of progression during hospitalization  Description: INTERVENTIONS:  - Assess and monitor for signs and symptoms of infection  - Monitor lab/diagnostic results  - Monitor all insertion sites, i.e. indwelling lines, tubes, and drains  - Monitor endotracheal if appropriate and nasal secretions for changes in amount and color  - [place] appropriate cooling/warming therapies per order  - Administer medications as ordered  - Instruct and encourage patient and family to use good hand hygiene technique  - Identify and instruct in appropriate isolation precautions for identified infection/condition  Outcome: Progressing  Goal: Absence of fever/infection during neutropenic period  Description: INTERVENTIONS:  - Monitor WBC    Outcome: Progressing     Problem: SAFETY ADULT  Goal: Patient will remain free of falls  Description: INTERVENTIONS:  - Educate patient/family on patient safety including physical limitations  - Instruct patient to call for assistance with activity   - Consult OT/PT to assist with strengthening/mobility   - Keep Call bell within reach  - Keep bed low and locked with side rails adjusted as appropriate  - Keep care items and personal belongings within reach  - Initiate and maintain comfort rounds  - Make Fall Risk Sign visible to staff  - Initiate/Maintain alarm  - Obtain necessary fall risk management equipment:   - Apply yellow socks and bracelet for high fall risk patients  - Consider moving patient to room near nurses station  Outcome: Progressing     Problem: PAIN - ADULT  Goal: Verbalizes/displays adequate comfort level or baseline comfort level  Description: Interventions:  - Encourage patient to monitor pain and request assistance  - Assess pain using appropriate pain scale  - Administer analgesics based on type and severity of pain and evaluate response  - Implement non-pharmacological measures as appropriate and evaluate response  -  Consider cultural and social influences on pain and pain management  - Notify physician/advanced practitioner if interventions unsuccessful or patient reports new pain  Outcome: Progressing

## 2024-12-19 NOTE — ASSESSMENT & PLAN NOTE
Fever, HR.  Due to pneumonia.  No other appreciable source.  Flu/RSV/COVID antigen, blood cultures negative.  Improving with antibiotics.    Continue antibiotics as below  Follow temperatures closely  Recheck WBC in AM to monitor infection  Supportive care as per the primary service

## 2024-12-19 NOTE — ASSESSMENT & PLAN NOTE
Lab Results   Component Value Date    EGFR 22 12/18/2024    EGFR 15 12/17/2024    EGFR 49 12/10/2024    CREATININE 2.79 (H) 12/18/2024    CREATININE 3.67 (H) 12/17/2024    CREATININE 1.44 (H) 12/10/2024   Recent baseline creatinine from 1.5-1.9  Suspect prerenal due to poor oral intake  Received 1 L of IV fluids, continue sodium chloride and 100 cc/h  Bladder scan  Avoid nephrotoxins  Continue fluids for now until po intake improves - states his appetite is slowly returning

## 2024-12-19 NOTE — CASE MANAGEMENT
Case Management Assessment & Discharge Planning Note    Patient name Zack Partida  Location [facility] 823/[facility] 823-01 MRN [id number]  : 1955 Date 2024       Current Admission Date: 2024  Current Admission Diagnosis:Legionella pneumonia (HCC)   Patient Active Problem List    Diagnosis Date Noted Date Diagnosed    Legionella pneumonia (HCC) 2024     Sepsis (HCC) 2024     Acute kidney injury superimposed on CKD  (HCC) 2024     Acute respiratory failure with hypoxia (HCC) 2024     Anemia and thrombocytopenia 2024     Hemoptysis 2024     Multiple myeloma not having achieved remission (HCC) 10/18/2024     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     IgG lambda monoclonal gammopathy 10/17/2024     Difficulty walking 2024     Nephrolithiasis 2024     Stage 3 chronic kidney disease (HCC) 2023     History of colonic polyps 2023     History of poliomyelitis 2021    Unspecified acquired deformity of unspecified lower leg 2021    Muscle weakness of extremity following poliomyelitis 2021     Prediabetes 2021     Obesity 2021     Essential hypertension 2021       LOS (days): 2  Geometric Mean LOS (GMLOS) (days):   Days to GMLOS:     OBJECTIVE:    Risk of Unplanned Readmission Score: 17.73         Current admission status: Inpatient       Preferred Pharmacy:   [id number] - [place] -  [address]   [place] [address]  Phone: [phone] Fax: [phone]    [id number] - [address] [address]  Phone: [phone] Fax: [phone]    [facility] - [place], [place] - [address]  [address]  [address]  [facility] [address]  Phone: [phone] Fax: [phone]    Primary Care Provider: Ayanna Paz MD    Primary Insurance: BLUE CROSS  Secondary  Insurance:     ASSESSMENT:  Active Health Care Proxies    There are no active Health Care Proxies on file.                 Readmission Root Cause  30 Day Readmission: No    Patient Information  Admitted from:: Home  Mental Status: Alert  During Assessment patient was accompanied by:  (miguel ángel Garcia)  Assessment information provided by:: Patient, Other - please comment (miguel ángel Garcia)  Primary Caregiver: Self  Support Systems: Self, Children, Family members  County of Residence: [place]  What city do you live in?: [place]  Home entry access options. Select all that apply.: Stairs  Number of steps to enter home.: One Flight  Do the steps have railings?: Yes  Type of Current Residence: 2 story home  Upon entering residence, is there a bedroom on the main floor (no further steps)?: No  A bedroom is located on the following floor levels of residence (select all that apply):: 2nd Floor  Upon entering residence, is there a bathroom on the main floor (no further steps)?: No  Indicate which floors of current residence have a bathroom (select all the apply):: 2nd Floor  Number of steps to 2nd floor from main floor: One Flight  Living Arrangements: Lives w/ Children  Is patient a ?: No    Activities of Daily Living Prior to Admission  Functional Status: Independent  Completes ADLs independently?: Yes  Ambulates independently?: Yes  Does patient use assisted devices?: No  Does patient currently own DME?: No  Does patient have a history of Outpatient Therapy (PT/OT)?: No  Does the patient have a history of Short-Term Rehab?: No  Does patient have a history of HHC?: No  Does patient currently have HHC?: No         Patient Information Continued  Income Source: Employed  Does patient have prescription coverage?: Yes  Does patient receive dialysis treatments?: No  Does patient have a history of substance abuse?: No  Does patient have a history of Mental Health Diagnosis?: No         Means of Transportation  Means  of Transport to Appts:: Drives Self          DISCHARGE DETAILS:    Discharge planning discussed with:: patient and grandson at bedside  Freedom of Choice: Yes  Comments - Freedom of Choice: STR rec reviewed- pt worried about taking more time off work, voicing preference for HHC- referrals placed via [facility]  CM contacted family/caregiver?: No- see comments (family at bedside)  Were Treatment Team discharge recommendations reviewed with patient/caregiver?: Yes  Did patient/caregiver verbalize understanding of patient care needs?: Yes  Were patient/caregiver advised of the risks associated with not following Treatment Team discharge recommendations?: Yes    Contacts  Patient Contacts: grandson-= Jose  Relationship to Patient:: Family  Contact Method: Phone  Phone Number: [phone]  Reason/Outcome: Emergency Contact                    CM introduced self and role to patient and grandsonJose at bedside.   SLIM provider present and assisting with interpretation as well    Pt resides with his daughter and grandson in [place] ( [place] to enter)  Independent with all ADLS, ambulation, no DME Use  No prior hx of STR, HHC Or OPPT  Reviewed current STR rec- patient worried about taking time off of work, and voices preference for home therapies vs OP  [facility] referrals placed- pending home o2 eval as well  Anticipate d/c 24-48 hours  CM to follow    Patient/caregiver received discharge checklist.  Content reviewed.  Patient/caregiver encouraged to participate in discharge plan of care prior to discharge home.  CM reviewed d/c planning process including the following: identifying help at home, patient preference for d/c planning needs, Discharge Lounge, [facility] Meds to Bed program, availability of treatment team to discuss questions or concerns patient and/or family may have regarding understanding medications and recognizing signs and symptoms once discharged.  CM also encouraged patient to follow up with all recommended  appointments after discharge. Patient advised of importance for patient and family to participate in managing patient’s medical well being.

## 2024-12-19 NOTE — PLAN OF CARE
Problem: INFECTION - ADULT  Goal: Absence or prevention of progression during hospitalization  Description: INTERVENTIONS:  - Assess and monitor for signs and symptoms of infection  - Monitor lab/diagnostic results  - Monitor all insertion sites, i.e. indwelling lines, tubes, and drains  - Monitor endotracheal if appropriate and nasal secretions for changes in amount and color  - [place] appropriate cooling/warming therapies per order  - Administer medications as ordered  - Instruct and encourage patient and family to use good hand hygiene technique  - Identify and instruct in appropriate isolation precautions for identified infection/condition  Outcome: Progressing  Goal: Absence of fever/infection during neutropenic period  Description: INTERVENTIONS:  - Monitor WBC    Outcome: Progressing     Problem: SAFETY ADULT  Goal: Patient will remain free of falls  Description: INTERVENTIONS:  - Educate patient/family on patient safety including physical limitations  - Instruct patient to call for assistance with activity   - Consult OT/PT to assist with strengthening/mobility   - Keep Call bell within reach  - Keep bed low and locked with side rails adjusted as appropriate  - Keep care items and personal belongings within reach  - Initiate and maintain comfort rounds  - Make Fall Risk Sign visible to staff  - Apply yellow socks and bracelet for high fall risk patients  - Consider moving patient to room near nurses station  Outcome: Progressing     Problem: DISCHARGE PLANNING  Goal: Discharge to home or other facility with appropriate resources  Description: INTERVENTIONS:  - Identify barriers to discharge w/patient and caregiver  - Arrange for needed discharge resources and transportation as appropriate  - Identify discharge learning needs (meds, wound care, etc.)  - Arrange for interpretive services to assist at discharge as needed  - Refer to Case Management Department for coordinating discharge planning if the  patient needs post-hospital services based on physician/advanced practitioner order or complex needs related to functional status, cognitive ability, or social support system  Outcome: Progressing     Problem: PAIN - ADULT  Goal: Verbalizes/displays adequate comfort level or baseline comfort level  Description: Interventions:  - Encourage patient to monitor pain and request assistance  - Assess pain using appropriate pain scale  - Administer analgesics based on type and severity of pain and evaluate response  - Implement non-pharmacological measures as appropriate and evaluate response  - Consider cultural and social influences on pain and pain management  - Notify physician/advanced practitioner if interventions unsuccessful or patient reports new pain  Outcome: Progressing

## 2024-12-20 PROBLEM — N18.32 STAGE 3B CHRONIC KIDNEY DISEASE (HCC): Status: ACTIVE | Noted: 2023-09-19

## 2024-12-20 LAB
ANION GAP SERPL CALCULATED.3IONS-SCNC: 10 MMOL/L (ref 4–13)
BUN SERPL-MCNC: 38 MG/DL (ref 5–25)
CALCIUM SERPL-MCNC: 8.5 MG/DL (ref 8.4–10.2)
CHLORIDE SERPL-SCNC: 114 MMOL/L (ref 96–108)
CO2 SERPL-SCNC: 18 MMOL/L (ref 21–32)
CREAT SERPL-MCNC: 1.61 MG/DL (ref 0.6–1.3)
ERYTHROCYTE [DISTWIDTH] IN BLOOD BY AUTOMATED COUNT: 16.7 % (ref 11.6–15.1)
GFR SERPL CREATININE-BSD FRML MDRD: 43 ML/MIN/1.73SQ M
GLUCOSE SERPL-MCNC: 109 MG/DL (ref 65–140)
HCT VFR BLD AUTO: 22.2 % (ref 36.5–49.3)
HGB BLD-MCNC: 7.3 G/DL (ref 12–17)
MCH RBC QN AUTO: 29.8 PG (ref 26.8–34.3)
MCHC RBC AUTO-ENTMCNC: 32.9 G/DL (ref 31.4–37.4)
MCV RBC AUTO: 91 FL (ref 82–98)
NRBC BLD AUTO-RTO: 0 /100 WBCS
PLATELET # BLD AUTO: 119 THOUSANDS/UL (ref 149–390)
PMV BLD AUTO: 13.7 FL (ref 8.9–12.7)
POTASSIUM SERPL-SCNC: 3.7 MMOL/L (ref 3.5–5.3)
RBC # BLD AUTO: 2.45 MILLION/UL (ref 3.88–5.62)
SODIUM SERPL-SCNC: 142 MMOL/L (ref 135–147)
WBC # BLD AUTO: 7.55 THOUSAND/UL (ref 4.31–10.16)

## 2024-12-20 PROCEDURE — 80048 BASIC METABOLIC PNL TOTAL CA: CPT

## 2024-12-20 PROCEDURE — 94761 N-INVAS EAR/PLS OXIMETRY MLT: CPT

## 2024-12-20 PROCEDURE — 99232 SBSQ HOSP IP/OBS MODERATE 35: CPT | Performed by: INTERNAL MEDICINE

## 2024-12-20 PROCEDURE — 85027 COMPLETE CBC AUTOMATED: CPT

## 2024-12-20 PROCEDURE — 99232 SBSQ HOSP IP/OBS MODERATE 35: CPT

## 2024-12-20 RX ORDER — HEPARIN SODIUM 5000 [USP'U]/ML
5000 INJECTION, SOLUTION INTRAVENOUS; SUBCUTANEOUS EVERY 8 HOURS SCHEDULED
Status: DISCONTINUED | OUTPATIENT
Start: 2024-12-20 | End: 2024-12-30 | Stop reason: HOSPADM

## 2024-12-20 RX ADMIN — AMLODIPINE BESYLATE 5 MG: 5 TABLET ORAL at 10:01

## 2024-12-20 RX ADMIN — ACETAMINOPHEN 650 MG: 325 TABLET, FILM COATED ORAL at 01:51

## 2024-12-20 RX ADMIN — LEVOFLOXACIN 750 MG: 750 TABLET, FILM COATED ORAL at 10:01

## 2024-12-20 RX ADMIN — HEPARIN SODIUM 5000 UNITS: 5000 INJECTION, SOLUTION INTRAVENOUS; SUBCUTANEOUS at 18:00

## 2024-12-20 RX ADMIN — HEPARIN SODIUM 5000 UNITS: 5000 INJECTION, SOLUTION INTRAVENOUS; SUBCUTANEOUS at 22:37

## 2024-12-20 NOTE — ASSESSMENT & PLAN NOTE
Blood Pressure: 135/76    Home regimen amlodipine 10 mg daily, chlorthalidone 25 mg daily, doxazosin 2 mg daily and irbesartan 300 mg daily    Plan:  Restart home amlodipine 5 mg po qd  Hold chlorthalidone and irbesartan while recovering from MARCI, can restart 1 week outpt after discharge w BMP to follow up  Monitor blood pressure

## 2024-12-20 NOTE — PROGRESS NOTES
Progress Note - Hospitalist   Name: Zack Partida 69 y.o. male I MRN: [id number]  Unit/Bed#: [facility] 823-01 I Date of Admission: 12/17/2024   Date of Service: 12/20/2024 I Hospital Day: 3    Assessment & Plan  Sepsis (HCC)  Patient is a 69-year-old male with a past medical history of multiple myeloma currently undergoing chemo, hypertension who presented to the hospital with productive cough and poor oral intake for 4 days, developed fever day of admission  Met sepsis criteria with , Tmax 102.9, RR 20 on admission  Chest x-ray showed left lower lung pneumonia  COVID/flu/RSV negative  WBC 21, procalcitonin 114, lactic acid normal  In ER, received IV fluids, blood cultures drawn, received IV cefepime and vanc    Plan:  Tx as below - abx as under legionella     Legionella pneumonia (HCC)  With sepsis on admission. CXR findings consistent w LLL PNA.     Admission 102.5F, 109 HR, 88% O2, procal 114.     Started on CTX, d/c'd after legionella (+). Started on azithromycin 12/17, d/c'd next day for broader coverage for atypicals w levofloxacin.     Plan:  Continue levofloxacin 12/18--  ID has been consulted, appreciate recs  IS  Resp protocol  PT OT  No further fluids as appetite has improved  Acute kidney injury superimposed on CKD  (HCC)  Lab Results   Component Value Date    EGFR 43 12/20/2024    EGFR 32 12/19/2024    EGFR 22 12/18/2024    CREATININE 1.61 (H) 12/20/2024    CREATININE 2.05 (H) 12/19/2024    CREATININE 2.79 (H) 12/18/2024   Recent baseline creatinine from 1.5-1.9  Suspect prerenal due to poor oral intake  Received 1 L of IV fluids, continue sodium chloride and 100 cc/h  Bladder scan  Avoid nephrotoxins  No further fluids needed. States his appetite is slowly returning  Acute respiratory failure with hypoxia (HCC)  Due to new/acute legionella left lung pneumonia  Down to 2L n/c  Plan:  Respiratory protocl  Monitor and wean as tolerated  Goal oxygen saturation 90% and above  Anemia and  thrombocytopenia  Likely secondary to chemo as he is actively on treatment this month  Baseline hemoglobin 10-11, most recent 9.1, on admission 8.0  Thrombocytopenia with platelets of 49  Patient has multiple myeloma currently undergoing chemo - chem DVT ppx on hold  Also acute illness can play a role  Patient complaining of minimal hemoptysis intermittently  Continue to monitor w daily labs  Essential hypertension  Blood Pressure: 140/78    Home regimen amlodipine 10 mg daily, chlorthalidone 25 mg daily, doxazosin 2 mg daily and irbesartan 300 mg daily    Plan:  Restart home amlodipine 5 mg po qd  Hold chlorthalidone and irbesartan while recovering from MARCI, can restart 1 week outpt after discharge w BMP to follow up  Monitor blood pressure  Multiple myeloma not having achieved remission (HCC)  History of multiple myeloma currently undergoing chemo  Likely causative factor for anemia, thrombocytopenia  Hemoptysis  Productive cough for 4 days, intermittent minimal hemoptysis per patient, most likely secondary to pneumonia  Recent Labs     12/18/24  0509 12/19/24  0551 12/20/24  0605   HGB 7.1* 7.4* 7.3*       No significant sputum output  Stage 3b chronic kidney disease (HCC)  Lab Results   Component Value Date    EGFR 43 12/20/2024    EGFR 32 12/19/2024    EGFR 22 12/18/2024    CREATININE 1.61 (H) 12/20/2024    CREATININE 2.05 (H) 12/19/2024    CREATININE 2.79 (H) 12/18/2024   Baseline Cr 1.1-1.3    VTE Pharmacologic Prophylaxis: VTE Score: 7 High Risk (Score >/= 5) - Pharmacological DVT Prophylaxis Ordered: heparin. Sequential Compression Devices Ordered.    Mobility:   Basic Mobility Inpatient Raw Score: 17  JH-HLM Goal: 5: Stand one or more mins  JH-HLM Achieved: 6: Walk 10 steps or more  JH-HLM Goal achieved. Continue to encourage appropriate mobility.    Patient Centered Rounds: I performed bedside rounds with nursing staff today.   Discussions with Specialists or Other Care Team Provider: SHI Garcia  Bonnie    Education and Discussions with Family / Patient: Updated  (daughter and grandson) at bedside.    Current Length of Stay: 3 day(s)  Current Patient Status: Inpatient   Certification Statement: The patient will continue to require additional inpatient hospital stay due to pending placement to rehab, medically stable for legionella PNA tx, on oxygen and qualified for home O2.  Discharge Plan: Anticipate discharge in 48-72 hrs to rehab facility.    Code Status: Level 1 - Full Code    Subjective   Patient feels much improved in shortness of breath and cough. He still has some difficulty walking but says it is much easier with an elbow crutch.     Objective :  Temp:  [99.5 °F (37.5 °C)-100.7 °F (38.2 °C)] 100.3 °F (37.9 °C)  HR:  [] 103  BP: (135-159)/(76-81) 140/78  Resp:  [16-20] 16  SpO2:  [92 %-93 %] 92 %  O2 Device: Nasal cannula  Nasal Cannula O2 Flow Rate (L/min):  [2 L/min-4 L/min] 2 L/min    Body mass index is 32.62 kg/m².     Input and Output Summary (last 24 hours):     Intake/Output Summary (Last 24 hours) at 12/20/2024 1648  Last data filed at 12/20/2024 0900  Gross per 24 hour   Intake 1916.67 ml   Output 1650 ml   Net 266.67 ml       Physical Exam  Vitals reviewed.   Constitutional:       General: He is not in acute distress.     Appearance: He is ill-appearing.   HENT:      Head: Normocephalic and atraumatic.      Mouth/Throat:      Mouth: Mucous membranes are moist.      Pharynx: Oropharynx is clear.   Eyes:      General: No scleral icterus.     Extraocular Movements: Extraocular movements intact.   Cardiovascular:      Rate and Rhythm: Normal rate and regular rhythm.      Heart sounds: No murmur heard.     No friction rub. No gallop.   Pulmonary:      Effort: Pulmonary effort is normal. No respiratory distress.      Breath sounds: No stridor. No wheezing or rales.   Abdominal:      General: There is distension (mild).      Palpations: There is no mass.      Tenderness:  There is no abdominal tenderness.   Musculoskeletal:         General: Normal range of motion.      Right lower leg: No edema.      Left lower leg: No edema.   Skin:     General: Skin is warm and dry.      Findings: No rash.   Neurological:      Mental Status: He is alert.      Sensory: No sensory deficit.   Psychiatric:         Mood and Affect: Mood normal.         Behavior: Behavior normal.         Thought Content: Thought content normal.           Lines/Drains:              Lab Results: I have reviewed the following results:   Results from last 7 days   Lab Units 12/20/24  0605 12/18/24  0509 12/17/24  0931   WBC Thousand/uL 7.55   < > 21.08*   HEMOGLOBIN g/dL 7.3*   < > 8.0*   HEMATOCRIT % 22.2*   < > 24.5*   PLATELETS Thousands/uL 119*   < > 49*   BANDS PCT %  --   --  8   LYMPHO PCT %  --   --  2*   MONO PCT %  --   --  1*   EOS PCT %  --   --  0    < > = values in this interval not displayed.     Results from last 7 days   Lab Units 12/20/24  0605 12/18/24  0509 12/17/24  0931   SODIUM mmol/L 142   < > 135   POTASSIUM mmol/L 3.7   < > 3.5   CHLORIDE mmol/L 114*   < > 105   CO2 mmol/L 18*   < > 19*   BUN mg/dL 38*   < > 69*   CREATININE mg/dL 1.61*   < > 3.67*   ANION GAP mmol/L 10   < > 11   CALCIUM mg/dL 8.5   < > 8.7   ALBUMIN g/dL  --   --  2.9*   TOTAL BILIRUBIN mg/dL  --   --  0.65   ALK PHOS U/L  --   --  96   ALT U/L  --   --  21   AST U/L  --   --  25   GLUCOSE RANDOM mg/dL 109   < > 126    < > = values in this interval not displayed.     Results from last 7 days   Lab Units 12/17/24  0931   INR  1.46*             Results from last 7 days   Lab Units 12/18/24  0509 12/17/24  0931   LACTIC ACID mmol/L  --  1.1   PROCALCITONIN ng/ml 100.11* 114.23*       Recent Cultures (last 7 days):   Results from last 7 days   Lab Units 12/19/24 2000 12/17/24  1248 12/17/24  0931   BLOOD CULTURE   --   --  No Growth at 72 hrs.  No Growth at 72 hrs.   SPUTUM CULTURE  Culture too young- will reincubate  --   --     GRAM STAIN RESULT  2+ Polys*  1+ Gram positive rods*  No Epithelial cells seen*  --   --    LEGIONELLA URINARY ANTIGEN   --  Positive*  --        Imaging Results Review: No pertinent imaging studies reviewed.  Other Study Results Review: No additional pertinent studies reviewed.    Last 24 Hours Medication List:     Current Facility-Administered Medications:     acetaminophen (TYLENOL) tablet 650 mg, Q6H PRN    amLODIPine (NORVASC) tablet 5 mg, Daily    benzonatate (TESSALON PERLES) capsule 100 mg, TID PRN    dextromethorphan-guaiFENesin (ROBITUSSIN DM) oral syrup 10 mL, Q4H PRN    levofloxacin (LEVAQUIN) tablet 750 mg, Every Other Day    Administrative Statements   Today, Patient Was Seen By: Radha Irvin MD  I have spent a total time of 35 minutes in caring for this patient on the day of the visit/encounter including Diagnostic results, Risks and benefits of tx options, and Patient and family education.    **Please Note: This note may have been constructed using a voice recognition system.**

## 2024-12-20 NOTE — ASSESSMENT & PLAN NOTE
Unclear risk factor/exposure.  While antigen has high specificity, consider other respiratory pathogens as well.    Continue levofloxacin through 12/27 for 10 days total  Follow respiratory status closely

## 2024-12-20 NOTE — ASSESSMENT & PLAN NOTE
Due to new/acute legionella left lung pneumonia  Down to 2L n/c  Plan:  Respiratory protocl  Monitor and wean as tolerated  Goal oxygen saturation 90% and above

## 2024-12-20 NOTE — ASSESSMENT & PLAN NOTE
History of multiple myeloma currently undergoing chemo  Likely causative factor for anemia, thrombocytopenia

## 2024-12-20 NOTE — PROGRESS NOTES
Patient:    MRN:  [id number]    Aidin Request ID:  [id number]    Level of care reserved:  Home Health Agency    Partner Reserved:  [facility], [place] [address] [phone]    Clinical needs requested:    Geography searched:  [id number]    Start of Service:    Request sent:  12:28pm EST on 12/19/2024 by Nichelle Cervantes    Partner reserved:  1:02pm EST on 12/20/2024 by Nichelle Cervantes    Choice list shared:  1:02pm EST on 12/20/2024 by Nichelle Cervantes

## 2024-12-20 NOTE — CASE MANAGEMENT
Case Management Discharge Planning Note    Patient name Zack Partida  Location [facility] 823/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     IgG lambda monoclonal gammopathy 10/17/2024     Difficulty walking 2024     Nephrolithiasis 2024     Stage 3 chronic kidney disease (HCC) 2023     History of colonic polyps 2023     History of poliomyelitis 2021    Unspecified acquired deformity of unspecified lower leg 2021    Muscle weakness of extremity following poliomyelitis 2021     Prediabetes 2021     Obesity 2021     Essential hypertension 2021       LOS (days): 3  Geometric Mean LOS (GMLOS) (days):   Days to GMLOS:     OBJECTIVE:  Risk of Unplanned Readmission Score: 13.85         Current admission status: Inpatient   Preferred Pharmacy:   [id number] - [place] -  [address]   [place] [address]  Phone: [phone] Fax: [phone]    [id number] - [address] [address]  Phone: [phone] Fax: [phone]    [facility] - [place], [place] - [address]  [address]  [address]  [facility] [address]  Phone: [phone] Fax: [phone]    Primary Care Provider: Ayanna Paz MD    Primary Insurance: BLUE CROSS  Secondary Insurance:     DISCHARGE DETAILS:         [facility] reserved  TC placed to daughter, however grandson picked up. Reviewed STR rec with family  They report they feel as though pt would benefit from IRF but are aware of his refusal. They state they are en route to hospital and will speak with him  IRF referral placed  Will follow     Update 2:30pm:  Met with patient and family at bedside. Patient now agreeable to pursuing rehab, [facility] preferred. They are aware of the process, insurance verification etc.   ARC reviewing

## 2024-12-20 NOTE — ASSESSMENT & PLAN NOTE
Lab Results   Component Value Date    EGFR 32 12/19/2024    EGFR 22 12/18/2024    EGFR 15 12/17/2024    CREATININE 2.05 (H) 12/19/2024    CREATININE 2.79 (H) 12/18/2024    CREATININE 3.67 (H) 12/17/2024   Recent baseline creatinine from 1.5-1.9  Suspect prerenal due to poor oral intake  Received 1 L of IV fluids, continue sodium chloride and 100 cc/h  Bladder scan  Avoid nephrotoxins  No further fluids needed. States his appetite is slowly returning

## 2024-12-20 NOTE — ASSESSMENT & PLAN NOTE
Patient is a 69-year-old male with a past medical history of multiple myeloma currently undergoing chemo, hypertension who presented to the hospital with productive cough and poor oral intake for 4 days, developed fever day of admission  Met sepsis criteria with , Tmax 102.9, RR 20 on admission  Chest x-ray showed left lower lung pneumonia  COVID/flu/RSV negative  WBC 21, procalcitonin 114, lactic acid normal  In ER, received IV fluids, blood cultures drawn, received IV cefepime and vanc    Plan:  Tx as below - abx as under legionella

## 2024-12-20 NOTE — PROGRESS NOTES
Progress Note - Infectious Disease   Name: Zack Partida 69 y.o. male I MRN: [id number]  Unit/Bed#: [facility] I Date of Admission: 12/17/2024   Date of Service: 12/20/2024 I Hospital Day: 3    Assessment & Plan  Sepsis (HCC)  Fever, HR.  Due to pneumonia.  No other appreciable source.  Flu/RSV/COVID antigen, blood cultures negative.  Improving with antibiotics.    Continue antibiotics as below  Follow temperatures closely  Supportive care as per the primary service  Legionella pneumonia (HCC)  Unclear risk factor/exposure.  While antigen has high specificity, consider other respiratory pathogens as well.    Continue levofloxacin through 12/27 for 10 days total  Follow respiratory status closely  Acute respiratory failure with hypoxia (HCC)  Due to pneumonia.    Continue antibiotics as above  Wean O2 as tolerated  Acute kidney injury superimposed on CKD  (HCC)  Baseline Cr. 1.1-1.3.  Likely multifactorial due to sepsis, poor PO intake.  Improving.    Follow creatinine closely and dose-adjust antibiotics as indicated  Recheck BMP in AM  Multiple myeloma not having achieved remission (HCC)  Currently on Daratumumab-VRd since November 2024.  Being evaluated for possible SCT.  Anemia and thrombocytopenia  Due to MM      I discussed with Dr. Irvin the above plan to continue antibiotics as above.  She agrees with the plan.  The patient is stable from an ID standpoint.  If the patient remains in-house, Dr. Gentile will reassess the patient on Monday 12/23.  Please call in the interim with new questions.    Antibiotics:  Levofloxacin #3    Subjective   Patient has no fever, chills, sweats; no nausea, vomiting, diarrhea; no cough, shortness of breath; no pain. Complains of leg edema    Objective :  Temp:  [99.1 °F (37.3 °C)-100.7 °F (38.2 °C)] 99.5 °F (37.5 °C)  HR:  [] 97  BP: (135-159)/(76-81) 159/81  Resp:  [18-22] 18  SpO2:  [93 %] 93 %    General:  No acute distress  Psychiatric:  Awake and alert  Pulmonary:   Normal respiratory excursion without accessory muscle use  Abdomen:  Soft, nontender  Extremities:  Nonpitting leg edema  Skin:  No rashes      Lab Results: I have reviewed the following results:  Results from last 7 days   Lab Units 12/20/24  0605 12/19/24  0551 12/18/24  0509   WBC Thousand/uL 7.55 9.74 14.26*   HEMOGLOBIN g/dL 7.3* 7.4* 7.1*   PLATELETS Thousands/uL 119* 69* 46*     Results from last 7 days   Lab Units 12/20/24  0605 12/19/24  0551 12/18/24  0509 12/17/24  0931   SODIUM mmol/L 142 140 137 135   POTASSIUM mmol/L 3.7 3.6 3.4* 3.5   CHLORIDE mmol/L 114* 115* 109* 105   CO2 mmol/L 18* 16* 16* 19*   BUN mg/dL 38* 49* 61* 69*   CREATININE mg/dL 1.61* 2.05* 2.79* 3.67*   EGFR ml/min/1.73sq m 43 32 22 15   CALCIUM mg/dL 8.5 8.1* 7.9* 8.7   AST U/L  --   --   --  25   ALT U/L  --   --   --  21   ALK PHOS U/L  --   --   --  96   ALBUMIN g/dL  --   --   --  2.9*     Results from last 7 days   Lab Units 12/17/24  1248 12/17/24  1236 12/17/24  0931   BLOOD CULTURE   --   --  No Growth at 48 hrs.  No Growth at 48 hrs.   MRSA CULTURE ONLY   --  No Methicillin Resistant Staphlyococcus aureus (MRSA) isolated  --    LEGIONELLA URINARY ANTIGEN  Positive*  --   --      Results from last 7 days   Lab Units 12/18/24  0509 12/17/24  0931   PROCALCITONIN ng/ml 100.11* 114.23*

## 2024-12-20 NOTE — PLAN OF CARE
Problem: INFECTION - ADULT  Goal: Absence or prevention of progression during hospitalization  Description: INTERVENTIONS:  - Assess and monitor for signs and symptoms of infection  - Monitor lab/diagnostic results  - Monitor all insertion sites, i.e. indwelling lines, tubes, and drains  - Monitor endotracheal if appropriate and nasal secretions for changes in amount and color  - Greer appropriate cooling/warming therapies per order  - Administer medications as ordered  - Instruct and encourage patient and family to use good hand hygiene technique  - Identify and instruct in appropriate isolation precautions for identified infection/condition  Outcome: Progressing  Goal: Absence of fever/infection during neutropenic period  Description: INTERVENTIONS:  - Monitor WBC    Outcome: Progressing     Problem: SAFETY ADULT  Goal: Patient will remain free of falls  Description: INTERVENTIONS:  - Educate patient/family on patient safety including physical limitations  - Instruct patient to call for assistance with activity   - Consult OT/PT to assist with strengthening/mobility   - Keep Call bell within reach  - Keep bed low and locked with side rails adjusted as appropriate  - Keep care items and personal belongings within reach  - Initiate and maintain comfort rounds  - Make Fall Risk Sign visible to staff  - Offer Toileting every  Hours, in advance of need  - Initiate/Maintain alarm  - Obtain necessary fall risk management equipment:   - Apply yellow socks and bracelet for high fall risk patients  - Consider moving patient to room near nurses station  Outcome: Progressing     Problem: DISCHARGE PLANNING  Goal: Discharge to home or other facility with appropriate resources  Description: INTERVENTIONS:  - Identify barriers to discharge w/patient and caregiver  - Arrange for needed discharge resources and transportation as appropriate  - Identify discharge learning needs (meds, wound care, etc.)  - Arrange for  interpretive services to assist at discharge as needed  - Refer to Case Management Department for coordinating discharge planning if the patient needs post-hospital services based on physician/advanced practitioner order or complex needs related to functional status, cognitive ability, or social support system  Outcome: Progressing     Problem: PAIN - ADULT  Goal: Verbalizes/displays adequate comfort level or baseline comfort level  Description: Interventions:  - Encourage patient to monitor pain and request assistance  - Assess pain using appropriate pain scale  - Administer analgesics based on type and severity of pain and evaluate response  - Implement non-pharmacological measures as appropriate and evaluate response  - Consider cultural and social influences on pain and pain management  - Notify physician/advanced practitioner if interventions unsuccessful or patient reports new pain  Outcome: Progressing

## 2024-12-20 NOTE — ASSESSMENT & PLAN NOTE
Lab Results   Component Value Date    EGFR 43 12/20/2024    EGFR 32 12/19/2024    EGFR 22 12/18/2024    CREATININE 1.61 (H) 12/20/2024    CREATININE 2.05 (H) 12/19/2024    CREATININE 2.79 (H) 12/18/2024   Baseline Cr 1.1-1.3

## 2024-12-20 NOTE — RESPIRATORY THERAPY NOTE
Home Oxygen Qualifying Test     Patient name: Zack Partida        : 1955   Date of Test:  2024  Diagnosis:    Home Oxygen Test:    **Medicare Guidelines require item(s) 1-5 on all ambulatory patients or 1 and 2 on non-ambulatory patients.    1. Baseline SPO2 on Room Air at rest 90 %   If <= 88% on Room Air add O2 via NC to obtain SpO2 >=88%. If LPM needed, not indicated    SPO2 during exertion on Room Air 87  %  During exertion monitor SPO2. If SPO2 increases >=89%, do not add supplemental oxygen    SPO2 on Oxygen at Rest 93 % at 3 LPM    SPO2 during exertion on Oxygen 92 % at 2 LPM    Test performed during exertion activity.      [x]  Supplemental Home Oxygen is indicated.    []  Client does not qualify for home oxygen.    Respiratory Additional Notes- Pt unable to ambulate, pt sat on side of bed and exerted with arm motions. Required 2L NC to maintain SpO2 above 88 or better     Angie Spring RT

## 2024-12-20 NOTE — ASSESSMENT & PLAN NOTE
Fever, HR.  Due to pneumonia.  No other appreciable source.  Flu/RSV/COVID antigen, blood cultures negative.  Improving with antibiotics.    Continue antibiotics as below  Follow temperatures closely  Supportive care as per the primary service

## 2024-12-20 NOTE — PROGRESS NOTES
Patient:    MRN:  [id number]    Aidin Request ID:  [id number]    Level of care reserved:  Home Health Agency    Partner Reserved:  [facility], [place] [address] [phone]    Clinical needs requested:    Geography searched:  [phone]    Start of Service:    Request sent:  12:28pm EST on 12/19/2024 by Nichelle Cervantes    Partner reserved:  1:02pm EST on 12/20/2024 by Nichelle Cervantes    Choice list shared:  1:02pm EST on 12/20/2024 by Nichelle Cervantes

## 2024-12-20 NOTE — ASSESSMENT & PLAN NOTE
With sepsis on admission. CXR findings consistent w LLL PNA.     Admission 102.5F, 109 HR, 88% O2, procal 114.     Started on CTX, d/c'd after legionella (+). Started on azithromycin 12/17, d/c'd next day for broader coverage for atypicals w levofloxacin.     Plan:  Continue levofloxacin 12/18--  ID has been consulted, appreciate recs  IS  Resp protocol  PT OT  No further fluids as appetite has improved

## 2024-12-20 NOTE — PROGRESS NOTES
Progress Note - Hospitalist   Name: Zack Partida 69 y.o. male I MRN: [id number]  Unit/Bed#: [facility] 823-01 I Date of Admission: 12/17/2024   Date of Service: 12/19/2024 I Hospital Day: 2    Assessment & Plan  Sepsis (HCC)  Patient is a 69-year-old male with a past medical history of multiple myeloma currently undergoing chemo, hypertension who presented to the hospital with productive cough and poor oral intake for 4 days, developed fever day of admission  Met sepsis criteria with , Tmax 102.9, RR 20 on admission  Chest x-ray showed left lower lung pneumonia  COVID/flu/RSV negative  WBC 21, procalcitonin 114, lactic acid normal  In ER, received IV fluids, blood cultures drawn, received IV cefepime and vanc    Plan:  Tx as below - abx as under legionella     Legionella pneumonia (HCC)  With sepsis on admission. CXR findings consistent w LLL PNA.     Admission 102.5F, 109 HR, 88% O2, procal 114.     Started on CTX, d/c'd after legionella (+). Started on azithromycin 12/17, d/c'd next day for broader coverage for atypicals w levofloxacin.     Plan:  Continue levofloxacin 12/18--  ID has been consulted, appreciate recs  IS  Resp protocol  PT OT  No further fluids as appetite has improved  Acute kidney injury superimposed on CKD  (HCC)  Lab Results   Component Value Date    EGFR 32 12/19/2024    EGFR 22 12/18/2024    EGFR 15 12/17/2024    CREATININE 2.05 (H) 12/19/2024    CREATININE 2.79 (H) 12/18/2024    CREATININE 3.67 (H) 12/17/2024   Recent baseline creatinine from 1.5-1.9  Suspect prerenal due to poor oral intake  Received 1 L of IV fluids, continue sodium chloride and 100 cc/h  Bladder scan  Avoid nephrotoxins  No further fluids needed. States his appetite is slowly returning  Acute respiratory failure with hypoxia (HCC)  Due to new/acute legionella left lung pneumonia  On midflow at present  Plan:  Respiratory protocl  Monitor and wean as tolerated  Goal oxygen saturation 90% and above  Anemia and  thrombocytopenia  Likely secondary to chemo as he is actively on treatment this month  Baseline hemoglobin 10-11, most recent 9.1, on admission 8.0  Thrombocytopenia with platelets of 49  Patient has multiple myeloma currently undergoing chemo - chem DVT ppx on hold  Also acute illness can play a role  Patient complaining of minimal hemoptysis intermittently  Continue to monitor w daily labs  Essential hypertension  Blood Pressure: 135/76    Home regimen amlodipine 10 mg daily, chlorthalidone 25 mg daily, doxazosin 2 mg daily and irbesartan 300 mg daily    Plan:  Restart home amlodipine 5 mg po qd  Hold chlorthalidone and irbesartan while recovering from MARCI, can restart 1 week outpt after discharge w BMP to follow up  Monitor blood pressure  Multiple myeloma not having achieved remission (HCC)  History of multiple myeloma currently undergoing chemo  Likely causative factor for anemia, thrombocytopenia  Hemoptysis  Productive cough for 4 days, intermittent minimal hemoptysis per patient, most likely secondary to pneumonia  Recent Labs     12/17/24  0931 12/18/24  0509 12/19/24  0551   HGB 8.0* 7.1* 7.4*       No significant sputum output    VTE Pharmacologic Prophylaxis: VTE Score: 7 High Risk (Score >/= 5) - Pharmacological DVT Prophylaxis Contraindicated. Sequential Compression Devices Ordered.  Due to plt <100k      Mobility:   Basic Mobility Inpatient Raw Score: 17  JH-HLM Goal: 5: Stand one or more mins  JH-HLM Achieved: 6: Walk 10 steps or more  JH-HLM Goal achieved. Continue to encourage appropriate mobility.    Patient Centered Rounds: I performed bedside rounds with nursing staff today.   Discussions with Specialists or Other Care Team Provider: ID    Education and Discussions with Family / Patient: Updated  (grandson) at bedside.    Current Length of Stay: 2 day(s)  Current Patient Status: Inpatient   Certification Statement: The patient will continue to require additional inpatient hospital  stay due to sepsis due to legionella PNA  Discharge Plan: Anticipate discharge in 48-72 hrs to rehab pending auth submissions and facility selection    Code Status: Level 1 - Full Code    Subjective   Patient reports feeling significantly better vs. day prior. He feels short of breath on exertion and a rest from time to time, but mostly is not bothered. His cough has improved in decreased frequency after trying cough medicine. He denies fevers/chills, chest pain, heart palpitations, nausea, vomiting, abdominal pain.        Objective :  Temp:  [98.1 °F (36.7 °C)-101.3 °F (38.5 °C)] 100.3 °F (37.9 °C)  HR:  [] 103  BP: (121-137)/(64-77) 135/76  Resp:  [17-22] 20  SpO2:  [92 %-93 %] 93 %  O2 Device: Nasal cannula  Nasal Cannula O2 Flow Rate (L/min):  [4 L/min] 4 L/min    Body mass index is 32.62 kg/m².     Input and Output Summary (last 24 hours):     Intake/Output Summary (Last 24 hours) at 12/19/2024 2147  Last data filed at 12/19/2024 1950  Gross per 24 hour   Intake 990 ml   Output 1650 ml   Net -660 ml       Physical Exam  Vitals reviewed.   Constitutional:       General: He is not in acute distress.     Appearance: He is ill-appearing. He is not toxic-appearing or diaphoretic.   HENT:      Head: Normocephalic and atraumatic.      Mouth/Throat:      Mouth: Mucous membranes are moist.      Pharynx: Oropharynx is clear.   Eyes:      General: No scleral icterus.     Extraocular Movements: Extraocular movements intact.   Cardiovascular:      Rate and Rhythm: Normal rate and regular rhythm.      Heart sounds: No murmur heard.     No friction rub. No gallop.   Pulmonary:      Effort: Pulmonary effort is normal. No respiratory distress.      Breath sounds: No stridor. No wheezing or rales.      Comments: Dry cough  Abdominal:      General: There is distension (mildly distended, firm w edema).      Palpations: There is no mass.      Tenderness: There is no abdominal tenderness. There is no guarding.    Musculoskeletal:         General: Normal range of motion.      Right lower leg: No edema.      Left lower leg: No edema.   Skin:     General: Skin is warm and dry.      Findings: No rash.   Neurological:      Mental Status: He is alert and oriented to person, place, and time.      Sensory: No sensory deficit.   Psychiatric:         Mood and Affect: Mood normal.         Behavior: Behavior normal.         Thought Content: Thought content normal.         Lines/Drains: none              Lab Results: I have reviewed the following results:   Results from last 7 days   Lab Units 12/19/24  0551 12/18/24  0509 12/17/24  0931   WBC Thousand/uL 9.74   < > 21.08*   HEMOGLOBIN g/dL 7.4*   < > 8.0*   HEMATOCRIT % 23.1*   < > 24.5*   PLATELETS Thousands/uL 69*   < > 49*   BANDS PCT %  --   --  8   LYMPHO PCT %  --   --  2*   MONO PCT %  --   --  1*   EOS PCT %  --   --  0    < > = values in this interval not displayed.     Results from last 7 days   Lab Units 12/19/24  0551 12/18/24  0509 12/17/24  0931   SODIUM mmol/L 140   < > 135   POTASSIUM mmol/L 3.6   < > 3.5   CHLORIDE mmol/L 115*   < > 105   CO2 mmol/L 16*   < > 19*   BUN mg/dL 49*   < > 69*   CREATININE mg/dL 2.05*   < > 3.67*   ANION GAP mmol/L 9   < > 11   CALCIUM mg/dL 8.1*   < > 8.7   ALBUMIN g/dL  --   --  2.9*   TOTAL BILIRUBIN mg/dL  --   --  0.65   ALK PHOS U/L  --   --  96   ALT U/L  --   --  21   AST U/L  --   --  25   GLUCOSE RANDOM mg/dL 127   < > 126    < > = values in this interval not displayed.     Results from last 7 days   Lab Units 12/17/24  0931   INR  1.46*             Results from last 7 days   Lab Units 12/18/24  0509 12/17/24  0931   LACTIC ACID mmol/L  --  1.1   PROCALCITONIN ng/ml 100.11* 114.23*       Recent Cultures (last 7 days):   Results from last 7 days   Lab Units 12/17/24  1248 12/17/24  0931   BLOOD CULTURE   --  No Growth at 48 hrs.  No Growth at 48 hrs.   LEGIONELLA URINARY ANTIGEN  Positive*  --        Imaging Results Review: No  pertinent imaging studies reviewed.  Other Study Results Review: No additional pertinent studies reviewed.    Last 24 Hours Medication List:     Current Facility-Administered Medications:     acetaminophen (TYLENOL) tablet 650 mg, Q6H PRN    benzonatate (TESSALON PERLES) capsule 100 mg, TID PRN    dextromethorphan-guaiFENesin (ROBITUSSIN DM) oral syrup 10 mL, Q4H PRN    levofloxacin (LEVAQUIN) tablet 750 mg, Every Other Day    sodium chloride 0.9 % infusion, Continuous, Last Rate: 100 mL/hr (12/19/24 1646)    Administrative Statements   Today, Patient Was Seen By: Radha Irvin MD  I have spent a total time of 35 minutes in caring for this patient on the day of the visit/encounter including Diagnostic results, Patient and family education, Impressions, Counseling / Coordination of care, Documenting in the medical record, Reviewing / ordering tests, medicine, procedures  , and Obtaining or reviewing history  .    **Please Note: This note may have been constructed using a voice recognition system.**

## 2024-12-20 NOTE — PLAN OF CARE
Problem: INFECTION - ADULT  Goal: Absence or prevention of progression during hospitalization  Description: INTERVENTIONS:  - Assess and monitor for signs and symptoms of infection  - Monitor lab/diagnostic results  - Monitor all insertion sites, i.e. indwelling lines, tubes, and drains  - Monitor endotracheal if appropriate and nasal secretions for changes in amount and color  - Foster appropriate cooling/warming therapies per order  - Administer medications as ordered  - Instruct and encourage patient and family to use good hand hygiene technique  - Identify and instruct in appropriate isolation precautions for identified infection/condition  Outcome: Progressing  Goal: Absence of fever/infection during neutropenic period  Description: INTERVENTIONS:  - Monitor WBC    Outcome: Progressing     Problem: SAFETY ADULT  Goal: Patient will remain free of falls  Description: INTERVENTIONS:  - Educate patient/family on patient safety including physical limitations  - Instruct patient to call for assistance with activity   - Consult OT/PT to assist with strengthening/mobility   - Keep Call bell within reach  - Keep bed low and locked with side rails adjusted as appropriate  - Keep care items and personal belongings within reach  - Initiate and maintain comfort rounds  - Make Fall Risk Sign visible to staff  - Offer Toileting every 2 Hours, in advance of need  - Initiate/Maintain bed alarm  - Obtain necessary fall risk management equipment: walker  - Apply yellow socks and bracelet for high fall risk patients  - Consider moving patient to room near nurses station  Outcome: Progressing     Problem: DISCHARGE PLANNING  Goal: Discharge to home or other facility with appropriate resources  Description: INTERVENTIONS:  - Identify barriers to discharge w/patient and caregiver  - Arrange for needed discharge resources and transportation as appropriate  - Identify discharge learning needs (meds, wound care, etc.)  - Arrange  for interpretive services to assist at discharge as needed  - Refer to Case Management Department for coordinating discharge planning if the patient needs post-hospital services based on physician/advanced practitioner order or complex needs related to functional status, cognitive ability, or social support system  Outcome: Progressing     Problem: PAIN - ADULT  Goal: Verbalizes/displays adequate comfort level or baseline comfort level  Description: Interventions:  - Encourage patient to monitor pain and request assistance  - Assess pain using appropriate pain scale  - Administer analgesics based on type and severity of pain and evaluate response  - Implement non-pharmacological measures as appropriate and evaluate response  - Consider cultural and social influences on pain and pain management  - Notify physician/advanced practitioner if interventions unsuccessful or patient reports new pain  Outcome: Progressing

## 2024-12-20 NOTE — ASSESSMENT & PLAN NOTE
Lab Results   Component Value Date    EGFR 43 12/20/2024    EGFR 32 12/19/2024    EGFR 22 12/18/2024    CREATININE 1.61 (H) 12/20/2024    CREATININE 2.05 (H) 12/19/2024    CREATININE 2.79 (H) 12/18/2024   Recent baseline creatinine from 1.5-1.9  Suspect prerenal due to poor oral intake  Received 1 L of IV fluids, continue sodium chloride and 100 cc/h  Bladder scan  Avoid nephrotoxins  No further fluids needed. States his appetite is slowly returning

## 2024-12-20 NOTE — ASSESSMENT & PLAN NOTE
Blood Pressure: 140/78    Home regimen amlodipine 10 mg daily, chlorthalidone 25 mg daily, doxazosin 2 mg daily and irbesartan 300 mg daily    Plan:  Restart home amlodipine 5 mg po qd  Hold chlorthalidone and irbesartan while recovering from MARCI, can restart 1 week outpt after discharge w BMP to follow up  Monitor blood pressure

## 2024-12-20 NOTE — ASSESSMENT & PLAN NOTE
Productive cough for 4 days, intermittent minimal hemoptysis per patient, most likely secondary to pneumonia  Recent Labs     12/18/24  0509 12/19/24  0551 12/20/24  0605   HGB 7.1* 7.4* 7.3*       No significant sputum output

## 2024-12-20 NOTE — ASSESSMENT & PLAN NOTE
Productive cough for 4 days, intermittent minimal hemoptysis per patient, most likely secondary to pneumonia  Recent Labs     12/17/24  0931 12/18/24  0509 12/19/24  0551   HGB 8.0* 7.1* 7.4*       No significant sputum output

## 2024-12-21 PROBLEM — A41.9 SEPSIS (HCC): Status: RESOLVED | Noted: 2024-12-17 | Resolved: 2024-12-21

## 2024-12-21 LAB
ANION GAP SERPL CALCULATED.3IONS-SCNC: 7 MMOL/L (ref 4–13)
BACTERIA SPT RESP CULT: ABNORMAL
BACTERIA SPT RESP CULT: ABNORMAL
BASOPHILS # BLD AUTO: 0 THOUSANDS/ÂΜL (ref 0–0.1)
BASOPHILS NFR BLD AUTO: 0 % (ref 0–1)
BUN SERPL-MCNC: 34 MG/DL (ref 5–25)
CALCIUM SERPL-MCNC: 8.3 MG/DL (ref 8.4–10.2)
CHLORIDE SERPL-SCNC: 113 MMOL/L (ref 96–108)
CO2 SERPL-SCNC: 21 MMOL/L (ref 21–32)
CREAT SERPL-MCNC: 1.52 MG/DL (ref 0.6–1.3)
EOSINOPHIL # BLD AUTO: 0.12 THOUSAND/ÂΜL (ref 0–0.61)
EOSINOPHIL NFR BLD AUTO: 2 % (ref 0–6)
ERYTHROCYTE [DISTWIDTH] IN BLOOD BY AUTOMATED COUNT: 16.4 % (ref 11.6–15.1)
GFR SERPL CREATININE-BSD FRML MDRD: 46 ML/MIN/1.73SQ M
GLUCOSE SERPL-MCNC: 107 MG/DL (ref 65–140)
GRAM STN SPEC: ABNORMAL
HCT VFR BLD AUTO: 24.5 % (ref 36.5–49.3)
HGB BLD-MCNC: 7.7 G/DL (ref 12–17)
IMM GRANULOCYTES # BLD AUTO: 0.05 THOUSAND/UL (ref 0–0.2)
IMM GRANULOCYTES NFR BLD AUTO: 1 % (ref 0–2)
LYMPHOCYTES # BLD AUTO: 0.28 THOUSANDS/ÂΜL (ref 0.6–4.47)
LYMPHOCYTES NFR BLD AUTO: 4 % (ref 14–44)
MCH RBC QN AUTO: 29.2 PG (ref 26.8–34.3)
MCHC RBC AUTO-ENTMCNC: 31.4 G/DL (ref 31.4–37.4)
MCV RBC AUTO: 93 FL (ref 82–98)
MONOCYTES # BLD AUTO: 0.33 THOUSAND/ÂΜL (ref 0.17–1.22)
MONOCYTES NFR BLD AUTO: 5 % (ref 4–12)
NEUTROPHILS # BLD AUTO: 6.02 THOUSANDS/ÂΜL (ref 1.85–7.62)
NEUTS SEG NFR BLD AUTO: 88 % (ref 43–75)
NRBC BLD AUTO-RTO: 0 /100 WBCS
PLATELET # BLD AUTO: 165 THOUSANDS/UL (ref 149–390)
PMV BLD AUTO: 12.9 FL (ref 8.9–12.7)
POTASSIUM SERPL-SCNC: 3.6 MMOL/L (ref 3.5–5.3)
RBC # BLD AUTO: 2.64 MILLION/UL (ref 3.88–5.62)
SODIUM SERPL-SCNC: 141 MMOL/L (ref 135–147)
WBC # BLD AUTO: 6.8 THOUSAND/UL (ref 4.31–10.16)

## 2024-12-21 PROCEDURE — 80048 BASIC METABOLIC PNL TOTAL CA: CPT

## 2024-12-21 PROCEDURE — 85025 COMPLETE CBC W/AUTO DIFF WBC: CPT

## 2024-12-21 PROCEDURE — 99232 SBSQ HOSP IP/OBS MODERATE 35: CPT

## 2024-12-21 RX ADMIN — HEPARIN SODIUM 5000 UNITS: 5000 INJECTION, SOLUTION INTRAVENOUS; SUBCUTANEOUS at 21:21

## 2024-12-21 RX ADMIN — HEPARIN SODIUM 5000 UNITS: 5000 INJECTION, SOLUTION INTRAVENOUS; SUBCUTANEOUS at 05:36

## 2024-12-21 RX ADMIN — HEPARIN SODIUM 5000 UNITS: 5000 INJECTION, SOLUTION INTRAVENOUS; SUBCUTANEOUS at 14:05

## 2024-12-21 RX ADMIN — ACETAMINOPHEN 650 MG: 325 TABLET, FILM COATED ORAL at 22:29

## 2024-12-21 RX ADMIN — ACETAMINOPHEN 650 MG: 325 TABLET, FILM COATED ORAL at 09:50

## 2024-12-21 RX ADMIN — AMLODIPINE BESYLATE 5 MG: 5 TABLET ORAL at 09:50

## 2024-12-21 NOTE — PLAN OF CARE
Problem: INFECTION - ADULT  Goal: Absence or prevention of progression during hospitalization  Description: INTERVENTIONS:  - Assess and monitor for signs and symptoms of infection  - Monitor lab/diagnostic results  - Monitor all insertion sites, i.e. indwelling lines, tubes, and drains  - Monitor endotracheal if appropriate and nasal secretions for changes in amount and color  - [place] appropriate cooling/warming therapies per order  - Administer medications as ordered  - Instruct and encourage patient and family to use good hand hygiene technique  - Identify and instruct in appropriate isolation precautions for identified infection/condition  Outcome: Progressing  Goal: Absence of fever/infection during neutropenic period  Description: INTERVENTIONS:  - Monitor WBC    Outcome: Progressing     Problem: SAFETY ADULT  Goal: Patient will remain free of falls  Description: INTERVENTIONS:  - Educate patient/family on patient safety including physical limitations  - Instruct patient to call for assistance with activity   - Consult OT/PT to assist with strengthening/mobility   - Keep Call bell within reach  - Keep bed low and locked with side rails adjusted as appropriate  - Keep care items and personal belongings within reach  - Initiate and maintain comfort rounds  - Make Fall Risk Sign visible to staff  - Offer Toileting every 2 Hours, in advance of need  - Initiate/Maintain bed alarm  - Obtain necessary fall risk management equipment: leg brace, crutch  - Apply yellow socks and bracelet for high fall risk patients  - Consider moving patient to room near nurses station  Outcome: Progressing     Problem: DISCHARGE PLANNING  Goal: Discharge to home or other facility with appropriate resources  Description: INTERVENTIONS:  - Identify barriers to discharge w/patient and caregiver  - Arrange for needed discharge resources and transportation as appropriate  - Identify discharge learning needs (meds, wound care, etc.)  -  Arrange for interpretive services to assist at discharge as needed  - Refer to Case Management Department for coordinating discharge planning if the patient needs post-hospital services based on physician/advanced practitioner order or complex needs related to functional status, cognitive ability, or social support system  Outcome: Progressing     Problem: PAIN - ADULT  Goal: Verbalizes/displays adequate comfort level or baseline comfort level  Description: Interventions:  - Encourage patient to monitor pain and request assistance  - Assess pain using appropriate pain scale  - Administer analgesics based on type and severity of pain and evaluate response  - Implement non-pharmacological measures as appropriate and evaluate response  - Consider cultural and social influences on pain and pain management  - Notify physician/advanced practitioner if interventions unsuccessful or patient reports new pain  Outcome: Progressing

## 2024-12-22 LAB
BACTERIA BLD CULT: NORMAL
BACTERIA BLD CULT: NORMAL

## 2024-12-22 PROCEDURE — 99231 SBSQ HOSP IP/OBS SF/LOW 25: CPT

## 2024-12-22 RX ADMIN — HEPARIN SODIUM 5000 UNITS: 5000 INJECTION, SOLUTION INTRAVENOUS; SUBCUTANEOUS at 13:32

## 2024-12-22 RX ADMIN — HEPARIN SODIUM 5000 UNITS: 5000 INJECTION, SOLUTION INTRAVENOUS; SUBCUTANEOUS at 05:31

## 2024-12-22 RX ADMIN — LEVOFLOXACIN 750 MG: 750 TABLET, FILM COATED ORAL at 08:28

## 2024-12-22 RX ADMIN — HEPARIN SODIUM 5000 UNITS: 5000 INJECTION, SOLUTION INTRAVENOUS; SUBCUTANEOUS at 21:16

## 2024-12-22 RX ADMIN — ACETAMINOPHEN 650 MG: 325 TABLET, FILM COATED ORAL at 13:32

## 2024-12-22 RX ADMIN — AMLODIPINE BESYLATE 5 MG: 5 TABLET ORAL at 08:28

## 2024-12-22 NOTE — PROGRESS NOTES
Progress Note - Hospitalist   Name: Zack Partida 69 y.o. male I MRN: [id number]  Unit/Bed#: [facility] 823-01 I Date of Admission: 12/17/2024   Date of Service: 12/22/2024 I Hospital Day: 5    Assessment & Plan  Legionella pneumonia (HCC)  With sepsis on admission. CXR findings consistent w LLL PNA.     Admission 102.5F, 109 HR, 88% O2, procal 114.     Started on CTX, d/c'd after legionella (+). Started on azithromycin 12/17, d/c'd next day for broader coverage for atypicals w levofloxacin.     Plan:  Continue levofloxacin 12/18--  ID has been consulted, appreciate recs  IS  Resp protocol  PT OT  No further fluids as appetite has improved  Acute kidney injury superimposed on CKD  (HCC)  Lab Results   Component Value Date    EGFR 46 12/21/2024    EGFR 43 12/20/2024    EGFR 32 12/19/2024    CREATININE 1.52 (H) 12/21/2024    CREATININE 1.61 (H) 12/20/2024    CREATININE 2.05 (H) 12/19/2024   Recent baseline creatinine from 1.5-1.9  Suspect prerenal due to poor oral intake  Improved w fluids  Bladder scan  Avoid nephrotoxins  No further fluids needed. States his appetite is slowly returning  Acute respiratory failure with hypoxia (HCC)  Due to new/acute legionella left lung pneumonia  Down to 2L n/c  Plan:  Respiratory protocl  Monitor and wean as tolerated  Goal oxygen saturation 90% and above  Anemia and thrombocytopenia  Likely secondary to chemo as he is actively on treatment this month  Baseline hemoglobin 10-11, most recent 9.1, on admission 8.0  Thrombocytopenia with platelets of 49  Patient has multiple myeloma currently undergoing chemo - chem DVT ppx on hold  Also acute illness can play a role  Patient complaining of minimal hemoptysis intermittently  Continue to monitor w daily labs  Essential hypertension  Blood Pressure: 135/75    Home regimen amlodipine 10 mg daily, chlorthalidone 25 mg daily, doxazosin 2 mg daily and irbesartan 300 mg daily      Plan:  Continue home amlodipine 5 mg po qd  Hold  chlorthalidone and irbesartan while recovering from MARCI  BMP 1 week on d/c to guide restart of HCTZ and ARB  Multiple myeloma not having achieved remission (HCC)  History of multiple myeloma currently undergoing chemo  Likely causative factor for anemia, thrombocytopenia  Hemoptysis  Productive cough for 4 days, intermittent minimal hemoptysis per patient, most likely secondary to pneumonia  Recent Labs     12/20/24  0605 12/21/24  0610   HGB 7.3* 7.7*       No significant sputum output. States he continues to have phlegm but not a severe amount  Stage 3b chronic kidney disease (HCC)  Lab Results   Component Value Date    EGFR 46 12/21/2024    EGFR 43 12/20/2024    EGFR 32 12/19/2024    CREATININE 1.52 (H) 12/21/2024    CREATININE 1.61 (H) 12/20/2024    CREATININE 2.05 (H) 12/19/2024   Baseline Cr 1.1-1.3    VTE Pharmacologic Prophylaxis: VTE Score: 7 High Risk (Score >/= 5) - Pharmacological DVT Prophylaxis Ordered: heparin. Sequential Compression Devices Ordered.    Mobility:   Basic Mobility Inpatient Raw Score: 17  JH-HLM Goal: 5: Stand one or more mins  JH-HLM Achieved: 5: Stand (1 or more minutes)  JH-HLM Goal achieved. Continue to encourage appropriate mobility.    Patient Centered Rounds: I performed bedside rounds with nursing staff today.   Discussions with Specialists or Other Care Team Provider: n/a    Education and Discussions with Family / Patient: Patient declined call to .     Current Length of Stay: 5 day(s)  Current Patient Status: Inpatient   Certification Statement: The patient will continue to require additional inpatient hospital stay due to pending placement to rehab, medically stable for legionella PNA tx, on oxygen and qualified for home O2.  Discharge Plan: Anticipate discharge in 48-72 hrs to rehab facility.    Code Status: Level 1 - Full Code    Subjective   No new complaints. Is doing strength exercises throughout the day in his room. Denies fevers/chills, chest pain,  shortness of breath, heart palpitations.      Objective :  Temp:  [99.8 °F (37.7 °C)-102 °F (38.9 °C)] 100.6 °F (38.1 °C)  HR:  [] 99  BP: (135-142)/(75-82) 135/75  Resp:  [16-19] 16  SpO2:  [91 %-93 %] 92 %  O2 Device: None (Room air)    Body mass index is 32.62 kg/m².     Input and Output Summary (last 24 hours):     Intake/Output Summary (Last 24 hours) at 12/22/2024 0843  Last data filed at 12/22/2024 0601  Gross per 24 hour   Intake 960 ml   Output 2125 ml   Net -1165 ml       Physical Exam  Vitals reviewed.   Constitutional:       General: He is not in acute distress.     Appearance: He is not ill-appearing.   HENT:      Head: Normocephalic and atraumatic.      Mouth/Throat:      Mouth: Mucous membranes are moist.      Pharynx: Oropharynx is clear.   Eyes:      General: No scleral icterus.     Extraocular Movements: Extraocular movements intact.   Cardiovascular:      Rate and Rhythm: Normal rate and regular rhythm.      Heart sounds: No murmur heard.     No friction rub. No gallop.   Pulmonary:      Effort: Pulmonary effort is normal. No respiratory distress.      Breath sounds: No stridor. No wheezing or rales.   Abdominal:      General: There is distension (mild).      Palpations: There is no mass.      Tenderness: There is no abdominal tenderness.   Musculoskeletal:         General: Normal range of motion.      Right lower leg: No edema.      Left lower leg: No edema.   Skin:     General: Skin is warm and dry.      Findings: No rash.   Neurological:      Mental Status: He is alert.      Sensory: No sensory deficit.   Psychiatric:         Mood and Affect: Mood normal.         Behavior: Behavior normal.         Thought Content: Thought content normal.         Lines/Drains:  none            Lab Results: I have reviewed the following results:   Results from last 7 days   Lab Units 12/21/24  0610 12/18/24  0509 12/17/24  0931   WBC Thousand/uL 6.80   < > 21.08*   HEMOGLOBIN g/dL 7.7*   < > 8.0*    HEMATOCRIT % 24.5*   < > 24.5*   PLATELETS Thousands/uL 165   < > 49*   BANDS PCT %  --   --  8   SEGS PCT % 88*  --   --    LYMPHO PCT % 4*  --  2*   MONO PCT % 5  --  1*   EOS PCT % 2  --  0    < > = values in this interval not displayed.     Results from last 7 days   Lab Units 12/21/24  0610 12/18/24  0509 12/17/24  0931   SODIUM mmol/L 141   < > 135   POTASSIUM mmol/L 3.6   < > 3.5   CHLORIDE mmol/L 113*   < > 105   CO2 mmol/L 21   < > 19*   BUN mg/dL 34*   < > 69*   CREATININE mg/dL 1.52*   < > 3.67*   ANION GAP mmol/L 7   < > 11   CALCIUM mg/dL 8.3*   < > 8.7   ALBUMIN g/dL  --   --  2.9*   TOTAL BILIRUBIN mg/dL  --   --  0.65   ALK PHOS U/L  --   --  96   ALT U/L  --   --  21   AST U/L  --   --  25   GLUCOSE RANDOM mg/dL 107   < > 126    < > = values in this interval not displayed.     Results from last 7 days   Lab Units 12/17/24  0931   INR  1.46*             Results from last 7 days   Lab Units 12/18/24  0509 12/17/24  0931   LACTIC ACID mmol/L  --  1.1   PROCALCITONIN ng/ml 100.11* 114.23*       Recent Cultures (last 7 days):   Results from last 7 days   Lab Units 12/19/24 2000 12/17/24  1248 12/17/24  0931   BLOOD CULTURE   --   --  No Growth After 4 Days.  No Growth After 4 Days.   SPUTUM CULTURE  2+ Growth of Candida albicans*  2+ Growth of  --   --    GRAM STAIN RESULT  2+ Polys*  1+ Gram positive rods*  No Epithelial cells seen*  --   --    LEGIONELLA URINARY ANTIGEN   --  Positive*  --        Imaging Results Review: No pertinent imaging studies reviewed.  Other Study Results Review: No additional pertinent studies reviewed.    Last 24 Hours Medication List:     Current Facility-Administered Medications:   •  acetaminophen (TYLENOL) tablet 650 mg, Q6H PRN  •  amLODIPine (NORVASC) tablet 5 mg, Daily  •  benzonatate (TESSALON PERLES) capsule 100 mg, TID PRN  •  dextromethorphan-guaiFENesin (ROBITUSSIN DM) oral syrup 10 mL, Q4H PRN  •  heparin (porcine) subcutaneous injection 5,000 Units, Q8H  LIZZETTE  •  levofloxacin (LEVAQUIN) tablet 750 mg, Every Other Day    Administrative Statements   Today, Patient Was Seen By: Radha Irvin MD  I have spent a total time of 35 minutes in caring for this patient on the day of the visit/encounter including Diagnostic results, Risks and benefits of tx options, and Patient and family education.    **Please Note: This note may have been constructed using a voice recognition system.**

## 2024-12-22 NOTE — ASSESSMENT & PLAN NOTE
Productive cough for 4 days, intermittent minimal hemoptysis per patient, most likely secondary to pneumonia  Recent Labs     12/19/24  0551 12/20/24  0605 12/21/24  0610   HGB 7.4* 7.3* 7.7*       No significant sputum output. States he continues to have phlegm but not a severe amount

## 2024-12-22 NOTE — ASSESSMENT & PLAN NOTE
Lab Results   Component Value Date    EGFR 46 12/21/2024    EGFR 43 12/20/2024    EGFR 32 12/19/2024    CREATININE 1.52 (H) 12/21/2024    CREATININE 1.61 (H) 12/20/2024    CREATININE 2.05 (H) 12/19/2024   Recent baseline creatinine from 1.5-1.9  Suspect prerenal due to poor oral intake  Improved w fluids  Bladder scan  Avoid nephrotoxins  No further fluids needed. States his appetite is slowly returning

## 2024-12-22 NOTE — ASSESSMENT & PLAN NOTE
Lab Results   Component Value Date    EGFR 46 12/21/2024    EGFR 43 12/20/2024    EGFR 32 12/19/2024    CREATININE 1.52 (H) 12/21/2024    CREATININE 1.61 (H) 12/20/2024    CREATININE 2.05 (H) 12/19/2024   Baseline Cr 1.1-1.3

## 2024-12-22 NOTE — ASSESSMENT & PLAN NOTE
Blood Pressure: 135/75    Home regimen amlodipine 10 mg daily, chlorthalidone 25 mg daily, doxazosin 2 mg daily and irbesartan 300 mg daily      Plan:  Continue home amlodipine 5 mg po qd  Hold chlorthalidone and irbesartan while recovering from MARCI  BMP 1 week on d/c to guide restart of HCTZ and ARB

## 2024-12-22 NOTE — ASSESSMENT & PLAN NOTE
Productive cough for 4 days, intermittent minimal hemoptysis per patient, most likely secondary to pneumonia  Recent Labs     12/20/24  0605 12/21/24  0610   HGB 7.3* 7.7*       No significant sputum output. States he continues to have phlegm but not a severe amount

## 2024-12-22 NOTE — ASSESSMENT & PLAN NOTE
Blood Pressure: 142/82    Home regimen amlodipine 10 mg daily, chlorthalidone 25 mg daily, doxazosin 2 mg daily and irbesartan 300 mg daily      Plan:  Continue home amlodipine 5 mg po qd  Hold chlorthalidone and irbesartan while recovering from MARCI  BMP 1 week on d/c to guide restart of HCTZ and ARB

## 2024-12-22 NOTE — PROGRESS NOTES
Progress Note - Hospitalist   Name: Zack Partida 69 y.o. male I MRN: [id number]  Unit/Bed#: [facility] 823-01 I Date of Admission: 12/17/2024   Date of Service: 12/21/2024 I Hospital Day: 4    Assessment & Plan  Sepsis (HCC) (Resolved: 12/21/2024)  Patient is a 69-year-old male with a past medical history of multiple myeloma currently undergoing chemo, hypertension who presented to the hospital with productive cough and poor oral intake for 4 days, developed fever day of admission  Met sepsis criteria with , Tmax 102.9, RR 20 on admission  Chest x-ray showed left lower lung pneumonia  COVID/flu/RSV negative  WBC 21, procalcitonin 114, lactic acid normal  In ER, received IV fluids, blood cultures drawn, received IV cefepime and vanc    Plan:  Tx as below - abx as under legionella     Legionella pneumonia (HCC)  With sepsis on admission. CXR findings consistent w LLL PNA.     Admission 102.5F, 109 HR, 88% O2, procal 114.     Started on CTX, d/c'd after legionella (+). Started on azithromycin 12/17, d/c'd next day for broader coverage for atypicals w levofloxacin.     Plan:  Continue levofloxacin 12/18--  ID has been consulted, appreciate recs  IS  Resp protocol  PT OT  No further fluids as appetite has improved  Acute kidney injury superimposed on CKD  (HCC)  Lab Results   Component Value Date    EGFR 46 12/21/2024    EGFR 43 12/20/2024    EGFR 32 12/19/2024    CREATININE 1.52 (H) 12/21/2024    CREATININE 1.61 (H) 12/20/2024    CREATININE 2.05 (H) 12/19/2024   Recent baseline creatinine from 1.5-1.9  Suspect prerenal due to poor oral intake  Improved w fluids  Bladder scan  Avoid nephrotoxins  No further fluids needed. States his appetite is slowly returning  Acute respiratory failure with hypoxia (HCC)  Due to new/acute legionella left lung pneumonia  Down to 2L n/c  Plan:  Respiratory protocl  Monitor and wean as tolerated  Goal oxygen saturation 90% and above  Anemia and thrombocytopenia  Likely secondary to  chemo as he is actively on treatment this month  Baseline hemoglobin 10-11, most recent 9.1, on admission 8.0  Thrombocytopenia with platelets of 49  Patient has multiple myeloma currently undergoing chemo - chem DVT ppx on hold  Also acute illness can play a role  Patient complaining of minimal hemoptysis intermittently  Continue to monitor w daily labs  Essential hypertension  Blood Pressure: 142/82    Home regimen amlodipine 10 mg daily, chlorthalidone 25 mg daily, doxazosin 2 mg daily and irbesartan 300 mg daily      Plan:  Continue home amlodipine 5 mg po qd  Hold chlorthalidone and irbesartan while recovering from MARCI  BMP 1 week on d/c to guide restart of HCTZ and ARB  Multiple myeloma not having achieved remission (HCC)  History of multiple myeloma currently undergoing chemo  Likely causative factor for anemia, thrombocytopenia  Hemoptysis  Productive cough for 4 days, intermittent minimal hemoptysis per patient, most likely secondary to pneumonia  Recent Labs     12/19/24  0551 12/20/24  0605 12/21/24  0610   HGB 7.4* 7.3* 7.7*       No significant sputum output. States he continues to have phlegm but not a severe amount  Stage 3b chronic kidney disease (HCC)  Lab Results   Component Value Date    EGFR 46 12/21/2024    EGFR 43 12/20/2024    EGFR 32 12/19/2024    CREATININE 1.52 (H) 12/21/2024    CREATININE 1.61 (H) 12/20/2024    CREATININE 2.05 (H) 12/19/2024   Baseline Cr 1.1-1.3    VTE Pharmacologic Prophylaxis: VTE Score: 7 High Risk (Score >/= 5) - Pharmacological DVT Prophylaxis Ordered: heparin. Sequential Compression Devices Ordered.    Mobility:   Basic Mobility Inpatient Raw Score: 17  -HLM Goal: 5: Stand one or more mins  JH-HLM Achieved: 5: Stand (1 or more minutes)  -HLM Goal achieved. Continue to encourage appropriate mobility.    Patient Centered Rounds: I performed bedside rounds with nursing staff today.   Discussions with Specialists or Other Care Team Provider: n/a    Education and  Discussions with Family / Patient: Updated  (daughter) via phone.    Current Length of Stay: 4 day(s)  Current Patient Status: Inpatient   Certification Statement: The patient will continue to require additional inpatient hospital stay due to pending placement to rehab, medically stable for legionella PNA tx, on oxygen and qualified for home O2.  Discharge Plan: Anticipate discharge in 48-72 hrs to rehab facility.    Code Status: Level 1 - Full Code    Subjective   Patient reports no complaints today. He states phlegm is still reddish, but improving in lesser quantity. Denies f/c, cp, sob, n/v.    Objective :  Temp:  [99.8 °F (37.7 °C)-101.9 °F (38.8 °C)] 100 °F (37.8 °C)  HR:  [] 98  BP: (142-153)/(82-90) 142/82  Resp:  [19-24] 19  SpO2:  [92 %-93 %] 93 %  O2 Device: None (Room air)    Body mass index is 32.62 kg/m².     Input and Output Summary (last 24 hours):     Intake/Output Summary (Last 24 hours) at 12/21/2024 1935  Last data filed at 12/21/2024 1520  Gross per 24 hour   Intake --   Output 1890 ml   Net -1890 ml       Physical Exam  Vitals reviewed.   Constitutional:       General: He is not in acute distress.     Appearance: He is not ill-appearing.   HENT:      Head: Normocephalic and atraumatic.      Mouth/Throat:      Mouth: Mucous membranes are moist.      Pharynx: Oropharynx is clear.   Eyes:      General: No scleral icterus.     Extraocular Movements: Extraocular movements intact.   Cardiovascular:      Rate and Rhythm: Normal rate and regular rhythm.      Heart sounds: No murmur heard.     No friction rub. No gallop.   Pulmonary:      Effort: Pulmonary effort is normal. No respiratory distress.      Breath sounds: No stridor. No wheezing or rales.   Abdominal:      General: There is distension (mild).      Palpations: There is no mass.      Tenderness: There is no abdominal tenderness.   Musculoskeletal:         General: Normal range of motion.      Right lower leg: No edema.       Left lower leg: No edema.   Skin:     General: Skin is warm and dry.      Findings: No rash.   Neurological:      Mental Status: He is alert.      Sensory: No sensory deficit.   Psychiatric:         Mood and Affect: Mood normal.         Behavior: Behavior normal.         Thought Content: Thought content normal.           Lines/Drains:  none            Lab Results: I have reviewed the following results:   Results from last 7 days   Lab Units 12/21/24  0610 12/18/24  0509 12/17/24  0931   WBC Thousand/uL 6.80   < > 21.08*   HEMOGLOBIN g/dL 7.7*   < > 8.0*   HEMATOCRIT % 24.5*   < > 24.5*   PLATELETS Thousands/uL 165   < > 49*   BANDS PCT %  --   --  8   SEGS PCT % 88*  --   --    LYMPHO PCT % 4*  --  2*   MONO PCT % 5  --  1*   EOS PCT % 2  --  0    < > = values in this interval not displayed.     Results from last 7 days   Lab Units 12/21/24  0610 12/18/24  0509 12/17/24  0931   SODIUM mmol/L 141   < > 135   POTASSIUM mmol/L 3.6   < > 3.5   CHLORIDE mmol/L 113*   < > 105   CO2 mmol/L 21   < > 19*   BUN mg/dL 34*   < > 69*   CREATININE mg/dL 1.52*   < > 3.67*   ANION GAP mmol/L 7   < > 11   CALCIUM mg/dL 8.3*   < > 8.7   ALBUMIN g/dL  --   --  2.9*   TOTAL BILIRUBIN mg/dL  --   --  0.65   ALK PHOS U/L  --   --  96   ALT U/L  --   --  21   AST U/L  --   --  25   GLUCOSE RANDOM mg/dL 107   < > 126    < > = values in this interval not displayed.     Results from last 7 days   Lab Units 12/17/24  0931   INR  1.46*             Results from last 7 days   Lab Units 12/18/24  0509 12/17/24  0931   LACTIC ACID mmol/L  --  1.1   PROCALCITONIN ng/ml 100.11* 114.23*       Recent Cultures (last 7 days):   Results from last 7 days   Lab Units 12/19/24  2000 12/17/24  1248 12/17/24  0931   BLOOD CULTURE   --   --  No Growth After 4 Days.  No Growth After 4 Days.   SPUTUM CULTURE  2+ Growth of Candida albicans*  2+ Growth of  --   --    GRAM STAIN RESULT  2+ Polys*  1+ Gram positive rods*  No Epithelial cells seen*  --   --     LEGIONELLA URINARY ANTIGEN   --  Positive*  --        Imaging Results Review: No pertinent imaging studies reviewed.  Other Study Results Review: No additional pertinent studies reviewed.    Last 24 Hours Medication List:     Current Facility-Administered Medications:     acetaminophen (TYLENOL) tablet 650 mg, Q6H PRN    amLODIPine (NORVASC) tablet 5 mg, Daily    benzonatate (TESSALON PERLES) capsule 100 mg, TID PRN    dextromethorphan-guaiFENesin (ROBITUSSIN DM) oral syrup 10 mL, Q4H PRN    heparin (porcine) subcutaneous injection 5,000 Units, Q8H LIZZETTE    levofloxacin (LEVAQUIN) tablet 750 mg, Every Other Day    Administrative Statements   Today, Patient Was Seen By: Radha Irvin MD  I have spent a total time of 35 minutes in caring for this patient on the day of the visit/encounter including Diagnostic results, Risks and benefits of tx options, and Patient and family education.    **Please Note: This note may have been constructed using a voice recognition system.**

## 2024-12-22 NOTE — PLAN OF CARE
Problem: INFECTION - ADULT  Goal: Absence or prevention of progression during hospitalization  Description: INTERVENTIONS:  - Assess and monitor for signs and symptoms of infection  - Monitor lab/diagnostic results  - Monitor all insertion sites, i.e. indwelling lines, tubes, and drains  - Monitor endotracheal if appropriate and nasal secretions for changes in amount and color  - [place] appropriate cooling/warming therapies per order  - Administer medications as ordered  - Instruct and encourage patient and family to use good hand hygiene technique  - Identify and instruct in appropriate isolation precautions for identified infection/condition  Outcome: Progressing  Goal: Absence of fever/infection during neutropenic period  Description: INTERVENTIONS:  - Monitor WBC    Outcome: Progressing     Problem: SAFETY ADULT  Goal: Patient will remain free of falls  Description: INTERVENTIONS:  - Educate patient/family on patient safety including physical limitations  - Instruct patient to call for assistance with activity   - Consult OT/PT to assist with strengthening/mobility   - Keep Call bell within reach  - Keep bed low and locked with side rails adjusted as appropriate  - Keep care items and personal belongings within reach  - Initiate and maintain comfort rounds  - Make Fall Risk Sign visible to staff  - Apply yellow socks and bracelet for high fall risk patients  - Consider moving patient to room near nurses station  Outcome: Progressing     Problem: DISCHARGE PLANNING  Goal: Discharge to home or other facility with appropriate resources  Description: INTERVENTIONS:  - Identify barriers to discharge w/patient and caregiver  - Arrange for needed discharge resources and transportation as appropriate  - Identify discharge learning needs (meds, wound care, etc.)  - Arrange for interpretive services to assist at discharge as needed  - Refer to Case Management Department for coordinating discharge planning if the  patient needs post-hospital services based on physician/advanced practitioner order or complex needs related to functional status, cognitive ability, or social support system  Outcome: Progressing     Problem: PAIN - ADULT  Goal: Verbalizes/displays adequate comfort level or baseline comfort level  Description: Interventions:  - Encourage patient to monitor pain and request assistance  - Assess pain using appropriate pain scale  - Administer analgesics based on type and severity of pain and evaluate response  - Implement non-pharmacological measures as appropriate and evaluate response  - Consider cultural and social influences on pain and pain management  - Notify physician/advanced practitioner if interventions unsuccessful or patient reports new pain  Outcome: Progressing

## 2024-12-22 NOTE — CASE MANAGEMENT
Case Management Discharge Planning Note    Patient name Zack Partida  Location [facility] 823/[facility] 823-01 MRN [id number]  : 1955 Date 2024       Current Admission Date: 2024  Current Admission Diagnosis:Legionella pneumonia (HCC)   Patient Active Problem List    Diagnosis Date Noted Date Diagnosed    Legionella pneumonia (HCC) 2024     Acute kidney injury superimposed on CKD  (HCC) 2024     Acute respiratory failure with hypoxia (HCC) 2024     Anemia and thrombocytopenia 2024     Hemoptysis 2024     Multiple myeloma not having achieved remission (HCC) 10/18/2024     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     IgG lambda monoclonal gammopathy 10/17/2024     Difficulty walking 2024     Nephrolithiasis 2024     Stage 3b chronic kidney disease (HCC) 2023     History of colonic polyps 2023     History of poliomyelitis 2021    Unspecified acquired deformity of unspecified lower leg 2021    Muscle weakness of extremity following poliomyelitis 2021     Prediabetes 2021     Obesity 2021     Essential hypertension 2021       LOS (days): 5  Geometric Mean LOS (GMLOS) (days):   Days to GMLOS:     OBJECTIVE:  Risk of Unplanned Readmission Score: 17.72         Current admission status: Inpatient   Preferred Pharmacy:   [id number] - [place] -  [address] [address] [address]  Phone: [phone] Fax: [phone]    [id number] - [place], [place] - [address] [place] [address]  Phone: [phone] Fax: [phone]    [facility] - [place], [place] - [address]  [address]  [address]  [facility] [address]  Phone: [phone] Fax: [phone]    Primary Care Provider: Ayanna Paz MD    Primary Insurance: BLUE CROSS  Secondary Insurance:     DISCHARGE DETAILS:            Additional Comments: Per [facility], they need not PT/OT notes to consider for placement.  CM reached out to PT to request pt be placed on list to be seen.

## 2024-12-23 PROBLEM — J96.01 ACUTE RESPIRATORY FAILURE WITH HYPOXIA (HCC): Status: RESOLVED | Noted: 2024-12-17 | Resolved: 2024-12-23

## 2024-12-23 PROCEDURE — 99233 SBSQ HOSP IP/OBS HIGH 50: CPT | Performed by: INTERNAL MEDICINE

## 2024-12-23 PROCEDURE — 97116 GAIT TRAINING THERAPY: CPT

## 2024-12-23 PROCEDURE — 99232 SBSQ HOSP IP/OBS MODERATE 35: CPT

## 2024-12-23 PROCEDURE — 97530 THERAPEUTIC ACTIVITIES: CPT

## 2024-12-23 RX ADMIN — HEPARIN SODIUM 5000 UNITS: 5000 INJECTION, SOLUTION INTRAVENOUS; SUBCUTANEOUS at 16:09

## 2024-12-23 RX ADMIN — HEPARIN SODIUM 5000 UNITS: 5000 INJECTION, SOLUTION INTRAVENOUS; SUBCUTANEOUS at 21:12

## 2024-12-23 RX ADMIN — AMLODIPINE BESYLATE 5 MG: 5 TABLET ORAL at 10:07

## 2024-12-23 RX ADMIN — HEPARIN SODIUM 5000 UNITS: 5000 INJECTION, SOLUTION INTRAVENOUS; SUBCUTANEOUS at 04:24

## 2024-12-23 RX ADMIN — ACETAMINOPHEN 650 MG: 325 TABLET, FILM COATED ORAL at 16:09

## 2024-12-23 NOTE — PLAN OF CARE
Problem: OCCUPATIONAL THERAPY ADULT  Goal: Performs self-care activities at highest level of function for planned discharge setting.  See evaluation for individualized goals.  Description: Treatment Interventions: ADL retraining, Functional transfer training, UE strengthening/ROM, Endurance training, Patient/family training, Cognitive reorientation, Equipment evaluation/education, Fine motor coordination activities, Compensatory technique education, Continued evaluation, Energy conservation, Activityengagement          See flowsheet documentation for full assessment, interventions and recommendations.   Outcome: Progressing  Note: Limitation: Decreased ADL status, Decreased Safe judgement during ADL, Decreased endurance  Prognosis: Fair  Assessment: Patient participated in Skilled OT session this date with interventions consisting of ADL re training with the use of correct body mechnaics, Energy Conservation techniques, safety awareness and fall prevention techniques,  therapeutic activities to: increase activity tolerance, increase dynamic sit/ stand balance during functional activity , and increase OOB/ sitting tolerance . Patient agreeable to OT treatment session, upon arrival patient was found seated OOB to Chair. Pt engaged in full ADL and functional transfers. Please refer to chart for functional levels. Patient requiring frequent re direction, verbal cues for safety, verbal cues for correct technique, verbal cues for pacing thru activity steps, and frequent rest periods. Patient continues to be functioning below baseline level, occupational performance remains limited secondary to factors listed above and increased risk for falls and injury.   From OT standpoint, recommendation at time of d/c would be level II Moderate Resource Intensity. The patient's raw score on the -PAC Daily Activity Inpatient Short Form is 18. A raw score of less than 19 suggests the patient may benefit from discharge to post-acute  rehabilitation services. Please refer to the recommendation of the Occupational Therapist for safe discharge planning.  Patient to benefit from continued Occupational Therapy treatment while in the hospital to address deficits as defined above and maximize level of functional independence with ADLs and functional mobility.     Rehab Resource Intensity Level, OT: II (Moderate Resource Intensity)

## 2024-12-23 NOTE — ASSESSMENT & PLAN NOTE
Lab Results   Component Value Date    EGFR 46 12/21/2024    EGFR 43 12/20/2024    EGFR 32 12/19/2024    CREATININE 1.52 (H) 12/21/2024    CREATININE 1.61 (H) 12/20/2024    CREATININE 2.05 (H) 12/19/2024

## 2024-12-23 NOTE — ASSESSMENT & PLAN NOTE
Unclear risk factor/exposure.  While antigen has high specificity, consider other respiratory pathogens as well.  Temperature curve trending down and the patient is clinically improved.    Continue levofloxacin through 12/27 for 10 days total  Follow respiratory status closely

## 2024-12-23 NOTE — CASE MANAGEMENT
Case Management Discharge Planning Note    Patient name Zack Partida  Location [facility] 823/[facility] 823-01 MRN [id number]  : 1955 Date 2024       Current Admission Date: 2024  Current Admission Diagnosis:Legionella pneumonia (HCC)   Patient Active Problem List    Diagnosis Date Noted Date Diagnosed    Legionella pneumonia (HCC) 2024     Acute kidney injury superimposed on CKD  (HCC) 2024     Acute respiratory failure with hypoxia (HCC) 2024     Anemia and thrombocytopenia 2024     Hemoptysis 2024     Multiple myeloma not having achieved remission (HCC) 10/18/2024     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     IgG lambda monoclonal gammopathy 10/17/2024     Difficulty walking 2024     Nephrolithiasis 2024     Stage 3b chronic kidney disease (HCC) 2023     History of colonic polyps 2023     History of poliomyelitis 2021    Unspecified acquired deformity of unspecified lower leg 2021    Muscle weakness of extremity following poliomyelitis 2021     Prediabetes 2021     Obesity 2021     Essential hypertension 2021       LOS (days): 6  Geometric Mean LOS (GMLOS) (days):   Days to GMLOS:     OBJECTIVE:  Risk of Unplanned Readmission Score: 17.98         Current admission status: Inpatient   Preferred Pharmacy:   [id number] - [place] -  [address]   [place] [address]  Phone: [phone] Fax: [phone]    [id number] - [place], [place] - [address] [place] [address]  Phone: [phone] Fax: [phone]    [facility] - [place], [place] - [address]  [address]  [address]  [facility] [address]  Phone: [phone] Fax: [phone]    Primary Care Provider: Ayanna Paz MD    Primary Insurance: BLUE CROSS  Secondary Insurance:     DISCHARGE DETAILS:              Per  [facility], able to accommodate  Auth tasked to DCS for submission

## 2024-12-23 NOTE — PROGRESS NOTES
Progress Note - Hospitalist   Name: Zack Partida 69 y.o. male I MRN: [id number]  Unit/Bed#: [facility] 823-01 I Date of Admission: 12/17/2024   Date of Service: 12/23/2024 I Hospital Day: 6    Assessment & Plan  Legionella pneumonia (HCC)  With sepsis on admission. CXR findings consistent w LLL PNA.     Admission 102.5F, 109 HR, 88% O2, procal 114.     Started on CTX, d/c'd after legionella (+). Started on azithromycin 12/17, d/c'd next day for broader coverage for atypicals w levofloxacin.     Plan:  Continue levofloxacin 12/18-12/27 for 10d total  ID has been consulted, appreciate recs  IS  Resp protocol  PT OT  No further fluids as appetite has improved  Acute kidney injury superimposed on CKD  (HCC)  Lab Results   Component Value Date    EGFR 46 12/21/2024    EGFR 43 12/20/2024    EGFR 32 12/19/2024    CREATININE 1.52 (H) 12/21/2024    CREATININE 1.61 (H) 12/20/2024    CREATININE 2.05 (H) 12/19/2024   Recent baseline creatinine from 1.5-1.9  Suspect prerenal due to poor oral intake  Improved w fluids  Bladder scan  Avoid nephrotoxins  No further fluids needed. States his appetite is slowly returning  Holding home irbesartan and chlorthalidone until MARCI resolves   Acute respiratory failure with hypoxia (HCC) (Resolved: 12/23/2024)  Due to new/acute legionella left lung pneumonia  Down to 2L n/c  Off O2 12/21 onwards  Plan:  Respiratory protocol  Monitor and wean as tolerated  Goal oxygen saturation 90% and above  Anemia and thrombocytopenia  Likely secondary to chemo as he is actively on treatment this month  Baseline hemoglobin 10-11, most recent 9.1, on admission 8.0  Thrombocytopenia with platelets of 49  Patient has multiple myeloma currently undergoing chemo - chem DVT ppx on hold  Also acute illness can play a role  Patient complaining of minimal hemoptysis intermittently  Continue to monitor w daily labs  Essential hypertension  Blood Pressure: 140/76    Home regimen amlodipine 10 mg daily, chlorthalidone  25 mg daily, doxazosin 2 mg daily and irbesartan 300 mg daily    Plan:  Continue home amlodipine 5 mg po qd  Hold chlorthalidone and irbesartan while recovering from MARCI  BMP 1 week on d/c to guide restart of HCTZ and ARB  Multiple myeloma not having achieved remission (HCC)  History of multiple myeloma currently undergoing chemo  Likely causative factor for anemia, thrombocytopenia  Hemoptysis  Productive cough for 4 days, intermittent minimal hemoptysis per patient, most likely secondary to pneumonia  Recent Labs     12/21/24  0610   HGB 7.7*       No significant sputum output. States he continues to have phlegm but not a severe amount  Periodic CBC  Stage 3b chronic kidney disease (HCC)  Lab Results   Component Value Date    EGFR 46 12/21/2024    EGFR 43 12/20/2024    EGFR 32 12/19/2024    CREATININE 1.52 (H) 12/21/2024    CREATININE 1.61 (H) 12/20/2024    CREATININE 2.05 (H) 12/19/2024   Baseline Cr 1.1-1.3    VTE Pharmacologic Prophylaxis: VTE Score: 7 High Risk (Score >/= 5) - Pharmacological DVT Prophylaxis Ordered: heparin. Sequential Compression Devices Ordered.    Mobility:   Basic Mobility Inpatient Raw Score: 15  JH-HLM Goal: 4: Move to chair/commode  JH-HLM Achieved: 7: Walk 25 feet or more  JH-HLM Goal achieved. Continue to encourage appropriate mobility.    Patient Centered Rounds: I performed bedside rounds with nursing staff today.   Discussions with Specialists or Other Care Team Provider: ID    Education and Discussions with Family / Patient: Patient declined call to .     Current Length of Stay: 6 day(s)  Current Patient Status: Inpatient   Certification Statement: The patient will continue to require additional inpatient hospital stay due to pending placement to rehab, medically stable for legionella PNA tx, on room air no home O2 needs.  Discharge Plan: Anticipate discharge in 48-72 hrs to rehab facility.    Code Status: Level 1 - Full Code    Subjective   No new complaints.  States he continues to have small phlegm cough-up with hemoptysis but no other issues. Cough is controlled. Denies fevers/chills, chest pain, shortness of breath, heart palpitations, nausea, vomiting, abdominal pain, weakness, or numbness.      Objective :  Temp:  [99.2 °F (37.3 °C)-100 °F (37.8 °C)] 99.2 °F (37.3 °C)  HR:  [88-98] 88  BP: (139-140)/(76) 140/76  Resp:  [16-18] 16  SpO2:  [93 %-95 %] 93 %  O2 Device: None (Room air)    Body mass index is 32.62 kg/m².     Input and Output Summary (last 24 hours):     Intake/Output Summary (Last 24 hours) at 12/23/2024 1551  Last data filed at 12/23/2024 1219  Gross per 24 hour   Intake 350 ml   Output 1550 ml   Net -1200 ml       Physical Exam  Vitals reviewed.   Constitutional:       General: He is not in acute distress.     Appearance: He is not ill-appearing.   HENT:      Head: Normocephalic and atraumatic.      Mouth/Throat:      Mouth: Mucous membranes are moist.      Pharynx: Oropharynx is clear.   Eyes:      General: No scleral icterus.     Extraocular Movements: Extraocular movements intact.   Cardiovascular:      Rate and Rhythm: Normal rate and regular rhythm.      Heart sounds: No murmur heard.     No friction rub. No gallop.   Pulmonary:      Effort: Pulmonary effort is normal. No respiratory distress.      Breath sounds: No stridor. No wheezing or rales.   Abdominal:      General: There is distension (mild).   Skin:     General: Skin is warm and dry.   Neurological:      Mental Status: He is alert.   Psychiatric:         Mood and Affect: Mood normal.         Behavior: Behavior normal.           Lines/Drains:  none        Lab Results: I have reviewed the following results:   Results from last 7 days   Lab Units 12/21/24  0610 12/18/24  0509 12/17/24  0931   WBC Thousand/uL 6.80   < > 21.08*   HEMOGLOBIN g/dL 7.7*   < > 8.0*   HEMATOCRIT % 24.5*   < > 24.5*   PLATELETS Thousands/uL 165   < > 49*   BANDS PCT %  --   --  8   SEGS PCT % 88*  --   --    LYMPHO  PCT % 4*  --  2*   MONO PCT % 5  --  1*   EOS PCT % 2  --  0    < > = values in this interval not displayed.     Results from last 7 days   Lab Units 12/21/24  0610 12/18/24  0509 12/17/24  0931   SODIUM mmol/L 141   < > 135   POTASSIUM mmol/L 3.6   < > 3.5   CHLORIDE mmol/L 113*   < > 105   CO2 mmol/L 21   < > 19*   BUN mg/dL 34*   < > 69*   CREATININE mg/dL 1.52*   < > 3.67*   ANION GAP mmol/L 7   < > 11   CALCIUM mg/dL 8.3*   < > 8.7   ALBUMIN g/dL  --   --  2.9*   TOTAL BILIRUBIN mg/dL  --   --  0.65   ALK PHOS U/L  --   --  96   ALT U/L  --   --  21   AST U/L  --   --  25   GLUCOSE RANDOM mg/dL 107   < > 126    < > = values in this interval not displayed.     Results from last 7 days   Lab Units 12/17/24  0931   INR  1.46*             Results from last 7 days   Lab Units 12/18/24  0509 12/17/24  0931   LACTIC ACID mmol/L  --  1.1   PROCALCITONIN ng/ml 100.11* 114.23*       Recent Cultures (last 7 days):   Results from last 7 days   Lab Units 12/19/24 2000 12/17/24  1248 12/17/24  0931   BLOOD CULTURE   --   --  No Growth After 5 Days.  No Growth After 5 Days.   SPUTUM CULTURE  2+ Growth of Candida albicans*  2+ Growth of  --   --    GRAM STAIN RESULT  2+ Polys*  1+ Gram positive rods*  No Epithelial cells seen*  --   --    LEGIONELLA URINARY ANTIGEN   --  Positive*  --        Imaging Results Review: No pertinent imaging studies reviewed.  Other Study Results Review: No additional pertinent studies reviewed.    Last 24 Hours Medication List:     Current Facility-Administered Medications:     acetaminophen (TYLENOL) tablet 650 mg, Q6H PRN    amLODIPine (NORVASC) tablet 5 mg, Daily    benzonatate (TESSALON PERLES) capsule 100 mg, TID PRN    dextromethorphan-guaiFENesin (ROBITUSSIN DM) oral syrup 10 mL, Q4H PRN    heparin (porcine) subcutaneous injection 5,000 Units, Q8H LIZZETTE    levofloxacin (LEVAQUIN) tablet 750 mg, Every Other Day    Administrative Statements   Today, Patient Was Seen By: Radha Irvin,  MD  I have spent a total time of 35 minutes in caring for this patient on the day of the visit/encounter including Diagnostic results, Risks and benefits of tx options, and Patient and family education.    **Please Note: This note may have been constructed using a voice recognition system.**

## 2024-12-23 NOTE — PLAN OF CARE
Problem: PHYSICAL THERAPY ADULT  Goal: Performs mobility at highest level of function for planned discharge setting.  See evaluation for individualized goals.  Description: Treatment/Interventions: Functional transfer training, Therapeutic exercise, Elevations, Bed mobility, Gait training, Spoke to nursing, Spoke to case management, OT  Equipment Recommended: Walker       See flowsheet documentation for full assessment, interventions and recommendations.  Outcome: Progressing  Note: Prognosis: Good  Problem List: Decreased strength, Decreased endurance, Impaired balance, Decreased mobility  Assessment: Pt seen for PT treatment session this date. Therapy session focused on functional transfers, and ambulation in order to improve overall mobility and independence. Pt cooperative and very pleasant during session. Pt requires minimal assist for donning brace on . Pt with improved mobility level compared to IE , with decreased assistance required from therapist. Increased activity tolerance, no complaints of FARIA, SPO2 on RA 93%,  with activity . Pt able to transfer with CG A, then ambulates with assistance. Pt declines to use AD at this time, noted to have impaired balance. Pt making steady progress toward goals. Pt was left in recliner at the end of PT session with all needs in reach. Pt would benefit from continued PT services while in hospital to address remaining limitations. The patient's AM-PAC Basic Mobility Inpatient Short Form Raw Score is 15. A Raw score of less than or equal to 16 suggests the patient may benefit from discharge to post-acute rehabilitation services. Please also refer to the recommendation of the Physical Therapist for safe discharge planning. Post dc recommendation is Level I at this time as pt would benefit from continued therapy services to maximize mobility , endurance training. Pt demonstrates good Rehab potential.  Barriers to Discharge: Decreased caregiver support, Inaccessible  home environment     Rehab Resource Intensity Level, PT: I (Maximum Resource Intensity)    See flowsheet documentation for full assessment.         100

## 2024-12-23 NOTE — OCCUPATIONAL THERAPY NOTE
Occupational Therapy Treatment Note      Zack Vosss    12/23/2024    Principal Problem:    Legionella pneumonia (HCC)  Active Problems:    Essential hypertension    Stage 3b chronic kidney disease (HCC)    Multiple myeloma not having achieved remission (HCC)    Acute kidney injury superimposed on CKD  (HCC)    Acute respiratory failure with hypoxia (HCC)    Anemia and thrombocytopenia    Hemoptysis      Past Medical History:   Diagnosis Date    Hypertension     Multiple myeloma (HCC)     Polio        Past Surgical History:   Procedure Laterality Date    IR BIOPSY BONE MARROW  6/11/2024    IR BIOPSY BONE MARROW  10/4/2024    IR BIOPSY KIDNEY RANDOM  5/29/2024    LEG SURGERY      for polio        12/23/24 1300   OT Last Visit   OT Visit Date 12/23/24   Note Type   Note Type Treatment for insurance authorization   Pain Assessment   Pain Assessment Tool 0-10   Pain Score No Pain   Restrictions/Precautions   Weight Bearing Precautions Per Order No   Braces or Orthoses Other (Comment)  (RLE KAFO 2' h/p polio - not donned during OT session per Pt request)   Other Precautions Chair Alarm;Bed Alarm;Multiple lines;Fall Risk  ([place] speaking -  services utilized (interpretor [id number] today))   Lifestyle   Autonomy IND PTA with all ADLs, assist PRN for IADLs. No DME used at baseline, crutches used if not wearing RLE brace.   Reciprocal Relationships Lives with supportive Dtr and grandson, provide assist PRN   Service to Others Works FT from 3pm to 2am.   Intrinsic Gratification Spending time with family   ADL   Where Assessed Chair  (simulated ADL)   Grooming Assistance 5  Supervision/Setup   UB Bathing Assistance 4  Minimal Assistance   LB Bathing Assistance 4  Minimal Assistance   UB Dressing Assistance 5  Supervision/Setup   LB Dressing Assistance 4  Minimal Assistance   Toileting Assistance  4  Minimal Assistance   Bed Mobility   Supine to Sit Unable to assess   Sit to Supine Unable to assess   Additional  Comments Pt seated OOB in chair upon OT arrival. Pt seated OOB in chair w/ chair alarm activated and all needs in reach s/p OT session.   Transfers   Sit to Stand 4  Minimal assistance   Additional items Assist x 1;Increased time required;Verbal cues;Armrests   Stand to Sit 4  Minimal assistance   Additional items Assist x 1;Increased time required;Verbal cues;Armrests  (A for controlled descent into chair)   Additional Comments tranfsers w/ RW. VC required for safety and hand placement.   Cognition   Overall Cognitive Status WFL   Arousal/Participation Alert;Cooperative   Attention Within functional limits   Orientation Level Oriented X4   Memory Within functional limits   Following Commands Follows all commands and directions without difficulty   Comments Pt pleasant, cooperative, and motivated for therapy today. Pt required VC for safety awareness   Activity Tolerance   Activity Tolerance Patient limited by fatigue   Medical Staff Made Aware RN cleared Pt for OT session   Assessment   Assessment Patient participated in Skilled OT session this date with interventions consisting of ADL re training with the use of correct body mechnaics, Energy Conservation techniques, safety awareness and fall prevention techniques,  therapeutic activities to: increase activity tolerance, increase dynamic sit/ stand balance during functional activity , and increase OOB/ sitting tolerance . Patient agreeable to OT treatment session, upon arrival patient was found seated OOB to Chair. Pt engaged in simulated full ADL and functional transfers. Please refer to chart for functional levels. Patient requiring frequent re direction, verbal cues for safety, verbal cues for correct technique, verbal cues for pacing thru activity steps, and frequent rest periods. Patient continues to be functioning below baseline level, occupational performance remains limited secondary to factors listed above and increased risk for falls and injury.   From  OT standpoint, recommendation at time of d/c would be level II Moderate Resource Intensity. The patient's raw score on the AM-PAC Daily Activity Inpatient Short Form is 18. A raw score of less than 19 suggests the patient may benefit from discharge to post-acute rehabilitation services. Please refer to the recommendation of the Occupational Therapist for safe discharge planning.  Patient to benefit from continued Occupational Therapy treatment while in the hospital to address deficits as defined above and maximize level of functional independence with ADLs and functional mobility.   Plan   Treatment Interventions ADL retraining;Functional transfer training;UE strengthening/ROM;Endurance training;Patient/family training;Equipment evaluation/education;Compensatory technique education;Activityengagement;Energy conservation   Goal Expiration Date 01/02/25   OT Treatment Day 1   OT Frequency 2-3x/wk   Discharge Recommendation   Rehab Resource Intensity Level, OT II (Moderate Resource Intensity)   AM-PAC Daily Activity Inpatient   Lower Body Dressing 3   Bathing 3   Toileting 3   Upper Body Dressing 3   Grooming 3   Eating 3   Daily Activity Raw Score 18   Daily Activity Standardized Score (Calc for Raw Score >=11) 38.66   -PAC Applied Cognition Inpatient   Following a Speech/Presentation 4   Understanding Ordinary Conversation 4   Taking Medications 4   Remembering Where Things Are Placed or Put Away 4   Remembering List of 4-5 Errands 4   Taking Care of Complicated Tasks 4   Applied Cognition Raw Score 24   Applied Cognition Standardized Score 62.21         Rosa Dias MS, OTR/L

## 2024-12-23 NOTE — ARC ADMISSION
Patient appears appropriate for ARC pending confirmation family support once home  / medical readiness / continued functional need /insurance auth /  bed avail  - CM made aware in aidin

## 2024-12-23 NOTE — ASSESSMENT & PLAN NOTE
Due to new/acute legionella left lung pneumonia  Down to 2L n/c  Off O2 12/21 onwards  Plan:  Respiratory protocol  Monitor and wean as tolerated  Goal oxygen saturation 90% and above

## 2024-12-23 NOTE — CASE MANAGEMENT
[facility] received request for authorization from Care Manager.  Authorization request submitted for: Acute Rehab  Facility Name: [facility]  NPI: [id number]  Facility MD: Dr. Ashley DePadua NPI: [id number]  Authorization initiated by contacting insurance:  [employer]   Via: Fax  Clinicals submitted via fax # [phone]    Care Manager notified: Nichelle Cervantes      Updates to authorization status will be noted in chart. Please reach out to CM for updates on any clinical information.

## 2024-12-23 NOTE — ASSESSMENT & PLAN NOTE
With sepsis on admission. CXR findings consistent w LLL PNA.     Admission 102.5F, 109 HR, 88% O2, procal 114.     Started on CTX, d/c'd after legionella (+). Started on azithromycin 12/17, d/c'd next day for broader coverage for atypicals w levofloxacin.     Plan:  Continue levofloxacin 12/18-12/27 for 10d total  ID has been consulted, appreciate recs  IS  Resp protocol  PT OT  No further fluids as appetite has improved

## 2024-12-23 NOTE — PHYSICAL THERAPY NOTE
PHYSICAL THERAPY NOTE          Patient Name: Zack Partida  Today's Date: 12/23/2024 12/23/24 0826   PT Last Visit   PT Visit Date 12/23/24   Note Type   Note Type Treatment for insurance authorization   Pain Assessment   Pain Assessment Tool 0-10   Pain Score No Pain   Restrictions/Precautions   Weight Bearing Precautions Per Order No   Braces or Orthoses Other (Comment)  (RLE KALAURA, h/p polio)   Other Precautions Chair Alarm;Bed Alarm;Multiple lines;Fall Risk  (Primarily Sammarinese speaking,  services used)   General   Chart Reviewed Yes   Family/Caregiver Present No   Cognition   Overall Cognitive Status WFL   Arousal/Participation Alert;Responsive   Attention Within functional limits   Orientation Level Oriented X4   Following Commands Follows one step commands without difficulty   Comments Pt cooperative during session   Subjective   Subjective Agreeable to mobilize   Bed Mobility   Supine to Sit Unable to assess   Sit to Supine Unable to assess   Additional Comments Pt OOB in bathroom with PCA upon arrival.   Transfers   Sit to Stand   (CG A)   Additional items Assist x 1;Increased time required;Verbal cues   Stand to Sit 5  Supervision   Additional items Increased time required;Verbal cues   Additional Comments No AD used, pt declines. Pt requires some  assistance to don R LE brace   Ambulation/Elevation   Gait pattern Improper Weight shift;Inconsistent haylie;Excessively slow;Short stride   Gait Assistance 4  Minimal assist   Additional items Assist x 1;Verbal cues   Assistive Device None  (Pt declines to use AD, reaches for hallway railing at times for balance)   Distance 40 ft   Ambulation/Elevation Additional Comments LOB noted X 2 durign session, with pt reaching to hallway railing for steadiness. Pt declines to utilize AD .   Balance   Static Sitting Fair +   Dynamic Sitting Fair   Static Standing Fair -    Dynamic Standing Poor +   Ambulatory Poor   Endurance Deficit   Endurance Deficit Yes   Activity Tolerance   Activity Tolerance Patient limited by fatigue   Nurse Made Aware RN cleared pt for therapy   Assessment   Prognosis Good   Problem List Decreased strength;Decreased endurance;Impaired balance;Decreased mobility   Assessment Pt seen for PT treatment session this date. Therapy session focused on functional transfers, and ambulation in order to improve overall mobility and independence. Pt cooperative and very pleasant during session. Pt requires minimal assist for donning brace on . Pt with improved mobility level compared to IE , with decreased assistance required from therapist. Increased activity tolerance, no complaints of FARIA, SPO2 on RA 93%,  with activity . Pt able to transfer with CG A, then ambulates with assistance. Pt declines to use AD at this time, noted to have impaired balance. Pt making steady progress toward goals. Pt was left in recliner at the end of PT session with all needs in reach. Pt would benefit from continued PT services while in hospital to address remaining limitations. The patient's AM-PAC Basic Mobility Inpatient Short Form Raw Score is 15. A Raw score of less than or equal to 16 suggests the patient may benefit from discharge to post-acute rehabilitation services. Please also refer to the recommendation of the Physical Therapist for safe discharge planning. Post dc recommendation is Level I at this time as pt would benefit from continued therapy services to maximize mobility , endurance training. Pt demonstrates good Rehab potential.   Barriers to Discharge Decreased caregiver support;Inaccessible home environment   Goals   Patient Goals to get up   STG Expiration Date 01/02/24   PT Treatment Day 1   Plan   Treatment/Interventions Functional transfer training;Elevations;Therapeutic exercise;Bed mobility;Gait training;Spoke to nursing;Spoke to case management;OT   Progress  Progressing toward goals   PT Frequency 3-5x/wk   Discharge Recommendation   Rehab Resource Intensity Level, PT I (Maximum Resource Intensity)   Equipment Recommended Walker   AM-PAC Basic Mobility Inpatient   Turning in Flat Bed Without Bedrails 3   Lying on Back to Sitting on Edge of Flat Bed Without Bedrails 2   Moving Bed to Chair 3   Standing Up From Chair Using Arms 3   Walk in Room 3   Climb 3-5 Stairs With Railing 1   Basic Mobility Inpatient Raw Score 15   Basic Mobility Standardized Score 36.97   [facility] Highest Level Of Mobility   -[facility] Goal 4: Move to chair/commode   -HL Achieved 7: Walk 25 feet or more   Education   Education Provided Mobility training   Patient Reinforcement needed   End of Consult   Patient Position at End of Consult All needs within reach;Bed/Chair alarm activated;Bedside chair   Kassie Burgess PT DPT

## 2024-12-23 NOTE — ASSESSMENT & PLAN NOTE
Productive cough for 4 days, intermittent minimal hemoptysis per patient, most likely secondary to pneumonia  Recent Labs     12/21/24  0610   HGB 7.7*       No significant sputum output. States he continues to have phlegm but not a severe amount  Periodic CBC

## 2024-12-23 NOTE — ARC ADMISSION
[facility]  met with patient at bedside. Reviewed ARC program, acute rehab criteria and approval process, insurance authorization process, as well as ARC locations and preferences. Patient's preferred [facility] location is [facility].  [facility] Rehab folder left with patient and all questions answered. Patient was made aware that ARC Reviewer will keep their  updated regarding referral status.

## 2024-12-23 NOTE — ASSESSMENT & PLAN NOTE
Lab Results   Component Value Date    EGFR 46 12/21/2024    EGFR 43 12/20/2024    EGFR 32 12/19/2024    CREATININE 1.52 (H) 12/21/2024    CREATININE 1.61 (H) 12/20/2024    CREATININE 2.05 (H) 12/19/2024   Recent baseline creatinine from 1.5-1.9  Suspect prerenal due to poor oral intake  Improved w fluids  Bladder scan  Avoid nephrotoxins  No further fluids needed. States his appetite is slowly returning  Holding home irbesartan and chlorthalidone until MACRI resolves

## 2024-12-23 NOTE — ASSESSMENT & PLAN NOTE
Blood Pressure: 140/76    Home regimen amlodipine 10 mg daily, chlorthalidone 25 mg daily, doxazosin 2 mg daily and irbesartan 300 mg daily    Plan:  Continue home amlodipine 5 mg po qd  Hold chlorthalidone and irbesartan while recovering from MARCI  BMP 1 week on d/c to guide restart of HCTZ and ARB

## 2024-12-23 NOTE — PROGRESS NOTES
Progress Note - Infectious Disease   Name: Zack Partida 69 y.o. male I MRN: [id number]  Unit/Bed#: [facility] 823-01 I Date of Admission: 12/17/2024   Date of Service: 12/23/2024 I Hospital Day: 6    Assessment & Plan  Legionella pneumonia (HCC)  Unclear risk factor/exposure.  While antigen has high specificity, consider other respiratory pathogens as well.  Temperature curve trending down and the patient is clinically improved.    Continue levofloxacin through 12/27 for 10 days total  Follow respiratory status closely  Acute respiratory failure with hypoxia (HCC)  Due to pneumonia.    Continue antibiotics as above  Wean O2 as tolerated  Acute kidney injury superimposed on CKD  (HCC)  Baseline Cr. 1.1-1.3.  Likely multifactorial due to sepsis, poor PO intake.  Improving.    Follow creatinine closely and dose-adjust antibiotics as indicated  Recheck BMP in AM  If creatinine clearance gets above 50, transition to every 24 hour dosing of the antibiotics  Multiple myeloma not having achieved remission (HCC)  Currently on Daratumumab-VRd since November 2024.  Being evaluated for possible SCT.  Anemia and thrombocytopenia  Due to MM  Stage 3b chronic kidney disease (HCC)  Lab Results   Component Value Date    EGFR 46 12/21/2024    EGFR 43 12/20/2024    EGFR 32 12/19/2024    CREATININE 1.52 (H) 12/21/2024    CREATININE 1.61 (H) 12/20/2024    CREATININE 2.05 (H) 12/19/2024       I have discussed with the primary service the above plan to continue the levofloxacin.  The primary service agrees with the plan.    Antibiotics:  Levofloxacin 6    Subjective   Patient with decreased temperature curve; no nausea, vomiting, diarrhea; still with some cough, and mild shortness of breath; no pain. No new symptoms.  Not requiring any oxygen support.    Objective :  Temp:  [99.2 °F (37.3 °C)-100.2 °F (37.9 °C)] 99.2 °F (37.3 °C)  HR:  [87-98] 88  BP: (127-140)/(76) 140/76  Resp:  [16-19] 16  SpO2:  [93 %-95 %] 93 %  O2 Device: None (Room  air)    General:  No acute distress  Psychiatric:  Awake and alert  Pulmonary:  Normal respiratory excursion without accessory muscle use.  Few crackles on the left base  Abdomen:  Soft, nontender  Extremities:  No edema  Skin:  No rashes      Lab Results: I have reviewed the following results:  Results from last 7 days   Lab Units 12/21/24 0610 12/20/24 0605 12/19/24  0551   WBC Thousand/uL 6.80 7.55 9.74   HEMOGLOBIN g/dL 7.7* 7.3* 7.4*   PLATELETS Thousands/uL 165 119* 69*     Results from last 7 days   Lab Units 12/21/24  0610 12/20/24  0605 12/19/24  0551 12/18/24  0509 12/17/24  0931   SODIUM mmol/L 141 142 140   < > 135   POTASSIUM mmol/L 3.6 3.7 3.6   < > 3.5   CHLORIDE mmol/L 113* 114* 115*   < > 105   CO2 mmol/L 21 18* 16*   < > 19*   BUN mg/dL 34* 38* 49*   < > 69*   CREATININE mg/dL 1.52* 1.61* 2.05*   < > 3.67*   EGFR ml/min/1.73sq m 46 43 32   < > 15   CALCIUM mg/dL 8.3* 8.5 8.1*   < > 8.7   AST U/L  --   --   --   --  25   ALT U/L  --   --   --   --  21   ALK PHOS U/L  --   --   --   --  96   ALBUMIN g/dL  --   --   --   --  2.9*    < > = values in this interval not displayed.     Results from last 7 days   Lab Units 12/19/24 2000 12/17/24  1248 12/17/24  1236 12/17/24  0931   BLOOD CULTURE   --   --   --  No Growth After 5 Days.  No Growth After 5 Days.   SPUTUM CULTURE  2+ Growth of Candida albicans*  2+ Growth of  --   --   --    GRAM STAIN RESULT  2+ Polys*  1+ Gram positive rods*  No Epithelial cells seen*  --   --   --    MRSA CULTURE ONLY   --   --  No Methicillin Resistant Staphlyococcus aureus (MRSA) isolated  --    LEGIONELLA URINARY ANTIGEN   --  Positive*  --   --      Results from last 7 days   Lab Units 12/18/24  0509 12/17/24  0931   PROCALCITONIN ng/ml 100.11* 114.23*

## 2024-12-23 NOTE — ASSESSMENT & PLAN NOTE
Baseline Cr. 1.1-1.3.  Likely multifactorial due to sepsis, poor PO intake.  Improving.    Follow creatinine closely and dose-adjust antibiotics as indicated  Recheck BMP in AM  If creatinine clearance gets above 50, transition to every 24 hour dosing of the antibiotics

## 2024-12-24 LAB
ANION GAP SERPL CALCULATED.3IONS-SCNC: 8 MMOL/L (ref 4–13)
BASOPHILS # BLD AUTO: 0.01 THOUSANDS/ÂΜL (ref 0–0.1)
BASOPHILS NFR BLD AUTO: 0 % (ref 0–1)
BUN SERPL-MCNC: 29 MG/DL (ref 5–25)
CALCIUM SERPL-MCNC: 8 MG/DL (ref 8.4–10.2)
CHLORIDE SERPL-SCNC: 111 MMOL/L (ref 96–108)
CO2 SERPL-SCNC: 22 MMOL/L (ref 21–32)
CREAT SERPL-MCNC: 1.39 MG/DL (ref 0.6–1.3)
EOSINOPHIL # BLD AUTO: 0.21 THOUSAND/ÂΜL (ref 0–0.61)
EOSINOPHIL NFR BLD AUTO: 3 % (ref 0–6)
ERYTHROCYTE [DISTWIDTH] IN BLOOD BY AUTOMATED COUNT: 16.3 % (ref 11.6–15.1)
GFR SERPL CREATININE-BSD FRML MDRD: 51 ML/MIN/1.73SQ M
GLUCOSE SERPL-MCNC: 116 MG/DL (ref 65–140)
HCT VFR BLD AUTO: 23.2 % (ref 36.5–49.3)
HGB BLD-MCNC: 7.3 G/DL (ref 12–17)
IMM GRANULOCYTES # BLD AUTO: 0.07 THOUSAND/UL (ref 0–0.2)
IMM GRANULOCYTES NFR BLD AUTO: 1 % (ref 0–2)
LYMPHOCYTES # BLD AUTO: 0.41 THOUSANDS/ÂΜL (ref 0.6–4.47)
LYMPHOCYTES NFR BLD AUTO: 6 % (ref 14–44)
MCH RBC QN AUTO: 29.1 PG (ref 26.8–34.3)
MCHC RBC AUTO-ENTMCNC: 31.5 G/DL (ref 31.4–37.4)
MCV RBC AUTO: 92 FL (ref 82–98)
MONOCYTES # BLD AUTO: 0.26 THOUSAND/ÂΜL (ref 0.17–1.22)
MONOCYTES NFR BLD AUTO: 4 % (ref 4–12)
NEUTROPHILS # BLD AUTO: 5.6 THOUSANDS/ÂΜL (ref 1.85–7.62)
NEUTS SEG NFR BLD AUTO: 86 % (ref 43–75)
NRBC BLD AUTO-RTO: 0 /100 WBCS
PLATELET # BLD AUTO: 231 THOUSANDS/UL (ref 149–390)
PMV BLD AUTO: 12.1 FL (ref 8.9–12.7)
POTASSIUM SERPL-SCNC: 3.5 MMOL/L (ref 3.5–5.3)
RBC # BLD AUTO: 2.51 MILLION/UL (ref 3.88–5.62)
SODIUM SERPL-SCNC: 141 MMOL/L (ref 135–147)
WBC # BLD AUTO: 6.56 THOUSAND/UL (ref 4.31–10.16)

## 2024-12-24 PROCEDURE — 99231 SBSQ HOSP IP/OBS SF/LOW 25: CPT | Performed by: INTERNAL MEDICINE

## 2024-12-24 PROCEDURE — 80048 BASIC METABOLIC PNL TOTAL CA: CPT

## 2024-12-24 PROCEDURE — 99233 SBSQ HOSP IP/OBS HIGH 50: CPT | Performed by: INTERNAL MEDICINE

## 2024-12-24 PROCEDURE — 85025 COMPLETE CBC W/AUTO DIFF WBC: CPT

## 2024-12-24 RX ORDER — DIPHENHYDRAMINE HYDROCHLORIDE AND LIDOCAINE HYDROCHLORIDE AND ALUMINUM HYDROXIDE AND MAGNESIUM HYDRO
10 KIT EVERY 4 HOURS PRN
Status: DISCONTINUED | OUTPATIENT
Start: 2024-12-24 | End: 2024-12-25

## 2024-12-24 RX ORDER — LEVOFLOXACIN 750 MG/1
750 TABLET, FILM COATED ORAL EVERY 24 HOURS
Status: DISCONTINUED | OUTPATIENT
Start: 2024-12-25 | End: 2024-12-24

## 2024-12-24 RX ORDER — LEVOFLOXACIN 750 MG/1
750 TABLET, FILM COATED ORAL EVERY 24 HOURS
Status: COMPLETED | OUTPATIENT
Start: 2024-12-24 | End: 2024-12-27

## 2024-12-24 RX ORDER — LEVOFLOXACIN 750 MG/1
750 TABLET, FILM COATED ORAL EVERY 24 HOURS
Status: DISCONTINUED | OUTPATIENT
Start: 2024-12-24 | End: 2024-12-24

## 2024-12-24 RX ADMIN — ACETAMINOPHEN 650 MG: 325 TABLET, FILM COATED ORAL at 23:27

## 2024-12-24 RX ADMIN — HEPARIN SODIUM 5000 UNITS: 5000 INJECTION, SOLUTION INTRAVENOUS; SUBCUTANEOUS at 06:51

## 2024-12-24 RX ADMIN — HEPARIN SODIUM 5000 UNITS: 5000 INJECTION, SOLUTION INTRAVENOUS; SUBCUTANEOUS at 14:51

## 2024-12-24 RX ADMIN — LEVOFLOXACIN 750 MG: 750 TABLET, FILM COATED ORAL at 10:11

## 2024-12-24 RX ADMIN — AMLODIPINE BESYLATE 5 MG: 5 TABLET ORAL at 10:13

## 2024-12-24 RX ADMIN — HEPARIN SODIUM 5000 UNITS: 5000 INJECTION, SOLUTION INTRAVENOUS; SUBCUTANEOUS at 21:32

## 2024-12-24 RX ADMIN — ACETAMINOPHEN 650 MG: 325 TABLET, FILM COATED ORAL at 10:10

## 2024-12-24 RX ADMIN — LIDOCAINE HYDROCHLORIDE 10 ML: 20 SOLUTION ORAL at 15:26

## 2024-12-24 NOTE — ASSESSMENT & PLAN NOTE
Lab Results   Component Value Date    EGFR 51 12/24/2024    EGFR 46 12/21/2024    EGFR 43 12/20/2024    CREATININE 1.39 (H) 12/24/2024    CREATININE 1.52 (H) 12/21/2024    CREATININE 1.61 (H) 12/20/2024   Recent baseline creatinine from 1.5-1.9  Renal function improving nearing baseline  Resume home irbesartan and chlorthalidone tomorrow if continued improvement is noted

## 2024-12-24 NOTE — PROGRESS NOTES
Progress Note - Hospitalist   Name: Zack Partida 69 y.o. male I MRN: [id number]  Unit/Bed#: [facility] 823-01 I Date of Admission: 12/17/2024   Date of Service: 12/24/2024 I Hospital Day: 7    Assessment & Plan  Legionella pneumonia (HCC)  With sepsis on admission. CXR findings consistent w LLL PNA.     Admission 102.5F, 109 HR, 88% O2, procal 114.     Started on CTX, d/c'd after legionella (+). Started on azithromycin 12/17, d/c'd next day for broader coverage for atypicals w levofloxacin.     Plan:  Continue levofloxacin 12/18-12/27 for 10d total  ID has been consulted, appreciate recs and continue following  Acute kidney injury superimposed on CKD  (HCC)  Lab Results   Component Value Date    EGFR 51 12/24/2024    EGFR 46 12/21/2024    EGFR 43 12/20/2024    CREATININE 1.39 (H) 12/24/2024    CREATININE 1.52 (H) 12/21/2024    CREATININE 1.61 (H) 12/20/2024   Recent baseline creatinine from 1.5-1.9  Renal function improving nearing baseline  Resume home irbesartan and chlorthalidone tomorrow if continued improvement is noted  Anemia and thrombocytopenia  Likely secondary to chemo as he is actively on treatment this month  Baseline hemoglobin 10-11, most recent 9.1, on admission 8.0  Thrombocytopenia with platelets of 49  Patient has multiple myeloma currently undergoing chemo - chem DVT ppx on hold  Also acute illness can play a role  Continue to monitor w daily labs  Essential hypertension  Blood Pressure: 130/74    Home regimen amlodipine 10 mg daily, chlorthalidone 25 mg daily, doxazosin 2 mg daily and irbesartan 300 mg daily    Plan:  Continue home amlodipine 5 mg po qd  Resume  chlorthalidone and irbesartan likely tomorrow  BMP 1 week on d/c to guide restart of HCTZ and ARB  Multiple myeloma not having achieved remission (HCC)  History of multiple myeloma currently undergoing chemo  Likely causative factor for anemia, thrombocytopenia  Hemoptysis  Productive cough for 4 days, intermittent minimal hemoptysis per  patient, most likely secondary to pneumonia  Recent Labs     12/24/24  0518   HGB 7.3*       Hemoglobin stable over past 72 hours and 7.0 range  Continue daily monitoring transfuse less than 7  Stage 3b chronic kidney disease (HCC)  Lab Results   Component Value Date    EGFR 51 12/24/2024    EGFR 46 12/21/2024    EGFR 43 12/20/2024    CREATININE 1.39 (H) 12/24/2024    CREATININE 1.52 (H) 12/21/2024    CREATININE 1.61 (H) 12/20/2024   Baseline Cr 1.1-1.3, approaching baseline    VTE Pharmacologic Prophylaxis: VTE Score: 7 High Risk (Score >/= 5) - Pharmacological DVT Prophylaxis Ordered: heparin. Sequential Compression Devices Ordered.    Mobility:   Basic Mobility Inpatient Raw Score: 15  JH-HLM Goal: 4: Move to chair/commode  JH-HLM Achieved: 7: Walk 25 feet or more  JH-HLM Goal achieved. Continue to encourage appropriate mobility.    Patient Centered Rounds: I performed bedside rounds with nursing staff today.   Discussions with Specialists or Other Care Team Provider: RN    Education and Discussions with Family / Patient: Patient declined call to .     Current Length of Stay: 7 day(s)  Current Patient Status: Inpatient   Certification Statement: The patient will continue to require additional inpatient hospital stay due to placement  Discharge Plan: Anticipate discharge in 24-48 hrs to rehab facility.    Code Status: Level 1 - Full Code    Subjective   Seen and examined at bedside with RN no acute events overnight patient states he is doing well and breathing comfortably.    Objective :  Temp:  [98.8 °F (37.1 °C)-100.2 °F (37.9 °C)] 98.8 °F (37.1 °C)  HR:  [89-97] 89  BP: ()/(63-76) 130/74  Resp:  [16-17] 16  SpO2:  [91 %-96 %] 91 %  O2 Device: None (Room air)    Body mass index is 32.62 kg/m².     Input and Output Summary (last 24 hours):     Intake/Output Summary (Last 24 hours) at 12/24/2024 1459  Last data filed at 12/24/2024 1350  Gross per 24 hour   Intake 400 ml   Output 1200 ml   Net  -800 ml       Physical Exam  Constitutional:       General: He is not in acute distress.     Appearance: Normal appearance. He is not ill-appearing.   HENT:      Mouth/Throat:      Mouth: Mucous membranes are moist.   Cardiovascular:      Rate and Rhythm: Normal rate and regular rhythm.   Pulmonary:      Effort: Pulmonary effort is normal. No respiratory distress.      Breath sounds: No wheezing.   Abdominal:      General: Abdomen is flat.      Palpations: Abdomen is soft.   Skin:     General: Skin is warm.   Neurological:      General: No focal deficit present.      Mental Status: He is alert and oriented to person, place, and time.           Lines/Drains:              Lab Results: I have reviewed the following results:   Results from last 7 days   Lab Units 12/24/24  0518   WBC Thousand/uL 6.56   HEMOGLOBIN g/dL 7.3*   HEMATOCRIT % 23.2*   PLATELETS Thousands/uL 231   SEGS PCT % 86*   LYMPHO PCT % 6*   MONO PCT % 4   EOS PCT % 3     Results from last 7 days   Lab Units 12/24/24  0518   SODIUM mmol/L 141   POTASSIUM mmol/L 3.5   CHLORIDE mmol/L 111*   CO2 mmol/L 22   BUN mg/dL 29*   CREATININE mg/dL 1.39*   ANION GAP mmol/L 8   CALCIUM mg/dL 8.0*   GLUCOSE RANDOM mg/dL 116                 Results from last 7 days   Lab Units 12/18/24  0509   PROCALCITONIN ng/ml 100.11*       Recent Cultures (last 7 days):   Results from last 7 days   Lab Units 12/19/24 2000   SPUTUM CULTURE  2+ Growth of Candida albicans*  2+ Growth of   GRAM STAIN RESULT  2+ Polys*  1+ Gram positive rods*  No Epithelial cells seen*       Imaging Results Review: No pertinent imaging studies reviewed.  Other Study Results Review: No additional pertinent studies reviewed.    Last 24 Hours Medication List:     Current Facility-Administered Medications:     acetaminophen (TYLENOL) tablet 650 mg, Q6H PRN    amLODIPine (NORVASC) tablet 5 mg, Daily    benzonatate (TESSALON PERLES) capsule 100 mg, TID PRN    dextromethorphan-guaiFENesin (ROBITUSSIN DM)  oral syrup 10 mL, Q4H PRN    diphenhydramine, lidocaine, Al/Mg hydroxide, simethicone (Magic Mouthwash) oral solution 10 mL, Q4H PRN    heparin (porcine) subcutaneous injection 5,000 Units, Q8H LIZZETTE    levofloxacin (LEVAQUIN) tablet 750 mg, Q24H    Administrative Statements   Today, Patient Was Seen By: Dat Rodriguez MD    **Please Note: This note may have been constructed using a voice recognition system.**

## 2024-12-24 NOTE — UTILIZATION REVIEW
Continued Stay Review    Date: 12/22/24                          Current Patient Class: Inpatient  Current Level of Care: med surg    HPI:69 y.o. male initially admitted on 12/17/24 With sepsis on admission. CXR findings consistent w LLL PNA. Started on CTX, d/c'd after legionella (+). Started on azithromycin 12/17, d/c'd next day for broader coverage for atypicals w levofloxacin.     Assessment/Plan:  On exam, abdominal distention noted. Continue levofloxacin, ID following. Encourage inc spirometry. Appetite slowly improving, DC IVF. Monitor daily labs, VS. Continue PT OT.     Medications:   Scheduled Medications:  amLODIPine, 5 mg, Oral, Daily  heparin (porcine), 5,000 Units, Subcutaneous, Q8H LIZZETTE  levofloxacin, 750 mg, Oral, Every Other Day      Continuous IV Infusions: none     PRN Meds:  acetaminophen, 650 mg, Oral, Q6H PRN  benzonatate, 100 mg, Oral, TID PRN  dextromethorphan-guaiFENesin, 10 mL, Oral, Q4H PRN      Discharge Plan: tbd, anticipated rehab facility.     Vital Signs (last 3 days)       Date/Time Temp Pulse Resp BP MAP (mmHg) SpO2 O2 Device Pain    12/24/24 07:15:26 100.2 °F (37.9 °C) 97 16 131/72 92 91 % -- --    12/23/24 2300 -- -- -- 140/76 -- -- -- --    12/23/24 21:57:10 99.4 °F (37.4 °C) 94 17 98/63 75 96 % -- --    12/23/24 2137 -- -- -- -- -- 93 % None (Room air) --    12/23/24 16:10:59 99 °F (37.2 °C) 97 16 144/73 97 94 % -- --    12/23/24 1609 -- -- -- -- -- -- -- No Pain    12/23/24 1300 -- -- -- -- -- -- -- No Pain    12/23/24 0826 -- -- -- -- -- -- -- No Pain    12/23/24 0750 -- -- -- -- -- -- None (Room air) No Pain    12/23/24 07:48:56 99.2 °F (37.3 °C) 88 16 140/76 97 93 % -- --    12/22/24 22:04:55 100 °F (37.8 °C) 98 18 139/76 97 93 % -- --    12/22/24 1658 -- -- -- -- -- 95 % None (Room air) --    12/22/24 14:53:46 99.8 °F (37.7 °C) 87 19 127/76 93 93 % -- --    12/22/24 1332 -- -- -- -- -- -- -- No Pain    12/22/24 13:22:57 100.2 °F (37.9 °C) 96 -- -- -- 94 % -- --    12/22/24  0800 -- -- -- -- -- -- None (Room air) No Pain    12/22/24 07:34:19 100.6 °F (38.1 °C) 99 16 135/75 95 92 % -- --    12/22/24 00:02:21 99.8 °F (37.7 °C) 98 -- -- -- 91 % -- --    12/21/24 2229 102 °F (38.9 °C) 119 18 135/78 97 91 % -- Med Not Given for Pain - for MAR use only    12/21/24 1650 -- -- -- -- -- 93 % None (Room air) No Pain    12/21/24 15:25:38 100 °F (37.8 °C) 98 19 142/82 102 93 % -- --    12/21/24 0950 -- -- -- -- -- -- -- No Pain    12/21/24 0900 -- -- -- -- -- 92 % None (Room air) No Pain    12/21/24 07:25:49 100.2 °F (37.9 °C) -- 22 150/85 107 -- -- --          Weight (last 2 days)       None            Pertinent Labs/Diagnostic Results:   Radiology:  XR chest portable - 1 view   Final Interpretation by Carmelo Hyman MD (12/17 1039)      Left lower lung pneumonia.            Workstation performed: [id number]           Cardiology:  ECG 12 lead   Final Result by Martine Landry MD (12/17 1657)   Sinus rhythm with short NE   Minimal voltage criteria for LVH, may be normal variant   Borderline ECG   When compared with ECG of 02-Nov-2024 09:04,   NE interval has decreased   Confirmed by Martine Landry ([id number]) on 12/17/2024 4:57:16 PM        GI:  No orders to display           Results from last 7 days   Lab Units 12/24/24  0518 12/21/24  0610 12/20/24  0605 12/19/24  0551 12/18/24  0509   WBC Thousand/uL 6.56 6.80 7.55 9.74 14.26*   HEMOGLOBIN g/dL 7.3* 7.7* 7.3* 7.4* 7.1*   HEMATOCRIT % 23.2* 24.5* 22.2* 23.1* 21.7*   PLATELETS Thousands/uL 231 165 119* 69* 46*   TOTAL NEUT ABS Thousands/µL 5.60 6.02  --   --   --          Results from last 7 days   Lab Units 12/24/24  0518 12/21/24  0610 12/20/24  0605 12/19/24  0551 12/18/24  0509   SODIUM mmol/L 141 141 142 140 137   POTASSIUM mmol/L 3.5 3.6 3.7 3.6 3.4*   CHLORIDE mmol/L 111* 113* 114* 115* 109*   CO2 mmol/L 22 21 18* 16* 16*   ANION GAP mmol/L 8 7 10 9 12   BUN mg/dL 29* 34* 38* 49* 61*   CREATININE mg/dL 1.39* 1.52* 1.61* 2.05* 2.79*   EGFR  ml/min/1.73sq m 51 46 43 32 22   CALCIUM mg/dL 8.0* 8.3* 8.5 8.1* 7.9*             Results from last 7 days   Lab Units 12/24/24  0518 12/21/24  0610 12/20/24  0605 12/19/24  0551 12/18/24  0509   GLUCOSE RANDOM mg/dL 116 107 109 127 108       Results from last 7 days   Lab Units 12/18/24  0509   PROCALCITONIN ng/ml 100.11*       Results from last 7 days   Lab Units 12/17/24  1247   CLARITY UA  Clear   COLOR UA  Yellow   SPEC GRAV UA  1.020   PH UA  5.5   GLUCOSE UA mg/dl Negative   KETONES UA mg/dl Negative   BLOOD UA  Moderate*   PROTEIN UA mg/dl 100 (2+)*   NITRITE UA  Negative   BILIRUBIN UA  Negative   UROBILINOGEN UA (BE) mg/dl <2.0   LEUKOCYTES UA  Negative   WBC UA /hpf 2-4*   RBC UA /hpf 2-4*   BACTERIA UA /hpf Occasional   EPITHELIAL CELLS WET PREP /hpf Occasional   MUCUS THREADS  Occasional*     Results from last 7 days   Lab Units 12/17/24  1248   STREP PNEUMONIAE ANTIGEN, URINE  Negative   LEGIONELLA URINARY ANTIGEN  Positive*       Results from last 7 days   Lab Units 12/19/24  2000   SPUTUM CULTURE  2+ Growth of Candida albicans*  2+ Growth of   GRAM STAIN RESULT  2+ Polys*  1+ Gram positive rods*  No Epithelial cells seen*     Results from last 7 days   Lab Units 12/18/24  0509   VANCOMYCIN RM ug/mL 13.4       Network Utilization Review Department  ATTENTION: Please call with any questions or concerns to [phone] and carefully listen to the prompts so that you are directed to the right person. All voicemails are confidential.   For Discharge needs, contact Care Management DC Support Team at [phone] opt. 2  Send all requests for admission clinical reviews, approved or denied determinations and any other requests to dedicated fax number below belonging to the campus where the patient is receiving treatment. List of dedicated fax numbers for the Facilities:  FACILITY NAME UR FAX NUMBER   ADMISSION DENIALS (Administrative/Medical Necessity) [phone]   DISCHARGE SUPPORT TEAM (NETWORK)  [phone]   PARENT CHILD HEALTH (Maternity/NICU/Pediatrics) [phone]   [facility] [phone]   [facility] [phone]   [facility] [phone]   [facility] [phone]   [facility] [phone]   [facility] [phone]   [facility] [phone]   [facility] [phone]   [facility] [phone]   [facility] [phone]   [facility] [phone]   [facility] [phone]

## 2024-12-24 NOTE — ASSESSMENT & PLAN NOTE
Baseline Cr. 1.1-1.3.  Likely multifactorial due to sepsis, poor PO intake.  Continues to improve.    Follow creatinine closely and dose-adjust antibiotics as indicated  Recheck BMP in AM  If creatinine clearance drops below 50, transition back to every 48 hour dosing of the antibiotics

## 2024-12-24 NOTE — ASSESSMENT & PLAN NOTE
Unclear risk factor/exposure.  While antigen has high specificity, consider other respiratory pathogens as well.  Temperature curve trending down and the patient is clinically improved.  Still with intermittent elevations in the temperature but feeling much better overall    Continue levofloxacin through 12/27 for 10 days total  Will increase the levofloxacin dosing to every 24 hours with the improved renal function.  Follow respiratory status closely

## 2024-12-24 NOTE — PROGRESS NOTES
Progress Note - Infectious Disease   Name: Zack Partida 69 y.o. male I MRN: [id number]  [facility] I Date of Admission: 12/17/2024   Date of Service: 12/24/2024 I Hospital Day: 7    Assessment & Plan  Legionella pneumonia (HCC)  Unclear risk factor/exposure.  While antigen has high specificity, consider other respiratory pathogens as well.  Temperature curve trending down and the patient is clinically improved.  Still with intermittent elevations in the temperature but feeling much better overall    Continue levofloxacin through 12/27 for 10 days total  Will increase the levofloxacin dosing to every 24 hours with the improved renal function.  Follow respiratory status closely  Acute kidney injury superimposed on CKD  (HCC)  Baseline Cr. 1.1-1.3.  Likely multifactorial due to sepsis, poor PO intake.  Continues to improve.    Follow creatinine closely and dose-adjust antibiotics as indicated  Recheck BMP in AM  If creatinine clearance drops below 50, transition back to every 48 hour dosing of the antibiotics  Multiple myeloma not having achieved remission (HCC)  Currently on Daratumumab-VRd since November 2024.  Being evaluated for possible SCT.  Anemia and thrombocytopenia  Due to MM  Stage 3b chronic kidney disease (HCC)  Lab Results   Component Value Date    EGFR 51 12/24/2024    EGFR 46 12/21/2024    EGFR 43 12/20/2024    CREATININE 1.39 (H) 12/24/2024    CREATININE 1.52 (H) 12/21/2024    CREATININE 1.61 (H) 12/20/2024       I have discussed with the primary service the above plan to continue the levofloxacin.  The primary service agrees with the plan.    Will see the patient again on 12/26/2024 if not discharged.  Please message me if questions.    Antibiotics:  Levofloxacin 7    Subjective   Patient has no fever, chills, sweats; no nausea, vomiting, diarrhea; no cough, shortness of breath; no pain. No new symptoms.  He is feeling much better overall.    Objective :  Temp:  [99 °F (37.2 °C)-100.2  °F (37.9 °C)] 100.2 °F (37.9 °C)  HR:  [94-97] 97  BP: ()/(63-76) 131/72  Resp:  [16-17] 16  SpO2:  [91 %-96 %] 91 %  O2 Device: None (Room air)    General:  No acute distress  Psychiatric:  Awake and alert  Pulmonary:  Normal respiratory excursion without accessory muscle use  Abdomen:  Soft, nontender  Extremities:  No edema  Skin:  No rashes      Lab Results: I have reviewed the following results:  Results from last 7 days   Lab Units 12/24/24  0518 12/21/24  0610 12/20/24  0605   WBC Thousand/uL 6.56 6.80 7.55   HEMOGLOBIN g/dL 7.3* 7.7* 7.3*   PLATELETS Thousands/uL 231 165 119*     Results from last 7 days   Lab Units 12/24/24  0518 12/21/24  0610 12/20/24  0605   SODIUM mmol/L 141 141 142   POTASSIUM mmol/L 3.5 3.6 3.7   CHLORIDE mmol/L 111* 113* 114*   CO2 mmol/L 22 21 18*   BUN mg/dL 29* 34* 38*   CREATININE mg/dL 1.39* 1.52* 1.61*   EGFR ml/min/1.73sq m 51 46 43   CALCIUM mg/dL 8.0* 8.3* 8.5     Results from last 7 days   Lab Units 12/19/24 2000 12/17/24  1248 12/17/24  1236   SPUTUM CULTURE  2+ Growth of Candida albicans*  2+ Growth of  --   --    GRAM STAIN RESULT  2+ Polys*  1+ Gram positive rods*  No Epithelial cells seen*  --   --    MRSA CULTURE ONLY   --   --  No Methicillin Resistant Staphlyococcus aureus (MRSA) isolated   LEGIONELLA URINARY ANTIGEN   --  Positive*  --      Results from last 7 days   Lab Units 12/18/24  0509   PROCALCITONIN ng/ml 100.11*

## 2024-12-24 NOTE — ASSESSMENT & PLAN NOTE
Blood Pressure: 130/74    Home regimen amlodipine 10 mg daily, chlorthalidone 25 mg daily, doxazosin 2 mg daily and irbesartan 300 mg daily    Plan:  Continue home amlodipine 5 mg po qd  Resume  chlorthalidone and irbesartan likely tomorrow  BMP 1 week on d/c to guide restart of HCTZ and ARB

## 2024-12-24 NOTE — ASSESSMENT & PLAN NOTE
Lab Results   Component Value Date    EGFR 51 12/24/2024    EGFR 46 12/21/2024    EGFR 43 12/20/2024    CREATININE 1.39 (H) 12/24/2024    CREATININE 1.52 (H) 12/21/2024    CREATININE 1.61 (H) 12/20/2024   Baseline Cr 1.1-1.3, approaching baseline

## 2024-12-24 NOTE — ASSESSMENT & PLAN NOTE
Lab Results   Component Value Date    EGFR 51 12/24/2024    EGFR 46 12/21/2024    EGFR 43 12/20/2024    CREATININE 1.39 (H) 12/24/2024    CREATININE 1.52 (H) 12/21/2024    CREATININE 1.61 (H) 12/20/2024

## 2024-12-24 NOTE — ASSESSMENT & PLAN NOTE
Productive cough for 4 days, intermittent minimal hemoptysis per patient, most likely secondary to pneumonia  Recent Labs     12/24/24  0518   HGB 7.3*       Hemoglobin stable over past 72 hours and 7.0 range  Continue daily monitoring transfuse less than 7

## 2024-12-24 NOTE — ASSESSMENT & PLAN NOTE
Likely secondary to chemo as he is actively on treatment this month  Baseline hemoglobin 10-11, most recent 9.1, on admission 8.0  Thrombocytopenia with platelets of 49  Patient has multiple myeloma currently undergoing chemo - chem DVT ppx on hold  Also acute illness can play a role  Continue to monitor w daily labs

## 2024-12-24 NOTE — ASSESSMENT & PLAN NOTE
With sepsis on admission. CXR findings consistent w LLL PNA.     Admission 102.5F, 109 HR, 88% O2, procal 114.     Started on CTX, d/c'd after legionella (+). Started on azithromycin 12/17, d/c'd next day for broader coverage for atypicals w levofloxacin.     Plan:  Continue levofloxacin 12/18-12/27 for 10d total  ID has been consulted, appreciate recs and continue following

## 2024-12-25 PROBLEM — K13.70 UNSPECIFIED LESIONS OF ORAL MUCOSA: Status: ACTIVE | Noted: 2024-12-25

## 2024-12-25 PROBLEM — R04.2 HEMOPTYSIS: Status: RESOLVED | Noted: 2024-12-17 | Resolved: 2024-12-25

## 2024-12-25 LAB
ANION GAP SERPL CALCULATED.3IONS-SCNC: 8 MMOL/L (ref 4–13)
BASOPHILS # BLD AUTO: 0.01 THOUSANDS/ÂΜL (ref 0–0.1)
BASOPHILS NFR BLD AUTO: 0 % (ref 0–1)
BUN SERPL-MCNC: 23 MG/DL (ref 5–25)
CALCIUM SERPL-MCNC: 7.9 MG/DL (ref 8.4–10.2)
CHLORIDE SERPL-SCNC: 111 MMOL/L (ref 96–108)
CO2 SERPL-SCNC: 24 MMOL/L (ref 21–32)
CREAT SERPL-MCNC: 1.26 MG/DL (ref 0.6–1.3)
EOSINOPHIL # BLD AUTO: 0.15 THOUSAND/ÂΜL (ref 0–0.61)
EOSINOPHIL NFR BLD AUTO: 2 % (ref 0–6)
ERYTHROCYTE [DISTWIDTH] IN BLOOD BY AUTOMATED COUNT: 16.3 % (ref 11.6–15.1)
GFR SERPL CREATININE-BSD FRML MDRD: 58 ML/MIN/1.73SQ M
GLUCOSE SERPL-MCNC: 90 MG/DL (ref 65–140)
HCT VFR BLD AUTO: 25.8 % (ref 36.5–49.3)
HGB BLD-MCNC: 8 G/DL (ref 12–17)
IMM GRANULOCYTES # BLD AUTO: 0.06 THOUSAND/UL (ref 0–0.2)
IMM GRANULOCYTES NFR BLD AUTO: 1 % (ref 0–2)
LYMPHOCYTES # BLD AUTO: 0.47 THOUSANDS/ÂΜL (ref 0.6–4.47)
LYMPHOCYTES NFR BLD AUTO: 7 % (ref 14–44)
MCH RBC QN AUTO: 29.1 PG (ref 26.8–34.3)
MCHC RBC AUTO-ENTMCNC: 31 G/DL (ref 31.4–37.4)
MCV RBC AUTO: 94 FL (ref 82–98)
MONOCYTES # BLD AUTO: 0.3 THOUSAND/ÂΜL (ref 0.17–1.22)
MONOCYTES NFR BLD AUTO: 5 % (ref 4–12)
NEUTROPHILS # BLD AUTO: 5.41 THOUSANDS/ÂΜL (ref 1.85–7.62)
NEUTS SEG NFR BLD AUTO: 85 % (ref 43–75)
NRBC BLD AUTO-RTO: 0 /100 WBCS
PLATELET # BLD AUTO: 257 THOUSANDS/UL (ref 149–390)
PMV BLD AUTO: 11.6 FL (ref 8.9–12.7)
POTASSIUM SERPL-SCNC: 3.6 MMOL/L (ref 3.5–5.3)
RBC # BLD AUTO: 2.75 MILLION/UL (ref 3.88–5.62)
SODIUM SERPL-SCNC: 143 MMOL/L (ref 135–147)
WBC # BLD AUTO: 6.4 THOUSAND/UL (ref 4.31–10.16)

## 2024-12-25 PROCEDURE — 80048 BASIC METABOLIC PNL TOTAL CA: CPT | Performed by: INTERNAL MEDICINE

## 2024-12-25 PROCEDURE — 87529 HSV DNA AMP PROBE: CPT | Performed by: INTERNAL MEDICINE

## 2024-12-25 PROCEDURE — 99232 SBSQ HOSP IP/OBS MODERATE 35: CPT | Performed by: INTERNAL MEDICINE

## 2024-12-25 PROCEDURE — 85025 COMPLETE CBC W/AUTO DIFF WBC: CPT | Performed by: INTERNAL MEDICINE

## 2024-12-25 RX ORDER — DIPHENHYDRAMINE HYDROCHLORIDE AND LIDOCAINE HYDROCHLORIDE AND ALUMINUM HYDROXIDE AND MAGNESIUM HYDRO
10 KIT EVERY 6 HOURS
Status: DISCONTINUED | OUTPATIENT
Start: 2024-12-25 | End: 2024-12-30

## 2024-12-25 RX ORDER — DOXAZOSIN 2 MG/1
2 TABLET ORAL
Status: DISCONTINUED | OUTPATIENT
Start: 2024-12-25 | End: 2024-12-30 | Stop reason: HOSPADM

## 2024-12-25 RX ADMIN — HEPARIN SODIUM 5000 UNITS: 5000 INJECTION, SOLUTION INTRAVENOUS; SUBCUTANEOUS at 05:25

## 2024-12-25 RX ADMIN — ACETAMINOPHEN 650 MG: 325 TABLET, FILM COATED ORAL at 17:07

## 2024-12-25 RX ADMIN — AMLODIPINE BESYLATE 5 MG: 5 TABLET ORAL at 09:09

## 2024-12-25 RX ADMIN — DIPHENHYDRAMINE HYDROCHLORIDE AND LIDOCAINE HYDROCHLORIDE AND ALUMINUM HYDROXIDE AND MAGNESIUM HYDRO 10 ML: KIT at 11:49

## 2024-12-25 RX ADMIN — HEPARIN SODIUM 5000 UNITS: 5000 INJECTION, SOLUTION INTRAVENOUS; SUBCUTANEOUS at 22:50

## 2024-12-25 RX ADMIN — LEVOFLOXACIN 750 MG: 750 TABLET, FILM COATED ORAL at 09:11

## 2024-12-25 RX ADMIN — DOXAZOSIN 2 MG: 2 TABLET ORAL at 22:50

## 2024-12-25 RX ADMIN — HEPARIN SODIUM 5000 UNITS: 5000 INJECTION, SOLUTION INTRAVENOUS; SUBCUTANEOUS at 15:00

## 2024-12-25 RX ADMIN — LIDOCAINE HYDROCHLORIDE 10 ML: 20 SOLUTION ORAL at 05:25

## 2024-12-25 RX ADMIN — DIPHENHYDRAMINE HYDROCHLORIDE AND LIDOCAINE HYDROCHLORIDE AND ALUMINUM HYDROXIDE AND MAGNESIUM HYDRO 10 ML: KIT at 22:50

## 2024-12-25 RX ADMIN — ACETAMINOPHEN 650 MG: 325 TABLET, FILM COATED ORAL at 22:53

## 2024-12-25 RX ADMIN — DIPHENHYDRAMINE HYDROCHLORIDE AND LIDOCAINE HYDROCHLORIDE AND ALUMINUM HYDROXIDE AND MAGNESIUM HYDRO 10 ML: KIT at 17:05

## 2024-12-25 NOTE — ASSESSMENT & PLAN NOTE
Productive cough for 4 days, intermittent minimal hemoptysis per patient, most likely secondary to pneumonia  Recent Labs     12/24/24  0518 12/25/24  1149   HGB 7.3* 8.0*       Monitor.  Hgb stable.

## 2024-12-25 NOTE — PLAN OF CARE
Problem: INFECTION - ADULT  Goal: Absence or prevention of progression during hospitalization  Description: INTERVENTIONS:  - Assess and monitor for signs and symptoms of infection  - Monitor lab/diagnostic results  - Monitor all insertion sites, i.e. indwelling lines, tubes, and drains  - Monitor endotracheal if appropriate and nasal secretions for changes in amount and color  - [place] appropriate cooling/warming therapies per order  - Administer medications as ordered  - Instruct and encourage patient and family to use good hand hygiene technique  - Identify and instruct in appropriate isolation precautions for identified infection/condition  Outcome: Progressing  Goal: Absence of fever/infection during neutropenic period  Description: INTERVENTIONS:  - Monitor WBC    Outcome: Progressing     Problem: SAFETY ADULT  Goal: Patient will remain free of falls  Description: INTERVENTIONS:  - Educate patient/family on patient safety including physical limitations  - Instruct patient to call for assistance with activity   - Consult OT/PT to assist with strengthening/mobility   - Keep Call bell within reach  - Keep bed low and locked with side rails adjusted as appropriate  - Keep care items and personal belongings within reach  - Initiate and maintain comfort rounds  - Make Fall Risk Sign visible to staff  - Apply yellow socks and bracelet for high fall risk patients  - Consider moving patient to room near nurses station  Outcome: Progressing     Problem: DISCHARGE PLANNING  Goal: Discharge to home or other facility with appropriate resources  Description: INTERVENTIONS:  - Identify barriers to discharge w/patient and caregiver  - Arrange for needed discharge resources and transportation as appropriate  - Identify discharge learning needs (meds, wound care, etc.)  - Arrange for interpretive services to assist at discharge as needed  - Refer to Case Management Department for coordinating discharge planning if the  Patients blood pressure range in the last 24 hours was: BP  Min: 101/58  Max: 162/69.The patient's inpatient anti-hypertensive regimen is listed below:  Current Antihypertensives  labetalol 20 mg/4 mL (5 mg/mL) IV syring, Every 15 min PRN, Intravenous  furosemide tablet 20 mg, Every other day, Oral  metoprolol tartrate (LOPRESSOR) tablet 50 mg, 2 times daily, Oral    Plan  - BP is controlled, no changes needed to their regimen   patient needs post-hospital services based on physician/advanced practitioner order or complex needs related to functional status, cognitive ability, or social support system  Outcome: Progressing     Problem: PAIN - ADULT  Goal: Verbalizes/displays adequate comfort level or baseline comfort level  Description: Interventions:  - Encourage patient to monitor pain and request assistance  - Assess pain using appropriate pain scale  - Administer analgesics based on type and severity of pain and evaluate response  - Implement non-pharmacological measures as appropriate and evaluate response  - Consider cultural and social influences on pain and pain management  - Notify physician/advanced practitioner if interventions unsuccessful or patient reports new pain  Outcome: Progressing

## 2024-12-25 NOTE — ASSESSMENT & PLAN NOTE
With sepsis on admission. CXR findings consistent w LLL PNA. Admission 102.5F, 109 HR, 88% O2, procal 114.   Started on CTX, d/c'd after legionella (+). Started on azithromycin 12/17, d/c'd next day for broader coverage for atypicals w levofloxacin.     Plan:  Continue levofloxacin 12/18-12/27 for 10d total  ID has been consulted, appreciate recs

## 2024-12-25 NOTE — ASSESSMENT & PLAN NOTE
Blood Pressure: 154/84    Home regimen amlodipine 10 mg daily, chlorthalidone 25 mg daily, doxazosin 2 mg daily and irbesartan 300 mg daily    Plan:  Continue home amlodipine 5 mg po qd  Restart doxazosin 2 mg qHS  Resume  chlorthalidone and irbesartan likely tomorrow

## 2024-12-25 NOTE — ASSESSMENT & PLAN NOTE
History of multiple myeloma currently undergoing chemo  Likely causative factor for anemia, thrombocytopenia  Outpatient follow-up

## 2024-12-25 NOTE — PROGRESS NOTES
Progress Note - Hospitalist   Name: Zack Partida 69 y.o. male I MRN: [id number]  Unit/Bed#: [facility] 823-01 I Date of Admission: 12/17/2024   Date of Service: 12/25/2024 I Hospital Day: 8    Assessment & Plan  Legionella pneumonia (HCC)  With sepsis on admission. CXR findings consistent w LLL PNA. Admission 102.5F, 109 HR, 88% O2, procal 114.   Started on CTX, d/c'd after legionella (+). Started on azithromycin 12/17, d/c'd next day for broader coverage for atypicals w levofloxacin.     Plan:  Continue levofloxacin 12/18-12/27 for 10d total  ID has been consulted, appreciate recs   Acute kidney injury superimposed on CKD  (HCC)  Lab Results   Component Value Date    EGFR 58 12/25/2024    EGFR 51 12/24/2024    EGFR 46 12/21/2024    CREATININE 1.26 12/25/2024    CREATININE 1.39 (H) 12/24/2024    CREATININE 1.52 (H) 12/21/2024   Recent baseline creatinine from 1.5-1.9  Renal function improving nearing baseline  Consider resuming chlorthalidone and irbesartan tomorrow if stable.    Anemia and thrombocytopenia  Likely secondary to chemo as he is actively on treatment this month  Baseline hemoglobin 10-11, most recent 9.1, on admission 8.0  Thrombocytopenia with platelets of 49  Patient has multiple myeloma currently undergoing chemo - chem DVT ppx on hold  Also acute illness can play a role  Continue to monitor w daily labs  Essential hypertension  Blood Pressure: 154/84    Home regimen amlodipine 10 mg daily, chlorthalidone 25 mg daily, doxazosin 2 mg daily and irbesartan 300 mg daily    Plan:  Continue home amlodipine 5 mg po qd  Restart doxazosin 2 mg qHS  Resume  chlorthalidone and irbesartan likely tomorrow   Multiple myeloma not having achieved remission (HCC)  History of multiple myeloma currently undergoing chemo  Likely causative factor for anemia, thrombocytopenia  Outpatient follow-up  Hemoptysis (Resolved: 12/25/2024)  Productive cough for 4 days, intermittent minimal hemoptysis per patient, most likely  secondary to pneumonia  Recent Labs     12/24/24  0518 12/25/24  1149   HGB 7.3* 8.0*       Monitor.  Hgb stable.  Stage 3b chronic kidney disease (HCC)  Lab Results   Component Value Date    EGFR 58 12/25/2024    EGFR 51 12/24/2024    EGFR 46 12/21/2024    CREATININE 1.26 12/25/2024    CREATININE 1.39 (H) 12/24/2024    CREATININE 1.52 (H) 12/21/2024   Baseline Cr 1.1-1.3, approaching baseline  Unspecified lesions of oral mucosa  Appears consistent with HSV though given tenuous ~50 creatinine clearance and concurrent levofloxacin daily dosing, will hold off on empiric anti-viral treatment due to nephrotoxic concerns  Continue with magic mouthwash  F/u HSV 1,2 PCR testing    VTE Pharmacologic Prophylaxis: VTE Score: 7 High Risk (Score >/= 5) - Pharmacological DVT Prophylaxis Ordered: heparin. Sequential Compression Devices Ordered.    Mobility:   Basic Mobility Inpatient Raw Score: 15  -[facility] Goal: 4: Move to chair/commode  -HL Achieved: 4: Move to chair/commode  -HLM Goal achieved. Continue to encourage appropriate mobility.    Patient Centered Rounds: I performed bedside rounds with nursing staff today.     Current Length of Stay: 8 day(s)  Current Patient Status: Inpatient   Certification Statement: The patient will continue to require additional inpatient hospital stay due to IV abx and rest of medical management as above  Discharge Plan:  Pending insurance auth for ARC, f/u CM on 12/26    Code Status: Level 1 - Full Code    Subjective   No acute events overnight.  Tmax 102.      Objective :  Temp:  [98.9 °F (37.2 °C)-102 °F (38.9 °C)] 98.9 °F (37.2 °C)  HR:  [90-96] 90  BP: (132-154)/(67-84) 154/84  Resp:  [17-20] 17  SpO2:  [93 %-95 %] 95 %  O2 Device: None (Room air)    Body mass index is 32.62 kg/m².     Input and Output Summary (last 24 hours):     Intake/Output Summary (Last 24 hours) at 12/25/2024 1508  Last data filed at 12/25/2024 0901  Gross per 24 hour   Intake --   Output 1350 ml   Net -1350 ml        Physical Exam  Vitals reviewed.   Constitutional:       General: He is not in acute distress.     Appearance: He is not ill-appearing or toxic-appearing.   Cardiovascular:      Rate and Rhythm: Normal rate.   Pulmonary:      Effort: No respiratory distress.      Breath sounds: No wheezing, rhonchi or rales.   Abdominal:      General: There is no distension.      Palpations: There is no mass.      Tenderness: There is no abdominal tenderness. There is no guarding.   Skin:     General: Skin is warm and dry.      Coloration: Skin is not jaundiced.   Neurological:      Mental Status: Mental status is at baseline.               Lab Results: I have reviewed the following results:   Results from last 7 days   Lab Units 12/25/24  1149   WBC Thousand/uL 6.40   HEMOGLOBIN g/dL 8.0*   HEMATOCRIT % 25.8*   PLATELETS Thousands/uL 257   SEGS PCT % 85*   LYMPHO PCT % 7*   MONO PCT % 5   EOS PCT % 2     Results from last 7 days   Lab Units 12/25/24  1149   SODIUM mmol/L 143   POTASSIUM mmol/L 3.6   CHLORIDE mmol/L 111*   CO2 mmol/L 24   BUN mg/dL 23   CREATININE mg/dL 1.26   ANION GAP mmol/L 8   CALCIUM mg/dL 7.9*   GLUCOSE RANDOM mg/dL 90                       Recent Cultures (last 7 days):   Results from last 7 days   Lab Units 12/19/24 2000   SPUTUM CULTURE  2+ Growth of Candida albicans*  2+ Growth of   GRAM STAIN RESULT  2+ Polys*  1+ Gram positive rods*  No Epithelial cells seen*       Imaging Results Review: No pertinent imaging studies reviewed.  Other Study Results Review: No additional pertinent studies reviewed.    Last 24 Hours Medication List:     Current Facility-Administered Medications:     acetaminophen (TYLENOL) tablet 650 mg, Q6H PRN    amLODIPine (NORVASC) tablet 5 mg, Daily    benzonatate (TESSALON PERLES) capsule 100 mg, TID PRN    dextromethorphan-guaiFENesin (ROBITUSSIN DM) oral syrup 10 mL, Q4H PRN    diphenhydramine, lidocaine, Al/Mg hydroxide, simethicone (Magic Mouthwash) oral solution 10 mL,  Q6H    heparin (porcine) subcutaneous injection 5,000 Units, Q8H LIZZETTE    levofloxacin (LEVAQUIN) tablet 750 mg, Q24H    Administrative Statements   Today, Patient Was Seen By: Roverto Morgan MD  I have spent a total time of 35 minutes in caring for this patient on the day of the visit/encounter including Instructions for management, Patient and family education, Importance of tx compliance, Counseling / Coordination of care, and Documenting in the medical record.    **Please Note: This note may have been constructed using a voice recognition system.**

## 2024-12-25 NOTE — ASSESSMENT & PLAN NOTE
Lab Results   Component Value Date    EGFR 58 12/25/2024    EGFR 51 12/24/2024    EGFR 46 12/21/2024    CREATININE 1.26 12/25/2024    CREATININE 1.39 (H) 12/24/2024    CREATININE 1.52 (H) 12/21/2024   Recent baseline creatinine from 1.5-1.9  Renal function improving nearing baseline  Consider resuming chlorthalidone and irbesartan tomorrow if stable.

## 2024-12-25 NOTE — ASSESSMENT & PLAN NOTE
Appears consistent with HSV though given tenuous ~50 creatinine clearance and concurrent levofloxacin daily dosing, will hold off on empiric anti-viral treatment due to nephrotoxic concerns  Continue with magic mouthwash  F/u HSV 1,2 PCR testing

## 2024-12-26 ENCOUNTER — APPOINTMENT (INPATIENT)
Dept: RADIOLOGY | Facility: HOSPITAL | Age: 69
DRG: 871 | End: 2024-12-26
Attending: INTERNAL MEDICINE
Payer: COMMERCIAL

## 2024-12-26 PROBLEM — K12.1 STOMATITIS: Status: ACTIVE | Noted: 2024-12-25

## 2024-12-26 LAB
ANION GAP SERPL CALCULATED.3IONS-SCNC: 6 MMOL/L (ref 4–13)
BASOPHILS # BLD AUTO: 0.01 THOUSANDS/ÂΜL (ref 0–0.1)
BASOPHILS NFR BLD AUTO: 0 % (ref 0–1)
BUN SERPL-MCNC: 21 MG/DL (ref 5–25)
CALCIUM SERPL-MCNC: 8 MG/DL (ref 8.4–10.2)
CHLORIDE SERPL-SCNC: 111 MMOL/L (ref 96–108)
CO2 SERPL-SCNC: 26 MMOL/L (ref 21–32)
CREAT SERPL-MCNC: 1.19 MG/DL (ref 0.6–1.3)
EOSINOPHIL # BLD AUTO: 0.17 THOUSAND/ÂΜL (ref 0–0.61)
EOSINOPHIL NFR BLD AUTO: 3 % (ref 0–6)
ERYTHROCYTE [DISTWIDTH] IN BLOOD BY AUTOMATED COUNT: 16.4 % (ref 11.6–15.1)
GFR SERPL CREATININE-BSD FRML MDRD: 62 ML/MIN/1.73SQ M
GLUCOSE SERPL-MCNC: 114 MG/DL (ref 65–140)
HCT VFR BLD AUTO: 24.7 % (ref 36.5–49.3)
HGB BLD-MCNC: 7.5 G/DL (ref 12–17)
IMM GRANULOCYTES # BLD AUTO: 0.08 THOUSAND/UL (ref 0–0.2)
IMM GRANULOCYTES NFR BLD AUTO: 1 % (ref 0–2)
LYMPHOCYTES # BLD AUTO: 0.57 THOUSANDS/ÂΜL (ref 0.6–4.47)
LYMPHOCYTES NFR BLD AUTO: 10 % (ref 14–44)
MCH RBC QN AUTO: 28.8 PG (ref 26.8–34.3)
MCHC RBC AUTO-ENTMCNC: 30.4 G/DL (ref 31.4–37.4)
MCV RBC AUTO: 95 FL (ref 82–98)
MONOCYTES # BLD AUTO: 0.26 THOUSAND/ÂΜL (ref 0.17–1.22)
MONOCYTES NFR BLD AUTO: 4 % (ref 4–12)
NEUTROPHILS # BLD AUTO: 4.81 THOUSANDS/ÂΜL (ref 1.85–7.62)
NEUTS SEG NFR BLD AUTO: 82 % (ref 43–75)
NRBC BLD AUTO-RTO: 1 /100 WBCS
PLATELET # BLD AUTO: 229 THOUSANDS/UL (ref 149–390)
PMV BLD AUTO: 12 FL (ref 8.9–12.7)
POTASSIUM SERPL-SCNC: 3.8 MMOL/L (ref 3.5–5.3)
RBC # BLD AUTO: 2.6 MILLION/UL (ref 3.88–5.62)
SODIUM SERPL-SCNC: 143 MMOL/L (ref 135–147)
WBC # BLD AUTO: 5.9 THOUSAND/UL (ref 4.31–10.16)

## 2024-12-26 PROCEDURE — 97116 GAIT TRAINING THERAPY: CPT

## 2024-12-26 PROCEDURE — 97530 THERAPEUTIC ACTIVITIES: CPT

## 2024-12-26 PROCEDURE — 80048 BASIC METABOLIC PNL TOTAL CA: CPT | Performed by: INTERNAL MEDICINE

## 2024-12-26 PROCEDURE — 99232 SBSQ HOSP IP/OBS MODERATE 35: CPT | Performed by: STUDENT IN AN ORGANIZED HEALTH CARE EDUCATION/TRAINING PROGRAM

## 2024-12-26 PROCEDURE — 85025 COMPLETE CBC W/AUTO DIFF WBC: CPT | Performed by: INTERNAL MEDICINE

## 2024-12-26 PROCEDURE — 71046 X-RAY EXAM CHEST 2 VIEWS: CPT

## 2024-12-26 PROCEDURE — 99233 SBSQ HOSP IP/OBS HIGH 50: CPT | Performed by: INTERNAL MEDICINE

## 2024-12-26 RX ORDER — LOSARTAN POTASSIUM 50 MG/1
100 TABLET ORAL DAILY
Status: DISCONTINUED | OUTPATIENT
Start: 2024-12-26 | End: 2024-12-30 | Stop reason: HOSPADM

## 2024-12-26 RX ORDER — CHLORTHALIDONE 25 MG/1
12.5 TABLET ORAL DAILY
Status: DISCONTINUED | OUTPATIENT
Start: 2024-12-26 | End: 2024-12-29

## 2024-12-26 RX ORDER — SODIUM CHLORIDE 9 MG/ML
100 INJECTION, SOLUTION INTRAVENOUS CONTINUOUS
Status: DISPENSED | OUTPATIENT
Start: 2024-12-26 | End: 2024-12-27

## 2024-12-26 RX ADMIN — SODIUM CHLORIDE 100 ML/HR: 0.9 INJECTION, SOLUTION INTRAVENOUS at 16:26

## 2024-12-26 RX ADMIN — DIPHENHYDRAMINE HYDROCHLORIDE AND LIDOCAINE HYDROCHLORIDE AND ALUMINUM HYDROXIDE AND MAGNESIUM HYDRO 10 ML: KIT at 23:36

## 2024-12-26 RX ADMIN — HEPARIN SODIUM 5000 UNITS: 5000 INJECTION, SOLUTION INTRAVENOUS; SUBCUTANEOUS at 22:26

## 2024-12-26 RX ADMIN — GUAIFENESIN AND DEXTROMETHORPHAN 10 ML: 100; 10 SYRUP ORAL at 22:27

## 2024-12-26 RX ADMIN — CHLORTHALIDONE 12.5 MG: 25 TABLET ORAL at 17:21

## 2024-12-26 RX ADMIN — DOXAZOSIN 2 MG: 2 TABLET ORAL at 22:26

## 2024-12-26 RX ADMIN — DIPHENHYDRAMINE HYDROCHLORIDE AND LIDOCAINE HYDROCHLORIDE AND ALUMINUM HYDROXIDE AND MAGNESIUM HYDRO 10 ML: KIT at 06:45

## 2024-12-26 RX ADMIN — ACETAMINOPHEN 650 MG: 325 TABLET, FILM COATED ORAL at 22:27

## 2024-12-26 RX ADMIN — HEPARIN SODIUM 5000 UNITS: 5000 INJECTION, SOLUTION INTRAVENOUS; SUBCUTANEOUS at 06:45

## 2024-12-26 RX ADMIN — LEVOFLOXACIN 750 MG: 750 TABLET, FILM COATED ORAL at 11:36

## 2024-12-26 RX ADMIN — AMLODIPINE BESYLATE 5 MG: 5 TABLET ORAL at 11:03

## 2024-12-26 RX ADMIN — DIPHENHYDRAMINE HYDROCHLORIDE AND LIDOCAINE HYDROCHLORIDE AND ALUMINUM HYDROXIDE AND MAGNESIUM HYDRO 10 ML: KIT at 13:15

## 2024-12-26 RX ADMIN — LOSARTAN POTASSIUM 100 MG: 50 TABLET, FILM COATED ORAL at 16:26

## 2024-12-26 RX ADMIN — HEPARIN SODIUM 5000 UNITS: 5000 INJECTION, SOLUTION INTRAVENOUS; SUBCUTANEOUS at 13:15

## 2024-12-26 NOTE — PLAN OF CARE
Problem: PHYSICAL THERAPY ADULT  Goal: Performs mobility at highest level of function for planned discharge setting.  See evaluation for individualized goals.  Description: Treatment/Interventions: Functional transfer training, Therapeutic exercise, Elevations, Bed mobility, Gait training, Spoke to nursing, Spoke to case management, OT  Equipment Recommended: Walker       See flowsheet documentation for full assessment, interventions and recommendations.  Outcome: Progressing  Note: Prognosis: Good  Problem List: Decreased endurance, Decreased mobility, Decreased strength  Assessment: Pt seen for PT treatment session this date. Therapy session focused on bed mobility, functional transfers, and ambulation in order to improve overall mobility and independence. Pt requires supervision for bed mobility  and transfers, ambulating with RW with CG A.1 seated rest break durign ambulation attempts, defers stairs at this time 2* fatigue. Pt making steady progress toward goals. Pt was left back in bed at the end of PT session with all needs in reach. Pt would benefit from continued PT services while in hospital to address remaining limitations. The patient's AM-PAC Basic Mobility Inpatient Short Form Raw Score is 15. A Raw score of less than or equal to 16 suggests the patient may benefit from discharge to post-acute rehabilitation services. Please also refer to the recommendation of the Physical Therapist for safe discharge planning. Post dc rec remains Level I, pt is improving in mobility level, will attempt stairs in following sessions as appropriate.  Barriers to Discharge: Inaccessible home environment, Decreased caregiver support     Rehab Resource Intensity Level, PT: I (Maximum Resource Intensity)    See flowsheet documentation for full assessment.

## 2024-12-26 NOTE — ASSESSMENT & PLAN NOTE
With sepsis on admission. CXR findings consistent w LLL PNA. Admission 102.5F, 109 HR, 88% O2, procal 114.   Started on CTX, d/c'd after legionella (+). Started on azithromycin 12/17, d/c'd next day for broader coverage for atypicals w levofloxacin.   Continue levofloxacin 12/18-12/27 for 10d total  Discussed with ID today  Chest x-ray with bilateral pneumonitis, no empyema  Patient with recurrent fevers otherwise clinically stable

## 2024-12-26 NOTE — PROGRESS NOTES
Progress Note - Infectious Disease   Name: Zack Partida 69 y.o. male I MRN: [id number]  Unit/Bed#: [facility] 823-01 I Date of Admission: 12/17/2024   Date of Service: 12/26/2024 I Hospital Day: 9    Assessment & Plan  Sepsis (HCC) (Resolved: 12/21/2024)  Fever, HR.  Due to pneumonia.  No other appreciable source.  Flu/RSV/COVID antigen, blood cultures negative.  Improving with antibiotics.  Now with recurrent fevers.  Consider due to stomatitis.  Consider pleural effusion.  Consider drug fever.  Overall looks clinically well.  WBC normal    Continue antibiotics as below  Check PA/lateral CXR  Follow temperatures closely  Supportive care as per the primary service  Legionella pneumonia (HCC)  Unclear risk factor/exposure.  While antigen has high specificity, consider other respiratory pathogens as well.  Clinically improving.    Continue levofloxacin through 12/27 for 10 days total  Follow respiratory status closely  Check CXR as above to evaluate for effusion.  Stomatitis  Consider due to HSV.  Symptoms >4 days so antivirals unlikely to benefit at this time.    Magic mouthwash  Serial exams  Acute kidney injury superimposed on CKD  (HCC)  Baseline Cr. 1.1-1.3.  Likely multifactorial due to sepsis, poor PO intake.  Continues to improve.    Follow creatinine closely and dose-adjust antibiotics as indicated  Recheck BMP in AM  Multiple myeloma not having achieved remission (HCC)  Currently on Daratumumab-VRd since November 2024.  Being evaluated for possible SCT.  Anemia and thrombocytopenia  Due to MM  Stage 3b chronic kidney disease (HCC)  Lab Results   Component Value Date    EGFR 62 12/26/2024    EGFR 58 12/25/2024    EGFR 51 12/24/2024    CREATININE 1.19 12/26/2024    CREATININE 1.26 12/25/2024    CREATININE 1.39 (H) 12/24/2024       I have discussed with Dr. To the above plan to check CXR and monitor fever curve. She agrees with the plan.    Antibiotics:  Levofloxacin #9    Subjective   Patient has no  fever, chills, sweats; no nausea, vomiting, diarrhea; no cough, shortness of breath; no pain. No new symptoms.    Objective :  Temp:  [98.2 °F (36.8 °C)-101.5 °F (38.6 °C)] 98.7 °F (37.1 °C)  HR:  [89-92] 92  BP: (122-152)/(67-86) 152/86  Resp:  [16-20] 19  SpO2:  [91 %-94 %] 94 %  O2 Device: None (Room air)    General:  No acute distress  Psychiatric:  Awake and alert  Pulmonary:  Normal respiratory excursion without accessory muscle use  Abdomen:  Soft, nontender  Extremities:  No edema  Skin:  No rashes      Lab Results: I have reviewed the following results:  Results from last 7 days   Lab Units 12/26/24  0555 12/25/24  1149 12/24/24  0518   WBC Thousand/uL 5.90 6.40 6.56   HEMOGLOBIN g/dL 7.5* 8.0* 7.3*   PLATELETS Thousands/uL 229 257 231     Results from last 7 days   Lab Units 12/26/24  0555 12/25/24  1149 12/24/24  0518   SODIUM mmol/L 143 143 141   POTASSIUM mmol/L 3.8 3.6 3.5   CHLORIDE mmol/L 111* 111* 111*   CO2 mmol/L 26 24 22   BUN mg/dL 21 23 29*   CREATININE mg/dL 1.19 1.26 1.39*   EGFR ml/min/1.73sq m 62 58 51   CALCIUM mg/dL 8.0* 7.9* 8.0*     Results from last 7 days   Lab Units 12/19/24 2000   SPUTUM CULTURE  2+ Growth of Candida albicans*  2+ Growth of   GRAM STAIN RESULT  2+ Polys*  1+ Gram positive rods*  No Epithelial cells seen*

## 2024-12-26 NOTE — UTILIZATION REVIEW
Continued Stay Review  Date: 12/26/24                        Current Patient Class: Inpatient    Current Level of Care: Acute Med Surg    HPI: 68 y/o male with PMHx HTN, multiple myeloma currently undergoing chemotherapy - initially admitted on 12/17/24 2nd Acute respiratory failure with hypoxia 2nd LLL Legionella Pneumonia with Sepsis, MARCI on CKD, Anemia 2nd chemotherapy     12/26/24:  No acute events overnight.  Tmax 101.5    Rm Air SpO2 91 - 94 %  MD Certification Statement: The patient will continue to require additional inpatient hospital stay due to IV abx and medical management.     Legionella pneumonia (HCC)  With sepsis on admission. CXR findings consistent w LLL PNA. Admission Rm Air SpO2 88% O2, procal 114.   Started on CTX, d/c'd after legionella (+). Started on azithromycin 12/17, d/c'd next day for broader coverage for atypicals w levofloxacin.      Plan: Continue levofloxacin 12/18-12/27 for 10d total  Check CXR as above to evaluate for effusion.    Sepsis (HCC) - resolved  Fever, HR.  Due to pneumonia.  No other appreciable source.  Flu/RSV/COVID antigen, blood cultures negative.  Improving with antibiotics.  Now with recurrent fevers.  Consider due to stomatitis.  Consider pleural effusion.  Consider drug fever.  Overall looks clinically well.  WBC normal   Continue antibiotics as below  Check PA/lateral CXR  Follow temperatures closely    Stomatitis  Consider due to HSV.  Symptoms >4 days so antivirals unlikely to benefit at this time.   Magic mouthwash  Serial exams    Scheduled Medications:  amLODIPine, 5 mg, Oral, Daily  diphenhydramine, lidocaine, Al/Mg hydroxide, simethicone, 10 mL, Swish & Spit, Q6H  doxazosin, 2 mg, Oral, HS  heparin (porcine), 5,000 Units, Subcutaneous, Q8H LIZZETTE  levofloxacin, 750 mg, Oral, Q24H    Continuous IV Infusions: NONE    PRN Meds:  acetaminophen, 650 mg, Oral, Q6H PRN - 12/25 X 2  benzonatate, 100 mg, Oral, TID PRN  dextromethorphan-guaiFENesin, 10 mL, Oral, Q4H  PRN    Discharge Plan:   To be determined   Inpatient Case Management following for all discharge needs      Vital Signs (last 3 days)       Date/Time Temp Pulse Resp BP MAP (mmHg) SpO2 O2 Device Patient Position - Orthostatic VS Pain    12/26/24 07:54:15 98.7 °F (37.1 °C) -- 19 152/86 108 -- -- -- --    12/26/24 02:33:03 98.2 °F (36.8 °C) 92 -- -- -- 94 % -- -- --    12/25/24 2253 -- -- -- -- -- -- -- -- Med Not Given for Pain - for MAR use only    12/25/24 22:30:08 101.5 °F (38.6 °C) 91 20 122/67 85 91 % -- -- --    12/25/24 2100 -- -- -- -- -- -- -- -- No Pain    12/25/24 2000 -- -- -- -- -- -- None (Room air) -- --    12/25/24 15:10:23 100 °F (37.8 °C) 89 16 150/79 103 94 % -- -- --    12/25/24 07:32:08 98.9 °F (37.2 °C) 90 17 154/84 107 95 % -- -- --    12/25/24 00:46:12 100.3 °F (37.9 °C) 91 -- -- -- 94 % -- -- --    12/24/24 2327 -- -- -- -- -- -- None (Room air) -- Med Not Given for Pain - for MAR use only    12/24/24 23:07:58 102 °F (38.9 °C) 96 20 132/67 89 93 % -- -- --    12/24/24 1551 -- -- -- -- -- 93 % None (Room air) -- --    12/24/24 15:06:36 98.8 °F (37.1 °C) 84 20 128/74 92 94 % -- -- --    12/24/24 13:32:50 98.8 °F (37.1 °C) 89 16 130/74 93 91 % -- Lying --    12/24/24 0945 -- -- -- -- -- 93 % None (Room air) -- 3    12/24/24 07:15:26 100.2 °F (37.9 °C) 97 16 131/72 92 91 % -- -- --    12/23/24 2300 -- -- -- 140/76 -- -- -- -- --    12/23/24 21:57:10 99.4 °F (37.4 °C) 94 17 98/63 75 96 % -- -- --    12/23/24 2137 -- -- -- -- -- 93 % None (Room air) -- --    12/23/24 16:10:59 99 °F (37.2 °C) 97 16 144/73 97 94 % -- -- --    12/23/24 1609 -- -- -- -- -- -- -- -- No Pain    12/23/24 1300 -- -- -- -- -- -- -- -- No Pain    12/23/24 0826 -- -- -- -- -- -- -- -- No Pain    12/23/24 0750 -- -- -- -- -- -- None (Room air) -- No Pain    12/23/24 07:48:56 99.2 °F (37.3 °C) 88 16 140/76 97 93 % -- -- --      12/24  0701 - 12/25  0701 - 12/26  0701 -    Intake   (mL/kg) 180   (2.1) 240   (2.8) 1,160   (13.5)     P.O. 180 240 1,160    Output 1,450 1,300 125    Urine   (mL/kg/hr) 1,450   (0.7) 1,300   (0.6) 125    Net -1,270 -1,060 +1,035    Unmeasured Stool Occurrence 1  x -- 0  x      Pertinent Labs/Diagnostic Results:   XR chest portable - 1 view   Final Interpretation  (12/17 1033)      Left lower lung pneumonia.     Cardiology:  ECG 12 lead   Final Result by (12/17 4618)   Sinus rhythm with short KY   Minimal voltage criteria for LVH, may be normal variant   Borderline ECG   When compared with ECG of 02-Nov-2024 09:04,   KY interval has decreased       Results from last 7 days   Lab Units 12/26/24  0555 12/25/24  1149 12/24/24  0518 12/21/24  0610 12/20/24  0605 12/20/24  0605   WBC Thousand/uL 5.90 6.40 6.56 6.80  --  7.55   HEMOGLOBIN g/dL 7.5* 8.0* 7.3* 7.7*  --  7.3*   HEMATOCRIT % 24.7* 25.8* 23.2* 24.5*  --  22.2*   PLATELETS Thousands/uL 229 257 231 165  --  119*   TOTAL NEUT ABS Thousands/µL 4.81 5.41 5.60 6.02   < >  --        Results from last 7 days   Lab Units 12/26/24  0555 12/25/24  1149 12/24/24  0518 12/21/24  0610 12/20/24  0605   SODIUM mmol/L 143 143 141 141 142   POTASSIUM mmol/L 3.8 3.6 3.5 3.6 3.7   CHLORIDE mmol/L 111* 111* 111* 113* 114*   CO2 mmol/L 26 24 22 21 18*   ANION GAP mmol/L 6 8 8 7 10   BUN mg/dL 21 23 29* 34* 38*   CREATININE mg/dL 1.19 1.26 1.39* 1.52* 1.61*   EGFR ml/min/1.73sq m 62 58 51 46 43   CALCIUM mg/dL 8.0* 7.9* 8.0* 8.3* 8.5       Results from last 7 days   Lab Units 12/26/24  0555 12/25/24  1149 12/24/24  0518 12/21/24  0610 12/20/24  0605   GLUCOSE RANDOM mg/dL 114 90 116 107 109       Results from last 7 days   Lab Units 12/19/24 2000   SPUTUM CULTURE  2+ Growth of Candida albicans*  2+ Growth of   GRAM STAIN RESULT  2+ Polys*  1+ Gram positive rods*  No Epithelial cells seen*     Network Utilization Review Department  ATTENTION: Please call with any questions or concerns to [phone] and carefully listen to the prompts so that you are directed to the right  person. All voicemails are confidential.   For Discharge needs, contact Care Management DC Support Team at [phone] opt. 2  Send all requests for admission clinical reviews, approved or denied determinations and any other requests to dedicated fax number below belonging to the campus where the patient is receiving treatment. List of dedicated fax numbers for the Facilities:  FACILITY NAME UR FAX NUMBER   ADMISSION DENIALS (Administrative/Medical Necessity) [phone]   DISCHARGE SUPPORT TEAM (NETWORK) [phone]   PARENT CHILD HEALTH (Maternity/NICU/Pediatrics) [phone]   [facility] [phone]   [facility] [phone]   [facility] [phone]   [facility] [phone]   Critical access hospital [phone]   [facility] [phone]   [facility] [phone]   [facility] [phone]   [facility] [phone]   [facility] [phone]   [facility] [phone]   [facility] [phone]

## 2024-12-26 NOTE — ASSESSMENT & PLAN NOTE
Fever, HR.  Due to pneumonia.  No other appreciable source.  Flu/RSV/COVID antigen, blood cultures negative.  Improving with antibiotics.  Now with recurrent fevers.  Consider due to stomatitis.  Consider pleural effusion.  Consider drug fever.  Overall looks clinically well.  WBC normal    Continue antibiotics as below  Check PA/lateral CXR  Follow temperatures closely  Supportive care as per the primary service

## 2024-12-26 NOTE — ASSESSMENT & PLAN NOTE
Baseline Cr. 1.1-1.3.  Likely multifactorial due to sepsis, poor PO intake.  Continues to improve.    Follow creatinine closely and dose-adjust antibiotics as indicated  Recheck BMP in AM

## 2024-12-26 NOTE — CASE MANAGEMENT
Case Management Discharge Planning Note    Patient name Zack Partida  Location [facility] 823/[facility] 823-01 MRN [id number]  : 1955 Date 2024       Current Admission Date: 2024  Current Admission Diagnosis:Legionella pneumonia (HCC)   Patient Active Problem List    Diagnosis Date Noted Date Diagnosed    Stomatitis 2024     Legionella pneumonia (HCC) 2024     Acute kidney injury superimposed on CKD  (HCC) 2024     Anemia and thrombocytopenia 2024     Multiple myeloma not having achieved remission (HCC) 10/18/2024     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     IgG lambda monoclonal gammopathy 10/17/2024     Difficulty walking 2024     Nephrolithiasis 2024     Stage 3b chronic kidney disease (HCC) 2023     History of colonic polyps 2023     History of poliomyelitis 2021    Unspecified acquired deformity of unspecified lower leg 2021    Muscle weakness of extremity following poliomyelitis 2021     Prediabetes 2021     Obesity 2021     Essential hypertension 2021       LOS (days): 9  Geometric Mean LOS (GMLOS) (days):   Days to GMLOS:     OBJECTIVE:  Risk of Unplanned Readmission Score: 15.96         Current admission status: Inpatient   Preferred Pharmacy:   [id number] - [place], [place] -  [facility]   [facility] [address]  Phone: [phone] Fax: [phone]    [id number] - [place], [place] - [address] [place] [address]  Phone: [phone] Fax: [phone]    [facility] - [place], [place] - [address]  [address]  [address]  [facility] [address]  Phone: [phone] Fax: [phone]    Primary Care Provider: Ayanna Paz MD    Primary Insurance: BLUE CROSS  Secondary Insurance:     DISCHARGE DETAILS:                       Continue to await auth  Pt with persistent fever, ID  workup ongoing

## 2024-12-26 NOTE — ASSESSMENT & PLAN NOTE
Lab Results   Component Value Date    EGFR 62 12/26/2024    EGFR 58 12/25/2024    EGFR 51 12/24/2024    CREATININE 1.19 12/26/2024    CREATININE 1.26 12/25/2024    CREATININE 1.39 (H) 12/24/2024

## 2024-12-26 NOTE — PROGRESS NOTES
Progress Note - Hospitalist   Name: Zack Partida 69 y.o. male I MRN: [id number]  Unit/Bed#: [facility] 823-01 I Date of Admission: 12/17/2024   Date of Service: 12/26/2024 I Hospital Day: 9    Assessment & Plan  Legionella pneumonia (HCC)  With sepsis on admission. CXR findings consistent w LLL PNA. Admission 102.5F, 109 HR, 88% O2, procal 114.   Started on CTX, d/c'd after legionella (+). Started on azithromycin 12/17, d/c'd next day for broader coverage for atypicals w levofloxacin.   Continue levofloxacin 12/18-12/27 for 10d total  Discussed with ID today  Chest x-ray with bilateral pneumonitis, no empyema  Patient with recurrent fevers otherwise clinically stable  Acute kidney injury superimposed on CKD  (HCC)  Lab Results   Component Value Date    EGFR 62 12/26/2024    EGFR 58 12/25/2024    EGFR 51 12/24/2024    CREATININE 1.19 12/26/2024    CREATININE 1.26 12/25/2024    CREATININE 1.39 (H) 12/24/2024   Recent baseline creatinine from 1.5-1.9  Kidney function has remained stable, will restart home antihypertensives  Repeat BMP in the morning  Stomatitis  Appears consistent with HSV  Discussed with ID and given the symptoms have been present for more than 4 days antivirals are unlikely have benefit at this time  Continue with magic mouthwash  F/u HSV 1,2 PCR   Will provide 10 hours of IV fluid resuscitation as patient with decreased oral intake in the setting of pain from stomatitis  Anemia and thrombocytopenia  Likely secondary to chemo as he is actively on treatment this month  Baseline hemoglobin 10-11, most recent 9.1, on admission 8.0  Thrombocytopenia with platelets of 49 on admission  Patient has multiple myeloma currently undergoing chemo  Also acute illness can play a role  Thrombocytopenia has resolved, hemoglobin has remained stable  Repeat CBC in the morning  Essential hypertension  Blood Pressure: 152/86    Home regimen amlodipine 10 mg daily, chlorthalidone 25 mg daily, doxazosin 2 mg daily and  irbesartan 300 mg daily  Continue home amlodipine 5 mg po qd and doxazosin 2 mg qHS  Restart chlorthalidone and irbesartan   BP reviewed and above goal  Multiple myeloma not having achieved remission (HCC)  History of multiple myeloma currently undergoing chemo  Likely causative factor for anemia, thrombocytopenia  Outpatient follow-up    VTE Pharmacologic Prophylaxis: VTE Score: 7 High Risk (Score >/= 5) - Pharmacological DVT Prophylaxis Ordered: heparin. Sequential Compression Devices Ordered.    Mobility:   Basic Mobility Inpatient Raw Score: 17  JH-HLM Goal: 5: Stand one or more mins  JH-HLM Achieved: 6: Walk 10 steps or more  JH-HLM Goal achieved. Continue to encourage appropriate mobility.    Patient Centered Rounds: I performed bedside rounds with nursing staff today.   Discussions with Specialists or Other Care Team Provider: , ID    Education and Discussions with Family / Patient:  Patient will update family members.     Current Length of Stay: 9 day(s)  Current Patient Status: Inpatient   Certification Statement: The patient will continue to require additional inpatient hospital stay due to dispo planning, no recurrence of fever  Discharge Plan: Anticipate discharge in 24-48 hrs to home with home services.    Code Status: Level 1 - Full Code    Subjective   No events overnight.  Patient reports burning sensation around his mouth and while eating.  He has no fever or chills.  No nausea or vomiting.    Objective :  Temp:  [98.2 °F (36.8 °C)-101.5 °F (38.6 °C)] 98.7 °F (37.1 °C)  HR:  [91-92] 92  BP: (122-152)/(67-86) 152/86  Resp:  [19-20] 19  SpO2:  [91 %-94 %] 94 %  O2 Device: None (Room air)    Body mass index is 32.62 kg/m².     Input and Output Summary (last 24 hours):     Intake/Output Summary (Last 24 hours) at 12/26/2024 1523  Last data filed at 12/26/2024 1300  Gross per 24 hour   Intake 1160 ml   Output 675 ml   Net 485 ml       Physical Exam  Vitals and nursing note reviewed.    Constitutional:       General: He is not in acute distress.     Appearance: He is not ill-appearing.   HENT:      Head: Normocephalic and atraumatic.      Mouth/Throat:      Comments: Upper lip blister  Eyes:      Conjunctiva/sclera: Conjunctivae normal.   Cardiovascular:      Rate and Rhythm: Normal rate and regular rhythm.   Pulmonary:      Effort: No respiratory distress.      Breath sounds: Normal breath sounds. No wheezing, rhonchi or rales.   Musculoskeletal:      Right lower leg: No edema.      Left lower leg: No edema.   Skin:     General: Skin is warm and dry.   Neurological:      Mental Status: He is alert.   Psychiatric:         Mood and Affect: Mood normal.         Behavior: Behavior normal.           Lines/Drains:              Lab Results: I have reviewed the following results:   Results from last 7 days   Lab Units 12/26/24  0555   WBC Thousand/uL 5.90   HEMOGLOBIN g/dL 7.5*   HEMATOCRIT % 24.7*   PLATELETS Thousands/uL 229   SEGS PCT % 82*   LYMPHO PCT % 10*   MONO PCT % 4   EOS PCT % 3     Results from last 7 days   Lab Units 12/26/24  0555   SODIUM mmol/L 143   POTASSIUM mmol/L 3.8   CHLORIDE mmol/L 111*   CO2 mmol/L 26   BUN mg/dL 21   CREATININE mg/dL 1.19   ANION GAP mmol/L 6   CALCIUM mg/dL 8.0*   GLUCOSE RANDOM mg/dL 114                       Recent Cultures (last 7 days):   Results from last 7 days   Lab Units 12/19/24 2000   SPUTUM CULTURE  2+ Growth of Candida albicans*  2+ Growth of   GRAM STAIN RESULT  2+ Polys*  1+ Gram positive rods*  No Epithelial cells seen*       Imaging Results Review: I reviewed radiology reports from this admission including: chest xray.  Other Study Results Review: No additional pertinent studies reviewed.    Last 24 Hours Medication List:     Current Facility-Administered Medications:     acetaminophen (TYLENOL) tablet 650 mg, Q6H PRN    amLODIPine (NORVASC) tablet 5 mg, Daily    benzonatate (TESSALON PERLES) capsule 100 mg, TID PRN     dextromethorphan-guaiFENesin (ROBITUSSIN DM) oral syrup 10 mL, Q4H PRN    diphenhydramine, lidocaine, Al/Mg hydroxide, simethicone (Magic Mouthwash) oral solution 10 mL, Q6H    doxazosin (CARDURA) tablet 2 mg, HS    heparin (porcine) subcutaneous injection 5,000 Units, Q8H LIZZETTE    levofloxacin (LEVAQUIN) tablet 750 mg, Q24H    Administrative Statements   Today, Patient Was Seen By: Heydi To MD      **Please Note: This note may have been constructed using a voice recognition system.**

## 2024-12-26 NOTE — CASE MANAGEMENT
Called Terence at [phone] to check status of IRF auth request. Spoke to Trinidad stated the auth is still pending and still working on it  Bill notified  Nichelle Cervantes    OB/GYN

## 2024-12-26 NOTE — PLAN OF CARE
Problem: SAFETY ADULT  Goal: Patient will remain free of falls  Description: INTERVENTIONS:  - Educate patient/family on patient safety including physical limitations  - Instruct patient to call for assistance with activity   - Consult OT/PT to assist with strengthening/mobility   - Keep Call bell within reach  - Keep bed low and locked with side rails adjusted as appropriate  - Keep care items and personal belongings within reach  - Initiate and maintain comfort rounds  - Make Fall Risk Sign visible to staff  - Offer Toileting every 2 Hours, in advance of need  - Initiate/Maintain bed alarm  - Obtain necessary fall risk management equipment: assistive devices  - Apply yellow socks and bracelet for high fall risk patients  - Consider moving patient to room near nurses station  Outcome: Progressing     Problem: PAIN - ADULT  Goal: Verbalizes/displays adequate comfort level or baseline comfort level  Description: Interventions:  - Encourage patient to monitor pain and request assistance  - Assess pain using appropriate pain scale  - Administer analgesics based on type and severity of pain and evaluate response  - Implement non-pharmacological measures as appropriate and evaluate response  - Consider cultural and social influences on pain and pain management  - Notify physician/advanced practitioner if interventions unsuccessful or patient reports new pain  Outcome: Progressing     Problem: INFECTION - ADULT  Goal: Absence or prevention of progression during hospitalization  Description: INTERVENTIONS:  - Assess and monitor for signs and symptoms of infection  - Monitor lab/diagnostic results  - Monitor all insertion sites, i.e. indwelling lines, tubes, and drains  - Monitor endotracheal if appropriate and nasal secretions for changes in amount and color  - [place] appropriate cooling/warming therapies per order  - Administer medications as ordered  - Instruct and encourage patient and family to use good hand  hygiene technique  - Identify and instruct in appropriate isolation precautions for identified infection/condition  Outcome: Progressing     Problem: RESPIRATORY - ADULT  Goal: Achieves optimal ventilation and oxygenation  Description: INTERVENTIONS:  - Assess for changes in respiratory status  - Assess for changes in mentation and behavior  - Position to facilitate oxygenation and minimize respiratory effort  - Oxygen administered by appropriate delivery if ordered  - Initiate smoking cessation education as indicated  - Encourage broncho-pulmonary hygiene including cough, deep breathe, Incentive Spirometry  - Assess the need for suctioning and aspirate as needed  - Assess and instruct to report SOB or any respiratory difficulty  - Respiratory Therapy support as indicated  Outcome: Progressing     Problem: METABOLIC, FLUID AND ELECTROLYTES - ADULT  Goal: Fluid balance maintained  Description: INTERVENTIONS:  - Monitor labs   - Monitor I/O and WT  - Instruct patient on fluid and nutrition as appropriate  - Assess for signs & symptoms of volume excess or deficit  Outcome: Progressing     Problem: HEMATOLOGIC - ADULT  Goal: Maintains hematologic stability  Description: INTERVENTIONS  - Assess for signs and symptoms of bleeding or hemorrhage  - Monitor labs  - Administer supportive blood products/factors as ordered and appropriate  Outcome: Progressing     Problem: DISCHARGE PLANNING  Goal: Discharge to home or other facility with appropriate resources  Description: INTERVENTIONS:  - Identify barriers to discharge w/patient and caregiver  - Arrange for needed discharge resources and transportation as appropriate  - Identify discharge learning needs (meds, wound care, etc.)  - Arrange for interpretive services to assist at discharge as needed  - Refer to Case Management Department for coordinating discharge planning if the patient needs post-hospital services based on physician/advanced practitioner order or complex  needs related to functional status, cognitive ability, or social support system  Outcome: Progressing

## 2024-12-26 NOTE — ASSESSMENT & PLAN NOTE
Consider due to HSV.  Symptoms >4 days so antivirals unlikely to benefit at this time.    Magic mouthwash  Serial exams

## 2024-12-26 NOTE — ASSESSMENT & PLAN NOTE
Likely secondary to chemo as he is actively on treatment this month  Baseline hemoglobin 10-11, most recent 9.1, on admission 8.0  Thrombocytopenia with platelets of 49 on admission  Patient has multiple myeloma currently undergoing chemo  Also acute illness can play a role  Thrombocytopenia has resolved, hemoglobin has remained stable  Repeat CBC in the morning

## 2024-12-26 NOTE — ASSESSMENT & PLAN NOTE
Lab Results   Component Value Date    EGFR 62 12/26/2024    EGFR 58 12/25/2024    EGFR 51 12/24/2024    CREATININE 1.19 12/26/2024    CREATININE 1.26 12/25/2024    CREATININE 1.39 (H) 12/24/2024   Recent baseline creatinine from 1.5-1.9  Kidney function has remained stable, will restart home antihypertensives  Repeat BMP in the morning

## 2024-12-26 NOTE — PLAN OF CARE
Problem: INFECTION - ADULT  Goal: Absence or prevention of progression during hospitalization  Description: INTERVENTIONS:  - Assess and monitor for signs and symptoms of infection  - Monitor lab/diagnostic results  - Monitor all insertion sites, i.e. indwelling lines, tubes, and drains  - Monitor endotracheal if appropriate and nasal secretions for changes in amount and color  - [place] appropriate cooling/warming therapies per order  - Administer medications as ordered  - Instruct and encourage patient and family to use good hand hygiene technique  - Identify and instruct in appropriate isolation precautions for identified infection/condition  Outcome: Progressing  Goal: Absence of fever/infection during neutropenic period  Description: INTERVENTIONS:  - Monitor WBC    Outcome: Progressing     Problem: SAFETY ADULT  Goal: Patient will remain free of falls  Description: INTERVENTIONS:  - Educate patient/family on patient safety including physical limitations  - Instruct patient to call for assistance with activity   - Consult OT/PT to assist with strengthening/mobility   - Keep Call bell within reach  - Keep bed low and locked with side rails adjusted as appropriate  - Keep care items and personal belongings within reach  - Initiate and maintain comfort rounds  - Make Fall Risk Sign visible to staff  - Offer Toileting every 2 Hours, in advance of need  - Initiate/Maintain bed alarm  - Obtain necessary fall risk management equipment: assistive devices  - Apply yellow socks and bracelet for high fall risk patients  - Consider moving patient to room near nurses station  Outcome: Progressing     Problem: DISCHARGE PLANNING  Goal: Discharge to home or other facility with appropriate resources  Description: INTERVENTIONS:  - Identify barriers to discharge w/patient and caregiver  - Arrange for needed discharge resources and transportation as appropriate  - Identify discharge learning needs (meds, wound care, etc.)  -  Arrange for interpretive services to assist at discharge as needed  - Refer to Case Management Department for coordinating discharge planning if the patient needs post-hospital services based on physician/advanced practitioner order or complex needs related to functional status, cognitive ability, or social support system  Outcome: Progressing     Problem: PAIN - ADULT  Goal: Verbalizes/displays adequate comfort level or baseline comfort level  Description: Interventions:  - Encourage patient to monitor pain and request assistance  - Assess pain using appropriate pain scale  - Administer analgesics based on type and severity of pain and evaluate response  - Implement non-pharmacological measures as appropriate and evaluate response  - Consider cultural and social influences on pain and pain management  - Notify physician/advanced practitioner if interventions unsuccessful or patient reports new pain  Outcome: Progressing     Problem: RESPIRATORY - ADULT  Goal: Achieves optimal ventilation and oxygenation  Description: INTERVENTIONS:  - Assess for changes in respiratory status  - Assess for changes in mentation and behavior  - Position to facilitate oxygenation and minimize respiratory effort  - Oxygen administered by appropriate delivery if ordered  - Initiate smoking cessation education as indicated  - Encourage broncho-pulmonary hygiene including cough, deep breathe, Incentive Spirometry  - Assess the need for suctioning and aspirate as needed  - Assess and instruct to report SOB or any respiratory difficulty  - Respiratory Therapy support as indicated  Outcome: Progressing     Problem: METABOLIC, FLUID AND ELECTROLYTES - ADULT  Goal: Fluid balance maintained  Description: INTERVENTIONS:  - Monitor labs   - Monitor I/O and WT  - Instruct patient on fluid and nutrition as appropriate  - Assess for signs & symptoms of volume excess or deficit  Outcome: Progressing     Problem: HEMATOLOGIC - ADULT  Goal: Maintains  hematologic stability  Description: INTERVENTIONS  - Assess for signs and symptoms of bleeding or hemorrhage  - Monitor labs  - Administer supportive blood products/factors as ordered and appropriate  Outcome: Progressing

## 2024-12-26 NOTE — ASSESSMENT & PLAN NOTE
Appears consistent with HSV  Discussed with ID and given the symptoms have been present for more than 4 days antivirals are unlikely have benefit at this time  Continue with magic mouthwash  F/u HSV 1,2 PCR   Will provide 10 hours of IV fluid resuscitation as patient with decreased oral intake in the setting of pain from stomatitis

## 2024-12-26 NOTE — PHYSICAL THERAPY NOTE
PHYSICAL THERAPY NOTE          Patient Name: Zack Partida  Today's Date: 12/26/2024 12/26/24 1346   PT Last Visit   PT Visit Date 12/26/24   Note Type   Note Type Treatment   Pain Assessment   Pain Assessment Tool 0-10   Pain Score No Pain   Restrictions/Precautions   Weight Bearing Precautions Per Order No   Braces or Orthoses Other (Comment)  (R LE KAFO, h/o polio)   Other Precautions Chair Alarm;Bed Alarm;Pain;Fall Risk  ([place] speaking,  services used)   General   Chart Reviewed Yes   Cognition   Overall Cognitive Status WFL   Arousal/Participation Alert;Responsive   Attention Within functional limits   Orientation Level Oriented X4   Following Commands Follows one step commands without difficulty   Comments Pt cooperative and very pleasant durign session   Subjective   Subjective Agreeable to mobilzie   Bed Mobility   Supine to Sit 5  Supervision   Additional items Increased time required;Bedrails   Sit to Supine 5  Supervision   Additional items Bedrails   Additional Comments Pt in bed upon arrival.   Transfers   Sit to Stand 5  Supervision   Additional items Increased time required;Verbal cues   Stand to Sit 5  Supervision   Additional items Verbal cues   Additional Comments w/RW   Ambulation/Elevation   Gait pattern Forward Flexion;Improper Weight shift;Inconsistent haylie;Step to;Excessively slow   Gait Assistance   (CG A)   Additional items Verbal cues   Assistive Device Rolling walker   Distance 60 ft X 2   Stair Management Assistance Not tested   Ambulation/Elevation Additional Comments Pt fatigued with ambulation , deferred stairs at this time. SPO2 on RA 94%, -133 durign activity   Balance   Static Sitting Good   Dynamic Sitting Fair +   Static Standing Fair   Dynamic Standing Fair -   Ambulatory Poor +   Endurance Deficit   Endurance Deficit Yes   Activity Tolerance   Activity Tolerance Patient  limited by fatigue   Nurse Made Aware RN cleared pt for therapy   Assessment   Prognosis Good   Problem List Decreased endurance;Decreased mobility;Decreased strength   Assessment Pt seen for PT treatment session this date. Therapy session focused on bed mobility, functional transfers, and ambulation in order to improve overall mobility and independence. Pt requires supervision for bed mobility  and transfers, ambulating with RW with CG A.1 seated rest break durign ambulation attempts, defers stairs at this time 2* fatigue. Pt making steady progress toward goals. Pt was left back in bed at the end of PT session with all needs in reach. Pt would benefit from continued PT services while in hospital to address remaining limitations. The patient's -[facility] Basic Mobility Inpatient Short Form Raw Score is 15. A Raw score of less than or equal to 16 suggests the patient may benefit from discharge to post-acute rehabilitation services. Please also refer to the recommendation of the Physical Therapist for safe discharge planning. Post dc rec remains Level I, pt is improving in mobility level, will attempt stairs in following sessions as appropriate.   Barriers to Discharge Inaccessible home environment;Decreased caregiver support   Goals   Patient Goals to walk   STG Expiration Date 01/02/24   PT Treatment Day 2   Plan   Treatment/Interventions Functional transfer training;Elevations;Therapeutic exercise;Gait training;Bed mobility;Spoke to nursing;Spoke to case management;OT   Progress Progressing toward goals   PT Frequency 3-5x/wk   Discharge Recommendation   Rehab Resource Intensity Level, PT I (Maximum Resource Intensity)   Equipment Recommended Walker   [facility] Basic Mobility Inpatient   Turning in Flat Bed Without Bedrails 3   Lying on Back to Sitting on Edge of Flat Bed Without Bedrails 2   Moving Bed to Chair 3   Standing Up From Chair Using Arms 3   Walk in Room 3   Climb 3-5 Stairs With Railing 1   Basic Mobility  Inpatient Raw Score 15   Basic Mobility Standardized Score 36.97   [facility] Highest Level Of Mobility   -[facility] Goal 4: Move to chair/commode   -[facility] Achieved 7: Walk 25 feet or more   Education   Education Provided Mobility training;Assistive device   Patient Demonstrates verbal understanding   End of Consult   Patient Position at End of Consult All needs within reach;Bed/Chair alarm activated;Supine   Kassie Burgess PT DPT

## 2024-12-26 NOTE — ASSESSMENT & PLAN NOTE
Blood Pressure: 152/86    Home regimen amlodipine 10 mg daily, chlorthalidone 25 mg daily, doxazosin 2 mg daily and irbesartan 300 mg daily  Continue home amlodipine 5 mg po qd and doxazosin 2 mg qHS  Restart chlorthalidone and irbesartan   BP reviewed and above goal

## 2024-12-27 LAB
ABO GROUP BLD: NORMAL
ANION GAP SERPL CALCULATED.3IONS-SCNC: 6 MMOL/L (ref 4–13)
BASOPHILS # BLD AUTO: 0.02 THOUSANDS/ÂΜL (ref 0–0.1)
BASOPHILS NFR BLD AUTO: 0 % (ref 0–1)
BLD GP AB SCN SERPL QL: POSITIVE
BLOOD GROUP ANTIBODIES SERPL: NORMAL
BUN SERPL-MCNC: 18 MG/DL (ref 5–25)
CALCIUM SERPL-MCNC: 7.8 MG/DL (ref 8.4–10.2)
CHLORIDE SERPL-SCNC: 109 MMOL/L (ref 96–108)
CO2 SERPL-SCNC: 26 MMOL/L (ref 21–32)
CREAT SERPL-MCNC: 1.14 MG/DL (ref 0.6–1.3)
EOSINOPHIL # BLD AUTO: 0.17 THOUSAND/ÂΜL (ref 0–0.61)
EOSINOPHIL NFR BLD AUTO: 3 % (ref 0–6)
ERYTHROCYTE [DISTWIDTH] IN BLOOD BY AUTOMATED COUNT: 16.1 % (ref 11.6–15.1)
GFR SERPL CREATININE-BSD FRML MDRD: 65 ML/MIN/1.73SQ M
GLUCOSE SERPL-MCNC: 101 MG/DL (ref 65–140)
HCT VFR BLD AUTO: 23.9 % (ref 36.5–49.3)
HGB BLD-MCNC: 7.3 G/DL (ref 12–17)
IMM GRANULOCYTES # BLD AUTO: 0.07 THOUSAND/UL (ref 0–0.2)
IMM GRANULOCYTES NFR BLD AUTO: 1 % (ref 0–2)
LYMPHOCYTES # BLD AUTO: 0.59 THOUSANDS/ÂΜL (ref 0.6–4.47)
LYMPHOCYTES NFR BLD AUTO: 10 % (ref 14–44)
MCH RBC QN AUTO: 29.2 PG (ref 26.8–34.3)
MCHC RBC AUTO-ENTMCNC: 30.5 G/DL (ref 31.4–37.4)
MCV RBC AUTO: 96 FL (ref 82–98)
MONOCYTES # BLD AUTO: 0.25 THOUSAND/ÂΜL (ref 0.17–1.22)
MONOCYTES NFR BLD AUTO: 4 % (ref 4–12)
NEUTROPHILS # BLD AUTO: 4.66 THOUSANDS/ÂΜL (ref 1.85–7.62)
NEUTS SEG NFR BLD AUTO: 82 % (ref 43–75)
NRBC BLD AUTO-RTO: 0 /100 WBCS
PLATELET # BLD AUTO: 231 THOUSANDS/UL (ref 149–390)
PMV BLD AUTO: 12 FL (ref 8.9–12.7)
POTASSIUM SERPL-SCNC: 3.5 MMOL/L (ref 3.5–5.3)
RBC # BLD AUTO: 2.5 MILLION/UL (ref 3.88–5.62)
RH BLD: POSITIVE
SODIUM SERPL-SCNC: 141 MMOL/L (ref 135–147)
SPECIMEN EXPIRATION DATE: NORMAL
WBC # BLD AUTO: 5.76 THOUSAND/UL (ref 4.31–10.16)

## 2024-12-27 PROCEDURE — P9040 RBC LEUKOREDUCED IRRADIATED: HCPCS

## 2024-12-27 PROCEDURE — 86921 COMPATIBILITY TEST INCUBATE: CPT

## 2024-12-27 PROCEDURE — 99232 SBSQ HOSP IP/OBS MODERATE 35: CPT | Performed by: STUDENT IN AN ORGANIZED HEALTH CARE EDUCATION/TRAINING PROGRAM

## 2024-12-27 PROCEDURE — 86900 BLOOD TYPING SEROLOGIC ABO: CPT | Performed by: STUDENT IN AN ORGANIZED HEALTH CARE EDUCATION/TRAINING PROGRAM

## 2024-12-27 PROCEDURE — 86901 BLOOD TYPING SEROLOGIC RH(D): CPT | Performed by: STUDENT IN AN ORGANIZED HEALTH CARE EDUCATION/TRAINING PROGRAM

## 2024-12-27 PROCEDURE — 86870 RBC ANTIBODY IDENTIFICATION: CPT | Performed by: STUDENT IN AN ORGANIZED HEALTH CARE EDUCATION/TRAINING PROGRAM

## 2024-12-27 PROCEDURE — 86922 COMPATIBILITY TEST ANTIGLOB: CPT

## 2024-12-27 PROCEDURE — 86850 RBC ANTIBODY SCREEN: CPT | Performed by: STUDENT IN AN ORGANIZED HEALTH CARE EDUCATION/TRAINING PROGRAM

## 2024-12-27 PROCEDURE — 85025 COMPLETE CBC W/AUTO DIFF WBC: CPT | Performed by: STUDENT IN AN ORGANIZED HEALTH CARE EDUCATION/TRAINING PROGRAM

## 2024-12-27 PROCEDURE — 80048 BASIC METABOLIC PNL TOTAL CA: CPT | Performed by: STUDENT IN AN ORGANIZED HEALTH CARE EDUCATION/TRAINING PROGRAM

## 2024-12-27 PROCEDURE — 30233N1 TRANSFUSION OF NONAUTOLOGOUS RED BLOOD CELLS INTO PERIPHERAL VEIN, PERCUTANEOUS APPROACH: ICD-10-PCS | Performed by: STUDENT IN AN ORGANIZED HEALTH CARE EDUCATION/TRAINING PROGRAM

## 2024-12-27 PROCEDURE — 99233 SBSQ HOSP IP/OBS HIGH 50: CPT | Performed by: INTERNAL MEDICINE

## 2024-12-27 RX ORDER — AMLODIPINE BESYLATE 10 MG/1
10 TABLET ORAL DAILY
Status: DISCONTINUED | OUTPATIENT
Start: 2024-12-28 | End: 2024-12-30 | Stop reason: HOSPADM

## 2024-12-27 RX ADMIN — DOXAZOSIN 2 MG: 2 TABLET ORAL at 21:08

## 2024-12-27 RX ADMIN — HEPARIN SODIUM 5000 UNITS: 5000 INJECTION, SOLUTION INTRAVENOUS; SUBCUTANEOUS at 05:29

## 2024-12-27 RX ADMIN — HEPARIN SODIUM 5000 UNITS: 5000 INJECTION, SOLUTION INTRAVENOUS; SUBCUTANEOUS at 21:08

## 2024-12-27 RX ADMIN — LOSARTAN POTASSIUM 100 MG: 50 TABLET, FILM COATED ORAL at 08:46

## 2024-12-27 RX ADMIN — LEVOFLOXACIN 750 MG: 750 TABLET, FILM COATED ORAL at 12:28

## 2024-12-27 RX ADMIN — DIPHENHYDRAMINE HYDROCHLORIDE AND LIDOCAINE HYDROCHLORIDE AND ALUMINUM HYDROXIDE AND MAGNESIUM HYDRO 10 ML: KIT at 05:33

## 2024-12-27 RX ADMIN — DIPHENHYDRAMINE HYDROCHLORIDE AND LIDOCAINE HYDROCHLORIDE AND ALUMINUM HYDROXIDE AND MAGNESIUM HYDRO 10 ML: KIT at 17:14

## 2024-12-27 RX ADMIN — HEPARIN SODIUM 5000 UNITS: 5000 INJECTION, SOLUTION INTRAVENOUS; SUBCUTANEOUS at 14:54

## 2024-12-27 RX ADMIN — DIPHENHYDRAMINE HYDROCHLORIDE AND LIDOCAINE HYDROCHLORIDE AND ALUMINUM HYDROXIDE AND MAGNESIUM HYDRO 10 ML: KIT at 12:28

## 2024-12-27 RX ADMIN — AMLODIPINE BESYLATE 5 MG: 5 TABLET ORAL at 08:45

## 2024-12-27 RX ADMIN — CHLORTHALIDONE 12.5 MG: 25 TABLET ORAL at 08:45

## 2024-12-27 NOTE — ASSESSMENT & PLAN NOTE
History of multiple myeloma currently undergoing OP Daratumumab and hyaluronidase-fihj (Darzalex Faspro SQ) + Bortezomib + Dexamethasone Q 21 Days   Outpatient follow-up

## 2024-12-27 NOTE — ASSESSMENT & PLAN NOTE
Appears consistent with HSV  Discussed with ID and given the symptoms have been present for more than 4 days antivirals are unlikely have benefit at this time  Continue with magic mouthwash  F/u HSV 1,2 PCR   S/p IV fluids

## 2024-12-27 NOTE — ASSESSMENT & PLAN NOTE
Likely secondary to chemo as he is actively on treatment this month  Baseline hemoglobin 10-11, on admission 8.0  Thrombocytopenia with platelets of 49 on admission  Likely secondary to multiple myeloma and acute illness  Thrombocytopenia has resolved  Hemoglobin 7.3 today given the patient is fatigued will provide 1 unit PRBC.  Consent obtained  Repeat CBC in the morning

## 2024-12-27 NOTE — PROGRESS NOTES
Progress Note - Hospitalist   Name: Zack Partida 69 y.o. male I MRN: [id number]  [facility] I Date of Admission: 12/17/2024   Date of Service: 12/27/2024 I Hospital Day: 10    Assessment & Plan  Legionella pneumonia (HCC)  With sepsis on admission. CXR findings consistent w LLL PNA. Admission 102.5F, 109 HR, 88% O2, procal 114.   Started on CTX, d/c'd after legionella (+). Started on azithromycin 12/17, d/c'd next day for broader coverage for atypicals w levofloxacin.   Chest x-ray on 12/26 with bibasilar pneumonitis  Completed 10 days of levofloxacin today  Discussed with ID   Patient has remained afebrile for the last 24 hours  Acute kidney injury superimposed on CKD  (HCC)  Lab Results   Component Value Date    EGFR 65 12/27/2024    EGFR 62 12/26/2024    EGFR 58 12/25/2024    CREATININE 1.14 12/27/2024    CREATININE 1.19 12/26/2024    CREATININE 1.26 12/25/2024   Recent baseline creatinine from 1.5-1.9  Kidney function has remained stable, will restart home antihypertensives  Repeat BMP in the morning  Stomatitis  Appears consistent with HSV  Discussed with ID and given the symptoms have been present for more than 4 days antivirals are unlikely have benefit at this time  Continue with magic mouthwash  F/u HSV 1,2 PCR   S/p IV fluids  Anemia and thrombocytopenia  Likely secondary to chemo as he is actively on treatment this month  Baseline hemoglobin 10-11, on admission 8.0  Thrombocytopenia with platelets of 49 on admission  Likely secondary to multiple myeloma and acute illness  Thrombocytopenia has resolved  Hemoglobin 7.3 today given the patient is fatigued will provide 1 unit PRBC.  Consent obtained  Repeat CBC in the morning  Essential hypertension  Blood Pressure: 136/78  Home regimen amlodipine 10 mg daily, chlorthalidone 25 mg daily, doxazosin 2 mg daily and irbesartan 300 mg daily  Continue home doxazosin 2 mg qHS, chlorthalidone 12.5 mg daily and ARB alternative.  Increase amlodipine to  home 10 mg daily  BP reviewed and acceptable  Multiple myeloma not having achieved remission (HCC)  History of multiple myeloma currently undergoing OP Daratumumab and hyaluronidase-fihj (Darzalex Faspro SQ) + Bortezomib + Dexamethasone Q 21 Days   Outpatient follow-up  Stage 3b chronic kidney disease (HCC)  Lab Results   Component Value Date    EGFR 65 12/27/2024    EGFR 62 12/26/2024    EGFR 58 12/25/2024    CREATININE 1.14 12/27/2024    CREATININE 1.19 12/26/2024    CREATININE 1.26 12/25/2024   Baseline Cr 1.1-1.3  Creatinine at baseline  Avoid nephrotoxins  History of poliomyelitis  Recommend acute rehab  We discussed using orthopedic device over compression sock or thin pants as is causing skin changes over his knees    VTE Pharmacologic Prophylaxis: VTE Score: 7 High Risk (Score >/= 5) - Pharmacological DVT Prophylaxis Ordered: heparin. Sequential Compression Devices Ordered.    Mobility:   Basic Mobility Inpatient Raw Score: 17  JH-HLM Goal: 5: Stand one or more mins  JH-HLM Achieved: 6: Walk 10 steps or more  JH-HLM Goal achieved. Continue to encourage appropriate mobility.    Patient Centered Rounds: I performed bedside rounds with nursing staff today.   Discussions with Specialists or Other Care Team Provider: , ID    Education and Discussions with Family / Patient: Updated  (daughter) via phone.    Current Length of Stay: 10 day(s)  Current Patient Status: Inpatient   Certification Statement: The patient will continue to require additional inpatient hospital stay due to dispo planning, blood transfusion  Discharge Plan: Anticipate discharge in 24-48 hrs to rehab facility.    Code Status: Level 1 - Full Code    Subjective   No events overnight.  Patient reports feeling better this morning.  Continues to feel somewhat fatigued.  No cough or shortness of breath.  No chest pain.  Tolerating oral intake.    Objective :  Temp:  [98.3 °F (36.8 °C)-99.4 °F (37.4 °C)] 98.9 °F (37.2  °C)  HR:  [] 103  BP: (136-146)/(78-83) 136/78  Resp:  [17-18] 18  SpO2:  [91 %-93 %] 93 %  O2 Device: None (Room air)    Body mass index is 32.62 kg/m².     Input and Output Summary (last 24 hours):     Intake/Output Summary (Last 24 hours) at 12/27/2024 1702  Last data filed at 12/27/2024 1200  Gross per 24 hour   Intake 1405 ml   Output 500 ml   Net 905 ml       Physical Exam  Vitals and nursing note reviewed.   Constitutional:       General: He is not in acute distress.     Appearance: He is not ill-appearing.   HENT:      Head: Normocephalic and atraumatic.   Eyes:      Conjunctiva/sclera: Conjunctivae normal.   Cardiovascular:      Rate and Rhythm: Normal rate and regular rhythm.   Pulmonary:      Effort: No respiratory distress.      Breath sounds: No wheezing.   Musculoskeletal:      Right lower leg: No edema.      Left lower leg: No edema.   Skin:     General: Skin is warm and dry.   Neurological:      Mental Status: He is alert.   Psychiatric:         Mood and Affect: Mood normal.         Behavior: Behavior normal.           Lines/Drains:              Lab Results: I have reviewed the following results:   Results from last 7 days   Lab Units 12/27/24  0606   WBC Thousand/uL 5.76   HEMOGLOBIN g/dL 7.3*   HEMATOCRIT % 23.9*   PLATELETS Thousands/uL 231   SEGS PCT % 82*   LYMPHO PCT % 10*   MONO PCT % 4   EOS PCT % 3     Results from last 7 days   Lab Units 12/27/24  0606   SODIUM mmol/L 141   POTASSIUM mmol/L 3.5   CHLORIDE mmol/L 109*   CO2 mmol/L 26   BUN mg/dL 18   CREATININE mg/dL 1.14   ANION GAP mmol/L 6   CALCIUM mg/dL 7.8*   GLUCOSE RANDOM mg/dL 101                       Recent Cultures (last 7 days):         Imaging Results Review: No pertinent imaging studies reviewed.  Other Study Results Review: No additional pertinent studies reviewed.    Last 24 Hours Medication List:     Current Facility-Administered Medications:     acetaminophen (TYLENOL) tablet 650 mg, Q6H PRN    amLODIPine (NORVASC)  tablet 5 mg, Daily    benzonatate (TESSALON PERLES) capsule 100 mg, TID PRN    chlorthalidone tablet 12.5 mg, Daily    dextromethorphan-guaiFENesin (ROBITUSSIN DM) oral syrup 10 mL, Q4H PRN    diphenhydramine, lidocaine, Al/Mg hydroxide, simethicone (Magic Mouthwash) oral solution 10 mL, Q6H    doxazosin (CARDURA) tablet 2 mg, HS    heparin (porcine) subcutaneous injection 5,000 Units, Q8H LIZZETTE    losartan (COZAAR) tablet 100 mg, Daily    Administrative Statements   Today, Patient Was Seen By: Heydi To MD      **Please Note: This note may have been constructed using a voice recognition system.**

## 2024-12-27 NOTE — ASSESSMENT & PLAN NOTE
Recommend acute rehab  We discussed using orthopedic device over compression sock or thin pants as is causing skin changes over his knees

## 2024-12-27 NOTE — ASSESSMENT & PLAN NOTE
Unclear risk factor/exposure.  While antigen has high specificity, consider other respiratory pathogens as well.  Clinically improving.  Repeat CXR improved without effusion.    Discontinue levofloxacin today to complete 10 days total antibiotics  Follow respiratory status closely

## 2024-12-27 NOTE — ASSESSMENT & PLAN NOTE
With sepsis on admission. CXR findings consistent w LLL PNA. Admission 102.5F, 109 HR, 88% O2, procal 114.   Started on CTX, d/c'd after legionella (+). Started on azithromycin 12/17, d/c'd next day for broader coverage for atypicals w levofloxacin.   Chest x-ray on 12/26 with bibasilar pneumonitis  Completed 10 days of levofloxacin today  Discussed with ID   Patient has remained afebrile for the last 24 hours

## 2024-12-27 NOTE — ASSESSMENT & PLAN NOTE
Blood Pressure: 136/78  Home regimen amlodipine 10 mg daily, chlorthalidone 25 mg daily, doxazosin 2 mg daily and irbesartan 300 mg daily  Continue home doxazosin 2 mg qHS, chlorthalidone 12.5 mg daily and ARB alternative.  Increase amlodipine to home 10 mg daily  BP reviewed and acceptable

## 2024-12-27 NOTE — PROGRESS NOTES
Progress Note - Infectious Disease   Name: aZck Partida 69 y.o. male I MRN: [id number]  Unit/Bed#: [facility] 823-01 I Date of Admission: 12/17/2024   Date of Service: 12/27/2024 I Hospital Day: 10    Assessment & Plan  Legionella pneumonia (HCC)  Unclear risk factor/exposure.  While antigen has high specificity, consider other respiratory pathogens as well.  Clinically improving.  Repeat CXR improved without effusion.    Discontinue levofloxacin today to complete 10 days total antibiotics  Follow respiratory status closely  Stomatitis  Consider due to HSV.  Symptoms >4 days so antivirals unlikely to benefit at this time.  Improving.    Magic mouthwash PRN  Acute kidney injury superimposed on CKD  (HCC)  Baseline Cr. 1.1-1.3.  Likely multifactorial due to sepsis, poor PO intake.  Improved.  Multiple myeloma not having achieved remission (HCC)  Currently on Daratumumab-VRd since November 2024.  Being evaluated for possible SCT.  Anemia and thrombocytopenia  Due to MM    Ok for discharge from Infectious Disease service perspective.    Antibiotics:  Levofloxacin #10    Subjective   Patient doing better today.  No fevers.  Oral lesions better.  Ate better this AM.  Occasional diarrhea alternating with normal stools.    Objective :  Temp:  [98.3 °F (36.8 °C)-99.4 °F (37.4 °C)] 98.3 °F (36.8 °C)  HR:  [] 103  BP: (142-147)/(80-83) 142/80  Resp:  [17-18] 17  SpO2:  [91 %-96 %] 93 %    General:  No acute distress  Psychiatric:  Awake and alert  Pulmonary:  Normal respiratory excursion without accessory muscle use  Abdomen:  Soft, nontender  Extremities:  No edema  Skin:  No rashes      Lab Results: I have reviewed the following results:  Results from last 7 days   Lab Units 12/27/24  0606 12/26/24  0555 12/25/24  1149   WBC Thousand/uL 5.76 5.90 6.40   HEMOGLOBIN g/dL 7.3* 7.5* 8.0*   PLATELETS Thousands/uL 231 229 257     Results from last 7 days   Lab Units 12/27/24  0606 12/26/24  0555 12/25/24  1149   SODIUM mmol/L  141 143 143   POTASSIUM mmol/L 3.5 3.8 3.6   CHLORIDE mmol/L 109* 111* 111*   CO2 mmol/L 26 26 24   BUN mg/dL 18 21 23   CREATININE mg/dL 1.14 1.19 1.26   EGFR ml/min/1.73sq m 65 62 58   CALCIUM mg/dL 7.8* 8.0* 7.9*                         Imaging Results Review: I personally reviewed the following image studies in PACS and associated radiology reports: chest xray. My interpretation of the radiology images/reports is: no effusions.

## 2024-12-27 NOTE — CASE MANAGEMENT
Called Terence at [phone] to check status of IRF auth request. Spoke to Katie stated the auth is still pending.  Bill notified  Nichelle Cervantes

## 2024-12-27 NOTE — ASSESSMENT & PLAN NOTE
Lab Results   Component Value Date    EGFR 65 12/27/2024    EGFR 62 12/26/2024    EGFR 58 12/25/2024    CREATININE 1.14 12/27/2024    CREATININE 1.19 12/26/2024    CREATININE 1.26 12/25/2024   Baseline Cr 1.1-1.3  Creatinine at baseline  Avoid nephrotoxins

## 2024-12-27 NOTE — ASSESSMENT & PLAN NOTE
Lab Results   Component Value Date    EGFR 65 12/27/2024    EGFR 62 12/26/2024    EGFR 58 12/25/2024    CREATININE 1.14 12/27/2024    CREATININE 1.19 12/26/2024    CREATININE 1.26 12/25/2024   Recent baseline creatinine from 1.5-1.9  Kidney function has remained stable, will restart home antihypertensives  Repeat BMP in the morning

## 2024-12-27 NOTE — PLAN OF CARE
Problem: INFECTION - ADULT  Goal: Absence or prevention of progression during hospitalization  Description: INTERVENTIONS:  - Assess and monitor for signs and symptoms of infection  - Monitor lab/diagnostic results  - Monitor all insertion sites, i.e. indwelling lines, tubes, and drains  - Monitor endotracheal if appropriate and nasal secretions for changes in amount and color  - [place] appropriate cooling/warming therapies per order  - Administer medications as ordered  - Instruct and encourage patient and family to use good hand hygiene technique  - Identify and instruct in appropriate isolation precautions for identified infection/condition  Outcome: Progressing  Goal: Absence of fever/infection during neutropenic period  Description: INTERVENTIONS:  - Monitor WBC    Outcome: Progressing     Problem: SAFETY ADULT  Goal: Patient will remain free of falls  Description: INTERVENTIONS:  - Educate patient/family on patient safety including physical limitations  - Instruct patient to call for assistance with activity   - Consult OT/PT to assist with strengthening/mobility   - Keep Call bell within reach  - Keep bed low and locked with side rails adjusted as appropriate  - Keep care items and personal belongings within reach  - Initiate and maintain comfort rounds  - Make Fall Risk Sign visible to staff  - Offer Toileting every 2 Hours, in advance of need  - Initiate/Maintain bed alarm  - Obtain necessary fall risk management equipment: assistive devices  - Apply yellow socks and bracelet for high fall risk patients  - Consider moving patient to room near nurses station  Outcome: Progressing     Problem: PAIN - ADULT  Goal: Verbalizes/displays adequate comfort level or baseline comfort level  Description: Interventions:  - Encourage patient to monitor pain and request assistance  - Assess pain using appropriate pain scale  - Administer analgesics based on type and severity of pain and evaluate response  - Implement  non-pharmacological measures as appropriate and evaluate response  - Consider cultural and social influences on pain and pain management  - Notify physician/advanced practitioner if interventions unsuccessful or patient reports new pain  Outcome: Progressing     Problem: DISCHARGE PLANNING  Goal: Discharge to home or other facility with appropriate resources  Description: INTERVENTIONS:  - Identify barriers to discharge w/patient and caregiver  - Arrange for needed discharge resources and transportation as appropriate  - Identify discharge learning needs (meds, wound care, etc.)  - Arrange for interpretive services to assist at discharge as needed  - Refer to Case Management Department for coordinating discharge planning if the patient needs post-hospital services based on physician/advanced practitioner order or complex needs related to functional status, cognitive ability, or social support system  Outcome: Progressing

## 2024-12-27 NOTE — ASSESSMENT & PLAN NOTE
Consider due to HSV.  Symptoms >4 days so antivirals unlikely to benefit at this time.  Improving.    Magic mouthwash PRN

## 2024-12-28 LAB
ABO GROUP BLD BPU: NORMAL
BASOPHILS # BLD AUTO: 0.01 THOUSANDS/ÂΜL (ref 0–0.1)
BASOPHILS NFR BLD AUTO: 0 % (ref 0–1)
BPU ID: NORMAL
CROSSMATCH: NORMAL
EOSINOPHIL # BLD AUTO: 0.22 THOUSAND/ÂΜL (ref 0–0.61)
EOSINOPHIL NFR BLD AUTO: 3 % (ref 0–6)
ERYTHROCYTE [DISTWIDTH] IN BLOOD BY AUTOMATED COUNT: 16.8 % (ref 11.6–15.1)
HCT VFR BLD AUTO: 28.4 % (ref 36.5–49.3)
HGB BLD-MCNC: 9.1 G/DL (ref 12–17)
IMM GRANULOCYTES # BLD AUTO: 0.04 THOUSAND/UL (ref 0–0.2)
IMM GRANULOCYTES NFR BLD AUTO: 1 % (ref 0–2)
LYMPHOCYTES # BLD AUTO: 0.71 THOUSANDS/ÂΜL (ref 0.6–4.47)
LYMPHOCYTES NFR BLD AUTO: 10 % (ref 14–44)
MCH RBC QN AUTO: 29.7 PG (ref 26.8–34.3)
MCHC RBC AUTO-ENTMCNC: 32 G/DL (ref 31.4–37.4)
MCV RBC AUTO: 93 FL (ref 82–98)
MONOCYTES # BLD AUTO: 0.29 THOUSAND/ÂΜL (ref 0.17–1.22)
MONOCYTES NFR BLD AUTO: 4 % (ref 4–12)
NEUTROPHILS # BLD AUTO: 5.7 THOUSANDS/ÂΜL (ref 1.85–7.62)
NEUTS SEG NFR BLD AUTO: 82 % (ref 43–75)
NRBC BLD AUTO-RTO: 0 /100 WBCS
PLATELET # BLD AUTO: 226 THOUSANDS/UL (ref 149–390)
PMV BLD AUTO: 11.1 FL (ref 8.9–12.7)
RBC # BLD AUTO: 3.06 MILLION/UL (ref 3.88–5.62)
UNIT DISPENSE STATUS: NORMAL
UNIT PRODUCT CODE: NORMAL
UNIT PRODUCT VOLUME: 350 ML
UNIT RH: NORMAL
WBC # BLD AUTO: 6.97 THOUSAND/UL (ref 4.31–10.16)

## 2024-12-28 PROCEDURE — 99232 SBSQ HOSP IP/OBS MODERATE 35: CPT | Performed by: STUDENT IN AN ORGANIZED HEALTH CARE EDUCATION/TRAINING PROGRAM

## 2024-12-28 PROCEDURE — 85025 COMPLETE CBC W/AUTO DIFF WBC: CPT

## 2024-12-28 RX ADMIN — AMLODIPINE BESYLATE 10 MG: 10 TABLET ORAL at 08:31

## 2024-12-28 RX ADMIN — DOXAZOSIN 2 MG: 2 TABLET ORAL at 23:30

## 2024-12-28 RX ADMIN — DIPHENHYDRAMINE HYDROCHLORIDE AND LIDOCAINE HYDROCHLORIDE AND ALUMINUM HYDROXIDE AND MAGNESIUM HYDRO 10 ML: KIT at 23:30

## 2024-12-28 RX ADMIN — DIPHENHYDRAMINE HYDROCHLORIDE AND LIDOCAINE HYDROCHLORIDE AND ALUMINUM HYDROXIDE AND MAGNESIUM HYDRO 10 ML: KIT at 11:44

## 2024-12-28 RX ADMIN — HEPARIN SODIUM 5000 UNITS: 5000 INJECTION, SOLUTION INTRAVENOUS; SUBCUTANEOUS at 06:28

## 2024-12-28 RX ADMIN — CHLORTHALIDONE 12.5 MG: 25 TABLET ORAL at 08:30

## 2024-12-28 RX ADMIN — LOSARTAN POTASSIUM 100 MG: 50 TABLET, FILM COATED ORAL at 08:30

## 2024-12-28 RX ADMIN — DIPHENHYDRAMINE HYDROCHLORIDE AND LIDOCAINE HYDROCHLORIDE AND ALUMINUM HYDROXIDE AND MAGNESIUM HYDRO 10 ML: KIT at 06:28

## 2024-12-28 RX ADMIN — HEPARIN SODIUM 5000 UNITS: 5000 INJECTION, SOLUTION INTRAVENOUS; SUBCUTANEOUS at 14:01

## 2024-12-28 RX ADMIN — DIPHENHYDRAMINE HYDROCHLORIDE AND LIDOCAINE HYDROCHLORIDE AND ALUMINUM HYDROXIDE AND MAGNESIUM HYDRO 10 ML: KIT at 16:47

## 2024-12-28 RX ADMIN — HEPARIN SODIUM 5000 UNITS: 5000 INJECTION, SOLUTION INTRAVENOUS; SUBCUTANEOUS at 23:30

## 2024-12-28 RX ADMIN — DIPHENHYDRAMINE HYDROCHLORIDE AND LIDOCAINE HYDROCHLORIDE AND ALUMINUM HYDROXIDE AND MAGNESIUM HYDRO 10 ML: KIT at 00:35

## 2024-12-28 NOTE — ASSESSMENT & PLAN NOTE
Blood Pressure: 133/77  Home regimen amlodipine 10 mg daily, chlorthalidone 25 mg daily, doxazosin 2 mg daily and irbesartan 300 mg daily  Continue home doxazosin 2 mg qHS, chlorthalidone 12.5 mg daily and ARB alternative.  Increase amlodipine to home 10 mg daily  BP reviewed and acceptable

## 2024-12-28 NOTE — ASSESSMENT & PLAN NOTE
Appears consistent with HSV  Discussed with ID and given the symptoms have been present for more than 4 days antivirals are unlikely have benefit at this time  Continue with magic mouthwash  F/u HSV 1,2 PCR   S/p IV fluids  Improving, patient tolerating oral intake

## 2024-12-28 NOTE — PLAN OF CARE
Problem: INFECTION - ADULT  Goal: Absence or prevention of progression during hospitalization  Description: INTERVENTIONS:  - Assess and monitor for signs and symptoms of infection  - Monitor lab/diagnostic results  - Monitor all insertion sites, i.e. indwelling lines, tubes, and drains  - Monitor endotracheal if appropriate and nasal secretions for changes in amount and color  - [place] appropriate cooling/warming therapies per order  - Administer medications as ordered  - Instruct and encourage patient and family to use good hand hygiene technique  - Identify and instruct in appropriate isolation precautions for identified infection/condition  Outcome: Progressing     Problem: SAFETY ADULT  Goal: Patient will remain free of falls  Description: INTERVENTIONS:  - Educate patient/family on patient safety including physical limitations  - Instruct patient to call for assistance with activity   - Consult OT/PT to assist with strengthening/mobility   - Keep Call bell within reach  - Keep bed low and locked with side rails adjusted as appropriate  - Keep care items and personal belongings within reach  - Initiate and maintain comfort rounds  - Make Fall Risk Sign visible to staff  - Offer Toileting every 2 Hours, in advance of need  - Initiate/Maintain bed alarm  - Obtain necessary fall risk management equipment: assistive devices  - Apply yellow socks and bracelet for high fall risk patients  - Consider moving patient to room near nurses station  Outcome: Progressing     Problem: PAIN - ADULT  Goal: Verbalizes/displays adequate comfort level or baseline comfort level  Description: Interventions:  - Encourage patient to monitor pain and request assistance  - Assess pain using appropriate pain scale  - Administer analgesics based on type and severity of pain and evaluate response  - Implement non-pharmacological measures as appropriate and evaluate response  - Consider cultural and social influences on pain and pain  management  - Notify physician/advanced practitioner if interventions unsuccessful or patient reports new pain  Outcome: Progressing     Problem: RESPIRATORY - ADULT  Goal: Achieves optimal ventilation and oxygenation  Description: INTERVENTIONS:  - Assess for changes in respiratory status  - Assess for changes in mentation and behavior  - Position to facilitate oxygenation and minimize respiratory effort  - Oxygen administered by appropriate delivery if ordered  - Initiate smoking cessation education as indicated  - Encourage broncho-pulmonary hygiene including cough, deep breathe, Incentive Spirometry  - Assess the need for suctioning and aspirate as needed  - Assess and instruct to report SOB or any respiratory difficulty  - Respiratory Therapy support as indicated  Outcome: Progressing     Problem: HEMATOLOGIC - ADULT  Goal: Maintains hematologic stability  Description: INTERVENTIONS  - Assess for signs and symptoms of bleeding or hemorrhage  - Monitor labs  - Administer supportive blood products/factors as ordered and appropriate  Outcome: Progressing     Problem: METABOLIC, FLUID AND ELECTROLYTES - ADULT  Goal: Fluid balance maintained  Description: INTERVENTIONS:  - Monitor labs   - Monitor I/O and WT  - Instruct patient on fluid and nutrition as appropriate  - Assess for signs & symptoms of volume excess or deficit  Outcome: Progressing

## 2024-12-28 NOTE — PROGRESS NOTES
Progress Note - Hospitalist   Name: Zack Partida 69 y.o. male I MRN: [id number]  [facility] I Date of Admission: 12/17/2024   Date of Service: 12/28/2024 I Hospital Day: 11    Assessment & Plan  Legionella pneumonia (HCC)  With sepsis on admission. CXR findings consistent w LLL PNA. Admission 102.5F, 109 HR, 88% O2, procal 114.   Started on CTX, d/c'd after legionella (+). Started on azithromycin 12/17, d/c'd next day for broader coverage for atypicals w levofloxacin.   Chest x-ray on 12/26 with bibasilar pneumonitis  Completed 10 days of levofloxacin on 12/27  Patient has remained afebrile for the last 24 hours  Acute kidney injury superimposed on CKD  (HCC)  Lab Results   Component Value Date    EGFR 65 12/27/2024    EGFR 62 12/26/2024    EGFR 58 12/25/2024    CREATININE 1.14 12/27/2024    CREATININE 1.19 12/26/2024    CREATININE 1.26 12/25/2024   Recent baseline creatinine from 1.5-1.9  Kidney function has remained stable, will restart home antihypertensives  Repeat BMP in the morning  Stomatitis  Appears consistent with HSV  Discussed with ID and given the symptoms have been present for more than 4 days antivirals are unlikely have benefit at this time  Continue with magic mouthwash  F/u HSV 1,2 PCR   S/p IV fluids  Improving, patient tolerating oral intake  Anemia and thrombocytopenia  Likely secondary to chemo as he is actively on treatment this month  Baseline hemoglobin 10-11, on admission 8.0  Thrombocytopenia with platelets of 49 on admission  Likely secondary to multiple myeloma and acute illness  Thrombocytopenia has resolved  Hemoglobin 7.3 on 12/27, 1 unit PRBC provided  Hgb 9.1 today  Repeat CBC in the morning  Essential hypertension  Blood Pressure: 133/77  Home regimen amlodipine 10 mg daily, chlorthalidone 25 mg daily, doxazosin 2 mg daily and irbesartan 300 mg daily  Continue home doxazosin 2 mg qHS, chlorthalidone 12.5 mg daily and ARB alternative.  Increase amlodipine to home 10  mg daily  BP reviewed and acceptable  Multiple myeloma not having achieved remission (HCC)  History of multiple myeloma currently undergoing OP Daratumumab and hyaluronidase-fihj (Darzalex Faspro SQ) + Bortezomib + Dexamethasone Q 21 Days   Outpatient follow-up  Stage 3b chronic kidney disease (HCC)  Lab Results   Component Value Date    EGFR 65 12/27/2024    EGFR 62 12/26/2024    EGFR 58 12/25/2024    CREATININE 1.14 12/27/2024    CREATININE 1.19 12/26/2024    CREATININE 1.26 12/25/2024   Baseline Cr 1.1-1.3  Creatinine at baseline  Avoid nephrotoxins  History of poliomyelitis  Recommend acute rehab  We discussed using orthopedic device over compression sock or thin pants as is causing skin changes over his knees    VTE Pharmacologic Prophylaxis: VTE Score: 7 High Risk (Score >/= 5) - Pharmacological DVT Prophylaxis Ordered: heparin. Sequential Compression Devices Ordered.    Mobility:   Basic Mobility Inpatient Raw Score: 17  JH-HLM Goal: 5: Stand one or more mins  JH-HLM Achieved: 6: Walk 10 steps or more  JH-HLM Goal achieved. Continue to encourage appropriate mobility.    Patient Centered Rounds: I performed bedside rounds with nursing staff today.   Discussions with Specialists or Other Care Team Provider: None    Education and Discussions with Family / Patient:  Patient will update family members.     Current Length of Stay: 11 day(s)  Current Patient Status: Inpatient   Certification Statement: The patient will continue to require additional inpatient hospital stay due to dispo planning  Discharge Plan: Anticipate discharge in 24-48 hrs to rehab facility.    Code Status: Level 1 - Full Code    Subjective   No events overnight.  Patient reports feeling well better.  Tolerating oral intake.  Has no pain.    Objective :  Temp:  [98.3 °F (36.8 °C)-99.6 °F (37.6 °C)] 98.6 °F (37 °C)  HR:  [91-96] 91  BP: (133-153)/(77-89) 133/77  Resp:  [16-18] 18  SpO2:  [92 %-96 %] 92 %  O2 Device: None (Room air)    Body mass  index is 32.62 kg/m².     Input and Output Summary (last 24 hours):     Intake/Output Summary (Last 24 hours) at 12/28/2024 1503  Last data filed at 12/28/2024 1300  Gross per 24 hour   Intake 948 ml   Output 975 ml   Net -27 ml       Physical Exam  Vitals and nursing note reviewed.   Constitutional:       General: He is not in acute distress.     Appearance: He is not ill-appearing.   HENT:      Head: Normocephalic and atraumatic.   Eyes:      Conjunctiva/sclera: Conjunctivae normal.   Cardiovascular:      Rate and Rhythm: Normal rate and regular rhythm.   Pulmonary:      Effort: No respiratory distress.   Abdominal:      General: Bowel sounds are normal. There is no distension.      Palpations: Abdomen is soft.   Musculoskeletal:      Right lower leg: No edema.      Left lower leg: No edema.   Skin:     General: Skin is warm and dry.   Psychiatric:         Mood and Affect: Mood normal.         Behavior: Behavior normal.           Lines/Drains:              Lab Results: I have reviewed the following results:   Results from last 7 days   Lab Units 12/28/24  1413   WBC Thousand/uL 6.97   HEMOGLOBIN g/dL 9.1*   HEMATOCRIT % 28.4*   PLATELETS Thousands/uL 226   SEGS PCT % 82*   LYMPHO PCT % 10*   MONO PCT % 4   EOS PCT % 3     Results from last 7 days   Lab Units 12/27/24  0606   SODIUM mmol/L 141   POTASSIUM mmol/L 3.5   CHLORIDE mmol/L 109*   CO2 mmol/L 26   BUN mg/dL 18   CREATININE mg/dL 1.14   ANION GAP mmol/L 6   CALCIUM mg/dL 7.8*   GLUCOSE RANDOM mg/dL 101                       Recent Cultures (last 7 days):         Imaging Results Review: No pertinent imaging studies reviewed.  Other Study Results Review: No additional pertinent studies reviewed.    Last 24 Hours Medication List:     Current Facility-Administered Medications:     acetaminophen (TYLENOL) tablet 650 mg, Q6H PRN    amLODIPine (NORVASC) tablet 10 mg, Daily    benzonatate (TESSALON PERLES) capsule 100 mg, TID PRN    chlorthalidone tablet 12.5 mg,  Daily    dextromethorphan-guaiFENesin (ROBITUSSIN DM) oral syrup 10 mL, Q4H PRN    diphenhydramine, lidocaine, Al/Mg hydroxide, simethicone (Magic Mouthwash) oral solution 10 mL, Q6H    doxazosin (CARDURA) tablet 2 mg, HS    heparin (porcine) subcutaneous injection 5,000 Units, Q8H LIZZETTE    losartan (COZAAR) tablet 100 mg, Daily    Administrative Statements   Today, Patient Was Seen By: Heydi To MD      **Please Note: This note may have been constructed using a voice recognition system.**

## 2024-12-28 NOTE — CASE MANAGEMENT
Called Rebecca at [phone] to check status of IRF auth requested. Was on hold for over an hour, will try again later today. No fax or voicemail received. Auth still pending at this time. ZARA notified: Stephanie Crowley      UPDATE 12/29 9:36 am    Called Rebecca at [phone] to check status of IRF auth requested. Was on hold for over an hour, again. No fax or voicemail received. Auth still pending at this time. ZARA notified: Nichelle Cervantes

## 2024-12-28 NOTE — ASSESSMENT & PLAN NOTE
Likely secondary to chemo as he is actively on treatment this month  Baseline hemoglobin 10-11, on admission 8.0  Thrombocytopenia with platelets of 49 on admission  Likely secondary to multiple myeloma and acute illness  Thrombocytopenia has resolved  Hemoglobin 7.3 on 12/27, 1 unit PRBC provided  Hgb 9.1 today  Repeat CBC in the morning

## 2024-12-28 NOTE — ASSESSMENT & PLAN NOTE
With sepsis on admission. CXR findings consistent w LLL PNA. Admission 102.5F, 109 HR, 88% O2, procal 114.   Started on CTX, d/c'd after legionella (+). Started on azithromycin 12/17, d/c'd next day for broader coverage for atypicals w levofloxacin.   Chest x-ray on 12/26 with bibasilar pneumonitis  Completed 10 days of levofloxacin on 12/27  Patient has remained afebrile for the last 24 hours

## 2024-12-28 NOTE — PLAN OF CARE
Problem: INFECTION - ADULT  Goal: Absence or prevention of progression during hospitalization  Description: INTERVENTIONS:  - Assess and monitor for signs and symptoms of infection  - Monitor lab/diagnostic results  - Monitor all insertion sites, i.e. indwelling lines, tubes, and drains  - Monitor endotracheal if appropriate and nasal secretions for changes in amount and color  - [place] appropriate cooling/warming therapies per order  - Administer medications as ordered  - Instruct and encourage patient and family to use good hand hygiene technique  - Identify and instruct in appropriate isolation precautions for identified infection/condition  Outcome: Progressing     Problem: SAFETY ADULT  Goal: Patient will remain free of falls  Description: INTERVENTIONS:  - Educate patient/family on patient safety including physical limitations  - Instruct patient to call for assistance with activity   - Consult OT/PT to assist with strengthening/mobility   - Keep Call bell within reach  - Keep bed low and locked with side rails adjusted as appropriate  - Keep care items and personal belongings within reach  - Initiate and maintain comfort rounds  - Make Fall Risk Sign visible to staff  - Offer Toileting every 2 Hours, in advance of need  - Initiate/Maintain bed alarm  - Obtain necessary fall risk management equipment: assistive devices  - Apply yellow socks and bracelet for high fall risk patients  - Consider moving patient to room near nurses station  Outcome: Progressing     Problem: PAIN - ADULT  Goal: Verbalizes/displays adequate comfort level or baseline comfort level  Description: Interventions:  - Encourage patient to monitor pain and request assistance  - Assess pain using appropriate pain scale  - Administer analgesics based on type and severity of pain and evaluate response  - Implement non-pharmacological measures as appropriate and evaluate response  - Consider cultural and social influences on pain and pain  management  - Notify physician/advanced practitioner if interventions unsuccessful or patient reports new pain  Outcome: Progressing     Problem: RESPIRATORY - ADULT  Goal: Achieves optimal ventilation and oxygenation  Description: INTERVENTIONS:  - Assess for changes in respiratory status  - Assess for changes in mentation and behavior  - Position to facilitate oxygenation and minimize respiratory effort  - Oxygen administered by appropriate delivery if ordered  - Initiate smoking cessation education as indicated  - Encourage broncho-pulmonary hygiene including cough, deep breathe, Incentive Spirometry  - Assess the need for suctioning and aspirate as needed  - Assess and instruct to report SOB or any respiratory difficulty  - Respiratory Therapy support as indicated  Outcome: Progressing

## 2024-12-29 LAB
ANION GAP SERPL CALCULATED.3IONS-SCNC: 7 MMOL/L (ref 4–13)
BASOPHILS # BLD AUTO: 0.01 THOUSANDS/ΜL (ref 0–0.1)
BASOPHILS NFR BLD AUTO: 0 % (ref 0–1)
BUN SERPL-MCNC: 22 MG/DL (ref 5–25)
CALCIUM SERPL-MCNC: 8 MG/DL (ref 8.4–10.2)
CHLORIDE SERPL-SCNC: 109 MMOL/L (ref 96–108)
CO2 SERPL-SCNC: 27 MMOL/L (ref 21–32)
CREAT SERPL-MCNC: 1.42 MG/DL (ref 0.6–1.3)
EOSINOPHIL # BLD AUTO: 0.21 THOUSAND/ΜL (ref 0–0.61)
EOSINOPHIL NFR BLD AUTO: 4 % (ref 0–6)
ERYTHROCYTE [DISTWIDTH] IN BLOOD BY AUTOMATED COUNT: 16.5 % (ref 11.6–15.1)
GFR SERPL CREATININE-BSD FRML MDRD: 50 ML/MIN/1.73SQ M
GLUCOSE SERPL-MCNC: 107 MG/DL (ref 65–140)
HCT VFR BLD AUTO: 27.1 % (ref 36.5–49.3)
HGB BLD-MCNC: 8.4 G/DL (ref 12–17)
IMM GRANULOCYTES # BLD AUTO: 0.04 THOUSAND/UL (ref 0–0.2)
IMM GRANULOCYTES NFR BLD AUTO: 1 % (ref 0–2)
LYMPHOCYTES # BLD AUTO: 0.68 THOUSANDS/ΜL (ref 0.6–4.47)
LYMPHOCYTES NFR BLD AUTO: 13 % (ref 14–44)
MCH RBC QN AUTO: 29 PG (ref 26.8–34.3)
MCHC RBC AUTO-ENTMCNC: 31 G/DL (ref 31.4–37.4)
MCV RBC AUTO: 93 FL (ref 82–98)
MONOCYTES # BLD AUTO: 0.24 THOUSAND/ΜL (ref 0.17–1.22)
MONOCYTES NFR BLD AUTO: 5 % (ref 4–12)
NEUTROPHILS # BLD AUTO: 4.06 THOUSANDS/ΜL (ref 1.85–7.62)
NEUTS SEG NFR BLD AUTO: 77 % (ref 43–75)
NRBC BLD AUTO-RTO: 0 /100 WBCS
PLATELET # BLD AUTO: 199 THOUSANDS/UL (ref 149–390)
PMV BLD AUTO: 11.5 FL (ref 8.9–12.7)
POTASSIUM SERPL-SCNC: 3.6 MMOL/L (ref 3.5–5.3)
RBC # BLD AUTO: 2.9 MILLION/UL (ref 3.88–5.62)
SODIUM SERPL-SCNC: 143 MMOL/L (ref 135–147)
WBC # BLD AUTO: 5.24 THOUSAND/UL (ref 4.31–10.16)

## 2024-12-29 PROCEDURE — 85025 COMPLETE CBC W/AUTO DIFF WBC: CPT

## 2024-12-29 PROCEDURE — 80048 BASIC METABOLIC PNL TOTAL CA: CPT

## 2024-12-29 PROCEDURE — 99232 SBSQ HOSP IP/OBS MODERATE 35: CPT | Performed by: STUDENT IN AN ORGANIZED HEALTH CARE EDUCATION/TRAINING PROGRAM

## 2024-12-29 RX ORDER — CHLORTHALIDONE 25 MG/1
25 TABLET ORAL DAILY
Status: DISCONTINUED | OUTPATIENT
Start: 2024-12-30 | End: 2024-12-30 | Stop reason: HOSPADM

## 2024-12-29 RX ADMIN — DOXAZOSIN 2 MG: 2 TABLET ORAL at 23:09

## 2024-12-29 RX ADMIN — DIPHENHYDRAMINE HYDROCHLORIDE AND LIDOCAINE HYDROCHLORIDE AND ALUMINUM HYDROXIDE AND MAGNESIUM HYDRO 10 ML: KIT at 11:52

## 2024-12-29 RX ADMIN — LOSARTAN POTASSIUM 100 MG: 50 TABLET, FILM COATED ORAL at 09:49

## 2024-12-29 RX ADMIN — DIPHENHYDRAMINE HYDROCHLORIDE AND LIDOCAINE HYDROCHLORIDE AND ALUMINUM HYDROXIDE AND MAGNESIUM HYDRO 10 ML: KIT at 23:09

## 2024-12-29 RX ADMIN — HEPARIN SODIUM 5000 UNITS: 5000 INJECTION, SOLUTION INTRAVENOUS; SUBCUTANEOUS at 06:52

## 2024-12-29 RX ADMIN — DIPHENHYDRAMINE HYDROCHLORIDE AND LIDOCAINE HYDROCHLORIDE AND ALUMINUM HYDROXIDE AND MAGNESIUM HYDRO 10 ML: KIT at 18:29

## 2024-12-29 RX ADMIN — AMLODIPINE BESYLATE 10 MG: 10 TABLET ORAL at 09:49

## 2024-12-29 RX ADMIN — CHLORTHALIDONE 12.5 MG: 25 TABLET ORAL at 09:50

## 2024-12-29 RX ADMIN — HEPARIN SODIUM 5000 UNITS: 5000 INJECTION, SOLUTION INTRAVENOUS; SUBCUTANEOUS at 23:09

## 2024-12-29 RX ADMIN — DIPHENHYDRAMINE HYDROCHLORIDE AND LIDOCAINE HYDROCHLORIDE AND ALUMINUM HYDROXIDE AND MAGNESIUM HYDRO 10 ML: KIT at 06:49

## 2024-12-29 RX ADMIN — HEPARIN SODIUM 5000 UNITS: 5000 INJECTION, SOLUTION INTRAVENOUS; SUBCUTANEOUS at 13:33

## 2024-12-29 NOTE — PROGRESS NOTES
Progress Note - Hospitalist   Name: Zack Partida 69 y.o. male I MRN: [id number]  [facility] Date of Admission: 12/17/2024   Date of Service: 12/29/2024 I Hospital Day: 12    Assessment & Plan  Legionella pneumonia (HCC)  With sepsis on admission. CXR findings consistent w LLL PNA. Admission 102.5F, 109 HR, 88% O2, procal 114.   Started on CTX, d/c'd after legionella (+). Started on azithromycin 12/17, d/c'd next day for broader coverage for atypicals w levofloxacin.   Chest x-ray on 12/26 with bibasilar pneumonitis  Completed 10 days of levofloxacin on 12/27  Patient has remained afebrile  Acute kidney injury superimposed on CKD  (HCC)  Lab Results   Component Value Date    EGFR 50 12/29/2024    EGFR 65 12/27/2024    EGFR 62 12/26/2024    CREATININE 1.42 (H) 12/29/2024    CREATININE 1.14 12/27/2024    CREATININE 1.19 12/26/2024   Recent baseline creatinine from 1.5-1.9  Kidney function has remained stable, will restart home antihypertensives  Repeat BMP in the morning  Stomatitis  Appears consistent with HSV  Discussed with ID and given the symptoms have been present for more than 4 days antivirals are unlikely have benefit at this time  Continue with magic mouthwash  F/u HSV 1,2 PCR   S/p IV fluids  Improving, patient tolerating oral intake  Anemia and thrombocytopenia  Likely secondary to chemo as he is actively on treatment this month  Baseline hemoglobin 10-11, on admission 8.0  Thrombocytopenia with platelets of 49 on admission  Likely secondary to multiple myeloma and acute illness  Thrombocytopenia has resolved  Hemoglobin 7.3 on 12/27, 1 unit PRBC provided  Hgb 8.4 today  Essential hypertension  Blood Pressure: 145/86  Home regimen amlodipine 10 mg daily, chlorthalidone 25 mg daily, doxazosin 2 mg daily and irbesartan 300 mg daily  Continue home doxazosin 2 mg qHS, amlodipine 10 mg daily and ARB alternative.  Increase chlorthalidone to home 25 mg daily  BP reviewed and acceptable  Multiple  myeloma not having achieved remission (HCC)  History of multiple myeloma currently undergoing OP Daratumumab and hyaluronidase-fihj (Darzalex Faspro SQ) + Bortezomib + Dexamethasone Q 21 Days   Outpatient follow-up  Stage 3b chronic kidney disease (HCC)  Lab Results   Component Value Date    EGFR 50 12/29/2024    EGFR 65 12/27/2024    EGFR 62 12/26/2024    CREATININE 1.42 (H) 12/29/2024    CREATININE 1.14 12/27/2024    CREATININE 1.19 12/26/2024   Baseline Cr 1.1-1.3  Creatinine at baseline  Avoid nephrotoxins  History of poliomyelitis  Recommend acute rehab  We discussed using orthopedic device over compression sock or thin pants as is causing skin changes over his knees    VTE Pharmacologic Prophylaxis: VTE Score: 7 High Risk (Score >/= 5) - Pharmacological DVT Prophylaxis Ordered: heparin. Sequential Compression Devices Ordered.    Mobility:   Basic Mobility Inpatient Raw Score: 17  JH-HLM Goal: 5: Stand one or more mins  JH-HLM Achieved: 6: Walk 10 steps or more  JH-HLM Goal achieved. Continue to encourage appropriate mobility.    Patient Centered Rounds: I performed bedside rounds with nursing staff today.   Discussions with Specialists or Other Care Team Provider: None    Education and Discussions with Family / Patient: Patient declined call to .     Current Length of Stay: 12 day(s)  Current Patient Status: Inpatient   Certification Statement: The patient will continue to require additional inpatient hospital stay due to dispo planning no events overnight.  Patient reports feeling well this morning.  No complaints.  Tolerating oral intake.  No shortness of breath or cough.  Discharge Plan: Anticipate discharge in 24-48 hrs to rehab facility.    Code Status: Level 1 - Full Code    Subjective   No events overnight.  Patient reports feeling well this morning, has no complaints.  Tolerating oral intake.  No chest pain or shortness of breath.    Objective :  Temp:  [98.5 °F (36.9 °C)-99.4 °F (37.4  °C)] 98.5 °F (36.9 °C)  HR:  [92-95] 94  BP: (125-145)/(75-86) 145/86  Resp:  [18] 18  SpO2:  [93 %-95 %] 95 %  O2 Device: None (Room air)    Body mass index is 32.62 kg/m².     Input and Output Summary (last 24 hours):     Intake/Output Summary (Last 24 hours) at 12/29/2024 1200  Last data filed at 12/29/2024 1101  Gross per 24 hour   Intake 597 ml   Output 1075 ml   Net -478 ml       Physical Exam  Vitals and nursing note reviewed.   Constitutional:       General: He is not in acute distress.     Appearance: He is not ill-appearing.   HENT:      Head: Normocephalic and atraumatic.   Eyes:      Conjunctiva/sclera: Conjunctivae normal.   Cardiovascular:      Rate and Rhythm: Normal rate and regular rhythm.   Pulmonary:      Effort: No respiratory distress.   Musculoskeletal:         General: No swelling.   Skin:     General: Skin is warm and dry.   Neurological:      Mental Status: He is alert.   Psychiatric:         Mood and Affect: Mood normal.         Behavior: Behavior normal.           Lines/Drains:              Lab Results: I have reviewed the following results:   Results from last 7 days   Lab Units 12/29/24  0716   WBC Thousand/uL 5.24   HEMOGLOBIN g/dL 8.4*   HEMATOCRIT % 27.1*   PLATELETS Thousands/uL 199   SEGS PCT % 77*   LYMPHO PCT % 13*   MONO PCT % 5   EOS PCT % 4     Results from last 7 days   Lab Units 12/29/24  0716   SODIUM mmol/L 143   POTASSIUM mmol/L 3.6   CHLORIDE mmol/L 109*   CO2 mmol/L 27   BUN mg/dL 22   CREATININE mg/dL 1.42*   ANION GAP mmol/L 7   CALCIUM mg/dL 8.0*   GLUCOSE RANDOM mg/dL 107                       Recent Cultures (last 7 days):         Imaging Results Review: No pertinent imaging studies reviewed.  Other Study Results Review: No additional pertinent studies reviewed.    Last 24 Hours Medication List:     Current Facility-Administered Medications:     acetaminophen (TYLENOL) tablet 650 mg, Q6H PRN    amLODIPine (NORVASC) tablet 10 mg, Daily    benzonatate (TESSALON  PERLES) capsule 100 mg, TID PRN    chlorthalidone tablet 12.5 mg, Daily    dextromethorphan-guaiFENesin (ROBITUSSIN DM) oral syrup 10 mL, Q4H PRN    diphenhydramine, lidocaine, Al/Mg hydroxide, simethicone (Magic Mouthwash) oral solution 10 mL, Q6H    doxazosin (CARDURA) tablet 2 mg, HS    heparin (porcine) subcutaneous injection 5,000 Units, Q8H LIZZETTE    losartan (COZAAR) tablet 100 mg, Daily    Administrative Statements   Today, Patient Was Seen By: Heydi To MD      **Please Note: This note may have been constructed using a voice recognition system.**

## 2024-12-29 NOTE — ASSESSMENT & PLAN NOTE
Likely secondary to chemo as he is actively on treatment this month  Baseline hemoglobin 10-11, on admission 8.0  Thrombocytopenia with platelets of 49 on admission  Likely secondary to multiple myeloma and acute illness  Thrombocytopenia has resolved  Hemoglobin 7.3 on 12/27, 1 unit PRBC provided  Hgb 8.4 today

## 2024-12-29 NOTE — ASSESSMENT & PLAN NOTE
Lab Results   Component Value Date    EGFR 50 12/29/2024    EGFR 65 12/27/2024    EGFR 62 12/26/2024    CREATININE 1.42 (H) 12/29/2024    CREATININE 1.14 12/27/2024    CREATININE 1.19 12/26/2024   Recent baseline creatinine from 1.5-1.9  Kidney function has remained stable, will restart home antihypertensives  Repeat BMP in the morning

## 2024-12-29 NOTE — ASSESSMENT & PLAN NOTE
Blood Pressure: 145/86  Home regimen amlodipine 10 mg daily, chlorthalidone 25 mg daily, doxazosin 2 mg daily and irbesartan 300 mg daily  Continue home doxazosin 2 mg qHS, amlodipine 10 mg daily and ARB alternative.  Increase chlorthalidone to home 25 mg daily  BP reviewed and acceptable

## 2024-12-29 NOTE — PLAN OF CARE
Problem: INFECTION - ADULT  Goal: Absence or prevention of progression during hospitalization  Description: INTERVENTIONS:  - Assess and monitor for signs and symptoms of infection  - Monitor lab/diagnostic results  - Monitor all insertion sites, i.e. indwelling lines, tubes, and drains  - Monitor endotracheal if appropriate and nasal secretions for changes in amount and color  - [place] appropriate cooling/warming therapies per order  - Administer medications as ordered  - Instruct and encourage patient and family to use good hand hygiene technique  - Identify and instruct in appropriate isolation precautions for identified infection/condition  Outcome: Progressing     Problem: SAFETY ADULT  Goal: Patient will remain free of falls  Description: INTERVENTIONS:  - Educate patient/family on patient safety including physical limitations  - Instruct patient to call for assistance with activity   - Consult OT/PT to assist with strengthening/mobility   - Keep Call bell within reach  - Keep bed low and locked with side rails adjusted as appropriate  - Keep care items and personal belongings within reach  - Initiate and maintain comfort rounds  - Make Fall Risk Sign visible to staff  - Offer Toileting every 2 Hours, in advance of need  - Initiate/Maintain bed alarm  - Obtain necessary fall risk management equipment: assistive devices  - Apply yellow socks and bracelet for high fall risk patients  - Consider moving patient to room near nurses station  Outcome: Progressing     Problem: PAIN - ADULT  Goal: Verbalizes/displays adequate comfort level or baseline comfort level  Description: Interventions:  - Encourage patient to monitor pain and request assistance  - Assess pain using appropriate pain scale  - Administer analgesics based on type and severity of pain and evaluate response  - Implement non-pharmacological measures as appropriate and evaluate response  - Consider cultural and social influences on pain and pain  management  - Notify physician/advanced practitioner if interventions unsuccessful or patient reports new pain  Outcome: Progressing     Problem: RESPIRATORY - ADULT  Goal: Achieves optimal ventilation and oxygenation  Description: INTERVENTIONS:  - Assess for changes in respiratory status  - Assess for changes in mentation and behavior  - Position to facilitate oxygenation and minimize respiratory effort  - Oxygen administered by appropriate delivery if ordered  - Initiate smoking cessation education as indicated  - Encourage broncho-pulmonary hygiene including cough, deep breathe, Incentive Spirometry  - Assess the need for suctioning and aspirate as needed  - Assess and instruct to report SOB or any respiratory difficulty  - Respiratory Therapy support as indicated  Outcome: Progressing

## 2024-12-29 NOTE — ASSESSMENT & PLAN NOTE
Lab Results   Component Value Date    EGFR 50 12/29/2024    EGFR 65 12/27/2024    EGFR 62 12/26/2024    CREATININE 1.42 (H) 12/29/2024    CREATININE 1.14 12/27/2024    CREATININE 1.19 12/26/2024   Baseline Cr 1.1-1.3  Creatinine at baseline  Avoid nephrotoxins

## 2024-12-29 NOTE — PLAN OF CARE
Problem: SAFETY ADULT  Goal: Patient will remain free of falls  Description: INTERVENTIONS:  - Educate patient/family on patient safety including physical limitations  - Instruct patient to call for assistance with activity   - Consult OT/PT to assist with strengthening/mobility   - Keep Call bell within reach  - Keep bed low and locked with side rails adjusted as appropriate  - Keep care items and personal belongings within reach  - Initiate and maintain comfort rounds  - Make Fall Risk Sign visible to staff  - Offer Toileting every 2 Hours, in advance of need  - Initiate/Maintain bed alarm  - Obtain necessary fall risk management equipment: assistive devices  - Apply yellow socks and bracelet for high fall risk patients  - Consider moving patient to room near nurses station  Outcome: Progressing     Problem: PAIN - ADULT  Goal: Verbalizes/displays adequate comfort level or baseline comfort level  Description: Interventions:  - Encourage patient to monitor pain and request assistance  - Assess pain using appropriate pain scale  - Administer analgesics based on type and severity of pain and evaluate response  - Implement non-pharmacological measures as appropriate and evaluate response  - Consider cultural and social influences on pain and pain management  - Notify physician/advanced practitioner if interventions unsuccessful or patient reports new pain  Outcome: Progressing     Problem: HEMATOLOGIC - ADULT  Goal: Maintains hematologic stability  Description: INTERVENTIONS  - Assess for signs and symptoms of bleeding or hemorrhage  - Monitor labs  - Administer supportive blood products/factors as ordered and appropriate  Outcome: Progressing     Problem: NEUROSENSORY - ADULT  Goal: Achieves maximal functionality and self care  Description: INTERVENTIONS  - Monitor swallowing and airway patency with patient fatigue and changes in neurological status  - Encourage and assist patient to increase activity and self  care.   - Encourage visually impaired, hearing impaired and aphasic patients to use assistive/communication devices  Outcome: Progressing     Problem: GENITOURINARY - ADULT  Goal: Absence of urinary retention  Description: INTERVENTIONS:  - Assess patient’s ability to void and empty bladder  - Monitor I/O  - Bladder scan as needed  - Discuss with physician/AP medications to alleviate retention as needed  - Discuss catheterization for long term situations as appropriate  Outcome: Progressing     Problem: RESPIRATORY - ADULT  Goal: Achieves optimal ventilation and oxygenation  Description: INTERVENTIONS:  - Assess for changes in respiratory status  - Assess for changes in mentation and behavior  - Position to facilitate oxygenation and minimize respiratory effort  - Oxygen administered by appropriate delivery if ordered  - Initiate smoking cessation education as indicated  - Encourage broncho-pulmonary hygiene including cough, deep breathe, Incentive Spirometry  - Assess the need for suctioning and aspirate as needed  - Assess and instruct to report SOB or any respiratory difficulty  - Respiratory Therapy support as indicated  Outcome: Progressing     Problem: DISCHARGE PLANNING  Goal: Discharge to home or other facility with appropriate resources  Description: INTERVENTIONS:  - Identify barriers to discharge w/patient and caregiver  - Arrange for needed discharge resources and transportation as appropriate  - Identify discharge learning needs (meds, wound care, etc.)  - Arrange for interpretive services to assist at discharge as needed  - Refer to Case Management Department for coordinating discharge planning if the patient needs post-hospital services based on physician/advanced practitioner order or complex needs related to functional status, cognitive ability, or social support system  Outcome: Progressing

## 2024-12-29 NOTE — ASSESSMENT & PLAN NOTE
With sepsis on admission. CXR findings consistent w LLL PNA. Admission 102.5F, 109 HR, 88% O2, procal 114.   Started on CTX, d/c'd after legionella (+). Started on azithromycin 12/17, d/c'd next day for broader coverage for atypicals w levofloxacin.   Chest x-ray on 12/26 with bibasilar pneumonitis  Completed 10 days of levofloxacin on 12/27  Patient has remained afebrile

## 2024-12-29 NOTE — CASE MANAGEMENT
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     IgG lambda monoclonal gammopathy 10/17/2024     Difficulty walking 2024     Nephrolithiasis 2024     Stage 3b chronic kidney disease (HCC) 2023     History of colonic polyps 2023     History of poliomyelitis 2021    Unspecified acquired deformity of unspecified lower leg 2021    Muscle weakness of extremity following poliomyelitis 2021     Prediabetes 2021     Obesity 2021     Essential hypertension 2021       LOS (days): 12  Geometric Mean LOS (GMLOS) (days):   Days to GMLOS:     OBJECTIVE:  Risk of Unplanned Readmission Score: 17.93         Current admission status: Inpatient   Preferred Pharmacy:   [id number] - [place] -  [facility]   [place] [address]  Phone: [phone] Fax: [phone]    [id number] - [place], [place] - [address] [place] [address]  Phone: [phone] Fax: [phone]    [facility] - [place], [place] - [address]  [address]  [address]  [facility] [address]  Phone: [phone] Fax: [phone]    Primary Care Provider: Ayanna Paz MD    Primary Insurance: BLUE CROSS  Secondary Insurance:     DISCHARGE DETAILS:              CM continues to await auth determination from insurance  ARC  updated and aware

## 2024-12-30 VITALS
TEMPERATURE: 98.4 F | RESPIRATION RATE: 17 BRPM | DIASTOLIC BLOOD PRESSURE: 73 MMHG | BODY MASS INDEX: 32.44 KG/M2 | HEIGHT: 64 IN | OXYGEN SATURATION: 94 % | WEIGHT: 190.04 LBS | HEART RATE: 92 BPM | SYSTOLIC BLOOD PRESSURE: 128 MMHG

## 2024-12-30 PROBLEM — R19.7 DIARRHEA: Status: ACTIVE | Noted: 2024-12-30

## 2024-12-30 LAB
ANION GAP SERPL CALCULATED.3IONS-SCNC: 5 MMOL/L (ref 4–13)
BASOPHILS # BLD AUTO: 0.01 THOUSANDS/ΜL (ref 0–0.1)
BASOPHILS NFR BLD AUTO: 0 % (ref 0–1)
BUN SERPL-MCNC: 23 MG/DL (ref 5–25)
C DIFF TOX GENS STL QL NAA+PROBE: NEGATIVE
CALCIUM SERPL-MCNC: 7.9 MG/DL (ref 8.4–10.2)
CHLORIDE SERPL-SCNC: 107 MMOL/L (ref 96–108)
CO2 SERPL-SCNC: 27 MMOL/L (ref 21–32)
CREAT SERPL-MCNC: 1.22 MG/DL (ref 0.6–1.3)
EOSINOPHIL # BLD AUTO: 0.26 THOUSAND/ΜL (ref 0–0.61)
EOSINOPHIL NFR BLD AUTO: 4 % (ref 0–6)
ERYTHROCYTE [DISTWIDTH] IN BLOOD BY AUTOMATED COUNT: 16.2 % (ref 11.6–15.1)
GFR SERPL CREATININE-BSD FRML MDRD: 60 ML/MIN/1.73SQ M
GLUCOSE SERPL-MCNC: 112 MG/DL (ref 65–140)
HCT VFR BLD AUTO: 27.4 % (ref 36.5–49.3)
HGB BLD-MCNC: 8.6 G/DL (ref 12–17)
HSV1 DNA SPEC QL NAA+PROBE: POSITIVE
HSV2 DNA SPEC QL NAA+PROBE: NEGATIVE
IMM GRANULOCYTES # BLD AUTO: 0.04 THOUSAND/UL (ref 0–0.2)
IMM GRANULOCYTES NFR BLD AUTO: 1 % (ref 0–2)
LYMPHOCYTES # BLD AUTO: 0.85 THOUSANDS/ΜL (ref 0.6–4.47)
LYMPHOCYTES NFR BLD AUTO: 14 % (ref 14–44)
MAGNESIUM SERPL-MCNC: 1.7 MG/DL (ref 1.9–2.7)
MCH RBC QN AUTO: 29.2 PG (ref 26.8–34.3)
MCHC RBC AUTO-ENTMCNC: 31.4 G/DL (ref 31.4–37.4)
MCV RBC AUTO: 93 FL (ref 82–98)
MONOCYTES # BLD AUTO: 0.29 THOUSAND/ΜL (ref 0.17–1.22)
MONOCYTES NFR BLD AUTO: 5 % (ref 4–12)
NEUTROPHILS # BLD AUTO: 4.61 THOUSANDS/ΜL (ref 1.85–7.62)
NEUTS SEG NFR BLD AUTO: 76 % (ref 43–75)
NRBC BLD AUTO-RTO: 0 /100 WBCS
PLATELET # BLD AUTO: 186 THOUSANDS/UL (ref 149–390)
PMV BLD AUTO: 11.2 FL (ref 8.9–12.7)
POTASSIUM SERPL-SCNC: 3.6 MMOL/L (ref 3.5–5.3)
RBC # BLD AUTO: 2.95 MILLION/UL (ref 3.88–5.62)
SODIUM SERPL-SCNC: 139 MMOL/L (ref 135–147)
WBC # BLD AUTO: 6.06 THOUSAND/UL (ref 4.31–10.16)

## 2024-12-30 PROCEDURE — 97116 GAIT TRAINING THERAPY: CPT

## 2024-12-30 PROCEDURE — 87493 C DIFF AMPLIFIED PROBE: CPT | Performed by: FAMILY MEDICINE

## 2024-12-30 PROCEDURE — 97530 THERAPEUTIC ACTIVITIES: CPT

## 2024-12-30 PROCEDURE — 80048 BASIC METABOLIC PNL TOTAL CA: CPT

## 2024-12-30 PROCEDURE — 99233 SBSQ HOSP IP/OBS HIGH 50: CPT | Performed by: INTERNAL MEDICINE

## 2024-12-30 PROCEDURE — 97112 NEUROMUSCULAR REEDUCATION: CPT

## 2024-12-30 PROCEDURE — 97535 SELF CARE MNGMENT TRAINING: CPT

## 2024-12-30 PROCEDURE — 99239 HOSP IP/OBS DSCHRG MGMT >30: CPT | Performed by: STUDENT IN AN ORGANIZED HEALTH CARE EDUCATION/TRAINING PROGRAM

## 2024-12-30 PROCEDURE — 83735 ASSAY OF MAGNESIUM: CPT

## 2024-12-30 PROCEDURE — 85025 COMPLETE CBC W/AUTO DIFF WBC: CPT

## 2024-12-30 PROCEDURE — NC001 PR NO CHARGE: Performed by: STUDENT IN AN ORGANIZED HEALTH CARE EDUCATION/TRAINING PROGRAM

## 2024-12-30 RX ORDER — AMLODIPINE BESYLATE 10 MG/1
10 TABLET ORAL DAILY
Qty: 30 TABLET | Refills: 0 | Status: SHIPPED | OUTPATIENT
Start: 2024-12-30

## 2024-12-30 RX ORDER — ACYCLOVIR 400 MG/1
400 TABLET ORAL 2 TIMES DAILY
Qty: 60 TABLET | Refills: 0 | Status: SHIPPED | OUTPATIENT
Start: 2024-12-30 | End: 2025-05-29

## 2024-12-30 RX ORDER — CHLORTHALIDONE 25 MG/1
25 TABLET ORAL DAILY
Qty: 90 TABLET | Refills: 0 | Status: SHIPPED | OUTPATIENT
Start: 2024-12-30 | End: 2025-03-30

## 2024-12-30 RX ORDER — ACYCLOVIR 200 MG/1
400 CAPSULE ORAL EVERY 12 HOURS SCHEDULED
Status: DISCONTINUED | OUTPATIENT
Start: 2024-12-30 | End: 2024-12-30 | Stop reason: HOSPADM

## 2024-12-30 RX ADMIN — HEPARIN SODIUM 5000 UNITS: 5000 INJECTION, SOLUTION INTRAVENOUS; SUBCUTANEOUS at 13:49

## 2024-12-30 RX ADMIN — AMLODIPINE BESYLATE 10 MG: 10 TABLET ORAL at 08:07

## 2024-12-30 RX ADMIN — HEPARIN SODIUM 5000 UNITS: 5000 INJECTION, SOLUTION INTRAVENOUS; SUBCUTANEOUS at 06:42

## 2024-12-30 RX ADMIN — DIPHENHYDRAMINE HYDROCHLORIDE AND LIDOCAINE HYDROCHLORIDE AND ALUMINUM HYDROXIDE AND MAGNESIUM HYDRO 10 ML: KIT at 06:42

## 2024-12-30 RX ADMIN — DIPHENHYDRAMINE HYDROCHLORIDE AND LIDOCAINE HYDROCHLORIDE AND ALUMINUM HYDROXIDE AND MAGNESIUM HYDRO 10 ML: KIT at 11:07

## 2024-12-30 RX ADMIN — CHLORTHALIDONE 25 MG: 25 TABLET ORAL at 08:07

## 2024-12-30 RX ADMIN — LOSARTAN POTASSIUM 100 MG: 50 TABLET, FILM COATED ORAL at 08:07

## 2024-12-30 NOTE — CASE MANAGEMENT
Called Terence this AM at [phone] to check status of IRF auth requested. Was on hold for over an hour and call disconnected.       Called Terence again the rep stated that we need to start a new auth and Fax the clinicals again. She stated there is no turnaround time for their auths     [facility] received request for authorization from Care Manager.  Authorization request submitted for: Acute Rehab  Facility Name:  [facility]  NPI: [id number]   Facility MD:   Dr. Ashley DePadua NPI: [id number]   Authorization initiated by contacting insurance:  [employer]   Via: Phone # [phone]   Clinicals submitted via fax # [phone]  Pending Reference #: [id number]    Care Manager notified: Linda Barkley    Updates to authorization status will be noted in chart. Please reach out to CM for updates on any clinical information.

## 2024-12-30 NOTE — ASSESSMENT & PLAN NOTE
Patient with multiple episodes of watery diarrhea that started on 12/29 evening  C. difficile negative  Patient with no leukocytosis and afebrile

## 2024-12-30 NOTE — PLAN OF CARE
Problem: PHYSICAL THERAPY ADULT  Goal: Performs mobility at highest level of function for planned discharge setting.  See evaluation for individualized goals.  Description: Treatment/Interventions: Functional transfer training, Therapeutic exercise, Elevations, Bed mobility, Gait training, Spoke to nursing, Spoke to case management, OT  Equipment Recommended: Walker       See flowsheet documentation for full assessment, interventions and recommendations.  Outcome: Progressing  Note: Prognosis: Excellent  Problem List: Decreased mobility  Assessment: There is significant improvement in the patient's strength, balance, and activity tolerance today. He is ambulating within the room without his KAFO and his axillary crutch with supervision. For ambulation int he hallway he was able to don his KAFO, shoes, and pants without assistance. Then he was able to ambulate around the unit without any device. The patient was then trialed on the stairs where he completed two flights with normal vitals and no dyspnea nor fatigue. He had no difficulty manuevering around obstacles nor a busy environment. Dynamic head and trunk movements were performed without any reduction in gait velocity. The patient notes no concerns about returning home, but he would like continued therapy. He also inquired as to when he is able to return to working which was deferred to the physicians. Discussed with BRIAN Adams/KEVEN and concur that the patient has demonstrated the necessary mobility and safety to return home.  Barriers to Discharge: None     Rehab Resource Intensity Level, PT: III (Minimum Resource Intensity)    See flowsheet documentation for full assessment.

## 2024-12-30 NOTE — ASSESSMENT & PLAN NOTE
Blood Pressure: 128/73  Home regimen amlodipine 10 mg daily, chlorthalidone 25 mg daily, doxazosin 2 mg daily and irbesartan 300 mg daily  Continue home doxazosin 2 mg qHS, amlodipine 10 mg daily, ARB and chlorthalidone 25 mg daily  BP reviewed and acceptable

## 2024-12-30 NOTE — ASSESSMENT & PLAN NOTE
Lab Results   Component Value Date    EGFR 60 12/30/2024    EGFR 50 12/29/2024    EGFR 65 12/27/2024    CREATININE 1.22 12/30/2024    CREATININE 1.42 (H) 12/29/2024    CREATININE 1.14 12/27/2024   Recent baseline creatinine from 1.5-1.9  Kidney function has remained stable, will restart home antihypertensives

## 2024-12-30 NOTE — ASSESSMENT & PLAN NOTE
New.  In setting of recent antibiotics.  Consider C. Diff.  Appears clinically stable without abdominal pain or leukocytosis.    Check C. Diff  If positive treat with vancomycin 125 mg PO Q6 x10 days  If negative, OK fro Imodium PRN

## 2024-12-30 NOTE — CASE MANAGEMENT
Case Management Discharge Planning Note    Patient name Zack Partida  Location [facility] 823/[facility] 823-01 MRN [id number]  : 1955 Date 2024       Current Admission Date: 2024  Current Admission Diagnosis:Legionella pneumonia (HCC)   Patient Active Problem List    Diagnosis Date Noted Date Diagnosed    Diarrhea 2024     Stomatitis 2024     Legionella pneumonia (HCC) 2024     Acute kidney injury superimposed on CKD  (HCC) 2024     Anemia and thrombocytopenia 2024     Multiple myeloma not having achieved remission (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     IgG lambda monoclonal gammopathy 10/17/2024     Difficulty walking 2024     Nephrolithiasis 2024     Stage 3b chronic kidney disease (HCC) 2023     History of colonic polyps 2023     History of poliomyelitis 2021    Unspecified acquired deformity of unspecified lower leg 2021    Muscle weakness of extremity following poliomyelitis 2021     Prediabetes 2021     Obesity 2021     Essential hypertension 2021       LOS (days): 13  Geometric Mean LOS (GMLOS) (days):   Days to GMLOS:     OBJECTIVE:  Risk of Unplanned Readmission Score: 17.1         Current admission status: Inpatient   Preferred Pharmacy:   [id number] - [place], [place] -  [address] [facility]   [place] [facility] [address]  Phone: [phone] Fax: [phone]    [facility] #[id number] - [place], [place] - [address] [place] [address]  Phone: [phone] Fax: [phone]    [facility] - [place], [place] - [address]  [address]  [address]  [facility] [address]  Phone: [phone] Fax: [phone]    Primary Care Provider: Ayanna Paz MD    Primary Insurance: BLUE CROSS  Secondary Insurance:     DISCHARGE DETAILS:                                          Other  Referral/Resources/Interventions Provided:  Referral Comments: Message from  dc support & they re-submitted for new auth request today. AIDIN message sent to [facility] to update.

## 2024-12-30 NOTE — ASSESSMENT & PLAN NOTE
Lab Results   Component Value Date    EGFR 60 12/30/2024    EGFR 50 12/29/2024    EGFR 65 12/27/2024    CREATININE 1.22 12/30/2024    CREATININE 1.42 (H) 12/29/2024    CREATININE 1.14 12/27/2024   Baseline Cr 1.1-1.3  Creatinine at baseline  Avoid nephrotoxins

## 2024-12-30 NOTE — ASSESSMENT & PLAN NOTE
Unclear risk factor/exposure.  While antigen has high specificity, consider other respiratory pathogens as well.  Clinically improving.  Repeat CXR improved without effusion.  Improved status post 10 days levofloxacin.

## 2024-12-30 NOTE — CASE MANAGEMENT
Case Management Discharge Planning Note    Patient name Zack Partida  Location [facility] 823/[facility] 823-01 MRN [id number]  : 1955 Date 2024       Current Admission Date: 2024  Current Admission Diagnosis:Legionella pneumonia (HCC)   Patient Active Problem List    Diagnosis Date Noted Date Diagnosed    Diarrhea 2024     Stomatitis 2024     Legionella pneumonia (HCC) 2024     Acute kidney injury superimposed on CKD  (HCC) 2024     Anemia and thrombocytopenia 2024     Multiple myeloma not having achieved remission (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     IgG lambda monoclonal gammopathy 10/17/2024     Difficulty walking 2024     Nephrolithiasis 2024     Stage 3b chronic kidney disease (HCC) 2023     History of colonic polyps 2023     History of poliomyelitis 2021    Unspecified acquired deformity of unspecified lower leg 2021    Muscle weakness of extremity following poliomyelitis 2021     Prediabetes 2021     Obesity 2021     Essential hypertension 2021       LOS (days): 13  Geometric Mean LOS (GMLOS) (days):   Days to GMLOS:     OBJECTIVE:  Risk of Unplanned Readmission Score: 17.1         Current admission status: Inpatient   Preferred Pharmacy:   [facility] #[id number] - [place], [place] -  [address] [facility]   [place] [facility] [address]  Phone: [phone] Fax: [phone]    [facility] #[id number] - [place], [place] - [address] [place] [address]  Phone: [phone] Fax: [phone]    [facility] - [place], [place] - [address]  [address]  [address]  [facility] [address]  Phone: [phone] Fax: [phone]    Primary Care Provider: Ayanna Paz MD    Primary Insurance: [employer]  Secondary Insurance:     DISCHARGE DETAILS:    Discharge planning discussed with:: pt & daughter  Chiara  Freedom of Choice: Yes     CM contacted family/caregiver?: Yes  Were Treatment Team discharge recommendations reviewed with patient/caregiver?: Yes  Did patient/caregiver verbalize understanding of patient care needs?: Yes  Were patient/caregiver advised of the risks associated with not following Treatment Team discharge recommendations?: Yes                   Other Referral/Resources/Interventions Provided:  Referral Comments: Updated by PT that pt has progressed well with PT/OT & is now cleared for level 3 for home therapy. & pt is agreeable. [address]  # [phone] & discussed with pt. He is agreeable to cancel acute rehab referral & wants to go home with vna. He lives wtih his daughter & granddaughter & is ok with CM calling his daughter to confirm. S/w Chiara & she is agreeable to dc home with vna. Slw [facility] & pt will go home with VNA for RN, PT & OT. Pt already has [facility] set up. Aidin referral olaced to notify of dc today. AIdin message sent to both [facility] & CM dc support to cancel auth & referral. Daughter will transport home today.   Rn updated.

## 2024-12-30 NOTE — CASE MANAGEMENT
Per CM, auth can be cancelled. Patient decided to go home.   Called Mary Ellen at [phone] who voided the IRF auth request.   notified Linda Barkley

## 2024-12-30 NOTE — ASSESSMENT & PLAN NOTE
Lab Results   Component Value Date    EGFR 60 12/30/2024    EGFR 50 12/29/2024    EGFR 65 12/27/2024    CREATININE 1.22 12/30/2024    CREATININE 1.42 (H) 12/29/2024    CREATININE 1.14 12/27/2024   Recent baseline creatinine from 1.5-1.9  Kidney function has remained stable, will restart home antihypertensives  Repeat BMP in the morning

## 2024-12-30 NOTE — PROGRESS NOTES
Progress Note - Infectious Disease   Name: Zack Partida 69 y.o. male I MRN: [id number]  Unit/Bed#: [facility] 823-01 I Date of Admission: 12/17/2024   Date of Service: 12/30/2024 I Hospital Day: 13    Assessment & Plan  Diarrhea  New.  In setting of recent antibiotics.  Consider C. Diff.  Appears clinically stable without abdominal pain or leukocytosis.    Check C. Diff  If positive treat with vancomycin 125 mg PO Q6 x10 days  If negative, OK fro Imodium PRN  Legionella pneumonia (HCC)  Unclear risk factor/exposure.  While antigen has high specificity, consider other respiratory pathogens as well.  Clinically improving.  Repeat CXR improved without effusion.  Improved status post 10 days levofloxacin.  Stomatitis  Consider due to HSV.  Symptoms >4 days so antivirals unlikely to benefit at this time.  Improved.  Acute kidney injury superimposed on CKD  ([facility])  Baseline Cr. 1.1-1.3.  Likely multifactorial due to sepsis, poor PO intake.  Improved.  Multiple myeloma not having achieved remission ([facility])  Currently on Daratumumab-VRd since November 2024.  Being evaluated for possible SCT.  Anemia and thrombocytopenia  Due to MM      The patient is stable from an ID standpoint  We will sign off for now.  Please call with new questions.    Antibiotics:  None    Subjective   Patient has no fever, chills, sweats; no nausea, vomiting, diarrhea; no cough, shortness of breath; no pain. No new symptoms.    Objective :  Temp:  [98.4 °F (36.9 °C)-99.3 °F (37.4 °C)] 98.4 °F (36.9 °C)  HR:  [] 92  BP: (111-128)/(70-73) 128/73  Resp:  [17-19] 17  SpO2:  [92 %-95 %] 94 %  O2 Device: None (Room air)    General:  No acute distress  Psychiatric:  Awake and alert  Pulmonary:  Normal respiratory excursion without accessory muscle use  Abdomen:  Soft, nontender  Extremities:  No edema  Skin:  No rashes      Lab Results: I have reviewed the following results:  Results from last 7 days   Lab Units 12/30/24  0528 12/29/24  0716  12/28/24  1413   WBC Thousand/uL 6.06 5.24 6.97   HEMOGLOBIN g/dL 8.6* 8.4* 9.1*   PLATELETS Thousands/uL 186 199 226     Results from last 7 days   Lab Units 12/30/24  0528 12/29/24  0716 12/27/24  0606   SODIUM mmol/L 139 143 141   POTASSIUM mmol/L 3.6 3.6 3.5   CHLORIDE mmol/L 107 109* 109*   CO2 mmol/L 27 27 26   BUN mg/dL 23 22 18   CREATININE mg/dL 1.22 1.42* 1.14   EGFR ml/min/1.73sq m 60 50 65   CALCIUM mg/dL 7.9* 8.0* 7.8*

## 2024-12-30 NOTE — ASSESSMENT & PLAN NOTE
Likely secondary to chemo as he is actively on treatment this month  Baseline hemoglobin 10-11, on admission 8.0  Thrombocytopenia with platelets of 49 on admission  Likely secondary to multiple myeloma and acute illness  Thrombocytopenia has resolved  Hemoglobin 7.3 on 12/27, 1 unit PRBC provided  Hgb 8.6 today

## 2024-12-30 NOTE — PROGRESS NOTES
Progress Note - Hospitalist   Name: Zack Partida 69 y.o. male I MRN: [id number]  [facility] Date of Admission: 12/17/2024   Date of Service: 12/30/2024 I Hospital Day: 13    Assessment & Plan  Legionella pneumonia (HCC)  With sepsis on admission. CXR findings consistent w LLL PNA. Admission 102.5F, 109 HR, 88% O2, procal 114.   Started on CTX, d/c'd after legionella (+). Started on azithromycin 12/17, d/c'd next day for broader coverage for atypicals w levofloxacin.   Chest x-ray on 12/26 with bibasilar pneumonitis  Completed 10 days of levofloxacin on 12/27  Patient has remained afebrile  Acute kidney injury superimposed on CKD  (HCC)  Lab Results   Component Value Date    EGFR 60 12/30/2024    EGFR 50 12/29/2024    EGFR 65 12/27/2024    CREATININE 1.22 12/30/2024    CREATININE 1.42 (H) 12/29/2024    CREATININE 1.14 12/27/2024   Recent baseline creatinine from 1.5-1.9  Kidney function has remained stable, will restart home antihypertensives  Repeat BMP in the morning  Stomatitis  Appears consistent with HSV  Discussed with ID and given the symptoms have been present for more than 4 days antivirals are unlikely have benefit at this time  Continue with magic mouthwash  F/u HSV 1,2 PCR   S/p IV fluids  Improving, patient tolerating oral intake  Diarrhea  Patient with multiple episodes of watery diarrhea that started on 12/29 evening, C. difficile was obtained  Follow C. difficile result  Patient with no leukocytosis and afebrile  Anemia and thrombocytopenia  Likely secondary to chemo as he is actively on treatment this month  Baseline hemoglobin 10-11, on admission 8.0  Thrombocytopenia with platelets of 49 on admission  Likely secondary to multiple myeloma and acute illness  Thrombocytopenia has resolved  Hemoglobin 7.3 on 12/27, 1 unit PRBC provided  Hgb 8.6 today  Essential hypertension  Blood Pressure: 128/73  Home regimen amlodipine 10 mg daily, chlorthalidone 25 mg daily, doxazosin 2 mg daily  and irbesartan 300 mg daily  Continue home doxazosin 2 mg qHS, amlodipine 10 mg daily, ARB and chlorthalidone 25 mg daily  BP reviewed and acceptable  Multiple myeloma not having achieved remission (HCC)  History of multiple myeloma currently undergoing OP Daratumumab and hyaluronidase-fihj (Darzalex Faspro SQ) + Bortezomib + Dexamethasone Q 21 Days   Outpatient follow-up  Restart antiviral prophylaxis with acyclovir 400 mg twice daily  Stage 3b chronic kidney disease (HCC)  Lab Results   Component Value Date    EGFR 60 12/30/2024    EGFR 50 12/29/2024    EGFR 65 12/27/2024    CREATININE 1.22 12/30/2024    CREATININE 1.42 (H) 12/29/2024    CREATININE 1.14 12/27/2024   Baseline Cr 1.1-1.3  Creatinine at baseline  Avoid nephrotoxins  History of poliomyelitis  Recommend acute rehab  Patient encouraged to use a sock or pants under prosthetic device    VTE Pharmacologic Prophylaxis: VTE Score: 7 High Risk (Score >/= 5) - Pharmacological DVT Prophylaxis Ordered: heparin. Sequential Compression Devices Ordered.    Mobility:   Basic Mobility Inpatient Raw Score: 18  JH-HLM Goal: 6: Walk 10 steps or more  JH-HLM Achieved: 7: Walk 25 feet or more  JH-HLM Goal achieved. Continue to encourage appropriate mobility.    Patient Centered Rounds: I performed bedside rounds with nursing staff today.   Discussions with Specialists or Other Care Team Provider:     Education and Discussions with Family / Patient:  Patient will update family members.     Current Length of Stay: 13 day(s)  Current Patient Status: Inpatient   Certification Statement: The patient will continue to require additional inpatient hospital stay due to dispo planning  Discharge Plan: Anticipate discharge in 24-48 hrs to rehab facility.    Code Status: Level 1 - Full Code    Subjective   No events overnight.  Patient reports multiple episodes of watery diarrhea that started last night.  He has no abdominal pain, nausea or vomiting.    Objective :  Temp:   [98.4 °F (36.9 °C)-99.3 °F (37.4 °C)] 98.4 °F (36.9 °C)  HR:  [] 92  BP: (111-128)/(70-73) 128/73  Resp:  [17-19] 17  SpO2:  [92 %-95 %] 94 %  O2 Device: None (Room air)    Body mass index is 32.62 kg/m².     Input and Output Summary (last 24 hours):     Intake/Output Summary (Last 24 hours) at 12/30/2024 1019  Last data filed at 12/30/2024 0810  Gross per 24 hour   Intake 1073 ml   Output 600 ml   Net 473 ml       Physical Exam  Vitals and nursing note reviewed.   Constitutional:       General: He is not in acute distress.     Appearance: He is not ill-appearing.   HENT:      Head: Normocephalic and atraumatic.   Eyes:      Conjunctiva/sclera: Conjunctivae normal.   Cardiovascular:      Rate and Rhythm: Normal rate and regular rhythm.   Pulmonary:      Effort: No respiratory distress.   Abdominal:      General: There is no distension.      Palpations: Abdomen is soft.      Tenderness: There is no abdominal tenderness.   Musculoskeletal:      Right lower leg: No edema.      Left lower leg: No edema.   Skin:     General: Skin is dry.   Neurological:      Mental Status: He is alert.   Psychiatric:         Mood and Affect: Mood normal.         Behavior: Behavior normal.           Lines/Drains:              Lab Results: I have reviewed the following results:   Results from last 7 days   Lab Units 12/30/24  0528   WBC Thousand/uL 6.06   HEMOGLOBIN g/dL 8.6*   HEMATOCRIT % 27.4*   PLATELETS Thousands/uL 186   SEGS PCT % 76*   LYMPHO PCT % 14   MONO PCT % 5   EOS PCT % 4     Results from last 7 days   Lab Units 12/30/24  0528   SODIUM mmol/L 139   POTASSIUM mmol/L 3.6   CHLORIDE mmol/L 107   CO2 mmol/L 27   BUN mg/dL 23   CREATININE mg/dL 1.22   ANION GAP mmol/L 5   CALCIUM mg/dL 7.9*   GLUCOSE RANDOM mg/dL 112                       Recent Cultures (last 7 days):         Imaging Results Review: No pertinent imaging studies reviewed.  Other Study Results Review: No additional pertinent studies reviewed.    Last 24  Hours Medication List:     Current Facility-Administered Medications:     acetaminophen (TYLENOL) tablet 650 mg, Q6H PRN    amLODIPine (NORVASC) tablet 10 mg, Daily    benzonatate (TESSALON PERLES) capsule 100 mg, TID PRN    chlorthalidone tablet 25 mg, Daily    dextromethorphan-guaiFENesin (ROBITUSSIN DM) oral syrup 10 mL, Q4H PRN    diphenhydramine, lidocaine, Al/Mg hydroxide, simethicone (Magic Mouthwash) oral solution 10 mL, Q6H    doxazosin (CARDURA) tablet 2 mg, HS    heparin (porcine) subcutaneous injection 5,000 Units, Q8H LIZZETTE    losartan (COZAAR) tablet 100 mg, Daily    Administrative Statements   Today, Patient Was Seen By: Heydi To MD      **Please Note: This note may have been constructed using a voice recognition system.**

## 2024-12-30 NOTE — PLAN OF CARE
Problem: PAIN - ADULT  Goal: Verbalizes/displays adequate comfort level or baseline comfort level  Description: Interventions:  - Encourage patient to monitor pain and request assistance  - Assess pain using appropriate pain scale  - Administer analgesics based on type and severity of pain and evaluate response  - Implement non-pharmacological measures as appropriate and evaluate response  - Consider cultural and social influences on pain and pain management  - Notify physician/advanced practitioner if interventions unsuccessful or patient reports new pain  Outcome: Progressing     Problem: SAFETY ADULT  Goal: Patient will remain free of falls  Description: INTERVENTIONS:  - Educate patient/family on patient safety including physical limitations  - Instruct patient to call for assistance with activity   - Consult OT/PT to assist with strengthening/mobility   - Keep Call bell within reach  - Keep bed low and locked with side rails adjusted as appropriate  - Keep care items and personal belongings within reach  - Initiate and maintain comfort rounds  - Make Fall Risk Sign visible to staff  - Offer Toileting every 2 Hours, in advance of need  - Initiate/Maintain bed alarm  - Obtain necessary fall risk management equipment: assistive devices  - Apply yellow socks and bracelet for high fall risk patients  - Consider moving patient to room near nurses station  Outcome: Progressing     Problem: INFECTION - ADULT  Goal: Absence or prevention of progression during hospitalization  Description: INTERVENTIONS:  - Assess and monitor for signs and symptoms of infection  - Monitor lab/diagnostic results  - Monitor all insertion sites, i.e. indwelling lines, tubes, and drains  - Monitor endotracheal if appropriate and nasal secretions for changes in amount and color  - [place] appropriate cooling/warming therapies per order  - Administer medications as ordered  - Instruct and encourage patient and family to use good hand  hygiene technique  - Identify and instruct in appropriate isolation precautions for identified infection/condition  Outcome: Progressing     Problem: RESPIRATORY - ADULT  Goal: Achieves optimal ventilation and oxygenation  Description: INTERVENTIONS:  - Assess for changes in respiratory status  - Assess for changes in mentation and behavior  - Position to facilitate oxygenation and minimize respiratory effort  - Oxygen administered by appropriate delivery if ordered  - Initiate smoking cessation education as indicated  - Encourage broncho-pulmonary hygiene including cough, deep breathe, Incentive Spirometry  - Assess the need for suctioning and aspirate as needed  - Assess and instruct to report SOB or any respiratory difficulty  - Respiratory Therapy support as indicated  Outcome: Progressing     Problem: HEMATOLOGIC - ADULT  Goal: Maintains hematologic stability  Description: INTERVENTIONS  - Assess for signs and symptoms of bleeding or hemorrhage  - Monitor labs  - Administer supportive blood products/factors as ordered and appropriate  Outcome: Progressing     Problem: NEUROSENSORY - ADULT  Goal: Achieves maximal functionality and self care  Description: INTERVENTIONS  - Monitor swallowing and airway patency with patient fatigue and changes in neurological status  - Encourage and assist patient to increase activity and self care.   - Encourage visually impaired, hearing impaired and aphasic patients to use assistive/communication devices  Outcome: Progressing     Problem: GENITOURINARY - ADULT  Goal: Absence of urinary retention  Description: INTERVENTIONS:  - Assess patient’s ability to void and empty bladder  - Monitor I/O  - Bladder scan as needed  - Discuss with physician/AP medications to alleviate retention as needed  - Discuss catheterization for long term situations as appropriate  Outcome: Progressing     Problem: METABOLIC, FLUID AND ELECTROLYTES - ADULT  Goal: Fluid balance maintained  Description:  INTERVENTIONS:  - Monitor labs   - Monitor I/O and WT  - Instruct patient on fluid and nutrition as appropriate  - Assess for signs & symptoms of volume excess or deficit  Outcome: Progressing     Problem: INFECTION - ADULT  Goal: Absence or prevention of progression during hospitalization  Description: INTERVENTIONS:  - Assess and monitor for signs and symptoms of infection  - Monitor lab/diagnostic results  - Monitor all insertion sites, i.e. indwelling lines, tubes, and drains  - Monitor endotracheal if appropriate and nasal secretions for changes in amount and color  - [place] appropriate cooling/warming therapies per order  - Administer medications as ordered  - Instruct and encourage patient and family to use good hand hygiene technique  - Identify and instruct in appropriate isolation precautions for identified infection/condition  Outcome: Progressing     Problem: DISCHARGE PLANNING  Goal: Discharge to home or other facility with appropriate resources  Description: INTERVENTIONS:  - Identify barriers to discharge w/patient and caregiver  - Arrange for needed discharge resources and transportation as appropriate  - Identify discharge learning needs (meds, wound care, etc.)  - Arrange for interpretive services to assist at discharge as needed  - Refer to Case Management Department for coordinating discharge planning if the patient needs post-hospital services based on physician/advanced practitioner order or complex needs related to functional status, cognitive ability, or social support system  Outcome: Progressing

## 2024-12-30 NOTE — ASSESSMENT & PLAN NOTE
With sepsis on admission. CXR findings consistent w LLL PNA. Admission 102.5F, 109 HR, 88% O2, procal 114.   Started on CTX, d/c'd after legionella (+). Started on azithromycin 12/17, d/c'd next day for broader coverage for atypicals w levofloxacin.   Chest x-ray on 12/26 with bibasilar pneumonitis  Completed 10 days of levofloxacin on 12/27  Patient has remained afebrile and hemodynamically stable.

## 2024-12-30 NOTE — PLAN OF CARE
Problem: OCCUPATIONAL THERAPY ADULT  Goal: Performs self-care activities at highest level of function for planned discharge setting.  See evaluation for individualized goals.  Description: Treatment Interventions: ADL retraining, Functional transfer training, UE strengthening/ROM, Endurance training, Patient/family training, Cognitive reorientation, Equipment evaluation/education, Fine motor coordination activities, Compensatory technique education, Continued evaluation, Energy conservation, Activityengagement          See flowsheet documentation for full assessment, interventions and recommendations.   Outcome: Progressing  Note: Limitation: Decreased ADL status, Decreased Safe judgement during ADL, Decreased endurance  Prognosis: Fair  Assessment: Pt seen for OT treatment session day 2 on this date focused on ADL retraining, functional transfers and mobility, functional endurance, recall of safety precautions, and patient education. Pt was greeted in bed and was cooperative throughout session. Following session, pt was left seated EOB with PT present and all needs within reach. Pt continues to be limited by decreased insight, endurance, although demonstrates improvements in ADL performing toileting and standing grooming with supervision. The patient's raw score on the -PAC Daily Activity Inpatient Short Form is 21. A raw score of greater than or equal to 19 suggests the patient may benefit from discharge to home. Please refer to the recommendation of the Occupational Therapist for safe discharge planning. Pt would benefit from continued acute OT intervention with plan to continue OT treatment sessions 2-3x per week. Recommend d/c to home with increased social support, level III services.     Rehab Resource Intensity Level, OT: (S) III (Minimum Resource Intensity) (change from previous recommendation)

## 2024-12-30 NOTE — OCCUPATIONAL THERAPY NOTE
Occupational Therapy Progress Note     Patient Name: Zack Partida  Today's Date: 12/30/2024  Problem List  Principal Problem:    Legionella pneumonia (HCC)  Active Problems:    Essential hypertension    History of poliomyelitis    Stage 3b chronic kidney disease (HCC)    Multiple myeloma not having achieved remission (HCC)    Acute kidney injury superimposed on CKD  (HCC)    Anemia and thrombocytopenia    Stomatitis    Diarrhea          12/30/24 1327   OT Last Visit   OT Visit Date 12/30/24   Note Type   Note Type Treatment   Pain Assessment   Pain Assessment Tool 0-10   Pain Score No Pain   Restrictions/Precautions   Weight Bearing Precautions Per Order No   Braces or Orthoses Other (Comment)  (RLE KAFO although declines at this time for mobility to/from BR)   Other Precautions Contact/isolation;Bed Alarm;Chair Alarm;Fall Risk  (Khmer-speaking)   Lifestyle   Autonomy IND PTA with all ADLs, assist PRN for IADLs. No DME used at baseline, crutches used if not wearing RLE brace.   Reciprocal Relationships Lives with supportive Dtr and grandson, provide assist PRN   Service to Others Works FT from 3pm to 2am.   Intrinsic Gratification Spending time with family   ADL   Where Assessed Other (Comment)  (bathroom)   Grooming Assistance 5  Supervision/Setup   Grooming Deficit Supervision/safety   Grooming Comments Pt stands at sink to wash hands following toileting.   Toileting Assistance  5  Supervision/Setup   Toileting Deficit Supervison/safety   Bed Mobility   Supine to Sit 5  Supervision   Additional items Increased time required;Verbal cues;Impulsive   Additional Comments Pt greeted in bed, left seated EOB with PT present.   Transfers   Sit to Stand 5  Supervision   Additional items Increased time required;Verbal cues   Stand to Sit 5  Supervision   Additional items Increased time required;Verbal cues   Toilet transfer 5  Supervision   Additional items Increased time required;Verbal cues;Standard toilet  "  Additional Comments with crutch   Functional Mobility   Functional Mobility 5  Supervision   Additional Comments Pt performs short distance mobility to/from BR with supervision with crutch.   Additional items Crutches  (single crutch)   Subjective   Subjective \"please let me\"   Cognition   Overall Cognitive Status WFL   Arousal/Participation Cooperative;Alert   Attention Within functional limits   Orientation Level Oriented X4   Memory Within functional limits   Following Commands Follows one step commands without difficulty   Comments Pt cooperative to therapy although impulsive, with some decreased insight to deficits.   Activity Tolerance   Activity Tolerance Treatment limited secondary to medical complications (Comment)  (HR elevated to 120 during FM although recovers with seated rest.)   Medical Staff Made Aware RN cleared, handoff to PT and therapy aide   Assessment   Assessment Pt seen for OT treatment session day 2 on this date focused on ADL retraining, functional transfers and mobility, functional endurance, recall of safety precautions, and patient education. Pt was greeted in bed and was cooperative throughout session. Following session, pt was left seated EOB with PT present and all needs within reach. Pt continues to be limited by decreased insight, endurance, although demonstrates improvements in ADL performing toileting and standing grooming with supervision. The patient's raw score on the AM-PAC Daily Activity Inpatient Short Form is 21. A raw score of greater than or equal to 19 suggests the patient may benefit from discharge to home. Please refer to the recommendation of the Occupational Therapist for safe discharge planning. Pt would benefit from continued acute OT intervention with plan to continue OT treatment sessions 2-3x per week. Recommend d/c to home with increased social support, level III services.   Plan   Treatment Interventions ADL retraining;Functional transfer training;Endurance " training;Continued evaluation;Energy conservation   Goal Expiration Date 01/02/25   OT Treatment Day 2   OT Frequency 2-3x/wk   Discharge Recommendation   Rehab Resource Intensity Level, OT (S)  III (Minimum Resource Intensity)  (change from previous recommendation)   AM-PAC Daily Activity Inpatient   Lower Body Dressing 3   Bathing 3   Toileting 3   Upper Body Dressing 4   Grooming 4   Eating 4   Daily Activity Raw Score 21   Daily Activity Standardized Score (Calc for Raw Score >=11) 44.27   AM-PAC Applied Cognition Inpatient   Following a Speech/Presentation 4   Understanding Ordinary Conversation 4   Taking Medications 4   Remembering Where Things Are Placed or Put Away 4   Remembering List of 4-5 Errands 4   Taking Care of Complicated Tasks 4   Applied Cognition Raw Score 24   Applied Cognition Standardized Score 62.21   End of Consult   Education Provided Yes   Patient Position at End of Consult Seated edge of bed;All needs within reach;Other (comment)  (PT present)   Nurse Communication Nurse aware of consult         SMITHA Burrell, OTR/L

## 2024-12-30 NOTE — ASSESSMENT & PLAN NOTE
Patient with multiple episodes of watery diarrhea that started on 12/29 evening, C. difficile was obtained  Follow C. difficile result  Patient with no leukocytosis and afebrile

## 2024-12-30 NOTE — ASSESSMENT & PLAN NOTE
Patient was reevaluated by PT/OT and cleared for discharge to home with home health care  Patient encouraged to use a sock or pants under prosthetic device

## 2024-12-30 NOTE — DISCHARGE SUMMARY
Discharge Summary - Hospitalist   Name: Zack Partida 69 y.o. male I MRN: [id number]  Unit/Bed#: [facility] 823-01 I Date of Admission: 12/17/2024   Date of Service: 12/30/2024 I Hospital Day: 13     Assessment & Plan  Legionella pneumonia (HCC)  With sepsis on admission. CXR findings consistent w LLL PNA. Admission 102.5F, 109 HR, 88% O2, procal 114.   Started on CTX, d/c'd after legionella (+). Started on azithromycin 12/17, d/c'd next day for broader coverage for atypicals w levofloxacin.   Chest x-ray on 12/26 with bibasilar pneumonitis  Completed 10 days of levofloxacin on 12/27  Patient has remained afebrile and hemodynamically stable.  Acute kidney injury superimposed on CKD  (HCC)  Lab Results   Component Value Date    EGFR 60 12/30/2024    EGFR 50 12/29/2024    EGFR 65 12/27/2024    CREATININE 1.22 12/30/2024    CREATININE 1.42 (H) 12/29/2024    CREATININE 1.14 12/27/2024   Recent baseline creatinine from 1.5-1.9  Kidney function has remained stable, will restart home antihypertensives  Stomatitis  Appears consistent with HSV  Discussed with ID and given the symptoms have been present for more than 4 days antivirals are unlikely have benefit at this time  Continue with magic mouthwash  F/u HSV 1,2 PCR   S/p IV fluids  Improving, patient tolerating oral intake  Diarrhea  Patient with multiple episodes of watery diarrhea that started on 12/29 evening  C. difficile negative  Patient with no leukocytosis and afebrile  Anemia and thrombocytopenia  Likely secondary to chemo as he is actively on treatment this month  Baseline hemoglobin 10-11, on admission 8.0  Thrombocytopenia with platelets of 49 on admission  Likely secondary to multiple myeloma and acute illness  Thrombocytopenia has resolved  Hemoglobin 7.3 on 12/27, 1 unit PRBC provided  Hgb 8.6 today  Essential hypertension  Blood Pressure: 128/73  Home regimen amlodipine 10 mg daily, chlorthalidone 25 mg daily, doxazosin 2 mg daily and irbesartan 300 mg  daily  Continue home doxazosin 2 mg qHS, amlodipine 10 mg daily, ARB and chlorthalidone 25 mg daily  BP reviewed and acceptable  Multiple myeloma not having achieved remission (HCC)  History of multiple myeloma currently undergoing OP Daratumumab and hyaluronidase-fihj (Darzalex Faspro SQ) + Bortezomib + Dexamethasone Q 21 Days   Outpatient follow-up  Restart antiviral prophylaxis with acyclovir 400 mg twice daily  Stage 3b chronic kidney disease (HCC)  Lab Results   Component Value Date    EGFR 60 12/30/2024    EGFR 50 12/29/2024    EGFR 65 12/27/2024    CREATININE 1.22 12/30/2024    CREATININE 1.42 (H) 12/29/2024    CREATININE 1.14 12/27/2024   Baseline Cr 1.1-1.3  Creatinine at baseline  Avoid nephrotoxins  History of poliomyelitis  Patient was reevaluated by PT/OT and cleared for discharge to home with home health care  Patient encouraged to use a sock or pants under prosthetic device     Medical Problems       Resolved Problems  Date Reviewed: 12/17/2024          Resolved    Sepsis (HCC) 12/21/2024     Resolved by  Radha Irvin MD    Acute respiratory failure with hypoxia (HCC) 12/23/2024     Resolved by  Radha Irvin MD    Hemoptysis 12/25/2024     Resolved by  Roverto Morgan MD        Discharging Physician / Practitioner: Heydi To MD  PCP: Ayanna Paz MD  Admission Date:   Admission Orders (From admission, onward)       Ordered        12/17/24 1124  INPATIENT ADMISSION  Once                          Discharge Date: 12/30/24    Consultations During Hospital Stay:  Infectious disease none    Procedures Performed:   None    Significant Findings / Test Results:   None    Incidental Findings:   None     Test Results Pending at Discharge (will require follow up):   None     Outpatient Tests Requested:  None    Complications:  None    Reason for Admission: None    Hospital Course:   Zack Partida is a 69 y.o. male patient who originally presented to the hospital on 12/17/2024 due to  productive cough, fever and poor oral.  Chest x-ray on admission revealed left lower lung pneumonia.  Workup with positive Legionella.  Patient was evaluated by ID and completed 10 days treatment of fluoroquinolone Hospital stay was complicated by stomatitis that improved with Magic mouthwash and diarrhea.  Negative C. difficile.    Initial plan was for rehabilitation however patient improved and was cleared for home health care after PT/OT reevaluation.    Please see above list of diagnoses and related plan for additional information.     Condition at Discharge: stable    Discharge Day Visit / Exam:   * Please refer to separate progress note for these details *    Discussion with Family: Updated  (daughter) at bedside.    Discharge instructions/Information to patient and family:   See after visit summary for information provided to patient and family.      Provisions for Follow-Up Care:  See after visit summary for information related to follow-up care and any pertinent home health orders.      Mobility at time of Discharge:   Basic Mobility Inpatient Raw Score: 22  JH-HLM Goal: 7: Walk 25 feet or more  JH-HLM Achieved: 7: Walk 25 feet or more  HLM Goal achieved. Continue to encourage appropriate mobility.     Disposition:   Home with VNA Services (Reminder: Complete face to face encounter)    Planned Readmission: no    Discharge Medications:  See after visit summary for reconciled discharge medications provided to patient and/or family.      Administrative Statements   Discharge Statement:  I have spent a total time of 40 minutes in caring for this patient on the day of the visit/encounter. >30 minutes of time was spent on: Diagnostic results, Risks and benefits of tx options, Instructions for management, Patient and family education, Importance of tx compliance, Risk factor reductions, Impressions, Counseling / Coordination of care, Documenting in the medical record, Reviewing / ordering tests,  medicine, procedures  , and Communicating with other healthcare professionals .    **Please Note: This note may have been constructed using a voice recognition system**

## 2024-12-30 NOTE — PHYSICAL THERAPY NOTE
Physical Therapy Progress Note     12/30/24 1415   PT Last Visit   PT Visit Date 12/30/24   Note Type   Note Type Treatment for insurance authorization   Pain Assessment   Pain Assessment Tool 0-10   Pain Score No Pain   Restrictions/Precautions   Braces or Orthoses Other (Comment)  (KAFO)   Other Precautions Contact/isolation;Fall Risk;Bed Alarm;Chair Alarm   Subjective   Subjective The patient states that he feels about back to normal, and that he has no concerns about returning home. He noted no difficulty with the stairs.   Transfers   Sit to Stand 6  Modified independent   Stand to Sit 6  Modified independent   Ambulation/Elevation   Gait pattern Excessively slow;Inconsistent haylie;Circumduction   Gait Assistance 5  Supervision   Assistive Device None  (Single axillary crutch 10 feet without his KAFO, remaining gait without any device while donning his KAFO.)   Distance 10 feet with no KAFO and axillary crutch RUE, 240 feet with KAFO and no device.   Stair Management Assistance 5  Supervision   Additional items Increased time required   Stair Management Technique One rail R;Foreward;One rail L;Step to pattern  (Right rail ascending, left rail descending.)   Number of Stairs 14   Balance   Static Sitting Normal   Dynamic Sitting Normal   Static Standing Good   Dynamic Standing Fair +   Ambulatory Fair +   Activity Tolerance   Activity Tolerance Patient tolerated treatment well   Nurse Made Lisa Brito RN.   Assessment   Prognosis Excellent   Problem List Decreased mobility   Assessment There is significant improvement in the patient's strength, balance, and activity tolerance today. He is ambulating within the room without his KAFO and his axillary crutch with supervision. For ambulation int he hallway he was able to don his KAFO, shoes, and pants without assistance. Then he was able to ambulate around the unit without any device. The patient was then trialed on the stairs where he completed two flights with  normal vitals and no dyspnea nor fatigue. He had no difficulty manuevering around obstacles nor a busy environment. Dynamic head and trunk movements were performed without any reduction in gait velocity. The patient notes no concerns about returning home, but he would like continued therapy. He also inquired as to when he is able to return to working which was deferred to the physicians. Discussed with CINTHIA AdamsR/KEVEN and concur that the patient has demonstrated the necessary mobility and safety to return home.   Barriers to Discharge None   Goals   Patient Goals To get back to work.   STG Expiration Date 01/02/25   PT Treatment Day 3   Plan   Treatment/Interventions Functional transfer training;LE strengthening/ROM;Elevations;Therapeutic exercise;Endurance training;Patient/family training;Bed mobility;Gait training   Progress Improving as expected   PT Frequency 3-5x/wk   Discharge Recommendation   Rehab Resource Intensity Level, PT III (Minimum Resource Intensity)   AM-PAC Basic Mobility Inpatient   Turning in Flat Bed Without Bedrails 4   Lying on Back to Sitting on Edge of Flat Bed Without Bedrails 4   Moving Bed to Chair 4   Standing Up From Chair Using Arms 4   Walk in Room 3   Climb 3-5 Stairs With Railing 3   Basic Mobility Inpatient Raw Score 22   Basic Mobility Standardized Score 47.4   [facility] Highest Level Of Mobility   -HL Goal 7: Walk 25 feet or more   -HLM Achieved 7: Walk 25 feet or more         An AM-PAC Basic Mobility raw score less than 16 suggests the patient may benefit from discharge to post-acute rehab services.    Miguel Malloy, PTA

## 2024-12-30 NOTE — ASSESSMENT & PLAN NOTE
History of multiple myeloma currently undergoing OP Daratumumab and hyaluronidase-fihj (Darzalex Faspro SQ) + Bortezomib + Dexamethasone Q 21 Days   Outpatient follow-up  Restart antiviral prophylaxis with acyclovir 400 mg twice daily

## 2024-12-30 NOTE — PLAN OF CARE
Problem: INFECTION - ADULT  Goal: Absence or prevention of progression during hospitalization  Description: INTERVENTIONS:  - Assess and monitor for signs and symptoms of infection  - Monitor lab/diagnostic results  - Monitor all insertion sites, i.e. indwelling lines, tubes, and drains  - Monitor endotracheal if appropriate and nasal secretions for changes in amount and color  - Thornton appropriate cooling/warming therapies per order  - Administer medications as ordered  - Instruct and encourage patient and family to use good hand hygiene technique  - Identify and instruct in appropriate isolation precautions for identified infection/condition  Outcome: Progressing  Goal: Absence of fever/infection during neutropenic period  Description: INTERVENTIONS:  - Monitor WBC    Outcome: Progressing     Problem: SAFETY ADULT  Goal: Patient will remain free of falls  Description: INTERVENTIONS:  - Educate patient/family on patient safety including physical limitations  - Instruct patient to call for assistance with activity   - Consult OT/PT to assist with strengthening/mobility   - Keep Call bell within reach  - Keep bed low and locked with side rails adjusted as appropriate  - Keep care items and personal belongings within reach  - Initiate and maintain comfort rounds  - Make Fall Risk Sign visible to staff  - Offer Toileting every 2 Hours, in advance of need  - Initiate/Maintain bed alarm  - Obtain necessary fall risk management equipment: assistive devices  - Apply yellow socks and bracelet for high fall risk patients  - Consider moving patient to room near nurses station  Outcome: Progressing     Problem: DISCHARGE PLANNING  Goal: Discharge to home or other facility with appropriate resources  Description: INTERVENTIONS:  - Identify barriers to discharge w/patient and caregiver  - Arrange for needed discharge resources and transportation as appropriate  - Identify discharge learning needs (meds, wound care, etc.)  -  Arrange for interpretive services to assist at discharge as needed  - Refer to Case Management Department for coordinating discharge planning if the patient needs post-hospital services based on physician/advanced practitioner order or complex needs related to functional status, cognitive ability, or social support system  Outcome: Progressing     Problem: METABOLIC, FLUID AND ELECTROLYTES - ADULT  Goal: Fluid balance maintained  Description: INTERVENTIONS:  - Monitor labs   - Monitor I/O and WT  - Instruct patient on fluid and nutrition as appropriate  - Assess for signs & symptoms of volume excess or deficit  Outcome: Progressing

## 2024-12-31 NOTE — UTILIZATION REVIEW
NOTIFICATION OF ADMISSION DISCHARGE   This is a Notification of Discharge from [facility]. Please be advised that this patient has been discharge from our facility. Below you will find the admission and discharge date and time including the patient’s disposition.   UTILIZATION REVIEW CONTACT:  Karlo Caraballo  Utilization   Network Utilization Review Department  Phone: [phone] x carefully listen to the prompts. All voicemails are confidential.  Email: [email or web address]     ADMISSION INFORMATION  PRESENTATION DATE: 12/17/2024  8:54 AM  OBERVATION ADMISSION DATE: N/A  INPATIENT ADMISSION DATE: 12/17/24 11:24 AM   DISCHARGE DATE: 12/30/2024  6:18 PM   DISPOSITION:Home with Home Health Care    Network Utilization Review Department  ATTENTION: Please call with any questions or concerns to [phone] and carefully listen to the prompts so that you are directed to the right person. All voicemails are confidential.   For Discharge needs, contact Care Management DC Support Team at [phone] opt. 2  Send all requests for admission clinical reviews, approved or denied determinations and any other requests to dedicated fax number below belonging to the campus where the patient is receiving treatment. List of dedicated fax numbers for the Facilities:  FACILITY NAME UR FAX NUMBER   ADMISSION DENIALS (Administrative/Medical Necessity) [phone]   DISCHARGE SUPPORT TEAM ([facility]) [phone]   PARENT CHILD HEALTH (Maternity/NICU/Pediatrics) [phone]   [facility] [phone]   [facility] [phone]   Critical access hospital [phone]   [facility] [phone]   [facility] [phone]   [facility] [phone]   [facility] [phone]   [facility]  [phone]   [facility] [phone]   [facility] [phone]   [facility] [phone]   [facility] [phone]

## 2025-01-02 ENCOUNTER — TELEPHONE (OUTPATIENT)
Dept: FAMILY MEDICINE CLINIC | Facility: CLINIC | Age: 70
End: 2025-01-02

## 2025-01-02 ENCOUNTER — TRANSITIONAL CARE MANAGEMENT (OUTPATIENT)
Dept: FAMILY MEDICINE CLINIC | Facility: CLINIC | Age: 70
End: 2025-01-02

## 2025-01-06 ENCOUNTER — TELEPHONE (OUTPATIENT)
Dept: HEMATOLOGY ONCOLOGY | Facility: CLINIC | Age: 70
End: 2025-01-06

## 2025-01-06 DIAGNOSIS — C90.00 MULTIPLE MYELOMA NOT HAVING ACHIEVED REMISSION (HCC): ICD-10-CM

## 2025-01-06 DIAGNOSIS — D47.2 IGG LAMBDA MONOCLONAL GAMMOPATHY: Primary | ICD-10-CM

## 2025-01-06 NOTE — TELEPHONE ENCOUNTER
"Rec'd call from patient's grandson Jose. He wanted to let us know that Zack was discharged from the hospital last week and he is scheduled for treatment this Friday. Jose states that the patient is feeling much better and is back to his \"normal\". He wanted to know if the patient should get blood work done and if he should go for treatment Friday. I advised him that usually Dr. Flores would want to see the patient first, especially after a hospitalization, but I will check with her NP since she is not in the office. Patient's grandson verbalized understanding and is in agreement with the plan.   "

## 2025-01-06 NOTE — TELEPHONE ENCOUNTER
Spoke with patient's grandson and advised him that MANNY Cabrera would like to see the patient prior to his infusion on Friday. The best day for them is tomorrow at 10am. Patient is scheduled and confirmed. I advised them that he will need labs prior to infusion and orders are placed. Patient's grandson verbalized understanding and is in agreement with the plan.

## 2025-01-07 ENCOUNTER — TELEPHONE (OUTPATIENT)
Dept: HEMATOLOGY ONCOLOGY | Facility: CLINIC | Age: 70
End: 2025-01-07

## 2025-01-07 ENCOUNTER — OFFICE VISIT (OUTPATIENT)
Dept: HEMATOLOGY ONCOLOGY | Facility: CLINIC | Age: 70
End: 2025-01-07
Payer: COMMERCIAL

## 2025-01-07 VITALS
BODY MASS INDEX: 32.27 KG/M2 | HEIGHT: 64 IN | TEMPERATURE: 98.4 F | OXYGEN SATURATION: 96 % | HEART RATE: 99 BPM | DIASTOLIC BLOOD PRESSURE: 80 MMHG | SYSTOLIC BLOOD PRESSURE: 142 MMHG | WEIGHT: 189 LBS | RESPIRATION RATE: 16 BRPM

## 2025-01-07 DIAGNOSIS — C90.00 MULTIPLE MYELOMA NOT HAVING ACHIEVED REMISSION (HCC): Primary | ICD-10-CM

## 2025-01-07 PROCEDURE — 99214 OFFICE O/P EST MOD 30 MIN: CPT

## 2025-01-07 NOTE — PROGRESS NOTES
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   We will proceed with cycle 3-day 8 of Daratumumab-VRd at current dose.  Patient is unsure about Revlimid, which was stopped during his hospitalization.  Informed patient his medication is ordered as follows: Revlimid 15 mg 2 weeks on 1 week off.  His grandson will give us a call and let us know if he has any medications left and we will send a refill ASAP.  Recommend patient restart his ASA 81 mg daily, continue acyclovir 400 mg twice daily.    We will see patient back in the office in 3 weeks with Dr. Flores.  He is aware to contact us for any additional questions/concerns or worsening symptoms.      History of Present Illness   Chief Complaint   Patient presents with    Follow-up   Patient is a 69-year-old Dutch-speaking male who presents for follow-up of his IgG lambda multiple myeloma with light chain deposition in kidney.  He is here with his grandson, Jose, who helped with translation during office visit.    After the last office visit, patient was hospitalized (from 2024 to 2024) with Legionella pneumonia and has completed 10 days of levofloxacin on 2024.  Patient was also experiencing diarrhea which started on 2024.    Patient reports he is doing \"much better.\"  He endorses fatigue.  Denies any fevers, chills, diarrhea, neuropathy, cough or shortness of breath.  His Revlimid was stopped, which he is taking 15 mg 2 weeks on and 1 week off, while he was hospitalized.  He is unsure if he is taking the Revlimid or ASA 81 mg daily for prophylaxis.      Patient did meet with Dr. Christianson at [facility] for bone " marrow transplant.  Per note that was accessed through care everywhere, his office will check if patient's insurance benefits cover transplant at [facility], if so, the process will start with PFTs, TTE, repeat bone marrow biopsy after cycle 4 of treatment.    Labs:  1/4/2025: Hgb 9.6, MCV 90, WBC 6.9, ANC 3.9, platelets 170,000, creatinine 1.09, EGFR 74, alk phos 166    Oncology History   Cancer Staging   No matching staging information was found for the patient.  Oncology History Overview Note   5/2024 - kidney biopsy - LCDD - conge red stainging negative    6/2024 - BMBX with 15-17% lambda restricted plasma cells    10/2024 - BMBX with 30% lambda restricted plasma cells, congo red staining negative, FISH - dup 1q - monoclonal protein is IgG lambda     Multiple myeloma not having achieved remission (HCC)   10/18/2024 Initial Diagnosis    Multiple myeloma not having achieved remission (HCC)     11/1/2024 -  Chemotherapy    alteplase (CATHFLO), 2 mg, Intracatheter, Every 1 Minute as needed, 3 of 14 cycles  daratumumab-hyaluronidase (DARZALEX FASPRO), 1,800 mg, Subcutaneous daratumumab-hyaluronidase, Once, 3 of 14 cycles  Administration: 1,800 mg (11/1/2024), 1,800 mg (11/7/2024), 1,800 mg (11/15/2024), 1,800 mg (11/22/2024), 1,800 mg (11/29/2024), 1,800 mg (12/6/2024), 1,800 mg (12/13/2024)  bortezomib (VELCADE), 1.3 mg/m2 = 2.5 mg, Subcutaneous, Once, 3 of 8 cycles  Administration: 2.5 mg (11/1/2024), 2.5 mg (11/7/2024), 2.5 mg (11/22/2024), 2.5 mg (11/29/2024), 2.5 mg (11/15/2024), 2.5 mg (12/6/2024), 2.5 mg (12/13/2024)          Review of Systems   Constitutional:  Positive for fatigue. Negative for appetite change, diaphoresis and fever.   HENT:  Negative for nosebleeds.    Eyes: Negative.    Respiratory:  Negative for cough and shortness of breath.    Cardiovascular:  Negative for chest pain, palpitations and leg swelling.   Gastrointestinal:  Negative for abdominal pain and blood in stool.   Genitourinary:  Negative  "for hematuria.   Musculoskeletal:  Positive for arthralgias.   Neurological:  Negative for dizziness, light-headedness and headaches.   Hematological: Negative.            Objective   /80 (BP Location: Left arm, Patient Position: Sitting, Cuff Size: Adult)   Pulse 99   Temp 98.4 °F (36.9 °C) (Temporal)   Resp 16   Ht 5' 4\" (1.626 m)   Wt 85.7 kg (189 lb)   SpO2 96%   BMI 32.44 kg/m²     Pain Screening:  Pain Score: 0-No pain  ECOG   0  Physical Exam  Constitutional:       Appearance: Normal appearance.   HENT:      Head: Normocephalic and atraumatic.   Cardiovascular:      Rate and Rhythm: Regular rhythm.      Heart sounds: Normal heart sounds.   Pulmonary:      Breath sounds: Normal breath sounds.   Musculoskeletal:      Cervical back: Normal range of motion.      Right lower leg: No edema.      Left lower leg: No edema.   Skin:     General: Skin is warm and dry.   Neurological:      Mental Status: He is alert and oriented to person, place, and time.   Psychiatric:         Mood and Affect: Mood normal.         Behavior: Behavior normal.         Thought Content: Thought content normal.         Judgment: Judgment normal.         Labs: I have reviewed the following labs:  Lab Results   Component Value Date/Time    WBC 6.06 12/30/2024 05:28 AM    RBC 2.95 (L) 12/30/2024 05:28 AM    Hemoglobin 8.6 (L) 12/30/2024 05:28 AM    Hematocrit 27.4 (L) 12/30/2024 05:28 AM    MCV 93 12/30/2024 05:28 AM    MCH 29.2 12/30/2024 05:28 AM    RDW 16.2 (H) 12/30/2024 05:28 AM    Platelets 186 12/30/2024 05:28 AM    Segmented % 76 (H) 12/30/2024 05:28 AM    Lymphocytes % 14 12/30/2024 05:28 AM    Monocytes % 5 12/30/2024 05:28 AM    Eosinophils Relative 4 12/30/2024 05:28 AM    Basophils Relative 0 12/30/2024 05:28 AM    Immature Grans % 1 12/30/2024 05:28 AM    Absolute Neutrophils 4.61 12/30/2024 05:28 AM     Lab Results   Component Value Date/Time    Potassium 3.4 (L) 01/04/2025 09:18 AM    Chloride 110 (H) 01/04/2025 " 09:18 AM    Carbon Dioxide 27 01/04/2025 09:18 AM    BUN 17 01/04/2025 09:18 AM    Creatinine 1.09 01/04/2025 09:18 AM    Glucose, Fasting 84 12/10/2024 09:23 AM    Calcium 8.3 (L) 01/04/2025 09:18 AM    Corrected Calcium 9.6 12/17/2024 09:31 AM    AST 12 01/04/2025 09:18 AM    ALT 17 01/04/2025 09:18 AM    Alkaline Phosphatase 166 (H) 01/04/2025 09:18 AM    Protein, Total 5.1 (L) 01/04/2025 09:18 AM    ALBUMIN 3.0 (L) 01/04/2025 09:18 AM    Total Bilirubin 0.4 01/04/2025 09:18 AM    eGFRcr 74 01/04/2025 09:18 AM     I spent 30 minutes in chart review, counseling, coordination of care, and documentation.    This note has been generated by voice recognition software system.  Therefore, there may be spelling, grammar, and or syntax errors. Please contact if questions arise.

## 2025-01-07 NOTE — TELEPHONE ENCOUNTER
Left VM for patient's grandson to ask him to call me back to see if the patient was taking Revlimid and if he has any at home. Callback number left.

## 2025-01-08 ENCOUNTER — TELEPHONE (OUTPATIENT)
Age: 70
End: 2025-01-08

## 2025-01-08 DIAGNOSIS — C90.00 MULTIPLE MYELOMA NOT HAVING ACHIEVED REMISSION (HCC): Primary | ICD-10-CM

## 2025-01-08 RX ORDER — LENALIDOMIDE 15 MG/1
15 CAPSULE ORAL DAILY
Qty: 14 CAPSULE | Refills: 0 | Status: SHIPPED | OUTPATIENT
Start: 2025-01-08

## 2025-01-08 NOTE — TELEPHONE ENCOUNTER
Left VM for patient's grandson to call me back at my direct number to discuss if he is on revlimid and if he has any left at home or if he needs a refill. Callback number left.

## 2025-01-08 NOTE — TELEPHONE ENCOUNTER
Rec'd call from patient's grandson and he stated that the patient does not have anymore revlimid to take at home. I called back and left a VM asking that he call me back to let me know if he is also taking 81mg asa. Callback number left.

## 2025-01-09 RX ORDER — DIPHENHYDRAMINE HCL 25 MG
25 TABLET ORAL ONCE
Status: CANCELLED | OUTPATIENT
Start: 2025-01-17

## 2025-01-09 RX ORDER — DEXAMETHASONE 4 MG/1
20 TABLET ORAL ONCE
Status: CANCELLED | OUTPATIENT
Start: 2025-01-10

## 2025-01-09 RX ORDER — ACETAMINOPHEN 325 MG/1
650 TABLET ORAL ONCE
Status: CANCELLED | OUTPATIENT
Start: 2025-01-17

## 2025-01-09 RX ORDER — DEXAMETHASONE 4 MG/1
20 TABLET ORAL ONCE
Status: CANCELLED | OUTPATIENT
Start: 2025-01-17

## 2025-01-09 RX ORDER — ACETAMINOPHEN 325 MG/1
650 TABLET ORAL ONCE
Status: CANCELLED | OUTPATIENT
Start: 2025-01-10

## 2025-01-09 RX ORDER — DIPHENHYDRAMINE HCL 25 MG
25 TABLET ORAL ONCE
Status: CANCELLED | OUTPATIENT
Start: 2025-01-10

## 2025-01-10 ENCOUNTER — HOSPITAL ENCOUNTER (OUTPATIENT)
Dept: INFUSION CENTER | Facility: CLINIC | Age: 70
End: 2025-01-10
Payer: COMMERCIAL

## 2025-01-10 VITALS
RESPIRATION RATE: 16 BRPM | HEIGHT: 64 IN | SYSTOLIC BLOOD PRESSURE: 125 MMHG | OXYGEN SATURATION: 96 % | HEART RATE: 96 BPM | WEIGHT: 184 LBS | DIASTOLIC BLOOD PRESSURE: 69 MMHG | BODY MASS INDEX: 31.41 KG/M2 | TEMPERATURE: 98.9 F

## 2025-01-10 DIAGNOSIS — C90.00 MULTIPLE MYELOMA NOT HAVING ACHIEVED REMISSION (HCC): Primary | ICD-10-CM

## 2025-01-10 PROCEDURE — 96401 CHEMO ANTI-NEOPL SQ/IM: CPT

## 2025-01-10 RX ORDER — DIPHENHYDRAMINE HCL 25 MG
25 TABLET ORAL ONCE
Status: COMPLETED | OUTPATIENT
Start: 2025-01-10 | End: 2025-01-10

## 2025-01-10 RX ORDER — DEXAMETHASONE 4 MG/1
20 TABLET ORAL ONCE
Status: COMPLETED | OUTPATIENT
Start: 2025-01-10 | End: 2025-01-10

## 2025-01-10 RX ORDER — ACETAMINOPHEN 325 MG/1
650 TABLET ORAL ONCE
Status: COMPLETED | OUTPATIENT
Start: 2025-01-10 | End: 2025-01-10

## 2025-01-10 RX ADMIN — ACETAMINOPHEN 650 MG: 325 TABLET ORAL at 08:33

## 2025-01-10 RX ADMIN — DIPHENHYDRAMINE HYDROCHLORIDE 25 MG: 25 TABLET ORAL at 08:33

## 2025-01-10 RX ADMIN — DEXAMETHASONE 20 MG: 4 TABLET ORAL at 08:33

## 2025-01-10 RX ADMIN — BORTEZOMIB 2.5 MG: 3.5 INJECTION, POWDER, LYOPHILIZED, FOR SOLUTION INTRAVENOUS; SUBCUTANEOUS at 09:34

## 2025-01-10 RX ADMIN — DARATUMUMAB AND HYALURONIDASE-FIHJ (HUMAN RECOMBINANT) 1800 MG: 1800; 30000 INJECTION SUBCUTANEOUS at 09:34

## 2025-01-10 NOTE — PROGRESS NOTES
Patient arrives for D8C3 Velcade and  Darzalex. No lab parameters for D8 treatment. Patient resting comfortably on recliner. Tolerated premeds without issue.

## 2025-01-10 NOTE — PROGRESS NOTES
Patient tolerated injections B/L abdomen without issue. Bandaids applied. Patient confirmed next scheduled appointment 1/17 at 1200, provided printed AVS and calendar, ambulatory from department with johnna.

## 2025-01-17 ENCOUNTER — HOSPITAL ENCOUNTER (OUTPATIENT)
Dept: INFUSION CENTER | Facility: CLINIC | Age: 70
End: 2025-01-17
Payer: COMMERCIAL

## 2025-01-17 VITALS
TEMPERATURE: 98.1 F | OXYGEN SATURATION: 98 % | BODY MASS INDEX: 31.16 KG/M2 | RESPIRATION RATE: 16 BRPM | WEIGHT: 182.5 LBS | HEART RATE: 84 BPM | DIASTOLIC BLOOD PRESSURE: 79 MMHG | HEIGHT: 64 IN | SYSTOLIC BLOOD PRESSURE: 138 MMHG

## 2025-01-17 DIAGNOSIS — C90.00 MULTIPLE MYELOMA NOT HAVING ACHIEVED REMISSION (HCC): Primary | ICD-10-CM

## 2025-01-17 PROCEDURE — 96401 CHEMO ANTI-NEOPL SQ/IM: CPT

## 2025-01-17 RX ORDER — ACETAMINOPHEN 325 MG/1
650 TABLET ORAL ONCE
Status: COMPLETED | OUTPATIENT
Start: 2025-01-17 | End: 2025-01-17

## 2025-01-17 RX ORDER — DIPHENHYDRAMINE HCL 25 MG
25 TABLET ORAL ONCE
Status: COMPLETED | OUTPATIENT
Start: 2025-01-17 | End: 2025-01-17

## 2025-01-17 RX ORDER — DEXAMETHASONE 4 MG/1
20 TABLET ORAL ONCE
Status: COMPLETED | OUTPATIENT
Start: 2025-01-17 | End: 2025-01-17

## 2025-01-17 RX ADMIN — DEXAMETHASONE 20 MG: 4 TABLET ORAL at 12:26

## 2025-01-17 RX ADMIN — ACETAMINOPHEN 650 MG: 325 TABLET ORAL at 12:25

## 2025-01-17 RX ADMIN — DARATUMUMAB AND HYALURONIDASE-FIHJ (HUMAN RECOMBINANT) 1800 MG: 1800; 30000 INJECTION SUBCUTANEOUS at 13:33

## 2025-01-17 RX ADMIN — DIPHENHYDRAMINE HYDROCHLORIDE 25 MG: 25 TABLET ORAL at 12:26

## 2025-01-17 RX ADMIN — BORTEZOMIB 2.5 MG: 3.5 INJECTION, POWDER, LYOPHILIZED, FOR SOLUTION INTRAVENOUS; SUBCUTANEOUS at 13:29

## 2025-01-17 NOTE — PROGRESS NOTES
Patient presents to the [facility] for the treatment of Velcade and Darzalex Faspro. Labs not needed for this treatment. He offers no concerns at this time. Patient is resting comfortably in the chair, call bell within reach.

## 2025-01-17 NOTE — PROGRESS NOTES
Patient tolerated treatment well with no adverse reactions. Velcade given in his Right lower quadrant. Darzalex Faspro given B/L lower quadrants. Calendar provided. Confirmed next appointment at the [facility] for 01/24/2025 @ 0900. Patient's grandson aware that he will need bloodwork drawn prior to that appointment. Patient ambulatory at discharge.

## 2025-01-20 PROBLEM — A48.1 LEGIONELLA PNEUMONIA (HCC): Status: RESOLVED | Noted: 2024-12-18 | Resolved: 2025-01-20

## 2025-01-22 ENCOUNTER — APPOINTMENT (OUTPATIENT)
Dept: LAB | Facility: CLINIC | Age: 70
End: 2025-01-22
Payer: COMMERCIAL

## 2025-01-22 DIAGNOSIS — C90.00 MULTIPLE MYELOMA NOT HAVING ACHIEVED REMISSION (HCC): ICD-10-CM

## 2025-01-22 LAB
ALBUMIN SERPL BCG-MCNC: 3.4 G/DL (ref 3.5–5)
ALP SERPL-CCNC: 97 U/L (ref 34–104)
ALT SERPL W P-5'-P-CCNC: 25 U/L (ref 7–52)
ANION GAP SERPL CALCULATED.3IONS-SCNC: 6 MMOL/L (ref 4–13)
AST SERPL W P-5'-P-CCNC: 9 U/L (ref 13–39)
BASOPHILS # BLD AUTO: 0.07 THOUSANDS/ΜL (ref 0–0.1)
BASOPHILS NFR BLD AUTO: 1 % (ref 0–1)
BILIRUB SERPL-MCNC: 0.4 MG/DL (ref 0.2–1)
BUN SERPL-MCNC: 26 MG/DL (ref 5–25)
CALCIUM ALBUM COR SERPL-MCNC: 9.5 MG/DL (ref 8.3–10.1)
CALCIUM SERPL-MCNC: 9 MG/DL (ref 8.4–10.2)
CHLORIDE SERPL-SCNC: 108 MMOL/L (ref 96–108)
CO2 SERPL-SCNC: 26 MMOL/L (ref 21–32)
CREAT SERPL-MCNC: 1.18 MG/DL (ref 0.6–1.3)
EOSINOPHIL # BLD AUTO: 0.29 THOUSAND/ΜL (ref 0–0.61)
EOSINOPHIL NFR BLD AUTO: 3 % (ref 0–6)
ERYTHROCYTE [DISTWIDTH] IN BLOOD BY AUTOMATED COUNT: 15.2 % (ref 11.6–15.1)
GFR SERPL CREATININE-BSD FRML MDRD: 63 ML/MIN/1.73SQ M
GLUCOSE P FAST SERPL-MCNC: 99 MG/DL (ref 65–99)
HCT VFR BLD AUTO: 33.3 % (ref 36.5–49.3)
HGB BLD-MCNC: 10.5 G/DL (ref 12–17)
IGA SERPL-MCNC: 18 MG/DL (ref 66–433)
IGG SERPL-MCNC: 1094 MG/DL (ref 635–1741)
IGM SERPL-MCNC: 24 MG/DL (ref 45–281)
IMM GRANULOCYTES # BLD AUTO: 0.05 THOUSAND/UL (ref 0–0.2)
IMM GRANULOCYTES NFR BLD AUTO: 1 % (ref 0–2)
LYMPHOCYTES # BLD AUTO: 2.28 THOUSANDS/ΜL (ref 0.6–4.47)
LYMPHOCYTES NFR BLD AUTO: 25 % (ref 14–44)
MCH RBC QN AUTO: 29.2 PG (ref 26.8–34.3)
MCHC RBC AUTO-ENTMCNC: 31.5 G/DL (ref 31.4–37.4)
MCV RBC AUTO: 93 FL (ref 82–98)
MONOCYTES # BLD AUTO: 0.72 THOUSAND/ΜL (ref 0.17–1.22)
MONOCYTES NFR BLD AUTO: 8 % (ref 4–12)
NEUTROPHILS # BLD AUTO: 5.65 THOUSANDS/ΜL (ref 1.85–7.62)
NEUTS SEG NFR BLD AUTO: 62 % (ref 43–75)
NRBC BLD AUTO-RTO: 0 /100 WBCS
PLATELET # BLD AUTO: 151 THOUSANDS/UL (ref 149–390)
PMV BLD AUTO: 13.3 FL (ref 8.9–12.7)
POTASSIUM SERPL-SCNC: 3.8 MMOL/L (ref 3.5–5.3)
PROT SERPL-MCNC: 6 G/DL (ref 6.4–8.4)
RBC # BLD AUTO: 3.6 MILLION/UL (ref 3.88–5.62)
SODIUM SERPL-SCNC: 140 MMOL/L (ref 135–147)
WBC # BLD AUTO: 9.06 THOUSAND/UL (ref 4.31–10.16)

## 2025-01-22 PROCEDURE — 83521 IG LIGHT CHAINS FREE EACH: CPT

## 2025-01-22 PROCEDURE — 85025 COMPLETE CBC W/AUTO DIFF WBC: CPT

## 2025-01-22 PROCEDURE — 82784 ASSAY IGA/IGD/IGG/IGM EACH: CPT

## 2025-01-22 PROCEDURE — 84165 PROTEIN E-PHORESIS SERUM: CPT

## 2025-01-22 PROCEDURE — 80053 COMPREHEN METABOLIC PANEL: CPT

## 2025-01-22 PROCEDURE — 36415 COLL VENOUS BLD VENIPUNCTURE: CPT

## 2025-01-22 RX ORDER — DEXAMETHASONE 4 MG/1
20 TABLET ORAL ONCE
Status: CANCELLED | OUTPATIENT
Start: 2025-01-24

## 2025-01-22 RX ORDER — DIPHENHYDRAMINE HCL 25 MG
25 TABLET ORAL ONCE
Status: CANCELLED | OUTPATIENT
Start: 2025-01-24

## 2025-01-22 RX ORDER — ACETAMINOPHEN 325 MG/1
650 TABLET ORAL ONCE
Status: CANCELLED | OUTPATIENT
Start: 2025-01-24

## 2025-01-23 LAB
ALBUMIN SERPL ELPH-MCNC: 3.06 G/DL (ref 3.2–5.1)
ALBUMIN SERPL ELPH-MCNC: 54.7 % (ref 48–70)
ALPHA1 GLOB SERPL ELPH-MCNC: 0.3 G/DL (ref 0.15–0.47)
ALPHA1 GLOB SERPL ELPH-MCNC: 5.3 % (ref 1.8–7)
ALPHA2 GLOB SERPL ELPH-MCNC: 0.72 G/DL (ref 0.42–1.04)
ALPHA2 GLOB SERPL ELPH-MCNC: 12.8 % (ref 5.9–14.9)
BETA GLOB ABNORMAL SERPL ELPH-MCNC: 0.35 G/DL (ref 0.31–0.57)
BETA1 GLOB SERPL ELPH-MCNC: 6.3 % (ref 4.7–7.7)
BETA2 GLOB SERPL ELPH-MCNC: 13.7 % (ref 3.1–7.9)
BETA2+GAMMA GLOB SERPL ELPH-MCNC: 0.77 G/DL (ref 0.2–0.58)
GAMMA GLOB ABNORMAL SERPL ELPH-MCNC: 0.4 G/DL (ref 0.4–1.66)
GAMMA GLOB SERPL ELPH-MCNC: 7.2 % (ref 6.9–22.3)
IGG/ALB SER: 1.21 {RATIO} (ref 1.1–1.8)
KAPPA LC FREE SER-MCNC: 11.2 MG/L (ref 3.3–19.4)
KAPPA LC FREE/LAMBDA FREE SER: 0.2 {RATIO} (ref 0.26–1.65)
LAMBDA LC FREE SERPL-MCNC: 54.9 MG/L (ref 5.7–26.3)
M PEAK ID 2: 1.8 %
M PROTEIN 1 MFR SERPL ELPH: 8.8 %
M PROTEIN 1 SERPL ELPH-MCNC: 0.49 G/DL
M PROTEIN 2 SERPL ELPH-MCNC: 0.1 G/DL
PROT PATTERN SERPL ELPH-IMP: ABNORMAL
PROT SERPL-MCNC: 5.6 G/DL (ref 6.4–8.2)

## 2025-01-23 PROCEDURE — 84165 PROTEIN E-PHORESIS SERUM: CPT | Performed by: STUDENT IN AN ORGANIZED HEALTH CARE EDUCATION/TRAINING PROGRAM

## 2025-01-24 ENCOUNTER — HOSPITAL ENCOUNTER (OUTPATIENT)
Dept: INFUSION CENTER | Facility: CLINIC | Age: 70
End: 2025-01-24
Payer: COMMERCIAL

## 2025-01-24 VITALS
HEIGHT: 64 IN | TEMPERATURE: 97 F | OXYGEN SATURATION: 97 % | RESPIRATION RATE: 16 BRPM | DIASTOLIC BLOOD PRESSURE: 75 MMHG | SYSTOLIC BLOOD PRESSURE: 114 MMHG | WEIGHT: 183 LBS | BODY MASS INDEX: 31.24 KG/M2 | HEART RATE: 88 BPM

## 2025-01-24 DIAGNOSIS — C90.00 MULTIPLE MYELOMA NOT HAVING ACHIEVED REMISSION (HCC): Primary | ICD-10-CM

## 2025-01-24 PROCEDURE — 96401 CHEMO ANTI-NEOPL SQ/IM: CPT

## 2025-01-24 RX ORDER — DIPHENHYDRAMINE HCL 25 MG
25 TABLET ORAL ONCE
Status: COMPLETED | OUTPATIENT
Start: 2025-01-24 | End: 2025-01-24

## 2025-01-24 RX ORDER — DEXAMETHASONE 4 MG/1
20 TABLET ORAL ONCE
Status: COMPLETED | OUTPATIENT
Start: 2025-01-24 | End: 2025-01-24

## 2025-01-24 RX ORDER — ACETAMINOPHEN 325 MG/1
650 TABLET ORAL ONCE
Status: COMPLETED | OUTPATIENT
Start: 2025-01-24 | End: 2025-01-24

## 2025-01-24 RX ADMIN — BORTEZOMIB 2.5 MG: 3.5 INJECTION, POWDER, LYOPHILIZED, FOR SOLUTION INTRAVENOUS; SUBCUTANEOUS at 10:34

## 2025-01-24 RX ADMIN — DIPHENHYDRAMINE HYDROCHLORIDE 25 MG: 25 TABLET ORAL at 09:16

## 2025-01-24 RX ADMIN — DARATUMUMAB AND HYALURONIDASE-FIHJ (HUMAN RECOMBINANT) 1800 MG: 1800; 30000 INJECTION SUBCUTANEOUS at 10:27

## 2025-01-24 RX ADMIN — DEXAMETHASONE 20 MG: 4 TABLET ORAL at 09:16

## 2025-01-24 RX ADMIN — ACETAMINOPHEN 650 MG: 325 TABLET ORAL at 09:16

## 2025-01-24 NOTE — PROGRESS NOTES
Patient here for D1C4 velcade and darzalex faspro. Patient resting in recliner with his one visitor at chairside, patient reports no complaints. Vitals stable. Labs within parameters for treatment. [facility]bell within reach of patient.

## 2025-01-24 NOTE — PROGRESS NOTES
Patient tolerated treatment without complications. Patient declined AVS. Reviewed upcoming appointments with patient and family. Patient is aware of next appointment 1/31 1100.

## 2025-01-31 ENCOUNTER — HOSPITAL ENCOUNTER (OUTPATIENT)
Dept: INFUSION CENTER | Facility: CLINIC | Age: 70
End: 2025-01-31
Payer: COMMERCIAL

## 2025-01-31 ENCOUNTER — TELEPHONE (OUTPATIENT)
Dept: HEMATOLOGY ONCOLOGY | Facility: CLINIC | Age: 70
End: 2025-01-31

## 2025-01-31 ENCOUNTER — HOSPITAL ENCOUNTER (OUTPATIENT)
Dept: RADIOLOGY | Facility: HOSPITAL | Age: 70
Discharge: HOME | End: 2025-01-31
Payer: COMMERCIAL

## 2025-01-31 VITALS
TEMPERATURE: 98.7 F | HEART RATE: 90 BPM | RESPIRATION RATE: 18 BRPM | HEIGHT: 64 IN | SYSTOLIC BLOOD PRESSURE: 113 MMHG | DIASTOLIC BLOOD PRESSURE: 66 MMHG | OXYGEN SATURATION: 98 % | WEIGHT: 179 LBS | BODY MASS INDEX: 30.56 KG/M2

## 2025-01-31 DIAGNOSIS — R05.1 ACUTE COUGH: Primary | ICD-10-CM

## 2025-01-31 DIAGNOSIS — R05.1 ACUTE COUGH: ICD-10-CM

## 2025-01-31 DIAGNOSIS — C90.00 MULTIPLE MYELOMA NOT HAVING ACHIEVED REMISSION (HCC): Primary | ICD-10-CM

## 2025-01-31 PROCEDURE — 96401 CHEMO ANTI-NEOPL SQ/IM: CPT

## 2025-01-31 PROCEDURE — 71046 X-RAY EXAM CHEST 2 VIEWS: CPT

## 2025-01-31 RX ORDER — AZITHROMYCIN 250 MG/1
TABLET, FILM COATED ORAL
Qty: 6 TABLET | Refills: 0 | Status: SHIPPED | OUTPATIENT
Start: 2025-01-31 | End: 2025-02-04

## 2025-01-31 RX ADMIN — BORTEZOMIB 2.5 MG: 3.5 INJECTION, POWDER, LYOPHILIZED, FOR SOLUTION INTRAVENOUS; SUBCUTANEOUS at 12:18

## 2025-01-31 NOTE — PROGRESS NOTES
Patient arrived for Velcade injection and reports not feeling well, coughing up clear phlegm, and tiredness. Patient is afebrile, message sent to Ondina Barrios and Deborah Mota. Okay to proceed with treatment and patient ordered an X-Ray to follow treatment today. Patient and son verbalize understanding. Confirmed next appt for 2/14 @ 9am. Declined AVS.

## 2025-01-31 NOTE — TELEPHONE ENCOUNTER
Spoke with patient's grandson and reviewed chest xray results. I advised him that we sent in a z-pack to the pharmacy. Patient's grandson verbalized understanding and knows to call if symptoms become worse.

## 2025-02-03 ENCOUNTER — OFFICE VISIT (OUTPATIENT)
Dept: UROLOGY | Facility: CLINIC | Age: 70
End: 2025-02-03
Payer: COMMERCIAL

## 2025-02-03 VITALS
OXYGEN SATURATION: 93 % | HEART RATE: 96 BPM | WEIGHT: 176.4 LBS | DIASTOLIC BLOOD PRESSURE: 62 MMHG | SYSTOLIC BLOOD PRESSURE: 112 MMHG | BODY MASS INDEX: 30.11 KG/M2 | HEIGHT: 64 IN

## 2025-02-03 DIAGNOSIS — N20.0 NEPHROLITHIASIS: Primary | ICD-10-CM

## 2025-02-03 PROCEDURE — 99213 OFFICE O/P EST LOW 20 MIN: CPT

## 2025-02-03 NOTE — PROGRESS NOTES
2/3/2025      No chief complaint on file.        Assessment and Plan    69 y.o. male managed by Dr. Celis    1.  Nephrolithiasis  -7 mm nonobstructing right upper pole intrarenal calculus  -asymptomatic  -Repeat renal US in 1 year PTV.      2.  BPH with nocturia  -Patient trialed 0.4 mg daily tamsulosin.  He states he does not need this medication.  Patient states he is not bothered by urinary symptoms.  Denies dysuria, flank pain, frequency, urgency, gross hematuria.  Denies recurrent UTIs    -Will return in 1 year for follow-up with repeat renal ultrasound prior to visit.  All questions addressed.  Please do not hesitate to reach out with further questions or concerns.      History of Present Illness  Zack Partida is a 69 y.o. male here with history of nephrolithiasis and BPH presenting today for follow-up.    He is accompanied by his grandson.    States he has a cough, started a week and half ago.     States no voiding complaints. Denies dysuria, flank pain, frequency, urgency, gross hematuria.  Denies recurrent UTIs.  Patient content with voiding.      Renal ultrasound completed on 5-23-24 showing a nonobstructing 6 mm right renal stone.  No hydronephrosis.  Prostamegaly.  Bilateral simple renal cysts.    Review of Systems   Constitutional:  Negative for activity change, chills, fatigue and fever.   HENT:  Negative for congestion, rhinorrhea and sore throat.    Eyes:  Negative for photophobia, redness and visual disturbance.   Respiratory:  Positive for cough. Negative for shortness of breath and wheezing.    Cardiovascular:  Negative for chest pain, palpitations and leg swelling.   Gastrointestinal:  Negative for abdominal pain, diarrhea, nausea and vomiting.   Genitourinary:  Negative for difficulty urinating, dysuria, flank pain, frequency, hematuria and urgency.   Neurological:  Negative for weakness, light-headedness and headaches.                Vitals  There were no vitals filed for this  visit.    Physical Exam  Constitutional:       General: He is not in acute distress.     Appearance: Normal appearance. He is not toxic-appearing.   HENT:      Head: Normocephalic.      Mouth/Throat:      Pharynx: Oropharynx is clear.   Eyes:      Extraocular Movements: Extraocular movements intact.      Pupils: Pupils are equal, round, and reactive to light.   Pulmonary:      Effort: Pulmonary effort is normal. No respiratory distress.   Musculoskeletal:         General: Normal range of motion.      Cervical back: Normal range of motion.   Neurological:      Mental Status: He is alert and oriented to person, place, and time. Mental status is at baseline.      Gait: Gait normal.   Psychiatric:         Mood and Affect: Mood normal.         Behavior: Behavior normal.         Thought Content: Thought content normal.         Judgment: Judgment normal.           Past History  Past Medical History:   Diagnosis Date    Hypertension     Multiple myeloma (HCC)     Polio      Social History     Socioeconomic History    Marital status: /Civil Union     Spouse name: Not on file    Number of children: Not on file    Years of education: Not on file    Highest education level: Not on file   Occupational History    Not on file   Tobacco Use    Smoking status: Never    Smokeless tobacco: Never   Vaping Use    Vaping status: Never Used   Substance and Sexual Activity    Alcohol use: Never    Drug use: Never    Sexual activity: Not Currently   Other Topics Concern    Not on file   Social History Narrative    Not on file     Social Drivers of Health     Financial Resource Strain: Not on file   Food Insecurity: No Food Insecurity (12/17/2024)    Nursing - Inadequate Food Risk Classification     Worried About Running Out of Food in the Last Year: Not on file     Ran Out of Food in the Last Year: Not on file     Ran Out of Food in the Last Year: Never true   Transportation Needs: No Transportation Needs (12/17/2024)    Nursing -  Transportation Risk Classification     Lack of Transportation: Not on file     Lack of Transportation: No   Physical Activity: Not on file   Stress: Not on file   Social Connections: Not on file   Intimate Partner Violence: Unknown (2024)    Nursing IPS     Feels Physically and Emotionally Safe: Not on file     Physically Hurt by Someone: Not on file     Humiliated or Emotionally Abused by Someone: Not on file     Physically Hurt by Someone: No     Hurt or Threatened by Someone: No   Housing Stability: Unknown (2024)    Nursing: Inadequate Housing Risk Classification     Has Housing: Not on file     Worried About Losing Housing: Not on file     Unable to Get Utilities: Not on file     Unable to Pay for Housing in the Last Year: No     Has Housin     Social History     Tobacco Use   Smoking Status Never   Smokeless Tobacco Never     Family History   Problem Relation Age of Onset    Hypertension Mother     Diabetes Mother     Hypertension Father     Diabetes Father        The following portions of the patient's history were reviewed and updated as appropriate: allergies, current medications, past medical history, past social history, past surgical history and problem list.    Results  No results found for this or any previous visit (from the past hour).]  Lab Results   Component Value Date    PSA 1.09 2024     Lab Results   Component Value Date    CALCIUM 9.0 2025    K 3.8 2025    CO2 26 2025     2025    BUN 26 (H) 2025    CREATININE 1.18 2025     Lab Results   Component Value Date    WBC 9.06 2025    HGB 10.5 (L) 2025    HCT 33.3 (L) 2025    MCV 93 2025     2025       MANNY Bar

## 2025-02-04 ENCOUNTER — OFFICE VISIT (OUTPATIENT)
Dept: HEMATOLOGY ONCOLOGY | Facility: CLINIC | Age: 70
End: 2025-02-04
Payer: COMMERCIAL

## 2025-02-04 VITALS
WEIGHT: 177 LBS | HEIGHT: 64 IN | BODY MASS INDEX: 30.22 KG/M2 | OXYGEN SATURATION: 94 % | DIASTOLIC BLOOD PRESSURE: 70 MMHG | TEMPERATURE: 97.9 F | SYSTOLIC BLOOD PRESSURE: 134 MMHG | RESPIRATION RATE: 16 BRPM | HEART RATE: 90 BPM

## 2025-02-04 DIAGNOSIS — N17.9 ACUTE KIDNEY INJURY SUPERIMPOSED ON CKD  (HCC): ICD-10-CM

## 2025-02-04 DIAGNOSIS — C90.00 MULTIPLE MYELOMA NOT HAVING ACHIEVED REMISSION (HCC): Primary | ICD-10-CM

## 2025-02-04 DIAGNOSIS — N18.9 ACUTE KIDNEY INJURY SUPERIMPOSED ON CKD  (HCC): ICD-10-CM

## 2025-02-04 PROCEDURE — 99214 OFFICE O/P EST MOD 30 MIN: CPT | Performed by: INTERNAL MEDICINE

## 2025-02-04 NOTE — PROGRESS NOTES
Medical Oncology: Outpatient Progress Note:  Name: Zack Partida      : 1955      MRN: [id number]               Encounter Provider: Cathy Flores DO  Encounter Date: 2025   Encounter department: [facility] [place]  Assessment & Plan  Multiple myeloma not having achieved remission (HCC)  Patient is a 69-year-old Irish-speaking male, with an established history of IgG lambda multiple myeloma, with light-chain deposition in the kidneys (Currently on Daratumumab-VRd - Cycle 4 Day 8 was last administered on 2025); who presents today for interval follow-up. Of note, Cycle 1 Day 1 of Daratumumab-VRd was initiated on 2024; with dose-reduced Revelimid 15 mg Daily, on Days 1 through 14, of a 21-day cycle. Daratumumab 1,800 mg, and Bortezomib 1.3 mg/m2 were changed from administration on Days 1, 8, and 15 every 21-days, to Days 1, and 8 only every 21-days, beginning with Cycle 3. Patient has since completed 4-cycles of treatment on 2025 (Cycle 4 Day 8).     On evaluation this morning, patient appears to have developed an acute viral-syndrome. He is otherwise tolerating systemic-therapy appropriately, without significant treatment-related toxicities. Repeat laboratory-studies demonstrate improved normocytic-anemia (Hemoglobin: 10.5 MCV: 93) without concomitant cytopenias, stable renal-insufficiency (Creatinine: 1.18 eGFR: 63), and hypoalbuminemia (Albumin: 3.4). Corrected Calcium: 9.5. Repeat SPEP shows a persistent minute peak at the cathodal-end of the gamma-region; identified as IgG-kappa on immunofixation. This is related to ongoing treatment with Daratumumab. Lambda free-light chain continues to improve (Kappa: 11.2 Lambda: 54.9 Ratio: 4.9 Note: Peak Lambda in 2024: 561.7 Ratio: 27.9). Quantitative Immunoglobulins also continue to improve; as evidenced by normalization of IgG (Ig,094 IgA: 18 IgM: 24).  Given acute viral-syndrome, recommend holding Cycle 5 Day 1 of treatment, planned for February 14th, 2025, pending in-office re-evaluation. Patient was instructed to hold resumption of Revelimid, pending the above-mentioned, as well. In the interim, will coordinate for intravenous-hydration, given dehydration in the setting of an acute viral-syndrome. Patient expressed understanding and agreement.    Summary of Recommendations:  Hold Cycle 5 Day 1 of Daratumumab-VRd (Planned for February 14th, 2025):  Hold treatment pending re-evaluation next Thursday, February 13th, 2025.  Note: Patient was instructed to hold Revelimid, pending the above.  Coordinate for intravenous-hydration, given acute viral-syndrome.  Recommend close interval follow-up on Thursday, February 13th, 2025.  Note: Patient has outpatient follow-up with [facility] - Hematology-Oncology (Golden Christianson M.D.), tomorrow, February 5th, 2025, at 8:10 a.m.       History of Present Illness   Chief Complaint: Outpatient Follow-Up.  Interval Events: Zack Partida is a 69-year-old Norwegian-speaking male, with an established history of IgG lambda multiple myeloma, with light-chain deposition in the kidneys (Currently on Daratumumab-VRd - Cycle 4 Day 8 was last administered on January 31st, 2025); who presents today for interval follow-up. On evaluation this morning, patient presents with his grandson (Jose Elias). Patient's grandson served as the , at patient's request. Patient states that he has been feeling unwell over the course of the last week. He describes profound fatigue, headache, cough with clear sputum-production, and shortness of breath. He reports poor appetite, with inadequate oral-intake within a similar time-frame. He is not presently taking Revelimid at this time. Recent Chest X-Ray demonstrated persistent groundglass infiltrate in the right lower lobe, and improved infiltrate in the left lower lobe; from  "recent hospitalization for pneumonia. He was prescribed Azithromycin yesterday; however, has not yet started the above-mentioned. Patient has no further complaints or concerns at the present time.    Oncology History   Cancer Staging   No matching staging information was found for the patient.  Oncology History Overview Note   5/2024 - kidney biopsy - LCDD - conge red stainging negative    6/2024 - BMBX with 15-17% lambda restricted plasma cells    10/2024 - BMBX with 30% lambda restricted plasma cells, congo red staining negative, FISH - dup 1q - monoclonal protein is IgG lambda     Multiple myeloma not having achieved remission (HCC)   10/18/2024 Initial Diagnosis    Multiple myeloma not having achieved remission (HCC)     11/1/2024 -  Chemotherapy    alteplase (CATHFLO), 2 mg, Intracatheter, Every 1 Minute as needed, 4 of 14 cycles  daratumumab-hyaluronidase (DARZALEX FASPRO), 1,800 mg, Subcutaneous (abdomen only), Once, 4 of 14 cycles  Administration: 1,800 mg (11/1/2024), 1,800 mg (11/7/2024), 1,800 mg (11/15/2024), 1,800 mg (11/22/2024), 1,800 mg (11/29/2024), 1,800 mg (12/6/2024), 1,800 mg (1/24/2025), 1,800 mg (12/13/2024), 1,800 mg (1/10/2025), 1,800 mg (1/17/2025)  bortezomib (VELCADE), 1.3 mg/m2 = 2.5 mg, Subcutaneous, Once, 4 of 8 cycles  Administration: 2.5 mg (11/1/2024), 2.5 mg (11/7/2024), 2.5 mg (11/22/2024), 2.5 mg (11/29/2024), 2.5 mg (1/24/2025), 2.5 mg (1/31/2025), 2.5 mg (11/15/2024), 2.5 mg (12/6/2024), 2.5 mg (12/13/2024), 2.5 mg (1/10/2025), 2.5 mg (1/17/2025)        Review of Systems  Review of Systems:  All systems reviewed and negative except otherwise listed in the History of Present Illness.      Objective   /70 (BP Location: Left arm, Patient Position: Sitting, Cuff Size: Adult)   Pulse 90   Temp 97.9 °F (36.6 °C) (Temporal)   Resp 16   Ht 5' 4\" (1.626 m)   Wt 80.3 kg (177 lb)   SpO2 94%   BMI 30.38 kg/m²     ECOG   2-3-Points.    Physical Exam:  General: Alert, and " oriented; Ill-Appearing; Coughing Throughout Encounter  HEENT: Atraumatic, and normocephalic; PERRLA; EOMI; Masked  Neck: Trachea midline; No neck masses, thyromegaly, or cervical lymphadenopathy  Cardiovascular: Regular rate and rhythm; No murmurs, rubs, or gallops  Respiratory: Clear to Auscultation; Adequate aeration; No supplemental oxygen  Abdomen: Soft, non-tender; Non-distended; No organomegaly; Bowel sounds present  Extremities: No obvious deformities; 1+ Bilateral Lower Extremity Edema  Neurologic: Appropriately alert, and oriented to Person, Place, and Time; No focal neurologic deficits    Labs: I have reviewed the following labs:  Lab Results   Component Value Date/Time    WBC 9.06 01/22/2025 08:39 AM    RBC 3.60 (L) 01/22/2025 08:39 AM    Hemoglobin 10.5 (L) 01/22/2025 08:39 AM    Hematocrit 33.3 (L) 01/22/2025 08:39 AM    MCV 93 01/22/2025 08:39 AM    MCH 29.2 01/22/2025 08:39 AM    RDW 15.2 (H) 01/22/2025 08:39 AM    Platelets 151 01/22/2025 08:39 AM    Segmented % 62 01/22/2025 08:39 AM    Lymphocytes % 25 01/22/2025 08:39 AM    Monocytes % 8 01/22/2025 08:39 AM    Eosinophils Relative 3 01/22/2025 08:39 AM    Basophils Relative 1 01/22/2025 08:39 AM    Immature Grans % 1 01/22/2025 08:39 AM    Absolute Neutrophils 5.65 01/22/2025 08:39 AM     Lab Results   Component Value Date/Time    Potassium 3.8 01/22/2025 08:39 AM    Potassium 3.4 (L) 01/04/2025 09:18 AM    Chloride 108 01/22/2025 08:39 AM    Chloride 110 (H) 01/04/2025 09:18 AM    Carbon Dioxide 27 01/04/2025 09:18 AM    CO2 26 01/22/2025 08:39 AM    BUN 26 (H) 01/22/2025 08:39 AM    BUN 17 01/04/2025 09:18 AM    Creatinine 1.18 01/22/2025 08:39 AM    Creatinine 1.09 01/04/2025 09:18 AM    Glucose, Fasting 99 01/22/2025 08:39 AM    Calcium 9.0 01/22/2025 08:39 AM    Calcium 8.3 (L) 01/04/2025 09:18 AM    Corrected Calcium 9.5 01/22/2025 08:39 AM    AST 9 (L) 01/22/2025 08:39 AM    AST 12 01/04/2025 09:18 AM    ALT 25 01/22/2025 08:39 AM    ALT  17 01/04/2025 09:18 AM    Alkaline Phosphatase 97 01/22/2025 08:39 AM    Alkaline Phosphatase 166 (H) 01/04/2025 09:18 AM    Total Protein 5.6 (L) 01/22/2025 08:39 AM    Total Protein 6.0 (L) 01/22/2025 08:39 AM    Protein, Total 5.1 (L) 01/04/2025 09:18 AM    Albumin 3.4 (L) 01/22/2025 08:39 AM    ALBUMIN 3.0 (L) 01/04/2025 09:18 AM    Total Bilirubin 0.40 01/22/2025 08:39 AM    Total Bilirubin 0.4 01/04/2025 09:18 AM    eGFRcr 74 01/04/2025 09:18 AM    eGFR 63 01/22/2025 08:39 AM     Patient was seen and discussed with attending physician, EDUARDO Jimenez D.O.  Hematology-Oncology Fellow (PGY-5)

## 2025-02-04 NOTE — ASSESSMENT & PLAN NOTE
Patient is a 69-year-old Sierra Leonean-speaking male, with an established history of IgG lambda multiple myeloma, with light-chain deposition in the kidneys (Currently on Daratumumab-VRd - Cycle 4 Day 8 was last administered on 2025); who presents today for interval follow-up. Of note, Cycle 1 Day 1 of Daratumumab-VRd was initiated on 2024; with dose-reduced Revelimid 15 mg Daily, on Days 1 through 14, of a 21-day cycle. Daratumumab 1,800 mg, and Bortezomib 1.3 mg/m2 were changed from administration on Days 1, 8, and 15 every 21-days, to Days 1, and 8 only every 21-days, beginning with Cycle 3. Patient has since completed 4-cycles of treatment on 2025 (Cycle 4 Day 8).     On evaluation this morning, patient appears to have developed an acute viral-syndrome. He is otherwise tolerating systemic-therapy appropriately, without significant treatment-related toxicities. Repeat laboratory-studies demonstrate improved normocytic-anemia (Hemoglobin: 10.5 MCV: 93) without concomitant cytopenias, stable renal-insufficiency (Creatinine: 1.18 eGFR: 63), and hypoalbuminemia (Albumin: 3.4). Corrected Calcium: 9.5. Repeat SPEP shows a persistent minute peak at the cathodal-end of the gamma-region; identified as IgG-kappa on immunofixation. This is related to ongoing treatment with Daratumumab. Lambda free-light chain continues to improve (Kappa: 11.2 Lambda: 54.9 Ratio: 4.9 Note: Peak Lambda in 2024: 561.7 Ratio: 27.9). Quantitative Immunoglobulins also continue to improve; as evidenced by normalization of IgG (Ig,094 IgA: 18 IgM: 24). Given acute viral-syndrome, recommend holding Cycle 5 Day 1 of treatment, planned for 2025, pending in-office re-evaluation. Patient was instructed to hold resumption of Revelimid, pending the above-mentioned, as well. In the interim, will coordinate for intravenous-hydration, given dehydration in the setting of an acute viral-syndrome.  Patient expressed understanding and agreement.    Summary of Recommendations:  Hold Cycle 5 Day 1 of Daratumumab-VRd (Planned for February 14th, 2025):  Hold treatment pending re-evaluation next Thursday, February 13th, 2025.  Note: Patient was instructed to hold Revelimid, pending the above.  Coordinate for intravenous-hydration, given acute viral-syndrome.  Recommend close interval follow-up on Thursday, February 13th, 2025.  Note: Patient has outpatient follow-up with [facility] - Hematology-Oncology (Golden Christianson M.D.), tomorrow, February 5th, 2025, at 8:10 a.m.

## 2025-02-05 ENCOUNTER — HOSPITAL ENCOUNTER (OUTPATIENT)
Dept: INFUSION CENTER | Facility: CLINIC | Age: 70
Discharge: HOME | End: 2025-02-05
Payer: COMMERCIAL

## 2025-02-05 VITALS
DIASTOLIC BLOOD PRESSURE: 74 MMHG | TEMPERATURE: 99.2 F | HEART RATE: 92 BPM | OXYGEN SATURATION: 96 % | SYSTOLIC BLOOD PRESSURE: 130 MMHG | RESPIRATION RATE: 18 BRPM

## 2025-02-05 DIAGNOSIS — C90.00 MULTIPLE MYELOMA NOT HAVING ACHIEVED REMISSION (HCC): ICD-10-CM

## 2025-02-05 DIAGNOSIS — N17.9 ACUTE KIDNEY INJURY SUPERIMPOSED ON CKD  (HCC): Primary | ICD-10-CM

## 2025-02-05 DIAGNOSIS — N18.9 ACUTE KIDNEY INJURY SUPERIMPOSED ON CKD  (HCC): Primary | ICD-10-CM

## 2025-02-05 RX ADMIN — SODIUM CHLORIDE 1000 ML: 0.9 INJECTION, SOLUTION INTRAVENOUS at 12:08

## 2025-02-05 NOTE — PROGRESS NOTES
Pt tolerated tx well with no complaints. Next appt scheduled for 2/14 at 0900, Yovany. AVS printed.

## 2025-02-05 NOTE — PROGRESS NOTES
Pt at [facility] for Hydration. Pt has no further questions at this time. Call bell within reach.

## 2025-02-10 ENCOUNTER — TELEPHONE (OUTPATIENT)
Age: 70
End: 2025-02-10

## 2025-02-10 NOTE — TELEPHONE ENCOUNTER
Call received by Lisette.     Per Lisette DMS authorization has  and to refill mediation it will need to be updated.    Medication: lenalidomide (REVLIMID) 15 MG CAPS     Phone number: [phone]  Fax number: [phone].   .    Thank you!

## 2025-02-12 ENCOUNTER — TELEPHONE (OUTPATIENT)
Dept: HEMATOLOGY ONCOLOGY | Facility: CLINIC | Age: 70
End: 2025-02-12

## 2025-02-12 ENCOUNTER — APPOINTMENT (OUTPATIENT)
Dept: LAB | Facility: CLINIC | Age: 70
End: 2025-02-12
Payer: COMMERCIAL

## 2025-02-12 DIAGNOSIS — D89.89 LAMBDA LIGHT CHAIN DEPOSITION DISEASE (HCC): ICD-10-CM

## 2025-02-12 DIAGNOSIS — N18.32 STAGE 3B CHRONIC KIDNEY DISEASE (HCC): ICD-10-CM

## 2025-02-12 DIAGNOSIS — N18.30 BENIGN HYPERTENSION WITH CKD (CHRONIC KIDNEY DISEASE) STAGE III (HCC): ICD-10-CM

## 2025-02-12 DIAGNOSIS — C90.00 MULTIPLE MYELOMA NOT HAVING ACHIEVED REMISSION (HCC): ICD-10-CM

## 2025-02-12 DIAGNOSIS — I12.9 BENIGN HYPERTENSION WITH CKD (CHRONIC KIDNEY DISEASE) STAGE III (HCC): ICD-10-CM

## 2025-02-12 LAB
ALBUMIN SERPL BCG-MCNC: 3.3 G/DL (ref 3.5–5)
ALP SERPL-CCNC: 83 U/L (ref 34–104)
ALT SERPL W P-5'-P-CCNC: 7 U/L (ref 7–52)
ANION GAP SERPL CALCULATED.3IONS-SCNC: 7 MMOL/L (ref 4–13)
AST SERPL W P-5'-P-CCNC: 9 U/L (ref 13–39)
BASOPHILS # BLD AUTO: 0.04 THOUSANDS/ΜL (ref 0–0.1)
BASOPHILS NFR BLD AUTO: 1 % (ref 0–1)
BILIRUB SERPL-MCNC: 0.35 MG/DL (ref 0.2–1)
BUN SERPL-MCNC: 24 MG/DL (ref 5–25)
CALCIUM ALBUM COR SERPL-MCNC: 9.7 MG/DL (ref 8.3–10.1)
CALCIUM SERPL-MCNC: 9.1 MG/DL (ref 8.4–10.2)
CHLORIDE SERPL-SCNC: 110 MMOL/L (ref 96–108)
CO2 SERPL-SCNC: 26 MMOL/L (ref 21–32)
CREAT SERPL-MCNC: 1.1 MG/DL (ref 0.6–1.3)
EOSINOPHIL # BLD AUTO: 0.12 THOUSAND/ΜL (ref 0–0.61)
EOSINOPHIL NFR BLD AUTO: 2 % (ref 0–6)
ERYTHROCYTE [DISTWIDTH] IN BLOOD BY AUTOMATED COUNT: 14.8 % (ref 11.6–15.1)
GFR SERPL CREATININE-BSD FRML MDRD: 68 ML/MIN/1.73SQ M
GLUCOSE P FAST SERPL-MCNC: 96 MG/DL (ref 65–99)
HCT VFR BLD AUTO: 31.4 % (ref 36.5–49.3)
HGB BLD-MCNC: 10.4 G/DL (ref 12–17)
IMM GRANULOCYTES # BLD AUTO: 0.02 THOUSAND/UL (ref 0–0.2)
IMM GRANULOCYTES NFR BLD AUTO: 0 % (ref 0–2)
LYMPHOCYTES # BLD AUTO: 1.27 THOUSANDS/ΜL (ref 0.6–4.47)
LYMPHOCYTES NFR BLD AUTO: 24 % (ref 14–44)
MCH RBC QN AUTO: 29.5 PG (ref 26.8–34.3)
MCHC RBC AUTO-ENTMCNC: 33.1 G/DL (ref 31.4–37.4)
MCV RBC AUTO: 89 FL (ref 82–98)
MONOCYTES # BLD AUTO: 0.44 THOUSAND/ΜL (ref 0.17–1.22)
MONOCYTES NFR BLD AUTO: 8 % (ref 4–12)
NEUTROPHILS # BLD AUTO: 3.33 THOUSANDS/ΜL (ref 1.85–7.62)
NEUTS SEG NFR BLD AUTO: 65 % (ref 43–75)
NRBC BLD AUTO-RTO: 0 /100 WBCS
PHOSPHATE SERPL-MCNC: 2.7 MG/DL (ref 2.3–4.1)
PLATELET # BLD AUTO: 315 THOUSANDS/UL (ref 149–390)
PMV BLD AUTO: 11.1 FL (ref 8.9–12.7)
POTASSIUM SERPL-SCNC: 3.5 MMOL/L (ref 3.5–5.3)
PROT SERPL-MCNC: 6.2 G/DL (ref 6.4–8.4)
RBC # BLD AUTO: 3.52 MILLION/UL (ref 3.88–5.62)
SODIUM SERPL-SCNC: 143 MMOL/L (ref 135–147)
WBC # BLD AUTO: 5.22 THOUSAND/UL (ref 4.31–10.16)

## 2025-02-12 PROCEDURE — 85025 COMPLETE CBC W/AUTO DIFF WBC: CPT

## 2025-02-12 PROCEDURE — 84100 ASSAY OF PHOSPHORUS: CPT

## 2025-02-12 PROCEDURE — 84165 PROTEIN E-PHORESIS SERUM: CPT

## 2025-02-12 PROCEDURE — 36415 COLL VENOUS BLD VENIPUNCTURE: CPT

## 2025-02-12 PROCEDURE — 83521 IG LIGHT CHAINS FREE EACH: CPT

## 2025-02-12 PROCEDURE — 80053 COMPREHEN METABOLIC PANEL: CPT

## 2025-02-12 PROCEDURE — 82784 ASSAY IGA/IGD/IGG/IGM EACH: CPT

## 2025-02-12 NOTE — TELEPHONE ENCOUNTER
Left voicemail for Jose informing him of follow up appointment with Dr. Flores on 2/13 at 2:40pm at the [facility]. Informed Jose Flores would like to see patient prior to treatment on Friday, 2/14. Provided [facility] number to call to confirm appointment.

## 2025-02-13 ENCOUNTER — TELEPHONE (OUTPATIENT)
Dept: HEMATOLOGY ONCOLOGY | Facility: CLINIC | Age: 70
End: 2025-02-13

## 2025-02-13 ENCOUNTER — OFFICE VISIT (OUTPATIENT)
Dept: HEMATOLOGY ONCOLOGY | Facility: CLINIC | Age: 70
End: 2025-02-13
Payer: COMMERCIAL

## 2025-02-13 VITALS
HEIGHT: 64 IN | DIASTOLIC BLOOD PRESSURE: 82 MMHG | RESPIRATION RATE: 16 BRPM | SYSTOLIC BLOOD PRESSURE: 142 MMHG | HEART RATE: 99 BPM | WEIGHT: 176 LBS | BODY MASS INDEX: 30.05 KG/M2 | OXYGEN SATURATION: 99 % | TEMPERATURE: 98.1 F

## 2025-02-13 DIAGNOSIS — D89.89 LAMBDA LIGHT CHAIN DEPOSITION DISEASE (HCC): ICD-10-CM

## 2025-02-13 DIAGNOSIS — C90.00 MULTIPLE MYELOMA NOT HAVING ACHIEVED REMISSION (HCC): ICD-10-CM

## 2025-02-13 LAB
IGA SERPL-MCNC: 15 MG/DL (ref 66–433)
IGG SERPL-MCNC: 892 MG/DL (ref 635–1741)
IGM SERPL-MCNC: 24 MG/DL (ref 45–281)
KAPPA LC FREE SER-MCNC: 13.7 MG/L (ref 3.3–19.4)
KAPPA LC FREE/LAMBDA FREE SER: 0.31 {RATIO} (ref 0.26–1.65)
LAMBDA LC FREE SERPL-MCNC: 44.9 MG/L (ref 5.7–26.3)

## 2025-02-13 PROCEDURE — 99214 OFFICE O/P EST MOD 30 MIN: CPT | Performed by: INTERNAL MEDICINE

## 2025-02-13 RX ORDER — DIPHENHYDRAMINE HCL 25 MG
25 TABLET ORAL ONCE
Status: CANCELLED | OUTPATIENT
Start: 2025-02-14

## 2025-02-13 RX ORDER — DEXAMETHASONE 4 MG/1
20 TABLET ORAL ONCE
Status: CANCELLED | OUTPATIENT
Start: 2025-02-14

## 2025-02-13 RX ORDER — ACETAMINOPHEN 325 MG/1
650 TABLET ORAL ONCE
OUTPATIENT
Start: 2025-03-07

## 2025-02-13 RX ORDER — ACYCLOVIR 400 MG/1
400 TABLET ORAL 2 TIMES DAILY
Qty: 60 TABLET | Refills: 3 | Status: SHIPPED | OUTPATIENT
Start: 2025-02-13 | End: 2025-07-13

## 2025-02-13 RX ORDER — DIPHENHYDRAMINE HCL 25 MG
25 TABLET ORAL ONCE
OUTPATIENT
Start: 2025-03-07

## 2025-02-13 RX ORDER — ACETAMINOPHEN 325 MG/1
650 TABLET ORAL ONCE
Status: CANCELLED | OUTPATIENT
Start: 2025-02-14

## 2025-02-13 RX ORDER — DEXAMETHASONE 4 MG/1
20 TABLET ORAL ONCE
OUTPATIENT
Start: 2025-03-07

## 2025-02-13 NOTE — TELEPHONE ENCOUNTER
Left voicemail advising grandson to call back to confirm if patient is able to make it to appointment today with Dr. Flores. Provided [employer] number to call back.

## 2025-02-13 NOTE — TELEPHONE ENCOUNTER
Left voicemail for johnna Garcia regarding my voicemail for him yesterday. Stated to please call back to confirm appointment and to let me know how patient Is feeling. Provided [facility] number to call back.

## 2025-02-13 NOTE — PROGRESS NOTES
Name: Zack Partida      : 1955      MRN: [id number]  Encounter Provider: Cathy Flores DO  Encounter Date: 2025   Encounter department: [facility] [place]  :  Assessment & Plan  Multiple myeloma not having achieved remission (HCC)    Orders:    acyclovir (ZOVIRAX) 400 MG tablet; Take 1 tablet (400 mg total) by mouth 2 (two) times a day    Lambda light chain deposition disease (HCC)         69-year-old male with lambda free light chain multiple myeloma.  He is having a good response to treatment.  I am going to take Velcade out of the treatment plan because of neuropathy.  We will continue with daratumumab every 3 weeks and he will restart his Revlimid at 15 mg 2 weeks on 1 week off.  We need to ship out his next prescription.  I sent a refill for his acyclovir.  I will refer him to [facility] Ryder for consideration of transplant.  We were giving him hydration which we can stop because he is eating and drinking better now.  I will be out of the office in 3 weeks before his next cycle and he will see my nurse practitioner.  I can see him 3 weeks after that.  We will continue to monitor his lambda free light chain levels for indication of response to therapy.    History of Present Illness   Chief Complaint   Patient presents with    Follow-up     69-year-old male with lambda free light chain multiple myeloma.  He is due for daratumumab and Velcade tomorrow.  He had an upper respiratory infection and finished a course of azithromycin.  He is feeling better.  He is eating better and overall back to normal.  No fevers.  He is starting to get neuropathy up to his thighs and the length of his fingers.  It is not interfering with function but it is bothersome to him.  His lambda free light chains continue to trend down.  Since our last visit he was evaluated at [facility] for consideration of autologous stem cell transplant.  Apparently they do not take his insurance  "for transplant.    Oncology History   Cancer Staging   No matching staging information was found for the patient.  Oncology History Overview Note   5/2024 - kidney biopsy - LCDD - conge red stainging negative    6/2024 - BMBX with 15-17% lambda restricted plasma cells    10/2024 - BMBX with 30% lambda restricted plasma cells, congo red staining negative, FISH - dup 1q - monoclonal protein is IgG lambda     Multiple myeloma not having achieved remission (HCC)   10/18/2024 Initial Diagnosis    Multiple myeloma not having achieved remission (HCC)     11/1/2024 -  Chemotherapy    alteplase (CATHFLO), 2 mg, Intracatheter, Every 1 Minute as needed, 4 of 14 cycles  daratumumab-hyaluronidase (DARZALEX FASPRO), 1,800 mg, Subcutaneous (abdomen only), Once, 4 of 14 cycles  Administration: 1,800 mg (11/1/2024), 1,800 mg (11/7/2024), 1,800 mg (11/15/2024), 1,800 mg (11/22/2024), 1,800 mg (11/29/2024), 1,800 mg (12/6/2024), 1,800 mg (1/24/2025), 1,800 mg (12/13/2024), 1,800 mg (1/10/2025), 1,800 mg (1/17/2025)  bortezomib (VELCADE), 1.3 mg/m2 = 2.5 mg, Subcutaneous, Once, 4 of 8 cycles  Administration: 2.5 mg (11/1/2024), 2.5 mg (11/7/2024), 2.5 mg (11/22/2024), 2.5 mg (11/29/2024), 2.5 mg (1/24/2025), 2.5 mg (1/31/2025), 2.5 mg (11/15/2024), 2.5 mg (12/6/2024), 2.5 mg (12/13/2024), 2.5 mg (1/10/2025), 2.5 mg (1/17/2025)        Pertinent Medical History   02/13/25:     Review of Systems otherwise neg        Objective   /82 (BP Location: Left arm, Patient Position: Sitting, Cuff Size: Adult)   Pulse 99   Temp 98.1 °F (36.7 °C) (Temporal)   Resp 16   Ht 5' 4\" (1.626 m)   Wt 79.8 kg (176 lb)   SpO2 99%   BMI 30.21 kg/m²     Pain Screening:  Pain Score: 0-No pain  ECOG   0  Physical Exam    GEN: Alert, awake oriented x3, in no acute distress  HEENT- No pallor, icterus, cyanosis, no oral mucosal lesions,   LAD - no palpable cervical, clavicle, axillary, inguinal LAD  Heart- normal S1 S2, regular rate and rhythm, No " murmur, rubs.   Lungs- clear breathing sound bilateral.   Abdomen- soft, Non tender, bowel sounds present  Extremities- No cyanosis, clubbing, edema  Neuro- No focal neurological deficit      Labs: I have reviewed the following labs:  Lab Results   Component Value Date/Time    WBC 5.22 02/12/2025 09:00 AM    RBC 3.52 (L) 02/12/2025 09:00 AM    Hemoglobin 10.4 (L) 02/12/2025 09:00 AM    Hematocrit 31.4 (L) 02/12/2025 09:00 AM    MCV 89 02/12/2025 09:00 AM    MCH 29.5 02/12/2025 09:00 AM    RDW 14.8 02/12/2025 09:00 AM    Platelets 315 02/12/2025 09:00 AM    Segmented % 65 02/12/2025 09:00 AM    Lymphocytes % 24 02/12/2025 09:00 AM    Monocytes % 8 02/12/2025 09:00 AM    Eosinophils Relative 2 02/12/2025 09:00 AM    Basophils Relative 1 02/12/2025 09:00 AM    Immature Grans % 0 02/12/2025 09:00 AM    Absolute Neutrophils 3.33 02/12/2025 09:00 AM     Lab Results   Component Value Date/Time    Potassium 3.5 02/12/2025 09:00 AM    Potassium 3.4 (L) 01/04/2025 09:18 AM    Chloride 110 (H) 02/12/2025 09:00 AM    Chloride 110 (H) 01/04/2025 09:18 AM    Carbon Dioxide 27 01/04/2025 09:18 AM    CO2 26 02/12/2025 09:00 AM    BUN 24 02/12/2025 09:00 AM    BUN 17 01/04/2025 09:18 AM    Creatinine 1.10 02/12/2025 09:00 AM    Creatinine 1.09 01/04/2025 09:18 AM    Glucose, Fasting 96 02/12/2025 09:00 AM    Calcium 9.1 02/12/2025 09:00 AM    Calcium 8.3 (L) 01/04/2025 09:18 AM    Corrected Calcium 9.7 02/12/2025 09:00 AM    AST 9 (L) 02/12/2025 09:00 AM    AST 12 01/04/2025 09:18 AM    ALT 7 02/12/2025 09:00 AM    ALT 17 01/04/2025 09:18 AM    Alkaline Phosphatase 83 02/12/2025 09:00 AM    Alkaline Phosphatase 166 (H) 01/04/2025 09:18 AM    Total Protein 6.2 (L) 02/12/2025 09:00 AM    Protein, Total 5.1 (L) 01/04/2025 09:18 AM    Albumin 3.3 (L) 02/12/2025 09:00 AM    ALBUMIN 3.0 (L) 01/04/2025 09:18 AM    Total Bilirubin 0.35 02/12/2025 09:00 AM    Total Bilirubin 0.4 01/04/2025 09:18 AM    eGFRcr 74 01/04/2025 09:18 AM    eGFR 68  02/12/2025 09:00 AM             Administrative Statements   I have spent a total time of 30 minutes in caring for this patient on the day of the visit/encounter including Diagnostic results, Prognosis, Risks and benefits of tx options, Instructions for management, Patient and family education, Impressions, Counseling / Coordination of care, Documenting in the medical record, Reviewing / ordering tests, medicine, procedures  , Obtaining or reviewing history  , and Communicating with other healthcare professionals .

## 2025-02-13 NOTE — TELEPHONE ENCOUNTER
Spoke with patient's grandson, Hemant, regarding patient's appointment with Dr. Flores today at 2:40pm. Stated I have left voicemails for Jose to call me back to confirm appointment. Hemant stated he will get in contact with Jose. Advised if patient cannot make it to appointment today, to still give me a call back as I'd like to see how patient is feeling compared to last office visit. Hemant verbalized understanding and is in agreement with the plan.

## 2025-02-13 NOTE — ASSESSMENT & PLAN NOTE
Orders:    acyclovir (ZOVIRAX) 400 MG tablet; Take 1 tablet (400 mg total) by mouth 2 (two) times a day

## 2025-02-14 ENCOUNTER — TELEPHONE (OUTPATIENT)
Dept: INFUSION CENTER | Facility: CLINIC | Age: 70
End: 2025-02-14

## 2025-02-14 ENCOUNTER — HOSPITAL ENCOUNTER (OUTPATIENT)
Dept: INFUSION CENTER | Facility: CLINIC | Age: 70
End: 2025-02-14
Payer: COMMERCIAL

## 2025-02-14 ENCOUNTER — TELEPHONE (OUTPATIENT)
Age: 70
End: 2025-02-14

## 2025-02-14 VITALS
HEART RATE: 83 BPM | OXYGEN SATURATION: 97 % | TEMPERATURE: 98.1 F | DIASTOLIC BLOOD PRESSURE: 74 MMHG | SYSTOLIC BLOOD PRESSURE: 114 MMHG

## 2025-02-14 DIAGNOSIS — C90.00 MULTIPLE MYELOMA NOT HAVING ACHIEVED REMISSION (HCC): Primary | ICD-10-CM

## 2025-02-14 DIAGNOSIS — C90.00 MULTIPLE MYELOMA NOT HAVING ACHIEVED REMISSION (HCC): ICD-10-CM

## 2025-02-14 LAB
ALBUMIN SERPL ELPH-MCNC: 2.82 G/DL (ref 3.2–5.1)
ALBUMIN SERPL ELPH-MCNC: 50.4 % (ref 48–70)
ALPHA1 GLOB SERPL ELPH-MCNC: 0.43 G/DL (ref 0.15–0.47)
ALPHA1 GLOB SERPL ELPH-MCNC: 7.6 % (ref 1.8–7)
ALPHA2 GLOB SERPL ELPH-MCNC: 0.89 G/DL (ref 0.42–1.04)
ALPHA2 GLOB SERPL ELPH-MCNC: 15.9 % (ref 5.9–14.9)
BETA GLOB ABNORMAL SERPL ELPH-MCNC: 0.35 G/DL (ref 0.31–0.57)
BETA1 GLOB SERPL ELPH-MCNC: 6.2 % (ref 4.7–7.7)
BETA2 GLOB SERPL ELPH-MCNC: 13.1 % (ref 3.1–7.9)
BETA2+GAMMA GLOB SERPL ELPH-MCNC: 0.73 G/DL (ref 0.2–0.58)
GAMMA GLOB ABNORMAL SERPL ELPH-MCNC: 0.38 G/DL (ref 0.4–1.66)
GAMMA GLOB SERPL ELPH-MCNC: 6.8 % (ref 6.9–22.3)
IGG/ALB SER: 1.02 {RATIO} (ref 1.1–1.8)
M PEAK ID 2: 1.3 %
M PROTEIN 1 MFR SERPL ELPH: 7.5 %
M PROTEIN 1 SERPL ELPH-MCNC: 0.42 G/DL
M PROTEIN 2 SERPL ELPH-MCNC: 0.07 G/DL
PROT PATTERN SERPL ELPH-IMP: ABNORMAL
PROT SERPL-MCNC: 5.6 G/DL (ref 6.4–8.2)

## 2025-02-14 PROCEDURE — 96401 CHEMO ANTI-NEOPL SQ/IM: CPT

## 2025-02-14 PROCEDURE — 84165 PROTEIN E-PHORESIS SERUM: CPT | Performed by: STUDENT IN AN ORGANIZED HEALTH CARE EDUCATION/TRAINING PROGRAM

## 2025-02-14 RX ORDER — LENALIDOMIDE 15 MG/1
15 CAPSULE ORAL DAILY
Qty: 14 CAPSULE | Refills: 0 | Status: SHIPPED | OUTPATIENT
Start: 2025-02-14

## 2025-02-14 RX ORDER — DIPHENHYDRAMINE HCL 25 MG
25 TABLET ORAL ONCE
Status: COMPLETED | OUTPATIENT
Start: 2025-02-14 | End: 2025-02-14

## 2025-02-14 RX ORDER — ACETAMINOPHEN 325 MG/1
650 TABLET ORAL ONCE
Status: COMPLETED | OUTPATIENT
Start: 2025-02-14 | End: 2025-02-14

## 2025-02-14 RX ORDER — DEXAMETHASONE 4 MG/1
20 TABLET ORAL ONCE
Status: COMPLETED | OUTPATIENT
Start: 2025-02-14 | End: 2025-02-14

## 2025-02-14 RX ADMIN — ACETAMINOPHEN 650 MG: 325 TABLET ORAL at 09:26

## 2025-02-14 RX ADMIN — DEXAMETHASONE 20 MG: 4 TABLET ORAL at 09:26

## 2025-02-14 RX ADMIN — DIPHENHYDRAMINE HYDROCHLORIDE 25 MG: 25 TABLET ORAL at 09:26

## 2025-02-14 RX ADMIN — DARATUMUMAB AND HYALURONIDASE-FIHJ (HUMAN RECOMBINANT) 1800 MG: 1800; 30000 INJECTION SUBCUTANEOUS at 10:27

## 2025-02-14 NOTE — PROGRESS NOTES
Patient tolerated treatment without incident. Next appt confirmed with patient for 2/21 at 0930 at [place]. AVS declined.

## 2025-02-14 NOTE — PROGRESS NOTES
[facility] to [facility] for Darzalex Faspro. Patient offers no complaints at this time. Labs reviewed from 2/12- within parameters for treatment today.

## 2025-02-14 NOTE — TELEPHONE ENCOUNTER
Revlimid has been on hold. [facility] auth# obtained and script pended for signature

## 2025-02-14 NOTE — TELEPHONE ENCOUNTER
Gemini called from [facility] regarding Revlimid prescription. She is asking for a new [employer] authorization number for the medication, as the one listed on the prescription dated   on .     Gemini stated patient was due for a refill on , and so will appreciate if this is worked on as soon as possible .

## 2025-02-14 NOTE — TELEPHONE ENCOUNTER
Patient scheduled for treatment today 2/14.   Velcade removed from treatment plan.   Lydia at [facility] made aware.

## 2025-02-14 NOTE — TELEPHONE ENCOUNTER
Medication time out  Spoke to Ruth Ann from Dr. Flores's office and Velcade to be removed from todays treatment and all further treatment plans.

## 2025-02-26 ENCOUNTER — DOCUMENTATION (OUTPATIENT)
Dept: HEMATOLOGY ONCOLOGY | Facility: CLINIC | Age: 70
End: 2025-02-26

## 2025-02-26 NOTE — PROGRESS NOTES
Called [facility] for an update on pt's Revlimid, as the portal previously said it was scheduled for delivery, and now says case was closed out, as they were unable to reach pt. Was informed the script had been scheduled for delivery but was canceled, as pt was unable to reach for pharmacist counseling.    Left voicemail for pt requesting he call [facility] [phone] to schedule delivery and speak with the pharmacist. Provided my direct line for any questions.     Also, pt has a duplicate chart in the [facility] system. He is listed as both Zack Garcia (current chart) and Zack Partida. A note was entered in their system so this can be fixed.

## 2025-03-06 ENCOUNTER — TELEPHONE (OUTPATIENT)
Dept: HEMATOLOGY ONCOLOGY | Facility: CLINIC | Age: 70
End: 2025-03-06

## 2025-03-06 ENCOUNTER — APPOINTMENT (OUTPATIENT)
Dept: LAB | Facility: CLINIC | Age: 70
End: 2025-03-06
Payer: COMMERCIAL

## 2025-03-06 ENCOUNTER — OFFICE VISIT (OUTPATIENT)
Dept: HEMATOLOGY ONCOLOGY | Facility: CLINIC | Age: 70
End: 2025-03-06
Payer: COMMERCIAL

## 2025-03-06 VITALS
HEART RATE: 99 BPM | WEIGHT: 176 LBS | RESPIRATION RATE: 16 BRPM | TEMPERATURE: 98.7 F | BODY MASS INDEX: 30.05 KG/M2 | SYSTOLIC BLOOD PRESSURE: 140 MMHG | DIASTOLIC BLOOD PRESSURE: 86 MMHG | HEIGHT: 64 IN | OXYGEN SATURATION: 98 %

## 2025-03-06 DIAGNOSIS — N18.32 STAGE 3B CHRONIC KIDNEY DISEASE (HCC): ICD-10-CM

## 2025-03-06 DIAGNOSIS — G62.0 CHEMOTHERAPY-INDUCED NEUROPATHY (HCC): ICD-10-CM

## 2025-03-06 DIAGNOSIS — I12.9 BENIGN HYPERTENSION WITH CKD (CHRONIC KIDNEY DISEASE) STAGE III (HCC): ICD-10-CM

## 2025-03-06 DIAGNOSIS — C90.00 MULTIPLE MYELOMA NOT HAVING ACHIEVED REMISSION (HCC): ICD-10-CM

## 2025-03-06 DIAGNOSIS — C90.00 MULTIPLE MYELOMA NOT HAVING ACHIEVED REMISSION (HCC): Primary | ICD-10-CM

## 2025-03-06 DIAGNOSIS — N18.30 BENIGN HYPERTENSION WITH CKD (CHRONIC KIDNEY DISEASE) STAGE III (HCC): ICD-10-CM

## 2025-03-06 DIAGNOSIS — D89.89 LAMBDA LIGHT CHAIN DEPOSITION DISEASE (HCC): ICD-10-CM

## 2025-03-06 DIAGNOSIS — T45.1X5A CHEMOTHERAPY-INDUCED NEUROPATHY (HCC): ICD-10-CM

## 2025-03-06 LAB
ALBUMIN SERPL BCG-MCNC: 3.8 G/DL (ref 3.5–5)
ALP SERPL-CCNC: 75 U/L (ref 34–104)
ALT SERPL W P-5'-P-CCNC: 11 U/L (ref 7–52)
ANION GAP SERPL CALCULATED.3IONS-SCNC: 10 MMOL/L (ref 4–13)
AST SERPL W P-5'-P-CCNC: 12 U/L (ref 13–39)
BASOPHILS # BLD AUTO: 0.1 THOUSANDS/ÂΜL (ref 0–0.1)
BASOPHILS NFR BLD AUTO: 1 % (ref 0–1)
BILIRUB SERPL-MCNC: 0.4 MG/DL (ref 0.2–1)
BUN SERPL-MCNC: 25 MG/DL (ref 5–25)
CALCIUM SERPL-MCNC: 9.6 MG/DL (ref 8.4–10.2)
CHLORIDE SERPL-SCNC: 108 MMOL/L (ref 96–108)
CO2 SERPL-SCNC: 24 MMOL/L (ref 21–32)
CREAT SERPL-MCNC: 1.28 MG/DL (ref 0.6–1.3)
CREAT UR-MCNC: 80.2 MG/DL
EOSINOPHIL # BLD AUTO: 0.36 THOUSAND/ÂΜL (ref 0–0.61)
EOSINOPHIL NFR BLD AUTO: 4 % (ref 0–6)
ERYTHROCYTE [DISTWIDTH] IN BLOOD BY AUTOMATED COUNT: 15.2 % (ref 11.6–15.1)
GFR SERPL CREATININE-BSD FRML MDRD: 57 ML/MIN/1.73SQ M
GLUCOSE SERPL-MCNC: 105 MG/DL (ref 65–140)
HCT VFR BLD AUTO: 35.7 % (ref 36.5–49.3)
HGB BLD-MCNC: 11.9 G/DL (ref 12–17)
IGA SERPL-MCNC: 17 MG/DL (ref 66–433)
IGG SERPL-MCNC: 1162 MG/DL (ref 635–1741)
IGM SERPL-MCNC: 52 MG/DL (ref 45–281)
IMM GRANULOCYTES # BLD AUTO: 0.02 THOUSAND/UL (ref 0–0.2)
IMM GRANULOCYTES NFR BLD AUTO: 0 % (ref 0–2)
LYMPHOCYTES # BLD AUTO: 2.25 THOUSANDS/ÂΜL (ref 0.6–4.47)
LYMPHOCYTES NFR BLD AUTO: 25 % (ref 14–44)
MCH RBC QN AUTO: 30.3 PG (ref 26.8–34.3)
MCHC RBC AUTO-ENTMCNC: 33.3 G/DL (ref 31.4–37.4)
MCV RBC AUTO: 91 FL (ref 82–98)
MONOCYTES # BLD AUTO: 0.58 THOUSAND/ÂΜL (ref 0.17–1.22)
MONOCYTES NFR BLD AUTO: 6 % (ref 4–12)
NEUTROPHILS # BLD AUTO: 5.81 THOUSANDS/ÂΜL (ref 1.85–7.62)
NEUTS SEG NFR BLD AUTO: 64 % (ref 43–75)
NRBC BLD AUTO-RTO: 0 /100 WBCS
PHOSPHATE SERPL-MCNC: 4 MG/DL (ref 2.3–4.1)
PLATELET # BLD AUTO: 220 THOUSANDS/UL (ref 149–390)
PMV BLD AUTO: 12.1 FL (ref 8.9–12.7)
POTASSIUM SERPL-SCNC: 4.1 MMOL/L (ref 3.5–5.3)
PROT SERPL-MCNC: 6.2 G/DL (ref 6.4–8.4)
PROT UR-MCNC: 40.3 MG/DL
PROT/CREAT UR: 0.5 MG/G{CREAT} (ref 0–0.1)
RBC # BLD AUTO: 3.93 MILLION/UL (ref 3.88–5.62)
SODIUM SERPL-SCNC: 142 MMOL/L (ref 135–147)
WBC # BLD AUTO: 9.12 THOUSAND/UL (ref 4.31–10.16)

## 2025-03-06 PROCEDURE — 84100 ASSAY OF PHOSPHORUS: CPT

## 2025-03-06 PROCEDURE — 36415 COLL VENOUS BLD VENIPUNCTURE: CPT

## 2025-03-06 PROCEDURE — 84165 PROTEIN E-PHORESIS SERUM: CPT

## 2025-03-06 PROCEDURE — 82784 ASSAY IGA/IGD/IGG/IGM EACH: CPT

## 2025-03-06 PROCEDURE — 99214 OFFICE O/P EST MOD 30 MIN: CPT

## 2025-03-06 PROCEDURE — 80053 COMPREHEN METABOLIC PANEL: CPT

## 2025-03-06 PROCEDURE — 83521 IG LIGHT CHAINS FREE EACH: CPT

## 2025-03-06 PROCEDURE — 84156 ASSAY OF PROTEIN URINE: CPT

## 2025-03-06 PROCEDURE — 86334 IMMUNOFIX E-PHORESIS SERUM: CPT

## 2025-03-06 PROCEDURE — 85025 COMPLETE CBC W/AUTO DIFF WBC: CPT

## 2025-03-06 PROCEDURE — 82570 ASSAY OF URINE CREATININE: CPT

## 2025-03-06 RX ORDER — GABAPENTIN 100 MG/1
100 CAPSULE ORAL
Qty: 30 CAPSULE | Refills: 0 | Status: SHIPPED | OUTPATIENT
Start: 2025-03-06

## 2025-03-06 NOTE — PROGRESS NOTES
Name: Zack Partida      : 1955      MRN: [id number]  Encounter Provider: MANNY Vernon  Encounter Date: 3/6/2025   Encounter department: [facility] [place]  :  Assessment & Plan  Chemotherapy-induced neuropathy (HCC)    Orders:    gabapentin (Neurontin) 100 mg capsule; Take 1 capsule (100 mg total) by mouth daily at bedtime    Multiple myeloma not having achieved remission (HCC)    69-year-old male with lambda free light chain multiple myeloma.  We will proceed with cycle 6 of daratumumab at current dose.  He will continue taking acyclovir twice daily.  Provided patient with phone number for [facility] pharmacy to call and get the Revlimid scheduled for delivery.  Informed his grandson they will not send the medication unless they speak with patient and his family member so they know someone will be home for the delivery.  Patient to start medication  when he has medication on hand, Revlimid 15 mg 2 weeks on 1 week off.    In regards to his neuropathy, patient concerned as he is starting his new job.  We discussed this was likely from Velcade, which was discontinued prior to his last cycle.  We will trial gabapentin 100 mg, which he can take at bedtime.  Reviewed the side effects such as drowsiness.  Recommend patient start taking B6 50 mg daily as well.  We will reassess his neuropathy and adjust the dose if necessary at the next office visit prior to his cycle 7 with Dr. Flores.    Patient is agreeable with the plan.  He is aware to contact us for any additional questions/concerns or worsening symptoms.    No follow-ups on file.    History of Present Illness   Chief Complaint   Patient presents with    Follow-up   Patient presents for follow-up of his lambda free light chain multiple myeloma.  He is here with his grandson, Jose who will be translating during office visit as patient is primarily Thai-speaking.  Patient is due for cycle 6 of  "daratumumab tomorrow, 3/7/2025.  Velcade was discontinued with cycle 5 due to his neuropathy.    Patient reports his neuropathy is slightly worse where his right leg \"falls asleep.\"  He has a lot of numbness and tingling however, still has feeling in his right leg.  He has some mild neuropathy in his thigh area.  He has some mild neuropathy in his fingers as well.  Patient is concerned about the neuropathy because he is going to be starting his job again, which requires him to stand for long periods of time.    Besides the neuropathy, patient denies any increased fatigue, fevers, infection, cough.  No diarrhea or constipation.  Patient is eating 3 meals a day and his weight is stable.    Patient is scheduled with Sixto Ryder at the [facility] however, his grandson is not sure of the date.    Labs reviewed, okay for treatment:  3/6/2025: Hgb 11.9, MCV 91, WBC 9.12, platelets 220,000, otherwise unremarkable differential, creatinine 1.28, EGFR 57, calcium 9.6, phosphorus 4.0, myeloma labs in process    Oncology History   Cancer Staging   No matching staging information was found for the patient.  Oncology History Overview Note   5/2024 - kidney biopsy - LCDD - conge red stainging negative    6/2024 - BMBX with 15-17% lambda restricted plasma cells    10/2024 - BMBX with 30% lambda restricted plasma cells, congo red staining negative, FISH - dup 1q - monoclonal protein is IgG lambda     Multiple myeloma not having achieved remission (HCC)   10/18/2024 Initial Diagnosis    Multiple myeloma not having achieved remission (HCC)     11/1/2024 -  Chemotherapy    alteplase (CATHFLO), 2 mg, Intracatheter, Every 1 Minute as needed, 5 of 14 cycles  daratumumab-hyaluronidase (DARZALEX FASPRO), 1,800 mg, Subcutaneous (abdomen only), Once, 5 of 14 cycles  Administration: 1,800 mg (11/1/2024), 1,800 mg (11/7/2024), 1,800 mg (11/15/2024), 1,800 mg (11/22/2024), 1,800 mg (11/29/2024), 1,800 mg (12/6/2024), 1,800 mg (1/24/2025), " "1,800 mg (12/13/2024), 1,800 mg (1/10/2025), 1,800 mg (1/17/2025), 1,800 mg (2/14/2025)  bortezomib (VELCADE), 1.3 mg/m2 = 2.5 mg, Subcutaneous, Once, 4 of 4 cycles  Administration: 2.5 mg (11/1/2024), 2.5 mg (11/7/2024), 2.5 mg (11/22/2024), 2.5 mg (11/29/2024), 2.5 mg (1/24/2025), 2.5 mg (1/31/2025), 2.5 mg (11/15/2024), 2.5 mg (12/6/2024), 2.5 mg (12/13/2024), 2.5 mg (1/10/2025), 2.5 mg (1/17/2025)       Review of Systems   Constitutional:  Negative for activity change, appetite change, diaphoresis, fatigue, fever and unexpected weight change.   HENT:  Negative for nosebleeds.    Eyes: Negative.    Respiratory:  Negative for cough, chest tightness and shortness of breath.    Cardiovascular:  Negative for chest pain, palpitations and leg swelling.   Gastrointestinal:  Negative for abdominal pain and blood in stool.   Endocrine: Negative.    Genitourinary:  Negative for hematuria.   Musculoskeletal: Negative.    Skin: Negative.    Neurological:  Positive for numbness (tingling b/l thighs, more noticeable RLE, b/l fingers). Negative for dizziness, light-headedness and headaches.   Hematological: Negative.            Objective   /86 (BP Location: Left arm, Patient Position: Sitting, Cuff Size: Adult)   Pulse 99   Temp 98.7 °F (37.1 °C) (Temporal)   Resp 16   Ht 5' 4\" (1.626 m)   Wt 79.8 kg (176 lb)   SpO2 98%   BMI 30.21 kg/m²     Pain Screening:  Pain Score:   7  ECOG   0  Physical Exam  Constitutional:       Appearance: Normal appearance.   HENT:      Head: Normocephalic and atraumatic.   Cardiovascular:      Rate and Rhythm: Regular rhythm.      Heart sounds: Normal heart sounds.   Pulmonary:      Breath sounds: Normal breath sounds.   Musculoskeletal:      Cervical back: Normal range of motion.      Right lower leg: No edema.      Left lower leg: No edema.   Skin:     General: Skin is warm and dry.   Neurological:      Mental Status: He is alert and oriented to person, place, and time.   Psychiatric: "         Mood and Affect: Mood normal.         Behavior: Behavior normal.         Thought Content: Thought content normal.         Judgment: Judgment normal.         Labs: I have reviewed the following labs:  Lab Results   Component Value Date/Time    WBC 9.12 03/06/2025 03:56 PM    RBC 3.93 03/06/2025 03:56 PM    Hemoglobin 11.9 (L) 03/06/2025 03:56 PM    Hematocrit 35.7 (L) 03/06/2025 03:56 PM    MCV 91 03/06/2025 03:56 PM    MCH 30.3 03/06/2025 03:56 PM    RDW 15.2 (H) 03/06/2025 03:56 PM    Platelets 220 03/06/2025 03:56 PM    Segmented % 64 03/06/2025 03:56 PM    Lymphocytes % 25 03/06/2025 03:56 PM    Monocytes % 6 03/06/2025 03:56 PM    Eosinophils Relative 4 03/06/2025 03:56 PM    Basophils Relative 1 03/06/2025 03:56 PM    Immature Grans % 0 03/06/2025 03:56 PM    Absolute Neutrophils 5.81 03/06/2025 03:56 PM     Lab Results   Component Value Date/Time    Potassium 4.1 03/06/2025 03:56 PM    Potassium 3.4 (L) 01/04/2025 09:18 AM    Chloride 108 03/06/2025 03:56 PM    Chloride 110 (H) 01/04/2025 09:18 AM    Carbon Dioxide 27 01/04/2025 09:18 AM    CO2 24 03/06/2025 03:56 PM    BUN 25 03/06/2025 03:56 PM    BUN 17 01/04/2025 09:18 AM    Creatinine 1.28 03/06/2025 03:56 PM    Creatinine 1.09 01/04/2025 09:18 AM    Glucose, Fasting 96 02/12/2025 09:00 AM    Calcium 9.6 03/06/2025 03:56 PM    Calcium 8.3 (L) 01/04/2025 09:18 AM    Corrected Calcium 9.7 02/12/2025 09:00 AM    AST 12 (L) 03/06/2025 03:56 PM    AST 12 01/04/2025 09:18 AM    ALT 11 03/06/2025 03:56 PM    ALT 17 01/04/2025 09:18 AM    Alkaline Phosphatase 75 03/06/2025 03:56 PM    Alkaline Phosphatase 166 (H) 01/04/2025 09:18 AM    Total Protein 6.2 (L) 03/06/2025 03:56 PM    Protein, Total 5.1 (L) 01/04/2025 09:18 AM    Albumin 3.8 03/06/2025 03:56 PM    ALBUMIN 3.0 (L) 01/04/2025 09:18 AM    Total Bilirubin 0.40 03/06/2025 03:56 PM    Total Bilirubin 0.4 01/04/2025 09:18 AM    eGFRcr 74 01/04/2025 09:18 AM    eGFR 57 03/06/2025 03:56 PM   I spent 30  minutes in chart review, counseling, coordination of care, and documentation.    This note has been generated by voice recognition software system.  Therefore, there may be spelling, grammar, and or syntax errors. Please contact if questions arise.

## 2025-03-06 NOTE — ASSESSMENT & PLAN NOTE
Orders:    gabapentin (Neurontin) 100 mg capsule; Take 1 capsule (100 mg total) by mouth daily at bedtime

## 2025-03-07 ENCOUNTER — HOSPITAL ENCOUNTER (OUTPATIENT)
Dept: INFUSION CENTER | Facility: CLINIC | Age: 70
End: 2025-03-07
Payer: COMMERCIAL

## 2025-03-07 ENCOUNTER — TELEPHONE (OUTPATIENT)
Dept: HEMATOLOGY ONCOLOGY | Facility: CLINIC | Age: 70
End: 2025-03-07

## 2025-03-07 VITALS
HEART RATE: 92 BPM | OXYGEN SATURATION: 98 % | BODY MASS INDEX: 30.05 KG/M2 | RESPIRATION RATE: 16 BRPM | SYSTOLIC BLOOD PRESSURE: 121 MMHG | TEMPERATURE: 98.2 F | DIASTOLIC BLOOD PRESSURE: 78 MMHG | WEIGHT: 176 LBS | HEIGHT: 64 IN

## 2025-03-07 DIAGNOSIS — C90.00 MULTIPLE MYELOMA NOT HAVING ACHIEVED REMISSION (HCC): Primary | ICD-10-CM

## 2025-03-07 LAB
KAPPA LC FREE SER-MCNC: 20.6 MG/L (ref 3.3–19.4)
KAPPA LC FREE/LAMBDA FREE SER: 0.37 {RATIO} (ref 0.26–1.65)
LAMBDA LC FREE SERPL-MCNC: 55.2 MG/L (ref 5.7–26.3)

## 2025-03-07 PROCEDURE — 96401 CHEMO ANTI-NEOPL SQ/IM: CPT

## 2025-03-07 RX ORDER — ACETAMINOPHEN 325 MG/1
650 TABLET ORAL ONCE
Status: COMPLETED | OUTPATIENT
Start: 2025-03-07 | End: 2025-03-07

## 2025-03-07 RX ORDER — DIPHENHYDRAMINE HCL 25 MG
25 TABLET ORAL ONCE
Status: COMPLETED | OUTPATIENT
Start: 2025-03-07 | End: 2025-03-07

## 2025-03-07 RX ORDER — DEXAMETHASONE 4 MG/1
20 TABLET ORAL ONCE
Status: COMPLETED | OUTPATIENT
Start: 2025-03-07 | End: 2025-03-07

## 2025-03-07 RX ADMIN — DEXAMETHASONE 20 MG: 4 TABLET ORAL at 09:04

## 2025-03-07 RX ADMIN — ACETAMINOPHEN 650 MG: 325 TABLET ORAL at 09:03

## 2025-03-07 RX ADMIN — DIPHENHYDRAMINE HYDROCHLORIDE 25 MG: 25 TABLET ORAL at 09:04

## 2025-03-07 RX ADMIN — DARATUMUMAB AND HYALURONIDASE-FIHJ (HUMAN RECOMBINANT) 1800 MG: 1800; 30000 INJECTION SUBCUTANEOUS at 10:09

## 2025-03-07 NOTE — TELEPHONE ENCOUNTER
Left VM for Jose, patient's grandson, to let him know that the patient is scheduled with Dr. Barraza on Monday March 10th at 0800. Callback number provided if needed.

## 2025-03-07 NOTE — TELEPHONE ENCOUNTER
A call was placed to Zack, reached his voice mail, a message was left. An appointment has been scheduled for 3/25/25 1240pm with Dr Flores at the [facility].

## 2025-03-07 NOTE — TELEPHONE ENCOUNTER
Patient has been scheduled out further. I will give him an updated calendar and go over it during scheduled appointment today.

## 2025-03-07 NOTE — PROGRESS NOTES
Patient presents to the [facility]fusion [facility] for the treatment of Darzalex Faspro. Labs from 03/06/2025 reviewed and within parameters for treatment. He offers no concerns at this time. Patient is resting comfortably in the chair, call bell within reach.

## 2025-03-07 NOTE — PROGRESS NOTES
Patient tolerated treatment well with no adverse reactions. Injections given in b/l abdominal tissue. AVS provided. Confirmed next appointment at the [facility] for 03/28/2025 @ 8995. Patient ambulatory at discharge.

## 2025-03-08 LAB
ALBUMIN SERPL ELPH-MCNC: 3.53 G/DL (ref 3.2–5.1)
ALBUMIN SERPL ELPH-MCNC: 56.1 % (ref 48–70)
ALPHA1 GLOB SERPL ELPH-MCNC: 0.3 G/DL (ref 0.15–0.47)
ALPHA1 GLOB SERPL ELPH-MCNC: 4.8 % (ref 1.8–7)
ALPHA2 GLOB SERPL ELPH-MCNC: 0.76 G/DL (ref 0.42–1.04)
ALPHA2 GLOB SERPL ELPH-MCNC: 12 % (ref 5.9–14.9)
BETA GLOB ABNORMAL SERPL ELPH-MCNC: 0.35 G/DL (ref 0.31–0.57)
BETA1 GLOB SERPL ELPH-MCNC: 5.6 % (ref 4.7–7.7)
BETA2 GLOB SERPL ELPH-MCNC: 12.3 % (ref 3.1–7.9)
BETA2+GAMMA GLOB SERPL ELPH-MCNC: 0.77 G/DL (ref 0.2–0.58)
GAMMA GLOB ABNORMAL SERPL ELPH-MCNC: 0.58 G/DL (ref 0.4–1.66)
GAMMA GLOB SERPL ELPH-MCNC: 9.2 % (ref 6.9–22.3)
IGG/ALB SER: 1.28 {RATIO} (ref 1.1–1.8)
INTERPRETATION UR IFE-IMP: NORMAL
PROT PATTERN SERPL ELPH-IMP: ABNORMAL
PROT SERPL-MCNC: 6.3 G/DL (ref 6.4–8.2)

## 2025-03-08 PROCEDURE — 84165 PROTEIN E-PHORESIS SERUM: CPT | Performed by: STUDENT IN AN ORGANIZED HEALTH CARE EDUCATION/TRAINING PROGRAM

## 2025-03-08 PROCEDURE — 86334 IMMUNOFIX E-PHORESIS SERUM: CPT | Performed by: STUDENT IN AN ORGANIZED HEALTH CARE EDUCATION/TRAINING PROGRAM

## 2025-03-17 DIAGNOSIS — C90.00 MULTIPLE MYELOMA NOT HAVING ACHIEVED REMISSION (HCC): ICD-10-CM

## 2025-03-17 RX ORDER — LENALIDOMIDE 15 MG/1
15 CAPSULE ORAL DAILY
Qty: 14 CAPSULE | Refills: 0 | Status: SHIPPED | OUTPATIENT
Start: 2025-03-17

## 2025-03-24 ENCOUNTER — APPOINTMENT (OUTPATIENT)
Dept: LAB | Facility: CLINIC | Age: 70
End: 2025-03-24
Payer: COMMERCIAL

## 2025-03-24 DIAGNOSIS — D69.6 THROMBOCYTOPENIA, UNSPECIFIED (HCC): ICD-10-CM

## 2025-03-24 LAB
ALBUMIN SERPL BCG-MCNC: 4 G/DL (ref 3.5–5)
ALP SERPL-CCNC: 65 U/L (ref 34–104)
ALT SERPL W P-5'-P-CCNC: 10 U/L (ref 7–52)
ANION GAP SERPL CALCULATED.3IONS-SCNC: 7 MMOL/L (ref 4–13)
AST SERPL W P-5'-P-CCNC: 10 U/L (ref 13–39)
BASOPHILS # BLD AUTO: 0.08 THOUSANDS/ÂΜL (ref 0–0.1)
BASOPHILS NFR BLD AUTO: 1 % (ref 0–1)
BILIRUB SERPL-MCNC: 0.34 MG/DL (ref 0.2–1)
BUN SERPL-MCNC: 43 MG/DL (ref 5–25)
CALCIUM SERPL-MCNC: 9.5 MG/DL (ref 8.4–10.2)
CHLORIDE SERPL-SCNC: 110 MMOL/L (ref 96–108)
CO2 SERPL-SCNC: 24 MMOL/L (ref 21–32)
CREAT SERPL-MCNC: 1.22 MG/DL (ref 0.6–1.3)
EOSINOPHIL # BLD AUTO: 0.3 THOUSAND/ÂΜL (ref 0–0.61)
EOSINOPHIL NFR BLD AUTO: 4 % (ref 0–6)
ERYTHROCYTE [DISTWIDTH] IN BLOOD BY AUTOMATED COUNT: 15.5 % (ref 11.6–15.1)
GFR SERPL CREATININE-BSD FRML MDRD: 60 ML/MIN/1.73SQ M
GLUCOSE P FAST SERPL-MCNC: 109 MG/DL (ref 65–99)
HCT VFR BLD AUTO: 37.7 % (ref 36.5–49.3)
HGB BLD-MCNC: 12.3 G/DL (ref 12–17)
IMM GRANULOCYTES # BLD AUTO: 0.02 THOUSAND/UL (ref 0–0.2)
IMM GRANULOCYTES NFR BLD AUTO: 0 % (ref 0–2)
LYMPHOCYTES # BLD AUTO: 1.84 THOUSANDS/ÂΜL (ref 0.6–4.47)
LYMPHOCYTES NFR BLD AUTO: 23 % (ref 14–44)
MCH RBC QN AUTO: 30.2 PG (ref 26.8–34.3)
MCHC RBC AUTO-ENTMCNC: 32.6 G/DL (ref 31.4–37.4)
MCV RBC AUTO: 93 FL (ref 82–98)
MONOCYTES # BLD AUTO: 0.56 THOUSAND/ÂΜL (ref 0.17–1.22)
MONOCYTES NFR BLD AUTO: 7 % (ref 4–12)
NEUTROPHILS # BLD AUTO: 5.13 THOUSANDS/ÂΜL (ref 1.85–7.62)
NEUTS SEG NFR BLD AUTO: 65 % (ref 43–75)
NRBC BLD AUTO-RTO: 0 /100 WBCS
PLATELET # BLD AUTO: 208 THOUSANDS/UL (ref 149–390)
PMV BLD AUTO: 12.4 FL (ref 8.9–12.7)
POTASSIUM SERPL-SCNC: 4 MMOL/L (ref 3.5–5.3)
PROT SERPL-MCNC: 6.8 G/DL (ref 6.4–8.4)
RBC # BLD AUTO: 4.07 MILLION/UL (ref 3.88–5.62)
SODIUM SERPL-SCNC: 141 MMOL/L (ref 135–147)
WBC # BLD AUTO: 7.93 THOUSAND/UL (ref 4.31–10.16)

## 2025-03-24 PROCEDURE — 80053 COMPREHEN METABOLIC PANEL: CPT

## 2025-03-24 PROCEDURE — 85025 COMPLETE CBC W/AUTO DIFF WBC: CPT

## 2025-03-24 PROCEDURE — 36415 COLL VENOUS BLD VENIPUNCTURE: CPT

## 2025-03-25 ENCOUNTER — OFFICE VISIT (OUTPATIENT)
Dept: HEMATOLOGY ONCOLOGY | Facility: CLINIC | Age: 70
End: 2025-03-25
Payer: COMMERCIAL

## 2025-03-25 ENCOUNTER — TELEPHONE (OUTPATIENT)
Dept: HEMATOLOGY ONCOLOGY | Facility: CLINIC | Age: 70
End: 2025-03-25

## 2025-03-25 VITALS
HEIGHT: 64 IN | SYSTOLIC BLOOD PRESSURE: 124 MMHG | WEIGHT: 177 LBS | RESPIRATION RATE: 16 BRPM | DIASTOLIC BLOOD PRESSURE: 78 MMHG | OXYGEN SATURATION: 97 % | HEART RATE: 99 BPM | TEMPERATURE: 98 F | BODY MASS INDEX: 30.22 KG/M2

## 2025-03-25 DIAGNOSIS — G62.9 NEUROPATHY: ICD-10-CM

## 2025-03-25 DIAGNOSIS — C90.00 MULTIPLE MYELOMA NOT HAVING ACHIEVED REMISSION (HCC): Primary | ICD-10-CM

## 2025-03-25 PROCEDURE — 99214 OFFICE O/P EST MOD 30 MIN: CPT | Performed by: INTERNAL MEDICINE

## 2025-03-25 RX ORDER — DEXAMETHASONE 4 MG/1
20 TABLET ORAL ONCE
Status: CANCELLED | OUTPATIENT
Start: 2025-03-28

## 2025-03-25 RX ORDER — ACETAMINOPHEN 325 MG/1
650 TABLET ORAL ONCE
Status: CANCELLED | OUTPATIENT
Start: 2025-03-28

## 2025-03-25 RX ORDER — ASPIRIN 81 MG/1
81 TABLET, CHEWABLE ORAL DAILY
Qty: 90 TABLET | Refills: 3 | Status: SHIPPED | OUTPATIENT
Start: 2025-03-25

## 2025-03-25 RX ORDER — FERROUS SULFATE 325(65) MG
325 TABLET ORAL DAILY
COMMUNITY

## 2025-03-25 RX ORDER — GABAPENTIN 300 MG/1
300 CAPSULE ORAL 2 TIMES DAILY
Qty: 60 CAPSULE | Refills: 2 | Status: SHIPPED | OUTPATIENT
Start: 2025-03-25

## 2025-03-25 RX ORDER — DIPHENHYDRAMINE HCL 25 MG
25 TABLET ORAL ONCE
Status: CANCELLED | OUTPATIENT
Start: 2025-03-28

## 2025-03-25 NOTE — TELEPHONE ENCOUNTER
Email sent to Dr. Barraza's coordinators. Patient was originally scheduled 3/10, but missed the appt. They will reach out to the patient to reschedule.

## 2025-03-26 NOTE — PROGRESS NOTES
Name: Zack Partida      : 1955      MRN: [id number]  Encounter Provider: Cathy Flores DO  Encounter Date: 3/25/2025   Encounter department: [facility] [place]  :  Assessment & Plan  Multiple myeloma not having achieved remission (HCC)    Orders:    aspirin 81 mg chewable tablet; Chew 1 tablet (81 mg total) daily    Neuropathy    Orders:    gabapentin (Neurontin) 300 mg capsule; Take 1 capsule (300 mg total) by mouth 2 (two) times a day    69-year-old male with IgG lambda multiple myeloma.  He will be receiving Revlimid and we will add this to the daratumumab dosed at 15 mg 2 weeks on 1 week off.  I prescribed a baby aspirin for him to prevent blood clots.  He gets dexamethasone on his daratumumab days.  I increased his gabapentin to 300 mg twice daily to help with the neuropathic pain.  I have added extra IV fluids for his treatment days as well.  I will plan on seeing him back in about 4 weeks I will continue to monitor his labs every 3 weeks.  He knows to call if there are any significant side effects when he starts the Revlimid.  We reviewed what side effects to watch for.    Return in about 4 weeks (around 2025) for 20 minute follow up visit.    History of Present Illness   Chief Complaint   Patient presents with    Follow-up     69-year-old male with IgG lambda multiple myeloma.  At this point he is on single agent daratumumab.  He will finally receive Revlimid on .  We have had some issues with them doing the survey and getting it delivered.  His Velcade has been stopped due to neuropathy and he is still suffering with it.  He was started on gabapentin 100 mg nightly by my nurse practitioner.  He has not noticed much of a difference in the neuropathy on it.  His labs indicate some dehydration.  He will be going back to work on Friday.  He is asking for something more for pain so that he can get through the workday.    Oncology History   Cancer  Staging   No matching staging information was found for the patient.  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 the surgery and receiving it    2/14/2025 - take of velcade due to severe neuropathy - still waiting for the patient to get revlimid     Multiple myeloma not having achieved remission (HCC)   10/18/2024 Initial Diagnosis    Multiple myeloma not having achieved remission (HCC)     11/1/2024 -  Chemotherapy    alteplase (CATHFLO), 2 mg, Intracatheter, Every 1 Minute as needed, 6 of 14 cycles  daratumumab-hyaluronidase (DARZALEX FASPRO), 1,800 mg, Subcutaneous (abdomen only), Once, 6 of 14 cycles  Administration: 1,800 mg (11/1/2024), 1,800 mg (11/7/2024), 1,800 mg (11/15/2024), 1,800 mg (11/22/2024), 1,800 mg (11/29/2024), 1,800 mg (12/6/2024), 1,800 mg (1/24/2025), 1,800 mg (12/13/2024), 1,800 mg (1/10/2025), 1,800 mg (1/17/2025), 1,800 mg (2/14/2025), 1,800 mg (3/7/2025)  sodium chloride, 1,000 mL (100 % of original dose 1,000 mL), Intravenous, Once, 0 of 8 cycles  Dose modification: 1,000 mL (original dose 1,000 mL, Cycle 7)  bortezomib (VELCADE), 1.3 mg/m2 = 2.5 mg, Subcutaneous, Once, 4 of 4 cycles  Administration: 2.5 mg (11/1/2024), 2.5 mg (11/7/2024), 2.5 mg (11/22/2024), 2.5 mg (11/29/2024), 2.5 mg (1/24/2025), 2.5 mg (1/31/2025), 2.5 mg (11/15/2024), 2.5 mg (12/6/2024), 2.5 mg (12/13/2024), 2.5 mg (1/10/2025), 2.5 mg (1/17/2025)        Pertinent Medical History         Review of Systems otherwise neg        Objective   /78 (BP Location: Left arm, Patient Position: Sitting, Cuff Size: Adult)   Pulse 99   Temp 98 °F (36.7 °C) (Temporal)   Resp 16   Ht  "5' 4\" (1.626 m)   Wt 80.3 kg (177 lb)   SpO2 97%   BMI 30.38 kg/m²     Pain Screening:  Pain Score:   7  ECOG   1  Physical Exam    GEN: Alert, awake oriented x3, in no acute distress  HEENT- No pallor, icterus, cyanosis, no oral mucosal lesions,   LAD - no palpable cervical, clavicle, axillary, inguinal LAD  Heart- normal S1 S2, regular rate and rhythm, No murmur, rubs.   Lungs- clear breathing sound bilateral.   Abdomen- soft, Non tender, bowel sounds present  Extremities- No cyanosis, clubbing, edema  Neuro- No focal neurological deficit      Labs: I have reviewed the following labs:  Lab Results   Component Value Date/Time    WBC 7.93 03/24/2025 08:47 AM    RBC 4.07 03/24/2025 08:47 AM    Hemoglobin 12.3 03/24/2025 08:47 AM    Hematocrit 37.7 03/24/2025 08:47 AM    MCV 93 03/24/2025 08:47 AM    MCH 30.2 03/24/2025 08:47 AM    RDW 15.5 (H) 03/24/2025 08:47 AM    Platelets 208 03/24/2025 08:47 AM    Segmented % 65 03/24/2025 08:47 AM    Lymphocytes % 23 03/24/2025 08:47 AM    Monocytes % 7 03/24/2025 08:47 AM    Eosinophils Relative 4 03/24/2025 08:47 AM    Basophils Relative 1 03/24/2025 08:47 AM    Immature Grans % 0 03/24/2025 08:47 AM    Absolute Neutrophils 5.13 03/24/2025 08:47 AM     Lab Results   Component Value Date/Time    Potassium 4.0 03/24/2025 08:47 AM    Potassium 3.4 (L) 01/04/2025 09:18 AM    Chloride 110 (H) 03/24/2025 08:47 AM    Chloride 110 (H) 01/04/2025 09:18 AM    Carbon Dioxide 27 01/04/2025 09:18 AM    CO2 24 03/24/2025 08:47 AM    BUN 43 (H) 03/24/2025 08:47 AM    BUN 17 01/04/2025 09:18 AM    Creatinine 1.22 03/24/2025 08:47 AM    Creatinine 1.09 01/04/2025 09:18 AM    Glucose, Fasting 109 (H) 03/24/2025 08:47 AM    Calcium 9.5 03/24/2025 08:47 AM    Calcium 8.3 (L) 01/04/2025 09:18 AM    Corrected Calcium 9.7 02/12/2025 09:00 AM    AST 10 (L) 03/24/2025 08:47 AM    AST 12 01/04/2025 09:18 AM    ALT 10 03/24/2025 08:47 AM    ALT 17 01/04/2025 09:18 AM    Alkaline Phosphatase 65 " 03/24/2025 08:47 AM    Alkaline Phosphatase 166 (H) 01/04/2025 09:18 AM    Total Protein 6.8 03/24/2025 08:47 AM    Protein, Total 5.1 (L) 01/04/2025 09:18 AM    Albumin 4.0 03/24/2025 08:47 AM    ALBUMIN 3.0 (L) 01/04/2025 09:18 AM    Total Bilirubin 0.34 03/24/2025 08:47 AM    Total Bilirubin 0.4 01/04/2025 09:18 AM    eGFRcr 74 01/04/2025 09:18 AM    eGFR 60 03/24/2025 08:47 AM

## 2025-03-28 ENCOUNTER — TELEPHONE (OUTPATIENT)
Dept: HEMATOLOGY ONCOLOGY | Facility: CLINIC | Age: 70
End: 2025-03-28

## 2025-03-28 ENCOUNTER — HOSPITAL ENCOUNTER (OUTPATIENT)
Dept: INFUSION CENTER | Facility: CLINIC | Age: 70
End: 2025-03-28
Payer: COMMERCIAL

## 2025-03-28 VITALS
SYSTOLIC BLOOD PRESSURE: 134 MMHG | OXYGEN SATURATION: 99 % | HEART RATE: 78 BPM | DIASTOLIC BLOOD PRESSURE: 83 MMHG | RESPIRATION RATE: 18 BRPM | TEMPERATURE: 97.6 F

## 2025-03-28 DIAGNOSIS — C90.00 MULTIPLE MYELOMA NOT HAVING ACHIEVED REMISSION (HCC): Primary | ICD-10-CM

## 2025-03-28 LAB
IGA SERPL-MCNC: 18 MG/DL (ref 66–433)
IGG SERPL-MCNC: 1144 MG/DL (ref 635–1741)
IGM SERPL-MCNC: 49 MG/DL (ref 45–281)

## 2025-03-28 PROCEDURE — 96360 HYDRATION IV INFUSION INIT: CPT

## 2025-03-28 PROCEDURE — 96401 CHEMO ANTI-NEOPL SQ/IM: CPT

## 2025-03-28 PROCEDURE — 83521 IG LIGHT CHAINS FREE EACH: CPT | Performed by: INTERNAL MEDICINE

## 2025-03-28 PROCEDURE — 82784 ASSAY IGA/IGD/IGG/IGM EACH: CPT | Performed by: INTERNAL MEDICINE

## 2025-03-28 PROCEDURE — 84165 PROTEIN E-PHORESIS SERUM: CPT | Performed by: INTERNAL MEDICINE

## 2025-03-28 RX ORDER — DIPHENHYDRAMINE HCL 25 MG
25 TABLET ORAL ONCE
Status: COMPLETED | OUTPATIENT
Start: 2025-03-28 | End: 2025-03-28

## 2025-03-28 RX ORDER — DEXAMETHASONE 4 MG/1
20 TABLET ORAL ONCE
Status: COMPLETED | OUTPATIENT
Start: 2025-03-28 | End: 2025-03-28

## 2025-03-28 RX ORDER — ACETAMINOPHEN 325 MG/1
650 TABLET ORAL ONCE
Status: COMPLETED | OUTPATIENT
Start: 2025-03-28 | End: 2025-03-28

## 2025-03-28 RX ADMIN — DEXAMETHASONE 20 MG: 4 TABLET ORAL at 09:55

## 2025-03-28 RX ADMIN — ACETAMINOPHEN 650 MG: 325 TABLET ORAL at 09:51

## 2025-03-28 RX ADMIN — DARATUMUMAB AND HYALURONIDASE-FIHJ (HUMAN RECOMBINANT) 1800 MG: 1800; 30000 INJECTION SUBCUTANEOUS at 11:04

## 2025-03-28 RX ADMIN — SODIUM CHLORIDE 1000 ML: 0.9 INJECTION, SOLUTION INTRAVENOUS at 09:41

## 2025-03-28 RX ADMIN — DIPHENHYDRAMINE HYDROCHLORIDE 25 MG: 25 TABLET ORAL at 09:51

## 2025-03-28 NOTE — PLAN OF CARE
Problem: Potential for Falls  Goal: Patient will remain free of falls  Description: INTERVENTIONS:- Educate patient/family on patient safety including physical limitations- Instruct patient to call for assistance with activity - Consult OT/PT to assist with strengthening/mobility - Keep Call bell within reach- Keep bed low and locked with side rails adjusted as appropriate- Keep care items and personal belongings within reach- Initiate and maintain comfort rounds- Make Fall Risk Sign visible to staff-   Outcome: Progressing     Problem: Knowledge Deficit  Goal: Patient/family/caregiver demonstrates understanding of disease process, treatment plan, medications, and discharge instructions  Description: Complete learning assessment and assess knowledge base.Interventions:- Provide teaching at level of understanding- Provide teaching via preferred learning methods  Outcome: Progressing

## 2025-03-28 NOTE — PROGRESS NOTES
Tolerated Darzlex without issue, no complaints. Pt to return 4/18 at 930am. Aware to get lab work done prior to next appt. AVS declined.

## 2025-03-31 ENCOUNTER — TELEPHONE (OUTPATIENT)
Age: 70
End: 2025-03-31

## 2025-03-31 LAB
ALBUMIN SERPL ELPH-MCNC: 3.76 G/DL (ref 3.2–5.1)
ALBUMIN SERPL ELPH-MCNC: 57.8 % (ref 48–70)
ALPHA1 GLOB SERPL ELPH-MCNC: 0.27 G/DL (ref 0.15–0.47)
ALPHA1 GLOB SERPL ELPH-MCNC: 4.2 % (ref 1.8–7)
ALPHA2 GLOB SERPL ELPH-MCNC: 0.78 G/DL (ref 0.42–1.04)
ALPHA2 GLOB SERPL ELPH-MCNC: 12 % (ref 5.9–14.9)
BETA GLOB ABNORMAL SERPL ELPH-MCNC: 0.37 G/DL (ref 0.31–0.57)
BETA1 GLOB SERPL ELPH-MCNC: 5.7 % (ref 4.7–7.7)
BETA2 GLOB SERPL ELPH-MCNC: 11.5 % (ref 3.1–7.9)
BETA2+GAMMA GLOB SERPL ELPH-MCNC: 0.75 G/DL (ref 0.2–0.58)
GAMMA GLOB ABNORMAL SERPL ELPH-MCNC: 0.57 G/DL (ref 0.4–1.66)
GAMMA GLOB SERPL ELPH-MCNC: 8.8 % (ref 6.9–22.3)
IGG/ALB SER: 1.37 {RATIO} (ref 1.1–1.8)
PROT PATTERN SERPL ELPH-IMP: ABNORMAL
PROT SERPL-MCNC: 6.5 G/DL (ref 6.4–8.2)

## 2025-03-31 PROCEDURE — 84165 PROTEIN E-PHORESIS SERUM: CPT | Performed by: PATHOLOGY

## 2025-03-31 NOTE — TELEPHONE ENCOUNTER
Email rec'd from Chelsea from [facility] that the patient is scheduled April 14th.

## 2025-04-01 LAB
KAPPA LC FREE SER-MCNC: 15.7 MG/L (ref 3.3–19.4)
KAPPA LC FREE/LAMBDA FREE SER: 0.28 {RATIO} (ref 0.26–1.65)
LAMBDA LC FREE SERPL-MCNC: 56 MG/L (ref 5.7–26.3)

## 2025-04-14 DIAGNOSIS — C90.00 MULTIPLE MYELOMA NOT HAVING ACHIEVED REMISSION (HCC): ICD-10-CM

## 2025-04-16 RX ORDER — LENALIDOMIDE 15 MG/1
CAPSULE ORAL
Qty: 14 CAPSULE | Refills: 0 | Status: SHIPPED | OUTPATIENT
Start: 2025-04-16

## 2025-04-16 RX ORDER — ACETAMINOPHEN 325 MG/1
650 TABLET ORAL ONCE
OUTPATIENT
Start: 2025-05-09

## 2025-04-16 RX ORDER — DIPHENHYDRAMINE HCL 25 MG
25 TABLET ORAL ONCE
OUTPATIENT
Start: 2025-05-09

## 2025-04-16 RX ORDER — DEXAMETHASONE 4 MG/1
20 TABLET ORAL ONCE
OUTPATIENT
Start: 2025-05-09

## 2025-04-16 RX ORDER — ACETAMINOPHEN 325 MG/1
650 TABLET ORAL ONCE
Status: CANCELLED | OUTPATIENT
Start: 2025-04-18

## 2025-04-16 RX ORDER — DIPHENHYDRAMINE HCL 25 MG
25 TABLET ORAL ONCE
Status: CANCELLED | OUTPATIENT
Start: 2025-04-18

## 2025-04-16 RX ORDER — DEXAMETHASONE 4 MG/1
20 TABLET ORAL ONCE
Status: CANCELLED | OUTPATIENT
Start: 2025-04-18

## 2025-04-17 ENCOUNTER — APPOINTMENT (OUTPATIENT)
Dept: LAB | Facility: CLINIC | Age: 70
End: 2025-04-17
Attending: INTERNAL MEDICINE
Payer: COMMERCIAL

## 2025-04-17 DIAGNOSIS — I12.9 BENIGN HYPERTENSION WITH CKD (CHRONIC KIDNEY DISEASE) STAGE III (HCC): ICD-10-CM

## 2025-04-17 DIAGNOSIS — D89.89 LAMBDA LIGHT CHAIN DEPOSITION DISEASE (HCC): ICD-10-CM

## 2025-04-17 DIAGNOSIS — C90.00 MULTIPLE MYELOMA NOT HAVING ACHIEVED REMISSION (HCC): ICD-10-CM

## 2025-04-17 DIAGNOSIS — N18.32 STAGE 3B CHRONIC KIDNEY DISEASE (HCC): ICD-10-CM

## 2025-04-17 DIAGNOSIS — N18.30 BENIGN HYPERTENSION WITH CKD (CHRONIC KIDNEY DISEASE) STAGE III (HCC): ICD-10-CM

## 2025-04-17 LAB
ALBUMIN SERPL BCG-MCNC: 3.6 G/DL (ref 3.5–5)
ALP SERPL-CCNC: 85 U/L (ref 34–104)
ALT SERPL W P-5'-P-CCNC: 21 U/L (ref 7–52)
ANION GAP SERPL CALCULATED.3IONS-SCNC: 7 MMOL/L (ref 4–13)
AST SERPL W P-5'-P-CCNC: 23 U/L (ref 13–39)
BASOPHILS # BLD AUTO: 0.05 THOUSANDS/ÂΜL (ref 0–0.1)
BASOPHILS NFR BLD AUTO: 1 % (ref 0–1)
BILIRUB SERPL-MCNC: 0.27 MG/DL (ref 0.2–1)
BUN SERPL-MCNC: 28 MG/DL (ref 5–25)
CALCIUM SERPL-MCNC: 8.8 MG/DL (ref 8.4–10.2)
CHLORIDE SERPL-SCNC: 112 MMOL/L (ref 96–108)
CO2 SERPL-SCNC: 25 MMOL/L (ref 21–32)
CREAT SERPL-MCNC: 1.32 MG/DL (ref 0.6–1.3)
EOSINOPHIL # BLD AUTO: 0.43 THOUSAND/ÂΜL (ref 0–0.61)
EOSINOPHIL NFR BLD AUTO: 7 % (ref 0–6)
ERYTHROCYTE [DISTWIDTH] IN BLOOD BY AUTOMATED COUNT: 15.1 % (ref 11.6–15.1)
GFR SERPL CREATININE-BSD FRML MDRD: 55 ML/MIN/1.73SQ M
GLUCOSE P FAST SERPL-MCNC: 94 MG/DL (ref 65–99)
HCT VFR BLD AUTO: 33.6 % (ref 36.5–49.3)
HGB BLD-MCNC: 10.6 G/DL (ref 12–17)
IGA SERPL-MCNC: 25 MG/DL (ref 66–433)
IGG SERPL-MCNC: 1021 MG/DL (ref 635–1741)
IGM SERPL-MCNC: 42 MG/DL (ref 45–281)
IMM GRANULOCYTES # BLD AUTO: 0.04 THOUSAND/UL (ref 0–0.2)
IMM GRANULOCYTES NFR BLD AUTO: 1 % (ref 0–2)
LYMPHOCYTES # BLD AUTO: 1.06 THOUSANDS/ÂΜL (ref 0.6–4.47)
LYMPHOCYTES NFR BLD AUTO: 16 % (ref 14–44)
MCH RBC QN AUTO: 29.4 PG (ref 26.8–34.3)
MCHC RBC AUTO-ENTMCNC: 31.5 G/DL (ref 31.4–37.4)
MCV RBC AUTO: 93 FL (ref 82–98)
MONOCYTES # BLD AUTO: 0.69 THOUSAND/ÂΜL (ref 0.17–1.22)
MONOCYTES NFR BLD AUTO: 10 % (ref 4–12)
NEUTROPHILS # BLD AUTO: 4.38 THOUSANDS/ÂΜL (ref 1.85–7.62)
NEUTS SEG NFR BLD AUTO: 65 % (ref 43–75)
NRBC BLD AUTO-RTO: 0 /100 WBCS
PHOSPHATE SERPL-MCNC: 3.2 MG/DL (ref 2.3–4.1)
PLATELET # BLD AUTO: 136 THOUSANDS/UL (ref 149–390)
PMV BLD AUTO: 12.6 FL (ref 8.9–12.7)
POTASSIUM SERPL-SCNC: 3.7 MMOL/L (ref 3.5–5.3)
PROT SERPL-MCNC: 6.3 G/DL (ref 6.4–8.4)
RBC # BLD AUTO: 3.61 MILLION/UL (ref 3.88–5.62)
SODIUM SERPL-SCNC: 144 MMOL/L (ref 135–147)
WBC # BLD AUTO: 6.65 THOUSAND/UL (ref 4.31–10.16)

## 2025-04-17 PROCEDURE — 82784 ASSAY IGA/IGD/IGG/IGM EACH: CPT

## 2025-04-17 PROCEDURE — 84100 ASSAY OF PHOSPHORUS: CPT

## 2025-04-17 PROCEDURE — 80053 COMPREHEN METABOLIC PANEL: CPT

## 2025-04-17 PROCEDURE — 36415 COLL VENOUS BLD VENIPUNCTURE: CPT

## 2025-04-17 PROCEDURE — 84165 PROTEIN E-PHORESIS SERUM: CPT

## 2025-04-17 PROCEDURE — 85025 COMPLETE CBC W/AUTO DIFF WBC: CPT

## 2025-04-17 PROCEDURE — 83521 IG LIGHT CHAINS FREE EACH: CPT

## 2025-04-18 ENCOUNTER — APPOINTMENT (OUTPATIENT)
Dept: LAB | Facility: CLINIC | Age: 70
End: 2025-04-18
Attending: INTERNAL MEDICINE
Payer: COMMERCIAL

## 2025-04-18 ENCOUNTER — HOSPITAL ENCOUNTER (OUTPATIENT)
Dept: INFUSION CENTER | Facility: CLINIC | Age: 70
End: 2025-04-18
Payer: COMMERCIAL

## 2025-04-18 VITALS
DIASTOLIC BLOOD PRESSURE: 76 MMHG | SYSTOLIC BLOOD PRESSURE: 135 MMHG | OXYGEN SATURATION: 98 % | HEART RATE: 75 BPM | TEMPERATURE: 97.2 F

## 2025-04-18 DIAGNOSIS — C90.00 MULTIPLE MYELOMA NOT HAVING ACHIEVED REMISSION (HCC): Primary | ICD-10-CM

## 2025-04-18 LAB
ALBUMIN SERPL ELPH-MCNC: 3.1 G/DL (ref 3.2–5.1)
ALBUMIN SERPL ELPH-MCNC: 54.3 % (ref 48–70)
ALPHA1 GLOB SERPL ELPH-MCNC: 0.36 G/DL (ref 0.15–0.47)
ALPHA1 GLOB SERPL ELPH-MCNC: 6.3 % (ref 1.8–7)
ALPHA2 GLOB SERPL ELPH-MCNC: 0.7 G/DL (ref 0.42–1.04)
ALPHA2 GLOB SERPL ELPH-MCNC: 12.3 % (ref 5.9–14.9)
BETA GLOB ABNORMAL SERPL ELPH-MCNC: 0.36 G/DL (ref 0.31–0.57)
BETA1 GLOB SERPL ELPH-MCNC: 6.4 % (ref 4.7–7.7)
BETA2 GLOB SERPL ELPH-MCNC: 11.6 % (ref 3.1–7.9)
BETA2+GAMMA GLOB SERPL ELPH-MCNC: 0.66 G/DL (ref 0.2–0.58)
CREAT UR-MCNC: 40.5 MG/DL
GAMMA GLOB ABNORMAL SERPL ELPH-MCNC: 0.52 G/DL (ref 0.4–1.66)
GAMMA GLOB SERPL ELPH-MCNC: 9.1 % (ref 6.9–22.3)
IGG/ALB SER: 1.19 {RATIO} (ref 1.1–1.8)
KAPPA LC FREE SER-MCNC: 31.6 MG/L (ref 3.3–19.4)
KAPPA LC FREE/LAMBDA FREE SER: 0.45 {RATIO} (ref 0.26–1.65)
LAMBDA LC FREE SERPL-MCNC: 69.6 MG/L (ref 5.7–26.3)
M PROTEIN 1 SERPL ELPH-MCNC: 0.44 G/DL
PROT SERPL-MCNC: 5.7 G/DL (ref 6.4–8.4)
PROT UR-MCNC: 43.7 MG/DL
PROT/CREAT UR: 1.1 MG/G{CREAT}

## 2025-04-18 PROCEDURE — 84165 PROTEIN E-PHORESIS SERUM: CPT | Performed by: PATHOLOGY

## 2025-04-18 PROCEDURE — 84156 ASSAY OF PROTEIN URINE: CPT

## 2025-04-18 PROCEDURE — 82570 ASSAY OF URINE CREATININE: CPT

## 2025-04-18 PROCEDURE — 96401 CHEMO ANTI-NEOPL SQ/IM: CPT

## 2025-04-18 PROCEDURE — 96360 HYDRATION IV INFUSION INIT: CPT

## 2025-04-18 PROCEDURE — 96361 HYDRATE IV INFUSION ADD-ON: CPT

## 2025-04-18 RX ORDER — DEXAMETHASONE 4 MG/1
20 TABLET ORAL ONCE
Status: COMPLETED | OUTPATIENT
Start: 2025-04-18 | End: 2025-04-18

## 2025-04-18 RX ORDER — DIPHENHYDRAMINE HCL 25 MG
25 TABLET ORAL ONCE
Status: COMPLETED | OUTPATIENT
Start: 2025-04-18 | End: 2025-04-18

## 2025-04-18 RX ORDER — ACETAMINOPHEN 325 MG/1
650 TABLET ORAL ONCE
Status: COMPLETED | OUTPATIENT
Start: 2025-04-18 | End: 2025-04-18

## 2025-04-18 RX ADMIN — DARATUMUMAB AND HYALURONIDASE-FIHJ (HUMAN RECOMBINANT) 1800 MG: 1800; 30000 INJECTION SUBCUTANEOUS at 11:19

## 2025-04-18 RX ADMIN — DIPHENHYDRAMINE HYDROCHLORIDE 25 MG: 25 TABLET ORAL at 10:03

## 2025-04-18 RX ADMIN — SODIUM CHLORIDE 1000 ML: 0.9 INJECTION, SOLUTION INTRAVENOUS at 09:55

## 2025-04-18 RX ADMIN — ACETAMINOPHEN 650 MG: 325 TABLET ORAL at 10:03

## 2025-04-18 RX ADMIN — DEXAMETHASONE 20 MG: 4 TABLET ORAL at 10:03

## 2025-04-18 NOTE — PROGRESS NOTES
Patient to infusion center for treatment with Darzalex Faspro with hydration. Patient offers no complaints. VSS. Labs from 4/17/2025 reviewed and within parameters to treat. Peripheral IV inserted without incident.

## 2025-04-18 NOTE — PROGRESS NOTES
Patient tolerated treatment without incident. Peripheral IV removed. Next appointment confirmed for 5/9/2025 at 0900. Treatment calendar printed and given to patient.

## 2025-04-25 ENCOUNTER — HOSPITAL ENCOUNTER (OUTPATIENT)
Dept: NON INVASIVE DIAGNOSTICS | Facility: CLINIC | Age: 70
Discharge: HOME | End: 2025-04-25
Attending: INTERNAL MEDICINE
Payer: COMMERCIAL

## 2025-04-25 ENCOUNTER — TELEPHONE (OUTPATIENT)
Age: 70
End: 2025-04-25

## 2025-04-25 ENCOUNTER — OFFICE VISIT (OUTPATIENT)
Dept: HEMATOLOGY ONCOLOGY | Facility: CLINIC | Age: 70
End: 2025-04-25
Payer: COMMERCIAL

## 2025-04-25 VITALS
OXYGEN SATURATION: 98 % | DIASTOLIC BLOOD PRESSURE: 68 MMHG | RESPIRATION RATE: 18 BRPM | HEIGHT: 64 IN | HEART RATE: 93 BPM | SYSTOLIC BLOOD PRESSURE: 138 MMHG | WEIGHT: 181 LBS | TEMPERATURE: 97.8 F | BODY MASS INDEX: 30.9 KG/M2

## 2025-04-25 DIAGNOSIS — C90.00 MULTIPLE MYELOMA NOT HAVING ACHIEVED REMISSION (HCC): ICD-10-CM

## 2025-04-25 DIAGNOSIS — R60.9 EDEMA, UNSPECIFIED TYPE: Primary | ICD-10-CM

## 2025-04-25 DIAGNOSIS — R60.9 EDEMA, UNSPECIFIED TYPE: ICD-10-CM

## 2025-04-25 PROCEDURE — 99214 OFFICE O/P EST MOD 30 MIN: CPT | Performed by: INTERNAL MEDICINE

## 2025-04-25 PROCEDURE — 93970 EXTREMITY STUDY: CPT | Performed by: STUDENT IN AN ORGANIZED HEALTH CARE EDUCATION/TRAINING PROGRAM

## 2025-04-25 PROCEDURE — 93970 EXTREMITY STUDY: CPT

## 2025-04-25 RX ORDER — DIPHENHYDRAMINE HCL 25 MG
25 TABLET ORAL ONCE
OUTPATIENT
Start: 2025-05-30

## 2025-04-25 RX ORDER — ACETAMINOPHEN 325 MG/1
650 TABLET ORAL ONCE
OUTPATIENT
Start: 2025-05-30

## 2025-04-25 RX ORDER — DEXAMETHASONE 4 MG/1
20 TABLET ORAL ONCE
OUTPATIENT
Start: 2025-05-30

## 2025-04-25 NOTE — TELEPHONE ENCOUNTER
Called and spoke to grandson, Hemant to let him know that Zack's duplex study came back negative for a DVT. No needs at this time.

## 2025-04-25 NOTE — PROGRESS NOTES
Name: Zack Partida      : 1955      MRN: [id number]  Encounter Provider: Cathy Flores DO  Encounter Date: 2025   Encounter department: [facility] [place]  :  Assessment & Plan  Edema, unspecified type    Orders:     VAS VENOUS DUPLEX - LOWER LIMB BILATERAL; Future    Multiple myeloma not having achieved remission (HCC)         I will have my nurse reach out to the company to see if they can call the patient with  services for the survey that needs to be done before the Revlimid refill can be shipped.  He otherwise tolerated it well and we will continue with 15 mg 2 weeks on 1 week off.  His disease is in remission.  We will continue with daratumumab every 3 weeks.  He will get dexamethasone prior to the daratumumab as a premed.  I am concerned he could have a DVT in the right lower extremity had ordered stat Dopplers.  He ran out of his aspirin.  There are refills on the prescription at the pharmacy but he may need anticoagulation if he has a DVT.  I will call him when I get that result.  If we have another failed attempt at getting a transplant visit I am not sure if he will actually be a candidate because of language barrier.  He has 2 grandsons who speak English but they variably answer their phone based on their work schedule.  The patient also is back to work and cannot answer his phone while he is at work.  If communication cannot safely occur then we may have to treat him without a transplant.  I will plan on seeing him back in 1 month in follow-up.    Return in about 4 weeks (around 2025) for 20 minute follow up visit.    History of Present Illness   Chief Complaint   Patient presents with    Follow-up     69-year-old male with IgG lambda multiple myeloma.  He started Revlimid last month for 2 weeks on 1 week off.  He has actually been off of it about a week and a half.  He has been having difficulty doing the survey to get his next  cycle of Revlimid due to the language barrier.  He is also now missed 2 transplant appointments with Sixto Ryder because they did not know who the doctor was when the appointment was made.  He did increase gabapentin to 300 mg twice a day and that help with some of his neuropathic pain.  He is having new right lower extremity edema that started about a week ago.  He is also run out of aspirin.  He tolerated the Revlimid well with no side effects.  No fatigue, rash, diarrhea.  No fevers.    Oncology History   Cancer Staging   No matching staging information was found for the patient.  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 the survey and receiving it    2/14/2025 - take out velcade due to severe neuropathy - still waiting for the patient to get revlimid    3/2025 - start revlimid 15 mg 2 weeks on, 1 week off     Multiple myeloma not having achieved remission (HCC)   10/18/2024 Initial Diagnosis    Multiple myeloma not having achieved remission (HCC)     11/1/2024 -  Chemotherapy    daratumumab-hyaluronidase (DARZALEX FASPRO), 1,800 mg, 8 of 14 cycles  Administration: 1,800 mg (11/1/2024), 1,800 mg (11/7/2024), 1,800 mg (11/15/2024), 1,800 mg (11/22/2024), 1,800 mg (11/29/2024), 1,800 mg (12/6/2024), 1,800 mg (1/24/2025), 1,800 mg (12/13/2024), 1,800 mg (1/10/2025), 1,800 mg (1/17/2025), 1,800 mg (2/14/2025), 1,800 mg (3/7/2025), 1,800 mg (3/28/2025), 1,800 mg (4/18/2025)  bortezomib (VELCADE), 1.3 mg/m2 = 2.5 mg, 4 of 4 cycles  Administration: 2.5 mg (11/1/2024), 2.5 mg (11/7/2024), 2.5 mg (11/22/2024), 2.5 mg (11/29/2024), 2.5 mg (1/24/2025), 2.5 mg (1/31/2025), 2.5 mg  "(11/15/2024), 2.5 mg (12/6/2024), 2.5 mg (12/13/2024), 2.5 mg (1/10/2025), 2.5 mg (1/17/2025)        Pertinent Medical History         Review of Systems otherwise neg        Objective   /68 (BP Location: Left arm, Patient Position: Sitting, Cuff Size: Large)   Pulse 93   Temp 97.8 °F (36.6 °C) (Temporal)   Resp 18   Ht 5' 4\" (1.626 m)   Wt 82.1 kg (181 lb)   SpO2 98%   BMI 31.07 kg/m²     Pain Screening:  Pain Score: 0-No pain  ECOG   0  Physical Exam    GEN: Alert, awake oriented x3, in no acute distress  HEENT- No pallor, icterus, cyanosis, no oral mucosal lesions,   LAD - no palpable cervical, clavicle, axillary, inguinal LAD  Heart- normal S1 S2, regular rate and rhythm, No murmur, rubs.   Lungs- clear breathing sound bilateral.   Abdomen- soft, Non tender, bowel sounds present  Extremities- No cyanosis, clubbing, new 1+ RLE edema  Neuro- No focal neurological deficit      Labs: I have reviewed the following labs:  Lab Results   Component Value Date/Time    WBC 6.65 04/17/2025 09:26 AM    RBC 3.61 (L) 04/17/2025 09:26 AM    Hemoglobin 10.6 (L) 04/17/2025 09:26 AM    Hematocrit 33.6 (L) 04/17/2025 09:26 AM    MCV 93 04/17/2025 09:26 AM    MCH 29.4 04/17/2025 09:26 AM    RDW 15.1 04/17/2025 09:26 AM    Platelets 136 (L) 04/17/2025 09:26 AM    Segmented % 65 04/17/2025 09:26 AM    Lymphocytes % 16 04/17/2025 09:26 AM    Monocytes % 10 04/17/2025 09:26 AM    Eosinophils Relative 7 (H) 04/17/2025 09:26 AM    Basophils Relative 1 04/17/2025 09:26 AM    Immature Grans % 1 04/17/2025 09:26 AM    Absolute Neutrophils 4.38 04/17/2025 09:26 AM     Lab Results   Component Value Date/Time    Potassium 3.7 04/17/2025 09:26 AM    Potassium 3.4 (L) 01/04/2025 09:18 AM    Chloride 112 (H) 04/17/2025 09:26 AM    Chloride 110 (H) 01/04/2025 09:18 AM    Carbon Dioxide 27 01/04/2025 09:18 AM    CO2 25 04/17/2025 09:26 AM    BUN 28 (H) 04/17/2025 09:26 AM    BUN 17 01/04/2025 09:18 AM    Creatinine 1.32 (H) 04/17/2025 " 09:26 AM    Creatinine 1.09 01/04/2025 09:18 AM    Glucose, Fasting 94 04/17/2025 09:26 AM    Calcium 8.8 04/17/2025 09:26 AM    Calcium 8.3 (L) 01/04/2025 09:18 AM    Corrected Calcium 9.7 02/12/2025 09:00 AM    AST 23 04/17/2025 09:26 AM    AST 12 01/04/2025 09:18 AM    ALT 21 04/17/2025 09:26 AM    ALT 17 01/04/2025 09:18 AM    Alkaline Phosphatase 85 04/17/2025 09:26 AM    Alkaline Phosphatase 166 (H) 01/04/2025 09:18 AM    Total Protein 5.7 (L) 04/17/2025 09:26 AM    Total Protein 6.3 (L) 04/17/2025 09:26 AM    Protein, Total 5.1 (L) 01/04/2025 09:18 AM    Albumin 3.6 04/17/2025 09:26 AM    ALBUMIN 3.0 (L) 01/04/2025 09:18 AM    Total Bilirubin 0.27 04/17/2025 09:26 AM    Total Bilirubin 0.4 01/04/2025 09:18 AM    eGFRcr 74 01/04/2025 09:18 AM    eGFR 55 04/17/2025 09:26 AM

## 2025-04-25 NOTE — TELEPHONE ENCOUNTER
Called Dave's son Hemant again to let him know that they will need to call [phone] to get the next refill of his Revlimid. Per Casandra, there is a prompt for [place] at the beginning of the call.     I had to leave a detailed voicemail relaying this message.

## 2025-04-28 ENCOUNTER — OFFICE VISIT (OUTPATIENT)
Dept: NEPHROLOGY | Facility: CLINIC | Age: 70
End: 2025-04-28
Payer: COMMERCIAL

## 2025-04-28 VITALS
WEIGHT: 184 LBS | BODY MASS INDEX: 31.41 KG/M2 | DIASTOLIC BLOOD PRESSURE: 74 MMHG | HEART RATE: 89 BPM | SYSTOLIC BLOOD PRESSURE: 122 MMHG | HEIGHT: 64 IN

## 2025-04-28 DIAGNOSIS — I12.9 BENIGN HYPERTENSION WITH CKD (CHRONIC KIDNEY DISEASE) STAGE III (HCC): ICD-10-CM

## 2025-04-28 DIAGNOSIS — N18.30 BENIGN HYPERTENSION WITH CKD (CHRONIC KIDNEY DISEASE) STAGE III (HCC): ICD-10-CM

## 2025-04-28 DIAGNOSIS — D63.1 ANEMIA IN STAGE 3A CHRONIC KIDNEY DISEASE  (HCC): ICD-10-CM

## 2025-04-28 DIAGNOSIS — N18.31 STAGE 3A CHRONIC KIDNEY DISEASE (HCC): Primary | ICD-10-CM

## 2025-04-28 DIAGNOSIS — N20.0 NEPHROLITHIASIS: ICD-10-CM

## 2025-04-28 DIAGNOSIS — N18.31 ANEMIA IN STAGE 3A CHRONIC KIDNEY DISEASE  (HCC): ICD-10-CM

## 2025-04-28 DIAGNOSIS — D89.89 LAMBDA LIGHT CHAIN DEPOSITION DISEASE (HCC): ICD-10-CM

## 2025-04-28 PROCEDURE — 99214 OFFICE O/P EST MOD 30 MIN: CPT | Performed by: INTERNAL MEDICINE

## 2025-04-28 NOTE — PATIENT INSTRUCTIONS
Your kidney function is stable and amount of protein leakage in the urine has overall improved which is a good sign.    Blood pressure is currently well-controlled and continue with current medications.    Continue to follow-up with your oncologist and continue treatment as they have advised as this is helping your kidneys.    Repeat renal function panel in 3 months.    Repeat panel of blood and urine tests in 6 months.    Follow-up in nephrology office in 6 months.

## 2025-04-28 NOTE — ASSESSMENT & PLAN NOTE
- Kidney ultrasound and CT stone study showing 6 to 7 mm right renal stone without hydronephrosis  - Follows with urology (notes reviewed, plan noted for kidney ultrasound in 02/2026)  - We discussed ongoing increased fluid intake to keep an optimal urine output

## 2025-04-28 NOTE — ASSESSMENT & PLAN NOTE
- Management as above (notes reviewed from oncology and he is currently in remission)  - Plans noted for stem cell transplantation based on follow-up from the patient

## 2025-04-28 NOTE — PROGRESS NOTES
Name: Zack Partida      : 1955      MRN: [id number]  Encounter Provider: Vega Landry MD  Encounter Date: 2025   Encounter department: [facility]  :  Assessment & Plan  Stage 3a chronic kidney disease (HCC)  - Etiology: Lambda light chain deposition disease  - Baseline Cr: 1.17 in 2022, 1.36 in 2023, 1.1-1.3 early , 1.5-to 2.0 in late , most recently 1.0-1.4 (1.32 2025)  - Episode of MARCI in 2024 in setting of pneumonia and sepsis with peak creatinine of 3.67  - Urinalysis: 2+ protein, 2-4 RBC, 2-4 WBC 2024  - Proteinuria: UACR 237 mg/g 2024 improved from 2200 mg/g 2024, UPCR 1.1 g 2025 improved from 4.24 g 2024  - Imaging: Bilateral simple cysts, right kidney 9.9 cm/left kidney 10.9 cm/normal echogenicity 2024  - No hypocomplementemia, KATELIN/dsDNA negative  SPEP IgG lambda monoclonal immunoglobulin  UPEP IgG lambda monoclonal immunoglobulin  Serum KL ratio 0.4 2025 improved to 0.11 2024   - Kidney biopsy 2024: Lambda light chain deposition disease, early and mild/tubular atrophy and interstitial fibrosis, mild to moderate, arterial/arteriolosclerosis and hyalinosis mild  - Bone marrow biopsy 2024: Lambda light chain restricted plasma cell neoplasm (15-17%) compatible with plasma cell myeloma  - Bone marrow biopsy 10/2024: 30% lambda restricted plasma cells, congo red staining negative   - CT low-dose myeloma scan negative 2024  - Current Rx: Daratumumab, lenalidomide per oncology  - Changes: Management per oncology  - Repeat renal function panel in 3 months in 6 months  - Discussed risk factor reduction to slow progression of chronic kidney disease  Intensive blood pressure control as below   Glycemic control, he is non-diabetic   Lifestyle modifications (weight loss/exercise)  Avoidance of NSAIDs    Electrolytes/Acid Base status   - Electrolytes and acid base status within normal limits      CKD Mineral and  Bone parameters   - Goal Ca 8.5-10 mg/dL, goal Phos 2.7-4.6 mg/dL, goal iPTH 30-70 pg/mL  - PTH 55.1 05/2020/calcium 8.8/phosphorus 3.2 04/2025 all at goal  - 25-hydroxy vitamin D level 16.1 status post ergocalciferol 50,000 units weekly for 8 doses  - Current Rx: Cholecalciferol 1000 units daily  - Changes: Repeat PTH and vitamin D level in 6 months  Benign hypertension with CKD (chronic kidney disease) stage III (HCC)  - Goal BP ~ 120/80 per KDIGO guidelines   - Volume status: Euvolemic  - Status: Blood pressure currently well-controlled  - Current antihypertensive regimen: Irbesartan 300 mg daily, amlodipine 10 mg daily chlorthalidone 25 mg daily  - Changes: No changes  Lambda light chain deposition disease (HCC)  - Management as above (notes reviewed from oncology and he is currently in remission)  - Plans noted for stem cell transplantation based on follow-up from the patient  Anemia in stage 3a chronic kidney disease  (HCC)  - He has anemia and thrombocytopenia in setting of lambda light chain myeloma  - Management of anemia and thrombocytopenia per oncology  Nephrolithiasis  - Kidney ultrasound and CT stone study showing 6 to 7 mm right renal stone without hydronephrosis  - Follows with urology (notes reviewed, plan noted for kidney ultrasound in 02/2026)  - We discussed ongoing increased fluid intake to keep an optimal urine output      History of Present Illness   HPI  Zack Partida is a 69 y.o. male    Since last visit: He was admitted to the hospital in 12/2024 with pneumonia secondary to Legionella.  Hospitalization was complicated by acute kidney injury which improved with IV fluids.  He is currently feeling well.  He complains of intermittent right leg swelling.  He denies dyspnea.  He denies any trouble with urination except nocturia.    From initial consultation: Zack has history of hypertension since 2013.  He tells me that he has history of kidney disease as a child but cannot tell the  diagnosis.  He has history of nephrolithiasis.  He does not have diabetes.  He was in [place] in 03/2024 and workup revealed 24-hour urine protein of around 530 mg.     >> Major risk factors for CKD  - Diabetes: No  - Hypertension: Yes since 2013   - Age >= 55 years: Yes   - Family history of kidney disease: No  - Obesity or metabolic syndrome: Yes      >> Medical history evaluation   - Prior kidney disease or dialysis: Unknown kidney disease as a child   - Incidental hematuria in the past: Yes with kidney stones   - Urinary symptoms: No   - History of foamy or frothy urine: Nocturia x3-4  - History of nephrolithiasis: Yes   - Diseases that share risk factors with CKD: HTN  - Systemic diseases that might affect kidney: No  - History of use of medications that might affect renal function: No    History obtained from: patient    Review of Systems   Constitutional:  Negative for chills and fever.   HENT:  Negative for ear pain and sore throat.    Eyes:  Negative for pain and visual disturbance.   Respiratory:  Negative for cough and shortness of breath.    Cardiovascular:  Negative for chest pain and palpitations.   Gastrointestinal:  Negative for abdominal pain and vomiting.   Genitourinary:  Negative for dysuria and hematuria.   Musculoskeletal:  Negative for arthralgias and back pain.   Skin:  Negative for color change and rash.   Neurological:  Negative for seizures and syncope.   All other systems reviewed and are negative.    Past Medical History   Past Medical History:   Diagnosis Date    Hypertension     Multiple myeloma (HCC)     Polio      Past Surgical History:   Procedure Laterality Date    IR BIOPSY BONE MARROW  6/11/2024    IR BIOPSY BONE MARROW  10/4/2024    IR BIOPSY KIDNEY RANDOM  5/29/2024    LEG SURGERY      for polio     Family History   Problem Relation Age of Onset    Hypertension Mother     Diabetes Mother     Hypertension Father     Diabetes Father       reports that he has never smoked. He has  "never used smokeless tobacco. He reports that he does not drink alcohol and does not use drugs.  Current Outpatient Medications   Medication Instructions    acyclovir (ZOVIRAX) 400 mg, Oral, 2 times daily    amLODIPine (NORVASC) 10 mg, Oral, Daily    aspirin 81 mg, Oral, Daily    chlorthalidone 25 mg, Oral, Daily    Cholecalciferol (VITAMIN D3) 1,000 Units, Oral, Daily    cyanocobalamin (VITAMIN B-12) 100 mcg, Daily    doxazosin (CARDURA) 2 mg, Oral, Daily at bedtime    ferrous sulfate 325 mg, Every other day    gabapentin (NEURONTIN) 300 mg, Oral, 2 times daily    irbesartan (AVAPRO) 300 mg, Daily    lenalidomide (REVLIMID) 15 MG CAPS TAKE 1 CAPSULE BY MOUTH 1 TIME A DAY FOR 14 DAYS ON THEN 7 DAYS OFF    Multiple Vitamin (multivitamin) capsule 1 capsule, Daily   No Known Allergies      Objective   /74 (BP Location: Left arm, Patient Position: Sitting, Cuff Size: Adult)   Pulse 89   Ht 5' 4\" (1.626 m)   Wt 83.5 kg (184 lb)   BMI 31.58 kg/m²      Physical Exam  Vitals and nursing note reviewed.   Constitutional:       General: He is not in acute distress.     Appearance: He is well-developed.   HENT:      Head: Normocephalic and atraumatic.   Eyes:      Conjunctiva/sclera: Conjunctivae normal.   Cardiovascular:      Rate and Rhythm: Normal rate and regular rhythm.      Pulses: Normal pulses.      Heart sounds: Normal heart sounds. No murmur heard.  Pulmonary:      Effort: Pulmonary effort is normal. No respiratory distress.      Breath sounds: Normal breath sounds.   Abdominal:      Tenderness: There is no abdominal tenderness. There is no right CVA tenderness or left CVA tenderness.   Musculoskeletal:         General: No swelling.      Cervical back: Neck supple.      Right lower leg: No edema.      Left lower leg: No edema.   Skin:     General: Skin is warm and dry.      Capillary Refill: Capillary refill takes less than 2 seconds.   Neurological:      Mental Status: He is alert.   Psychiatric:         " Mood and Affect: Mood normal.

## 2025-05-07 ENCOUNTER — APPOINTMENT (OUTPATIENT)
Dept: LAB | Facility: CLINIC | Age: 70
End: 2025-05-07
Payer: COMMERCIAL

## 2025-05-07 ENCOUNTER — TELEPHONE (OUTPATIENT)
Age: 70
End: 2025-05-07

## 2025-05-07 DIAGNOSIS — C90.00 MULTIPLE MYELOMA NOT HAVING ACHIEVED REMISSION (HCC): ICD-10-CM

## 2025-05-07 LAB
ALBUMIN SERPL BCG-MCNC: 3.9 G/DL (ref 3.5–5)
ALP SERPL-CCNC: 86 U/L (ref 34–104)
ALT SERPL W P-5'-P-CCNC: 18 U/L (ref 7–52)
ANION GAP SERPL CALCULATED.3IONS-SCNC: 7 MMOL/L (ref 4–13)
AST SERPL W P-5'-P-CCNC: 18 U/L (ref 13–39)
BASOPHILS # BLD AUTO: 0.22 THOUSANDS/ÂΜL (ref 0–0.1)
BASOPHILS NFR BLD AUTO: 3 % (ref 0–1)
BILIRUB SERPL-MCNC: 0.42 MG/DL (ref 0.2–1)
BUN SERPL-MCNC: 47 MG/DL (ref 5–25)
CALCIUM SERPL-MCNC: 9.4 MG/DL (ref 8.4–10.2)
CHLORIDE SERPL-SCNC: 108 MMOL/L (ref 96–108)
CO2 SERPL-SCNC: 25 MMOL/L (ref 21–32)
CREAT SERPL-MCNC: 1.66 MG/DL (ref 0.6–1.3)
EOSINOPHIL # BLD AUTO: 0.87 THOUSAND/ÂΜL (ref 0–0.61)
EOSINOPHIL NFR BLD AUTO: 10 % (ref 0–6)
ERYTHROCYTE [DISTWIDTH] IN BLOOD BY AUTOMATED COUNT: 14.6 % (ref 11.6–15.1)
GFR SERPL CREATININE-BSD FRML MDRD: 41 ML/MIN/1.73SQ M
GLUCOSE P FAST SERPL-MCNC: 101 MG/DL (ref 65–99)
HCT VFR BLD AUTO: 36.4 % (ref 36.5–49.3)
HGB BLD-MCNC: 11.8 G/DL (ref 12–17)
IGA SERPL-MCNC: 23 MG/DL (ref 66–433)
IGG SERPL-MCNC: 1067 MG/DL (ref 635–1741)
IGM SERPL-MCNC: 43 MG/DL (ref 45–281)
IMM GRANULOCYTES # BLD AUTO: 0.02 THOUSAND/UL (ref 0–0.2)
IMM GRANULOCYTES NFR BLD AUTO: 0 % (ref 0–2)
LYMPHOCYTES # BLD AUTO: 2.11 THOUSANDS/ÂΜL (ref 0.6–4.47)
LYMPHOCYTES NFR BLD AUTO: 25 % (ref 14–44)
MCH RBC QN AUTO: 30 PG (ref 26.8–34.3)
MCHC RBC AUTO-ENTMCNC: 32.4 G/DL (ref 31.4–37.4)
MCV RBC AUTO: 93 FL (ref 82–98)
MONOCYTES # BLD AUTO: 0.59 THOUSAND/ÂΜL (ref 0.17–1.22)
MONOCYTES NFR BLD AUTO: 7 % (ref 4–12)
NEUTROPHILS # BLD AUTO: 4.72 THOUSANDS/ÂΜL (ref 1.85–7.62)
NEUTS SEG NFR BLD AUTO: 55 % (ref 43–75)
NRBC BLD AUTO-RTO: 0 /100 WBCS
PLATELET # BLD AUTO: 216 THOUSANDS/UL (ref 149–390)
PMV BLD AUTO: 12.2 FL (ref 8.9–12.7)
POTASSIUM SERPL-SCNC: 4 MMOL/L (ref 3.5–5.3)
PROT SERPL-MCNC: 6.7 G/DL (ref 6.4–8.4)
RBC # BLD AUTO: 3.93 MILLION/UL (ref 3.88–5.62)
SODIUM SERPL-SCNC: 140 MMOL/L (ref 135–147)
WBC # BLD AUTO: 8.53 THOUSAND/UL (ref 4.31–10.16)

## 2025-05-07 PROCEDURE — 82784 ASSAY IGA/IGD/IGG/IGM EACH: CPT

## 2025-05-07 PROCEDURE — 84165 PROTEIN E-PHORESIS SERUM: CPT

## 2025-05-07 PROCEDURE — 85025 COMPLETE CBC W/AUTO DIFF WBC: CPT

## 2025-05-07 PROCEDURE — 83521 IG LIGHT CHAINS FREE EACH: CPT

## 2025-05-07 PROCEDURE — 36415 COLL VENOUS BLD VENIPUNCTURE: CPT

## 2025-05-07 PROCEDURE — 80053 COMPREHEN METABOLIC PANEL: CPT

## 2025-05-07 NOTE — TELEPHONE ENCOUNTER
Left VM for patient to let him know that he needs to call [facility] to schedule an appt because they will not let me schedule due to many no-shows. Direct number left for further discussion.

## 2025-05-08 LAB
ALBUMIN SERPL ELPH-MCNC: 3.55 G/DL (ref 3.2–5.1)
ALBUMIN SERPL ELPH-MCNC: 57.3 % (ref 48–70)
ALPHA1 GLOB SERPL ELPH-MCNC: 0.32 G/DL (ref 0.15–0.47)
ALPHA1 GLOB SERPL ELPH-MCNC: 5.1 % (ref 1.8–7)
ALPHA2 GLOB SERPL ELPH-MCNC: 0.71 G/DL (ref 0.42–1.04)
ALPHA2 GLOB SERPL ELPH-MCNC: 11.5 % (ref 5.9–14.9)
BETA GLOB ABNORMAL SERPL ELPH-MCNC: 0.37 G/DL (ref 0.31–0.57)
BETA1 GLOB SERPL ELPH-MCNC: 6 % (ref 4.7–7.7)
BETA2 GLOB SERPL ELPH-MCNC: 11.4 % (ref 3.1–7.9)
BETA2+GAMMA GLOB SERPL ELPH-MCNC: 0.71 G/DL (ref 0.2–0.58)
GAMMA GLOB ABNORMAL SERPL ELPH-MCNC: 0.54 G/DL (ref 0.4–1.66)
GAMMA GLOB SERPL ELPH-MCNC: 8.7 % (ref 6.9–22.3)
IGG/ALB SER: 1.34 {RATIO} (ref 1.1–1.8)
KAPPA LC FREE SER-MCNC: 19 MG/L (ref 3.3–19.4)
KAPPA LC FREE/LAMBDA FREE SER: 0.29 {RATIO} (ref 0.26–1.65)
LAMBDA LC FREE SERPL-MCNC: 66.5 MG/L (ref 5.7–26.3)
M PROTEIN 1 SERPL ELPH-MCNC: 0.46 G/DL
PROT SERPL-MCNC: 6.2 G/DL (ref 6.4–8.4)

## 2025-05-08 PROCEDURE — 84165 PROTEIN E-PHORESIS SERUM: CPT | Performed by: STUDENT IN AN ORGANIZED HEALTH CARE EDUCATION/TRAINING PROGRAM

## 2025-05-09 ENCOUNTER — HOSPITAL ENCOUNTER (OUTPATIENT)
Dept: INFUSION CENTER | Facility: CLINIC | Age: 70
End: 2025-05-09
Payer: COMMERCIAL

## 2025-05-09 VITALS
TEMPERATURE: 97.6 F | SYSTOLIC BLOOD PRESSURE: 131 MMHG | RESPIRATION RATE: 18 BRPM | DIASTOLIC BLOOD PRESSURE: 72 MMHG | HEART RATE: 85 BPM

## 2025-05-09 DIAGNOSIS — C90.00 MULTIPLE MYELOMA NOT HAVING ACHIEVED REMISSION (HCC): Primary | ICD-10-CM

## 2025-05-09 PROCEDURE — 96402 CHEMO HORMON ANTINEOPL SQ/IM: CPT

## 2025-05-09 PROCEDURE — 96360 HYDRATION IV INFUSION INIT: CPT

## 2025-05-09 RX ORDER — DIPHENHYDRAMINE HCL 25 MG
25 TABLET ORAL ONCE
Status: COMPLETED | OUTPATIENT
Start: 2025-05-09 | End: 2025-05-09

## 2025-05-09 RX ORDER — DEXAMETHASONE 4 MG/1
20 TABLET ORAL ONCE
Status: COMPLETED | OUTPATIENT
Start: 2025-05-09 | End: 2025-05-09

## 2025-05-09 RX ORDER — ACETAMINOPHEN 325 MG/1
650 TABLET ORAL ONCE
Status: COMPLETED | OUTPATIENT
Start: 2025-05-09 | End: 2025-05-09

## 2025-05-09 RX ADMIN — DEXAMETHASONE 20 MG: 4 TABLET ORAL at 09:07

## 2025-05-09 RX ADMIN — ACETAMINOPHEN 650 MG: 325 TABLET ORAL at 09:07

## 2025-05-09 RX ADMIN — SODIUM CHLORIDE 1000 ML: 0.9 INJECTION, SOLUTION INTRAVENOUS at 09:21

## 2025-05-09 RX ADMIN — DARATUMUMAB AND HYALURONIDASE-FIHJ (HUMAN RECOMBINANT) 1800 MG: 1800; 30000 INJECTION SUBCUTANEOUS at 10:15

## 2025-05-09 RX ADMIN — DIPHENHYDRAMINE HYDROCHLORIDE 25 MG: 25 TABLET ORAL at 09:07

## 2025-05-09 NOTE — PLAN OF CARE
Problem: Potential for Falls  Goal: Patient will remain free of falls  Description: INTERVENTIONS:- Educate patient/family on patient safety including physical limitations- Instruct patient to call for assistance with activity - Consult OT/PT to assist with strengthening/mobility - Keep Call bell within reach- Keep bed low and locked with side rails adjusted as appropriate- Keep care items and personal belongings within reach- Initiate and maintain comfort rounds- Make Fall Risk Sign visible to staff- Consider moving patient to room near nurses station  Outcome: Progressing     Problem: Knowledge Deficit  Goal: Patient/family/caregiver demonstrates understanding of disease process, treatment plan, medications, and discharge instructions  Description: Complete learning assessment and assess knowledge base.Interventions:- Provide teaching at level of understanding- Provide teaching via preferred learning methods  Outcome: Progressing

## 2025-05-09 NOTE — PROGRESS NOTES
Pt tolerated tx well with no complaints. Next appt scheduled for 6/6 at 1100, [place]. AVS declined.

## 2025-05-09 NOTE — PROGRESS NOTES
Pt at [facility] for Darzalex faspro and hydration. Labs reviewed from 5/7, no parameters noted. Pt has no further questions at this time. Call bell within reach.

## 2025-05-12 ENCOUNTER — TELEPHONE (OUTPATIENT)
Age: 70
End: 2025-05-12

## 2025-05-12 NOTE — TELEPHONE ENCOUNTER
Pt under care of:  Marilou Burch  Office Location: [place]    Insurance   Current Insurance? yes   Insurance E-verified? yes      History  Last Seen (include Follow Up recommendations of last visit- see Office Visit - Instructions): 02/03/25 Will return 1 year for follow up with repeat renal ultasound prior to visit    Pt calling due to:schedule appointment    Active Symptoms?  Explain:Incontinence/nocturia    Appointment Details   Date:      Time:  12:20    Location:  [place]    Provider: Marilou Burch    Does the appointment need further review? no      Pt can be reached at:[phone](EC)Hemant

## 2025-05-16 DIAGNOSIS — C90.00 MULTIPLE MYELOMA NOT HAVING ACHIEVED REMISSION (HCC): ICD-10-CM

## 2025-05-16 RX ORDER — LENALIDOMIDE 15 MG/1
15 CAPSULE ORAL DAILY
Qty: 14 CAPSULE | Refills: 0 | Status: SHIPPED | OUTPATIENT
Start: 2025-05-16

## 2025-05-27 ENCOUNTER — TELEPHONE (OUTPATIENT)
Dept: HEMATOLOGY ONCOLOGY | Facility: CLINIC | Age: 70
End: 2025-05-27

## 2025-05-27 ENCOUNTER — OFFICE VISIT (OUTPATIENT)
Dept: HEMATOLOGY ONCOLOGY | Facility: CLINIC | Age: 70
End: 2025-05-27
Payer: COMMERCIAL

## 2025-05-27 VITALS
HEART RATE: 99 BPM | SYSTOLIC BLOOD PRESSURE: 148 MMHG | BODY MASS INDEX: 31.76 KG/M2 | DIASTOLIC BLOOD PRESSURE: 82 MMHG | OXYGEN SATURATION: 97 % | WEIGHT: 186 LBS | TEMPERATURE: 98.2 F | RESPIRATION RATE: 16 BRPM | HEIGHT: 64 IN

## 2025-05-27 DIAGNOSIS — R30.0 DYSURIA: ICD-10-CM

## 2025-05-27 DIAGNOSIS — C90.00 MULTIPLE MYELOMA NOT HAVING ACHIEVED REMISSION (HCC): Primary | ICD-10-CM

## 2025-05-27 PROCEDURE — 99215 OFFICE O/P EST HI 40 MIN: CPT | Performed by: INTERNAL MEDICINE

## 2025-05-27 RX ORDER — ACYCLOVIR 400 MG/1
400 TABLET ORAL 2 TIMES DAILY
Qty: 60 TABLET | Refills: 3 | Status: SHIPPED | OUTPATIENT
Start: 2025-05-27 | End: 2025-10-24

## 2025-05-27 NOTE — PROGRESS NOTES
Medical Oncology: Outpatient Progress Note:            Name: Zack Partida      : 1955      MRN: [id number]                   Encounter Provider: Cathy Flores DO  Encounter Date: 2025   Encounter department: [facility] [place]  Assessment & Plan  Multiple myeloma not having achieved remission (HCC)  Patient is a 69-year-old Kenyan-speaking male, with an established history of IgG lambda multiple myeloma, with light-chain deposition in the kidneys (Currently on maintenance Daratumumab-Rd); who presents today for interval follow-up. On evaluation this morning, patient is clinically well; with the exception of dysuria, and urinary frequency. Patient remains on Daratumumab-Rd, with Daratumumab and Dexamethasone administered on Day 1, of a 21-day cycle. He is supposed to be taking Lenalidomide 15 mg Daily, 2-weeks on and 1-week off; however, has been off of Lenalidomide for upwards of 2-months, given that he is having difficulty acquiring his prescriptions (e.g. Language barrier, and difficulty hearing while he is working). Laboratory-studies were reviewed, and show mild stable normocytic anemia (Hemoglobin: 11.8 MCV: 93). Stable to worsened renal-insufficiency (Creatinine: 1.66 Baseline Creatinine: 1.22 to 1.32). Myeloma-parameters are, as follows: SPEP: Persistent monoclonal peak in the beta-2 region, and gamma-region (Consistent with Daratumumab-therapy). Free Light-Chains: Kappa: 19 Lambda: 66.5 Ratio: 3.5. Quantitative Immunoglobulins: IgA: 23 Ig,067 IgM: 43. Mild uptrend in myeloma-parameters are likely reflective of non-adherence with Lenalidomide. Will reattempt to coordinate for delivery of Lenalidomide. If the above-mentioned remains an issue, will consider alternative therapy to improve accessibility and adherence. Discussed the above-mentioned, at length, with patient and his grandson; who expressed understanding and agreement.    Note: Discussed  with patient and his grandson, that patient is not a candidate for autologous stem-cell transplant, given issues with adherence, communication, and follow-up. The commitment of travel alone between [place], and his residence here in the [facility] would not be feasible for this patient. Will continue with treatment for myeloma, as outlined below.     Summary of Recommendations:  Obtain repeat Urinalysis to evaluate for acute cystitis.  Recommend outpatient follow-up with Urology, as scheduled, on June 13th, 2025.  Continue VTE-and antiviral prophylaxis, as follows:  Continue Aspirin 81 mg Daily.  Continue Acyclovir 400 mg BID (Refilled).  Continue Daratumumab-Rd:  Proceed to Cycle 10 Day 1 of Daratumumab, on June 6th, 2025.  Administer Dexamethasone 20 mg Daily, on Day 1.  Continue Lenalidomide 15 mg Daily, on Days 1-21 of 28-day cycle.  Note: Nursing Staff assisted patient with contacting [facility] for coordination of delivery of Lenalidomide, in real-time, during his encounter. Patient to start taking, on receipt.  Recommend close interval follow-up in approximately 4-weeks.  Obtain repeat laboratory-studies, including myeloma-parameters, prior to visit.       History of Present Illness   Chief Complaint: Follow-up.  Interval Events:  Zack Partida is a 69-year-old Cook Islander-speaking male, with an established history of IgG lambda multiple myeloma, with light-chain deposition in the kidneys (Currently on maintenance Daratumumab-Rd); who presents today for interval follow-up. On evaluation this morning, patient is clinically well. His grandson is present, and serves as a Cook Islander Interpretor for patient, throughout our encounter. They report new-onset dysuria, and urinary-frequency; for which patient sought evaluation at a local Urgent Care. Urinalysis was reportedly negative; although not presently available for my review. Will repeat testing, accordingly. Additionally, patient coordinated  for outpatient follow-up with Urology, on June 13th, 2025; for further evaluation and management, as well. Additionally, on further questioning on treatment tolerance, patient states that he has been off of Lenalidomide for upwards of 2-months. He reports barriers of communication, including language, and difficulty hearing; thus, Nursing Staff assisted him with contacting the company to arrange for Lenalidomide delivery to home. Otherwise, he continue to tolerate Daratumumab appropriately, without any significant side-effects. No further complaints or concerns noted, at the present time.    Oncology History   Cancer Staging   No matching staging information was found for the patient.  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 the survey and receiving it    2/14/2025 - take out velcade due to severe neuropathy - still waiting for the patient to get revlimid    3/2025 - start revlimid 15 mg 2 weeks on, 1 week off     Multiple myeloma not having achieved remission (HCC)   10/18/2024 Initial Diagnosis    Multiple myeloma not having achieved remission (HCC)     11/1/2024 -  Chemotherapy    daratumumab-hyaluronidase (DARZALEX FASPRO), 1,800 mg, 9 of 14 cycles  Administration: 1,800 mg (11/1/2024), 1,800 mg (11/7/2024), 1,800 mg (11/15/2024), 1,800 mg (11/22/2024), 1,800 mg (11/29/2024), 1,800 mg (12/6/2024), 1,800 mg (1/24/2025), 1,800 mg (5/9/2025), 1,800 mg (12/13/2024), 1,800 mg (1/10/2025), 1,800 mg (1/17/2025), 1,800 mg (2/14/2025), 1,800 mg (3/7/2025), 1,800 mg (3/28/2025), 1,800 mg (4/18/2025)  bortezomib (VELCADE), 1.3 mg/m2 = 2.5 mg, 4 of 4 cycles  Administration: 2.5 mg  "(11/1/2024), 2.5 mg (11/7/2024), 2.5 mg (11/22/2024), 2.5 mg (11/29/2024), 2.5 mg (1/24/2025), 2.5 mg (1/31/2025), 2.5 mg (11/15/2024), 2.5 mg (12/6/2024), 2.5 mg (12/13/2024), 2.5 mg (1/10/2025), 2.5 mg (1/17/2025)        Review of Systems  Review of Systems:  All systems reviewed and negative except otherwise listed in the History of Present Illness.      Objective   /82 (BP Location: Left arm, Patient Position: Sitting, Cuff Size: Adult)   Pulse 99   Temp 98.2 °F (36.8 °C) (Temporal)   Resp 16   Ht 5' 4\" (1.626 m)   Wt 84.4 kg (186 lb)   SpO2 97%   BMI 31.93 kg/m²     ECOG   1-Points.    Physical Exam:  General: Alert, and oriented; Pleasant, and conversational; Well-Appearing  HEENT: Atraumatic, and normocephalic; PERRLA; EOMI; Moist mucosal membranes  Neck: Trachea midline; No neck masses, thyromegaly, or cervical lymphadenopathy  Cardiovascular: Mild Tachycardia Noted on Vital-Signs (Regular Rate and Rhythm on Auscultation); No murmurs, rubs, or gallops appreciated; No peripheral edema  Respiratory: Clear to auscultation bilaterally; Adequate aeration; No supplemental oxygen   Abdomen: Soft, non-tender; Non-distended; No organomegaly; Bowel sounds present   Neurologic: Appropriately alert, and oriented to Person, Place, and Time; No focal deficits    Labs: I have reviewed the following labs:  Lab Results   Component Value Date/Time    WBC 8.53 05/07/2025 08:49 AM    RBC 3.93 05/07/2025 08:49 AM    Hemoglobin 11.8 (L) 05/07/2025 08:49 AM    Hematocrit 36.4 (L) 05/07/2025 08:49 AM    MCV 93 05/07/2025 08:49 AM    MCH 30.0 05/07/2025 08:49 AM    RDW 14.6 05/07/2025 08:49 AM    Platelets 216 05/07/2025 08:49 AM    Segmented % 55 05/07/2025 08:49 AM    Lymphocytes % 25 05/07/2025 08:49 AM    Monocytes % 7 05/07/2025 08:49 AM    Eosinophils Relative 10 (H) 05/07/2025 08:49 AM    Basophils Relative 3 (H) 05/07/2025 08:49 AM    Immature Grans % 0 05/07/2025 08:49 AM    Absolute Neutrophils 4.72 05/07/2025 " 08:49 AM     Lab Results   Component Value Date/Time    Potassium 4.0 05/07/2025 08:49 AM    Potassium 3.4 (L) 01/04/2025 09:18 AM    Chloride 108 05/07/2025 08:49 AM    Chloride 110 (H) 01/04/2025 09:18 AM    Carbon Dioxide 27 01/04/2025 09:18 AM    CO2 25 05/07/2025 08:49 AM    BUN 47 (H) 05/07/2025 08:49 AM    BUN 17 01/04/2025 09:18 AM    Creatinine 1.66 (H) 05/07/2025 08:49 AM    Creatinine 1.09 01/04/2025 09:18 AM    Glucose, Fasting 101 (H) 05/07/2025 08:49 AM    Calcium 9.4 05/07/2025 08:49 AM    Calcium 8.3 (L) 01/04/2025 09:18 AM    Corrected Calcium 9.7 02/12/2025 09:00 AM    AST 18 05/07/2025 08:49 AM    AST 12 01/04/2025 09:18 AM    ALT 18 05/07/2025 08:49 AM    ALT 17 01/04/2025 09:18 AM    Alkaline Phosphatase 86 05/07/2025 08:49 AM    Alkaline Phosphatase 166 (H) 01/04/2025 09:18 AM    Total Protein 6.2 (L) 05/07/2025 08:49 AM    Total Protein 6.7 05/07/2025 08:49 AM    Protein, Total 5.1 (L) 01/04/2025 09:18 AM    Albumin 3.9 05/07/2025 08:49 AM    ALBUMIN 3.0 (L) 01/04/2025 09:18 AM    Total Bilirubin 0.42 05/07/2025 08:49 AM    Total Bilirubin 0.4 01/04/2025 09:18 AM    eGFRcr 74 01/04/2025 09:18 AM    eGFR 41 05/07/2025 08:49 AM     Patient was seen and discussed with attending physician, EDUARDO Jimenez D.O.  Hematology-Oncology Fellow (PGY-5)

## 2025-05-27 NOTE — TELEPHONE ENCOUNTER
Called PT he left without scheduling follow up.  I have called PT and LVM with the FU date, I have scheduled for 7/1 at 12:20 and asked to call back and confim.

## 2025-05-27 NOTE — ASSESSMENT & PLAN NOTE
Patient is a 69-year-old [place]isian-speaking male, with an established history of IgG lambda multiple myeloma, with light-chain deposition in the kidneys (Currently on maintenance Daratumumab-Rd); who presents today for interval follow-up. On evaluation this morning, patient is clinically well; with the exception of dysuria, and urinary frequency. Patient remains on Daratumumab-Rd, with Daratumumab and Dexamethasone administered on Day 1, of a 21-day cycle. He is supposed to be taking Lenalidomide 15 mg Daily, 2-weeks on and 1-week off; however, has been off of Lenalidomide for upwards of 2-months, given that he is having difficulty acquiring his prescriptions (e.g. Language barrier, and difficulty hearing while he is working). Laboratory-studies were reviewed, and show mild stable normocytic anemia (Hemoglobin: 11.8 MCV: 93). Stable to worsened renal-insufficiency (Creatinine: 1.66 Baseline Creatinine: 1.22 to 1.32). Myeloma-parameters are, as follows: SPEP: Persistent monoclonal peak in the beta-2 region, and gamma-region (Consistent with Daratumumab-therapy). Free Light-Chains: Kappa: 19 Lambda: 66.5 Ratio: 3.5. Quantitative Immunoglobulins: IgA: 23 Ig,067 IgM: 43. Mild uptrend in myeloma-parameters are likely reflective of non-adherence with Lenalidomide. Will reattempt to coordinate for delivery of Lenalidomide. If the above-mentioned remains an issue, will consider alternative therapy to improve accessibility and adherence. Discussed the above-mentioned, at length, with patient and his grandson; who expressed understanding and agreement.    Note: Discussed with patient and his grandson, that patient is not a candidate for autologous stem-cell transplant, given issues with adherence, communication, and follow-up. The commitment of travel alone between [place], and his residence here in the [facility] would not be feasible for this patient. Will continue with treatment for myeloma, as outlined  Wound cultures from sternal area positive for Klebsiella aeruginosa.  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 Meyers called patient and encouraged to go to ER as the above-listed medication comes in injectable form.  +also needs further evaluation on the progress of the fluid collection as patient complains of pain locally  Patient voiced understanding about the concerns, but defers going to the ER and desires to wait and check with Infectious Disease tomorrow morning  Understands the risk     below.     Summary of Recommendations:  Obtain repeat Urinalysis to evaluate for acute cystitis.  Recommend outpatient follow-up with Urology, as scheduled, on June 13th, 2025.  Continue VTE-and antiviral prophylaxis, as follows:  Continue Aspirin 81 mg Daily.  Continue Acyclovir 400 mg BID (Refilled).  Continue Daratumumab-Rd:  Proceed to Cycle 10 Day 1 of Daratumumab, on June 6th, 2025.  Administer Dexamethasone 20 mg Daily, on Day 1.  Continue Lenalidomide 15 mg Daily, on Days 1-21 of 28-day cycle.  Note: Nursing Staff assisted patient with contacting [facility] for coordination of delivery of Lenalidomide, in real-time, during his encounter. Patient to start taking, on receipt.  Recommend close interval follow-up in approximately 4-weeks.  Obtain repeat laboratory-studies, including myeloma-parameters, prior to visit.

## 2025-06-03 ENCOUNTER — APPOINTMENT (OUTPATIENT)
Dept: LAB | Facility: CLINIC | Age: 70
End: 2025-06-03
Attending: INTERNAL MEDICINE
Payer: COMMERCIAL

## 2025-06-03 DIAGNOSIS — C90.00 MULTIPLE MYELOMA NOT HAVING ACHIEVED REMISSION (HCC): ICD-10-CM

## 2025-06-03 DIAGNOSIS — R30.0 DYSURIA: ICD-10-CM

## 2025-06-03 LAB
ALBUMIN SERPL BCG-MCNC: 3.8 G/DL (ref 3.5–5)
ALP SERPL-CCNC: 75 U/L (ref 34–104)
ALT SERPL W P-5'-P-CCNC: 20 U/L (ref 7–52)
ANION GAP SERPL CALCULATED.3IONS-SCNC: 6 MMOL/L (ref 4–13)
AST SERPL W P-5'-P-CCNC: 15 U/L (ref 13–39)
BASOPHILS # BLD AUTO: 0.06 THOUSANDS/ÂΜL (ref 0–0.1)
BASOPHILS NFR BLD AUTO: 1 % (ref 0–1)
BILIRUB SERPL-MCNC: 0.29 MG/DL (ref 0.2–1)
BUN SERPL-MCNC: 53 MG/DL (ref 5–25)
CALCIUM SERPL-MCNC: 9.4 MG/DL (ref 8.4–10.2)
CHLORIDE SERPL-SCNC: 111 MMOL/L (ref 96–108)
CO2 SERPL-SCNC: 26 MMOL/L (ref 21–32)
CREAT SERPL-MCNC: 1.69 MG/DL (ref 0.6–1.3)
EOSINOPHIL # BLD AUTO: 0.32 THOUSAND/ÂΜL (ref 0–0.61)
EOSINOPHIL NFR BLD AUTO: 4 % (ref 0–6)
ERYTHROCYTE [DISTWIDTH] IN BLOOD BY AUTOMATED COUNT: 14.3 % (ref 11.6–15.1)
GFR SERPL CREATININE-BSD FRML MDRD: 40 ML/MIN/1.73SQ M
GLUCOSE P FAST SERPL-MCNC: 118 MG/DL (ref 65–99)
HCT VFR BLD AUTO: 34.7 % (ref 36.5–49.3)
HGB BLD-MCNC: 11.3 G/DL (ref 12–17)
IGA SERPL-MCNC: 19 MG/DL (ref 66–433)
IGG SERPL-MCNC: 985 MG/DL (ref 635–1741)
IGM SERPL-MCNC: 30 MG/DL (ref 45–281)
IMM GRANULOCYTES # BLD AUTO: 0.02 THOUSAND/UL (ref 0–0.2)
IMM GRANULOCYTES NFR BLD AUTO: 0 % (ref 0–2)
LYMPHOCYTES # BLD AUTO: 1.8 THOUSANDS/ÂΜL (ref 0.6–4.47)
LYMPHOCYTES NFR BLD AUTO: 25 % (ref 14–44)
MCH RBC QN AUTO: 30.4 PG (ref 26.8–34.3)
MCHC RBC AUTO-ENTMCNC: 32.6 G/DL (ref 31.4–37.4)
MCV RBC AUTO: 93 FL (ref 82–98)
MONOCYTES # BLD AUTO: 0.81 THOUSAND/ÂΜL (ref 0.17–1.22)
MONOCYTES NFR BLD AUTO: 11 % (ref 4–12)
NEUTROPHILS # BLD AUTO: 4.19 THOUSANDS/ÂΜL (ref 1.85–7.62)
NEUTS SEG NFR BLD AUTO: 59 % (ref 43–75)
NRBC BLD AUTO-RTO: 0 /100 WBCS
PLATELET # BLD AUTO: 225 THOUSANDS/UL (ref 149–390)
PMV BLD AUTO: 11.9 FL (ref 8.9–12.7)
POTASSIUM SERPL-SCNC: 4 MMOL/L (ref 3.5–5.3)
PROT SERPL-MCNC: 6.4 G/DL (ref 6.4–8.4)
RBC # BLD AUTO: 3.72 MILLION/UL (ref 3.88–5.62)
SODIUM SERPL-SCNC: 143 MMOL/L (ref 135–147)
WBC # BLD AUTO: 7.2 THOUSAND/UL (ref 4.31–10.16)

## 2025-06-03 PROCEDURE — 82784 ASSAY IGA/IGD/IGG/IGM EACH: CPT

## 2025-06-03 PROCEDURE — 85025 COMPLETE CBC W/AUTO DIFF WBC: CPT

## 2025-06-03 PROCEDURE — 80053 COMPREHEN METABOLIC PANEL: CPT

## 2025-06-03 PROCEDURE — 84165 PROTEIN E-PHORESIS SERUM: CPT

## 2025-06-03 PROCEDURE — 36415 COLL VENOUS BLD VENIPUNCTURE: CPT

## 2025-06-03 PROCEDURE — 83521 IG LIGHT CHAINS FREE EACH: CPT

## 2025-06-04 LAB
KAPPA LC FREE SER-MCNC: 15.4 MG/L (ref 3.3–19.4)
KAPPA LC FREE/LAMBDA FREE SER: 0.24 {RATIO} (ref 0.26–1.65)
LAMBDA LC FREE SERPL-MCNC: 64.8 MG/L (ref 5.7–26.3)

## 2025-06-05 LAB
ALBUMIN SERPL ELPH-MCNC: 3.5 G/DL (ref 3.2–5.1)
ALBUMIN SERPL ELPH-MCNC: 58.4 % (ref 48–70)
ALPHA1 GLOB SERPL ELPH-MCNC: 0.29 G/DL (ref 0.15–0.47)
ALPHA1 GLOB SERPL ELPH-MCNC: 4.8 % (ref 1.8–7)
ALPHA2 GLOB SERPL ELPH-MCNC: 0.66 G/DL (ref 0.42–1.04)
ALPHA2 GLOB SERPL ELPH-MCNC: 11 % (ref 5.9–14.9)
BETA GLOB ABNORMAL SERPL ELPH-MCNC: 0.35 G/DL (ref 0.31–0.57)
BETA1 GLOB SERPL ELPH-MCNC: 5.8 % (ref 4.7–7.7)
BETA2 GLOB SERPL ELPH-MCNC: 12.2 % (ref 3.1–7.9)
BETA2+GAMMA GLOB SERPL ELPH-MCNC: 0.73 G/DL (ref 0.2–0.58)
GAMMA GLOB ABNORMAL SERPL ELPH-MCNC: 0.47 G/DL (ref 0.4–1.66)
GAMMA GLOB SERPL ELPH-MCNC: 7.8 % (ref 6.9–22.3)
IGG/ALB SER: 1.4 {RATIO} (ref 1.1–1.8)
M PROTEIN 1 SERPL ELPH-MCNC: 0.46 G/DL
PROT SERPL-MCNC: 6 G/DL (ref 6.4–8.4)

## 2025-06-05 PROCEDURE — 84165 PROTEIN E-PHORESIS SERUM: CPT | Performed by: PATHOLOGY

## 2025-06-06 ENCOUNTER — HOSPITAL ENCOUNTER (OUTPATIENT)
Dept: INFUSION CENTER | Facility: CLINIC | Age: 70
End: 2025-06-06
Payer: COMMERCIAL

## 2025-06-06 VITALS
DIASTOLIC BLOOD PRESSURE: 74 MMHG | HEART RATE: 87 BPM | TEMPERATURE: 98.7 F | SYSTOLIC BLOOD PRESSURE: 128 MMHG | OXYGEN SATURATION: 98 % | RESPIRATION RATE: 17 BRPM

## 2025-06-06 DIAGNOSIS — C90.00 MULTIPLE MYELOMA NOT HAVING ACHIEVED REMISSION (HCC): Primary | ICD-10-CM

## 2025-06-06 PROCEDURE — 96401 CHEMO ANTI-NEOPL SQ/IM: CPT

## 2025-06-06 PROCEDURE — 96360 HYDRATION IV INFUSION INIT: CPT

## 2025-06-06 RX ORDER — DEXAMETHASONE 4 MG/1
20 TABLET ORAL ONCE
Status: COMPLETED | OUTPATIENT
Start: 2025-06-06 | End: 2025-06-06

## 2025-06-06 RX ORDER — DIPHENHYDRAMINE HCL 25 MG
25 TABLET ORAL ONCE
Status: COMPLETED | OUTPATIENT
Start: 2025-06-06 | End: 2025-06-06

## 2025-06-06 RX ORDER — ACETAMINOPHEN 325 MG/1
650 TABLET ORAL ONCE
Status: COMPLETED | OUTPATIENT
Start: 2025-06-06 | End: 2025-06-06

## 2025-06-06 RX ADMIN — DIPHENHYDRAMINE HYDROCHLORIDE 25 MG: 25 TABLET ORAL at 11:07

## 2025-06-06 RX ADMIN — DARATUMUMAB AND HYALURONIDASE-FIHJ (HUMAN RECOMBINANT) 1800 MG: 1800; 30000 INJECTION SUBCUTANEOUS at 12:22

## 2025-06-06 RX ADMIN — DEXAMETHASONE 20 MG: 4 TABLET ORAL at 11:07

## 2025-06-06 RX ADMIN — SODIUM CHLORIDE 1000 ML: 0.9 INJECTION, SOLUTION INTRAVENOUS at 11:08

## 2025-06-06 RX ADMIN — ACETAMINOPHEN 650 MG: 325 TABLET ORAL at 11:07

## 2025-06-06 NOTE — PROGRESS NOTES
Patient is here for Darzalex Faspro and Hydration. Labs reviewed from 6/3, no lab parameters required for treatment today.  Patient resting with no complains. Will continue to monitor.

## 2025-06-06 NOTE — PROGRESS NOTES
Patient tolerated hydration and injections without complications. Patient aware of next appointment 6/20 at 1030, provided AVS.

## 2025-06-13 ENCOUNTER — OFFICE VISIT (OUTPATIENT)
Dept: UROLOGY | Facility: CLINIC | Age: 70
End: 2025-06-13
Payer: COMMERCIAL

## 2025-06-13 VITALS
WEIGHT: 185.8 LBS | OXYGEN SATURATION: 97 % | HEIGHT: 64 IN | BODY MASS INDEX: 31.72 KG/M2 | SYSTOLIC BLOOD PRESSURE: 144 MMHG | HEART RATE: 81 BPM | DIASTOLIC BLOOD PRESSURE: 82 MMHG

## 2025-06-13 DIAGNOSIS — N20.0 NEPHROLITHIASIS: ICD-10-CM

## 2025-06-13 DIAGNOSIS — R35.1 BENIGN PROSTATIC HYPERPLASIA WITH NOCTURIA: Primary | ICD-10-CM

## 2025-06-13 DIAGNOSIS — N40.1 BENIGN PROSTATIC HYPERPLASIA WITH NOCTURIA: Primary | ICD-10-CM

## 2025-06-13 LAB — POST-VOID RESIDUAL VOLUME, ML POC: 53 ML

## 2025-06-13 PROCEDURE — 51798 US URINE CAPACITY MEASURE: CPT

## 2025-06-13 PROCEDURE — 99213 OFFICE O/P EST LOW 20 MIN: CPT

## 2025-06-13 RX ORDER — AZITHROMYCIN 250 MG/1
250 TABLET, FILM COATED ORAL DAILY
COMMUNITY
Start: 2025-02-01

## 2025-06-13 RX ORDER — PSYLLIUM HUSK 0.4 G
1 CAPSULE ORAL DAILY
COMMUNITY
Start: 2025-05-23

## 2025-06-13 RX ORDER — TAMSULOSIN HYDROCHLORIDE 0.4 MG/1
0.4 CAPSULE ORAL
Qty: 90 CAPSULE | Refills: 1 | Status: SHIPPED | OUTPATIENT
Start: 2025-06-13

## 2025-06-13 NOTE — ASSESSMENT & PLAN NOTE
-7 mm nonobstructing right upper pole intrarenal calculus  -asymptomatic  -Repeat renal US in 1 year PTV.         -Patient will return in 3 months for symptom reevaluation.  All questions addressed.  Please do not hesitate to reach out with further questions or concerns.

## 2025-06-13 NOTE — PROGRESS NOTES
Name: Zack Partida      : 1955      MRN: [id number]  Encounter Provider: MANNY Bar  Encounter Date: 2025   Encounter department: [facility]OLOG[facility]  :  Assessment & Plan  Benign prostatic hyperplasia with nocturia  - 53 mls today.  - Initiate 0.4 mg tamsulosin.  Side effect profile and usage discussed.    Orders:    POCT Measure PVR    tamsulosin (FLOMAX) 0.4 mg; Take 1 capsule (0.4 mg total) by mouth daily with dinner    Nephrolithiasis  -7 mm nonobstructing right upper pole intrarenal calculus  -asymptomatic  -Repeat renal US in 1 year PTV.         -Patient will return in 3 months for symptom reevaluation.  All questions addressed.  Please do not hesitate to reach out with further questions or concerns.      History of Present Illness   Zack Partida is a 69 y.o. male who presents here with history of nephrolithiasis and BPH presenting today for follow-up.     He is accompanied by his grandson.        Patient states he has been having increased nocturia, occasional intermittent burning with urination started about 2 months ago but states has improved.  He states on occasion he feels this when emptying his bladder.  He does have known prostatomegaly shown on renal ultrasound in .  He is interested in trying daily tamsulosin.  He is asymptomatic of flank pain, dysuria, gross hematuria.   Denies recurrent UTIs.         Renal ultrasound completed on 24 showing a nonobstructing 6 mm right renal stone.  No hydronephrosis.  Prostamegaly.  Bilateral simple renal cysts.      Review of Systems   Constitutional:  Negative for activity change, chills, fatigue and fever.   HENT:  Negative for congestion, rhinorrhea and sore throat.    Eyes:  Negative for photophobia, redness and visual disturbance.   Respiratory:  Negative for cough, shortness of breath and wheezing.    Cardiovascular:  Negative for chest pain, palpitations and leg swelling.   Gastrointestinal:   "Negative for abdominal pain, diarrhea, nausea and vomiting.   Genitourinary:  Negative for difficulty urinating, dysuria, flank pain, frequency, hematuria and urgency.        Nocturia, intermittent dysuria   Neurological:  Negative for weakness, light-headedness and headaches.          Objective   Ht 5' 4\" (1.626 m)   BMI 31.93 kg/m²     Physical Exam  Vitals reviewed.   Constitutional:       General: He is not in acute distress.     Appearance: He is well-developed.   HENT:      Head: Normocephalic and atraumatic.     Eyes:      Conjunctiva/sclera: Conjunctivae normal.     Pulmonary:      Effort: Pulmonary effort is normal. No respiratory distress.     Neurological:      Mental Status: He is alert and oriented to person, place, and time.     Psychiatric:         Mood and Affect: Mood normal.        Results   Lab Results   Component Value Date    PSA 1.09 05/11/2024     Lab Results   Component Value Date    CALCIUM 9.4 06/03/2025    K 4.0 06/03/2025    CO2 26 06/03/2025     (H) 06/03/2025    BUN 53 (H) 06/03/2025    CREATININE 1.69 (H) 06/03/2025     Lab Results   Component Value Date    WBC 7.20 06/03/2025    HGB 11.3 (L) 06/03/2025    HCT 34.7 (L) 06/03/2025    MCV 93 06/03/2025     06/03/2025       Office Urine Dip  No results found for this or any previous visit (from the past hour).      "

## 2025-06-18 DIAGNOSIS — C90.00 MULTIPLE MYELOMA NOT HAVING ACHIEVED REMISSION (HCC): ICD-10-CM

## 2025-06-18 RX ORDER — LENALIDOMIDE 15 MG/1
CAPSULE ORAL
Qty: 14 CAPSULE | Refills: 0 | Status: SHIPPED | OUTPATIENT
Start: 2025-06-18

## 2025-06-24 ENCOUNTER — APPOINTMENT (OUTPATIENT)
Dept: LAB | Facility: CLINIC | Age: 70
End: 2025-06-24
Payer: COMMERCIAL

## 2025-06-24 DIAGNOSIS — C90.00 MULTIPLE MYELOMA NOT HAVING ACHIEVED REMISSION (HCC): ICD-10-CM

## 2025-06-24 LAB
ALBUMIN SERPL BCG-MCNC: 3.5 G/DL (ref 3.5–5)
ALP SERPL-CCNC: 80 U/L (ref 34–104)
ALT SERPL W P-5'-P-CCNC: 14 U/L (ref 7–52)
ANION GAP SERPL CALCULATED.3IONS-SCNC: 5 MMOL/L (ref 4–13)
AST SERPL W P-5'-P-CCNC: 11 U/L (ref 13–39)
BASOPHILS # BLD AUTO: 0.1 THOUSANDS/ÂΜL (ref 0–0.1)
BASOPHILS NFR BLD AUTO: 2 % (ref 0–1)
BILIRUB SERPL-MCNC: 0.28 MG/DL (ref 0.2–1)
BUN SERPL-MCNC: 40 MG/DL (ref 5–25)
CALCIUM SERPL-MCNC: 8.9 MG/DL (ref 8.4–10.2)
CHLORIDE SERPL-SCNC: 112 MMOL/L (ref 96–108)
CO2 SERPL-SCNC: 25 MMOL/L (ref 21–32)
CREAT SERPL-MCNC: 1.8 MG/DL (ref 0.6–1.3)
EOSINOPHIL # BLD AUTO: 0.37 THOUSAND/ÂΜL (ref 0–0.61)
EOSINOPHIL NFR BLD AUTO: 7 % (ref 0–6)
ERYTHROCYTE [DISTWIDTH] IN BLOOD BY AUTOMATED COUNT: 13.7 % (ref 11.6–15.1)
GFR SERPL CREATININE-BSD FRML MDRD: 37 ML/MIN/1.73SQ M
GLUCOSE P FAST SERPL-MCNC: 110 MG/DL (ref 65–99)
HCT VFR BLD AUTO: 31.8 % (ref 36.5–49.3)
HGB BLD-MCNC: 10.7 G/DL (ref 12–17)
IGA SERPL-MCNC: 30 MG/DL (ref 66–433)
IGG SERPL-MCNC: 1007 MG/DL (ref 635–1741)
IGM SERPL-MCNC: 30 MG/DL (ref 45–281)
IMM GRANULOCYTES # BLD AUTO: 0.01 THOUSAND/UL (ref 0–0.2)
IMM GRANULOCYTES NFR BLD AUTO: 0 % (ref 0–2)
LYMPHOCYTES # BLD AUTO: 1.45 THOUSANDS/ÂΜL (ref 0.6–4.47)
LYMPHOCYTES NFR BLD AUTO: 28 % (ref 14–44)
MCH RBC QN AUTO: 31.2 PG (ref 26.8–34.3)
MCHC RBC AUTO-ENTMCNC: 33.6 G/DL (ref 31.4–37.4)
MCV RBC AUTO: 93 FL (ref 82–98)
MONOCYTES # BLD AUTO: 0.7 THOUSAND/ÂΜL (ref 0.17–1.22)
MONOCYTES NFR BLD AUTO: 14 % (ref 4–12)
NEUTROPHILS # BLD AUTO: 2.48 THOUSANDS/ÂΜL (ref 1.85–7.62)
NEUTS SEG NFR BLD AUTO: 49 % (ref 43–75)
NRBC BLD AUTO-RTO: 0 /100 WBCS
PLATELET # BLD AUTO: 151 THOUSANDS/UL (ref 149–390)
PMV BLD AUTO: 12.3 FL (ref 8.9–12.7)
POTASSIUM SERPL-SCNC: 4 MMOL/L (ref 3.5–5.3)
PROT SERPL-MCNC: 6.4 G/DL (ref 6.4–8.4)
RBC # BLD AUTO: 3.43 MILLION/UL (ref 3.88–5.62)
SODIUM SERPL-SCNC: 142 MMOL/L (ref 135–147)
WBC # BLD AUTO: 5.11 THOUSAND/UL (ref 4.31–10.16)

## 2025-06-24 PROCEDURE — 83521 IG LIGHT CHAINS FREE EACH: CPT

## 2025-06-24 PROCEDURE — 80053 COMPREHEN METABOLIC PANEL: CPT

## 2025-06-24 PROCEDURE — 84165 PROTEIN E-PHORESIS SERUM: CPT

## 2025-06-24 PROCEDURE — 36415 COLL VENOUS BLD VENIPUNCTURE: CPT

## 2025-06-24 PROCEDURE — 82784 ASSAY IGA/IGD/IGG/IGM EACH: CPT

## 2025-06-24 PROCEDURE — 85025 COMPLETE CBC W/AUTO DIFF WBC: CPT

## 2025-06-25 LAB
ALBUMIN SERPL ELPH-MCNC: 3.09 G/DL (ref 3.2–5.1)
ALBUMIN SERPL ELPH-MCNC: 53.3 % (ref 48–70)
ALPHA1 GLOB SERPL ELPH-MCNC: 0.4 G/DL (ref 0.15–0.47)
ALPHA1 GLOB SERPL ELPH-MCNC: 6.9 % (ref 1.8–7)
ALPHA2 GLOB SERPL ELPH-MCNC: 0.76 G/DL (ref 0.42–1.04)
ALPHA2 GLOB SERPL ELPH-MCNC: 13.1 % (ref 5.9–14.9)
BETA GLOB ABNORMAL SERPL ELPH-MCNC: 0.34 G/DL (ref 0.31–0.57)
BETA1 GLOB SERPL ELPH-MCNC: 5.9 % (ref 4.7–7.7)
BETA2 GLOB SERPL ELPH-MCNC: 12.6 % (ref 3.1–7.9)
BETA2+GAMMA GLOB SERPL ELPH-MCNC: 0.73 G/DL (ref 0.2–0.58)
GAMMA GLOB ABNORMAL SERPL ELPH-MCNC: 0.48 G/DL (ref 0.4–1.66)
GAMMA GLOB SERPL ELPH-MCNC: 8.2 % (ref 6.9–22.3)
IGG/ALB SER: 1.14 {RATIO} (ref 1.1–1.8)
KAPPA LC FREE SER-MCNC: 27.2 MG/L (ref 3.3–19.4)
KAPPA LC FREE/LAMBDA FREE SER: 0.3 {RATIO} (ref 0.26–1.65)
LAMBDA LC FREE SERPL-MCNC: 91.1 MG/L (ref 5.7–26.3)
M PROTEIN 1 SERPL ELPH-MCNC: 0.5 G/DL
PROT SERPL-MCNC: 5.8 G/DL (ref 6.4–8.4)

## 2025-06-25 PROCEDURE — 84165 PROTEIN E-PHORESIS SERUM: CPT | Performed by: STUDENT IN AN ORGANIZED HEALTH CARE EDUCATION/TRAINING PROGRAM

## 2025-06-25 RX ORDER — ACETAMINOPHEN 325 MG/1
650 TABLET ORAL ONCE
Status: CANCELLED | OUTPATIENT
Start: 2025-06-27

## 2025-06-25 RX ORDER — DIPHENHYDRAMINE HCL 25 MG
25 TABLET ORAL ONCE
Status: CANCELLED | OUTPATIENT
Start: 2025-06-27

## 2025-06-25 RX ORDER — DEXAMETHASONE 4 MG/1
20 TABLET ORAL ONCE
Status: CANCELLED | OUTPATIENT
Start: 2025-06-27

## 2025-06-27 ENCOUNTER — HOSPITAL ENCOUNTER (OUTPATIENT)
Dept: INFUSION CENTER | Facility: CLINIC | Age: 70
End: 2025-06-27
Attending: INTERNAL MEDICINE
Payer: COMMERCIAL

## 2025-06-27 VITALS
OXYGEN SATURATION: 98 % | DIASTOLIC BLOOD PRESSURE: 70 MMHG | TEMPERATURE: 96.7 F | SYSTOLIC BLOOD PRESSURE: 115 MMHG | HEART RATE: 74 BPM | RESPIRATION RATE: 20 BRPM

## 2025-06-27 DIAGNOSIS — C90.00 MULTIPLE MYELOMA NOT HAVING ACHIEVED REMISSION (HCC): Primary | ICD-10-CM

## 2025-06-27 PROCEDURE — 96401 CHEMO ANTI-NEOPL SQ/IM: CPT

## 2025-06-27 PROCEDURE — 96360 HYDRATION IV INFUSION INIT: CPT

## 2025-06-27 RX ORDER — DEXAMETHASONE 4 MG/1
20 TABLET ORAL ONCE
Status: COMPLETED | OUTPATIENT
Start: 2025-06-27 | End: 2025-06-27

## 2025-06-27 RX ORDER — DIPHENHYDRAMINE HCL 25 MG
25 TABLET ORAL ONCE
Status: COMPLETED | OUTPATIENT
Start: 2025-06-27 | End: 2025-06-27

## 2025-06-27 RX ORDER — ACETAMINOPHEN 325 MG/1
650 TABLET ORAL ONCE
Status: COMPLETED | OUTPATIENT
Start: 2025-06-27 | End: 2025-06-27

## 2025-06-27 RX ADMIN — DARATUMUMAB AND HYALURONIDASE-FIHJ (HUMAN RECOMBINANT) 1800 MG: 1800; 30000 INJECTION SUBCUTANEOUS at 11:24

## 2025-06-27 RX ADMIN — SODIUM CHLORIDE 1000 ML: 0.9 INJECTION, SOLUTION INTRAVENOUS at 10:18

## 2025-06-27 RX ADMIN — DEXAMETHASONE 20 MG: 4 TABLET ORAL at 10:14

## 2025-06-27 RX ADMIN — ACETAMINOPHEN 650 MG: 325 TABLET ORAL at 10:14

## 2025-06-27 RX ADMIN — DIPHENHYDRAMINE HYDROCHLORIDE 25 MG: 25 TABLET ORAL at 10:14

## 2025-06-27 NOTE — PROGRESS NOTES
Pt here for hydration and darzalex faspro, tolerated well with no complaints. Next appt 7/18 at 0800 at [facility]. Declined AVS.

## 2025-07-07 ENCOUNTER — TELEPHONE (OUTPATIENT)
Age: 70
End: 2025-07-07

## 2025-07-07 NOTE — TELEPHONE ENCOUNTER
Left voicemail for grandson to see if patient is able to come in for tomorrow's appointment at 8am instead of 8:20am. Appointment tentatively moved to 8am. Advised to call if this does not work for them. Provided [employer] number.

## 2025-07-08 ENCOUNTER — TELEPHONE (OUTPATIENT)
Dept: HEMATOLOGY ONCOLOGY | Facility: CLINIC | Age: 70
End: 2025-07-08

## 2025-07-08 ENCOUNTER — OFFICE VISIT (OUTPATIENT)
Dept: HEMATOLOGY ONCOLOGY | Facility: CLINIC | Age: 70
End: 2025-07-08
Payer: COMMERCIAL

## 2025-07-08 VITALS
HEART RATE: 92 BPM | TEMPERATURE: 98.2 F | BODY MASS INDEX: 31.24 KG/M2 | HEIGHT: 64 IN | WEIGHT: 183 LBS | RESPIRATION RATE: 16 BRPM | SYSTOLIC BLOOD PRESSURE: 140 MMHG | OXYGEN SATURATION: 98 % | DIASTOLIC BLOOD PRESSURE: 72 MMHG

## 2025-07-08 DIAGNOSIS — C90.00 MULTIPLE MYELOMA NOT HAVING ACHIEVED REMISSION (HCC): Primary | ICD-10-CM

## 2025-07-08 DIAGNOSIS — N18.9 ACUTE KIDNEY INJURY SUPERIMPOSED ON CKD  (HCC): ICD-10-CM

## 2025-07-08 DIAGNOSIS — N17.9 ACUTE KIDNEY INJURY SUPERIMPOSED ON CKD  (HCC): ICD-10-CM

## 2025-07-08 PROCEDURE — 99214 OFFICE O/P EST MOD 30 MIN: CPT | Performed by: INTERNAL MEDICINE

## 2025-07-08 RX ORDER — ACETAMINOPHEN 325 MG/1
650 TABLET ORAL ONCE
Status: CANCELLED | OUTPATIENT
Start: 2025-07-18

## 2025-07-08 RX ORDER — DEXAMETHASONE 4 MG/1
20 TABLET ORAL ONCE
Status: CANCELLED | OUTPATIENT
Start: 2025-07-18

## 2025-07-08 RX ORDER — DIPHENHYDRAMINE HCL 25 MG
25 TABLET ORAL ONCE
Status: CANCELLED | OUTPATIENT
Start: 2025-07-18

## 2025-07-08 NOTE — PROGRESS NOTES
Name: Zack Partida      : 1955      MRN: [id number]  Encounter Provider: Cathy Flores DO  Encounter Date: 2025   Encounter department: [facility] [place]  :  Assessment & Plan  Multiple myeloma not having achieved remission (HCC)         Acute kidney injury superimposed on CKD  (HCC)  Lab Results   Component Value Date    EGFR 37 2025    EGFR 40 2025    EGFR 41 2025    CREATININE 1.80 (H) 2025    CREATININE 1.69 (H) 2025    CREATININE 1.66 (H) 2025            In the past that was giving him IV fluids with his treatment and I am going to add that back.  His BUN/creatinine ratio indicates dehydration.  I asked him to push his oral fluids is much as he possibly can especially in the heat.  He is in a partial remission right now.  IgG and M spike are stable.  Lambda free light chains came up a bit but this could have also been influenced by his acute renal insufficiency.  I am not going to change anything for now as he just got restarted on Revlimid.  I am having my nurse do the Revlimid survey with him in the room right now with his grandson present.  I will plan on seeing him back in 3 to 4 weeks and he is cleared for his daratumumab next Friday with IV fluids added.  He states that he needs more acyclovir and aspirin.  He does have refills on this prescription so his grandson will call the pharmacy to get the refills.  He knows to call in the interim with any other questions or concerns.    Return for 20 minute follow up visit.    History of Present Illness   Chief Complaint   Patient presents with    Follow-up     69-year-old male with IgG lambda multiple myeloma.  He was able to obtain the Revlimid for last cycle of treatment and he did not notice any increase in side effects.  He denies any rash or diarrhea.  He is getting daratumumab and dexamethasone every 3 weeks.  Labs prior to this visit show an increase in his  creatinine to 1.8 with a BUN of 40.  He states that he is drinking 1.5 to 2 L of water a day.  He is working full-time.  ANC is 2.4.  He denies any fevers or signs of infection.  No new pain.    Oncology History   Cancer Staging   No matching staging information was found for the patient.  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 the survey and receiving it    2/14/2025 - take out velcade due to severe neuropathy - still waiting for the patient to get revlimid    3/2025 - start revlimid 15 mg 2 weeks on, 1 week off     Multiple myeloma not having achieved remission (HCC)   10/18/2024 Initial Diagnosis    Multiple myeloma not having achieved remission (HCC)     11/1/2024 -  Chemotherapy    daratumumab-hyaluronidase (DARZALEX FASPRO), 1,800 mg, 11 of 14 cycles  Administration: 1,800 mg (11/1/2024), 1,800 mg (11/7/2024), 1,800 mg (11/15/2024), 1,800 mg (11/22/2024), 1,800 mg (11/29/2024), 1,800 mg (12/6/2024), 1,800 mg (1/24/2025), 1,800 mg (5/9/2025), 1,800 mg (12/13/2024), 1,800 mg (1/10/2025), 1,800 mg (1/17/2025), 1,800 mg (2/14/2025), 1,800 mg (3/7/2025), 1,800 mg (3/28/2025), 1,800 mg (4/18/2025), 1,800 mg (6/6/2025), 1,800 mg (6/27/2025)  bortezomib (VELCADE), 1.3 mg/m2 = 2.5 mg, 4 of 4 cycles  Administration: 2.5 mg (11/1/2024), 2.5 mg (11/7/2024), 2.5 mg (11/22/2024), 2.5 mg (11/29/2024), 2.5 mg (1/24/2025), 2.5 mg (1/31/2025), 2.5 mg (11/15/2024), 2.5 mg (12/6/2024), 2.5 mg (12/13/2024), 2.5 mg (1/10/2025), 2.5 mg (1/17/2025)        Pertinent Medical History         Review of Systems otherwise neg        Objective   /72 (BP Location: Left arm, Patient Position:  "Sitting, Cuff Size: Adult)   Pulse 92   Temp 98.2 °F (36.8 °C) (Temporal)   Resp 16   Ht 5' 4\" (1.626 m)   Wt 83 kg (183 lb)   SpO2 98%   BMI 31.41 kg/m²     Pain Screening:  Pain Score: 0-No pain  ECOG   0  Physical Exam    GEN: Alert, awake oriented x3, in no acute distress  HEENT- No pallor, icterus, cyanosis, no oral mucosal lesions,   LAD - no palpable cervical, clavicle, axillary, inguinal LAD  Heart- normal S1 S2, regular rate and rhythm, No murmur, rubs.   Lungs- clear breathing sound bilateral.   Abdomen- soft, Non tender, bowel sounds present  Extremities- No cyanosis, clubbing, edema  Neuro- No focal neurological deficit      Labs: I have reviewed the following labs:  Lab Results   Component Value Date/Time    WBC 5.11 06/24/2025 09:23 AM    RBC 3.43 (L) 06/24/2025 09:23 AM    Hemoglobin 10.7 (L) 06/24/2025 09:23 AM    Hematocrit 31.8 (L) 06/24/2025 09:23 AM    MCV 93 06/24/2025 09:23 AM    MCH 31.2 06/24/2025 09:23 AM    RDW 13.7 06/24/2025 09:23 AM    Platelets 151 06/24/2025 09:23 AM    Segmented % 49 06/24/2025 09:23 AM    Lymphocytes % 28 06/24/2025 09:23 AM    Monocytes % 14 (H) 06/24/2025 09:23 AM    Eosinophils Relative 7 (H) 06/24/2025 09:23 AM    Basophils Relative 2 (H) 06/24/2025 09:23 AM    Immature Grans % 0 06/24/2025 09:23 AM    Absolute Neutrophils 2.48 06/24/2025 09:23 AM     Lab Results   Component Value Date/Time    Potassium 4.0 06/24/2025 09:23 AM    Potassium 3.4 (L) 01/04/2025 09:18 AM    Chloride 112 (H) 06/24/2025 09:23 AM    Chloride 110 (H) 01/04/2025 09:18 AM    Carbon Dioxide 27 01/04/2025 09:18 AM    CO2 25 06/24/2025 09:23 AM    BUN 40 (H) 06/24/2025 09:23 AM    BUN 17 01/04/2025 09:18 AM    Creatinine 1.80 (H) 06/24/2025 09:23 AM    Creatinine 1.09 01/04/2025 09:18 AM    Glucose, Fasting 110 (H) 06/24/2025 09:23 AM    Calcium 8.9 06/24/2025 09:23 AM    Calcium 8.3 (L) 01/04/2025 09:18 AM    Corrected Calcium 9.7 02/12/2025 09:00 AM    AST 11 (L) 06/24/2025 09:23 AM "    AST 12 01/04/2025 09:18 AM    ALT 14 06/24/2025 09:23 AM    ALT 17 01/04/2025 09:18 AM    Alkaline Phosphatase 80 06/24/2025 09:23 AM    Alkaline Phosphatase 166 (H) 01/04/2025 09:18 AM    Total Protein 5.8 (L) 06/24/2025 09:23 AM    Total Protein 6.4 06/24/2025 09:23 AM    Protein, Total 5.1 (L) 01/04/2025 09:18 AM    Albumin 3.5 06/24/2025 09:23 AM    ALBUMIN 3.0 (L) 01/04/2025 09:18 AM    Total Bilirubin 0.28 06/24/2025 09:23 AM    Total Bilirubin 0.4 01/04/2025 09:18 AM    eGFRcr 74 01/04/2025 09:18 AM    eGFR 37 06/24/2025 09:23 AM

## 2025-07-08 NOTE — ASSESSMENT & PLAN NOTE
Lab Results   Component Value Date    EGFR 37 06/24/2025    EGFR 40 06/03/2025    EGFR 41 05/07/2025    CREATININE 1.80 (H) 06/24/2025    CREATININE 1.69 (H) 06/03/2025    CREATININE 1.66 (H) 05/07/2025

## 2025-07-18 ENCOUNTER — HOSPITAL ENCOUNTER (OUTPATIENT)
Dept: INFUSION CENTER | Facility: CLINIC | Age: 70
End: 2025-07-18
Attending: INTERNAL MEDICINE
Payer: COMMERCIAL

## 2025-07-18 VITALS
RESPIRATION RATE: 16 BRPM | OXYGEN SATURATION: 96 % | DIASTOLIC BLOOD PRESSURE: 61 MMHG | HEART RATE: 80 BPM | SYSTOLIC BLOOD PRESSURE: 108 MMHG | TEMPERATURE: 97 F

## 2025-07-18 DIAGNOSIS — C90.00 MULTIPLE MYELOMA NOT HAVING ACHIEVED REMISSION (HCC): Primary | ICD-10-CM

## 2025-07-18 DIAGNOSIS — N18.9 ACUTE KIDNEY INJURY SUPERIMPOSED ON CKD  (HCC): ICD-10-CM

## 2025-07-18 DIAGNOSIS — N17.9 ACUTE KIDNEY INJURY SUPERIMPOSED ON CKD  (HCC): ICD-10-CM

## 2025-07-18 LAB
ALBUMIN SERPL BCG-MCNC: 3.5 G/DL (ref 3.5–5)
ALP SERPL-CCNC: 61 U/L (ref 34–104)
ALT SERPL W P-5'-P-CCNC: 13 U/L (ref 7–52)
ANION GAP SERPL CALCULATED.3IONS-SCNC: 5 MMOL/L (ref 4–13)
AST SERPL W P-5'-P-CCNC: 14 U/L (ref 13–39)
BASOPHILS # BLD AUTO: 0.04 THOUSANDS/ÂΜL (ref 0–0.1)
BASOPHILS NFR BLD AUTO: 1 % (ref 0–1)
BILIRUB SERPL-MCNC: 0.44 MG/DL (ref 0.2–1)
BUN SERPL-MCNC: 51 MG/DL (ref 5–25)
CALCIUM SERPL-MCNC: 8.7 MG/DL (ref 8.4–10.2)
CHLORIDE SERPL-SCNC: 113 MMOL/L (ref 96–108)
CO2 SERPL-SCNC: 23 MMOL/L (ref 21–32)
CREAT SERPL-MCNC: 1.6 MG/DL (ref 0.6–1.3)
EOSINOPHIL # BLD AUTO: 1.05 THOUSAND/ÂΜL (ref 0–0.61)
EOSINOPHIL NFR BLD AUTO: 15 % (ref 0–6)
ERYTHROCYTE [DISTWIDTH] IN BLOOD BY AUTOMATED COUNT: 14 % (ref 11.6–15.1)
GFR SERPL CREATININE-BSD FRML MDRD: 43 ML/MIN/1.73SQ M
GLUCOSE SERPL-MCNC: 142 MG/DL (ref 65–140)
HCT VFR BLD AUTO: 28.1 % (ref 36.5–49.3)
HGB BLD-MCNC: 9.2 G/DL (ref 12–17)
IGA SERPL-MCNC: 17 MG/DL (ref 66–433)
IGG SERPL-MCNC: 893 MG/DL (ref 635–1741)
IGM SERPL-MCNC: 21 MG/DL (ref 45–281)
IMM GRANULOCYTES # BLD AUTO: 0.02 THOUSAND/UL (ref 0–0.2)
IMM GRANULOCYTES NFR BLD AUTO: 0 % (ref 0–2)
LYMPHOCYTES # BLD AUTO: 1.2 THOUSANDS/ÂΜL (ref 0.6–4.47)
LYMPHOCYTES NFR BLD AUTO: 17 % (ref 14–44)
MCH RBC QN AUTO: 31.5 PG (ref 26.8–34.3)
MCHC RBC AUTO-ENTMCNC: 32.7 G/DL (ref 31.4–37.4)
MCV RBC AUTO: 96 FL (ref 82–98)
MONOCYTES # BLD AUTO: 0.43 THOUSAND/ÂΜL (ref 0.17–1.22)
MONOCYTES NFR BLD AUTO: 6 % (ref 4–12)
NEUTROPHILS # BLD AUTO: 4.29 THOUSANDS/ÂΜL (ref 1.85–7.62)
NEUTS SEG NFR BLD AUTO: 61 % (ref 43–75)
NRBC BLD AUTO-RTO: 0 /100 WBCS
PLATELET # BLD AUTO: 124 THOUSANDS/UL (ref 149–390)
PMV BLD AUTO: 12.3 FL (ref 8.9–12.7)
POTASSIUM SERPL-SCNC: 3.5 MMOL/L (ref 3.5–5.3)
PROT SERPL-MCNC: 5.6 G/DL (ref 6.4–8.4)
RBC # BLD AUTO: 2.92 MILLION/UL (ref 3.88–5.62)
SODIUM SERPL-SCNC: 141 MMOL/L (ref 135–147)
WBC # BLD AUTO: 7.03 THOUSAND/UL (ref 4.31–10.16)

## 2025-07-18 PROCEDURE — 85025 COMPLETE CBC W/AUTO DIFF WBC: CPT | Performed by: INTERNAL MEDICINE

## 2025-07-18 PROCEDURE — 96401 CHEMO ANTI-NEOPL SQ/IM: CPT

## 2025-07-18 PROCEDURE — 82784 ASSAY IGA/IGD/IGG/IGM EACH: CPT | Performed by: INTERNAL MEDICINE

## 2025-07-18 PROCEDURE — 96360 HYDRATION IV INFUSION INIT: CPT

## 2025-07-18 PROCEDURE — 83521 IG LIGHT CHAINS FREE EACH: CPT | Performed by: INTERNAL MEDICINE

## 2025-07-18 PROCEDURE — 84165 PROTEIN E-PHORESIS SERUM: CPT | Performed by: INTERNAL MEDICINE

## 2025-07-18 PROCEDURE — 80053 COMPREHEN METABOLIC PANEL: CPT | Performed by: INTERNAL MEDICINE

## 2025-07-18 RX ORDER — ACETAMINOPHEN 325 MG/1
650 TABLET ORAL ONCE
Status: COMPLETED | OUTPATIENT
Start: 2025-07-18 | End: 2025-07-18

## 2025-07-18 RX ORDER — DEXAMETHASONE 4 MG/1
20 TABLET ORAL ONCE
Status: COMPLETED | OUTPATIENT
Start: 2025-07-18 | End: 2025-07-18

## 2025-07-18 RX ORDER — DIPHENHYDRAMINE HCL 25 MG
25 TABLET ORAL ONCE
Status: COMPLETED | OUTPATIENT
Start: 2025-07-18 | End: 2025-07-18

## 2025-07-18 RX ADMIN — DEXAMETHASONE 20 MG: 4 TABLET ORAL at 08:23

## 2025-07-18 RX ADMIN — ACETAMINOPHEN 650 MG: 325 TABLET ORAL at 08:23

## 2025-07-18 RX ADMIN — DIPHENHYDRAMINE HYDROCHLORIDE 25 MG: 25 TABLET ORAL at 08:24

## 2025-07-18 RX ADMIN — SODIUM CHLORIDE 1000 ML: 0.9 INJECTION, SOLUTION INTRAVENOUS at 08:25

## 2025-07-18 RX ADMIN — DARATUMUMAB AND HYALURONIDASE-FIHJ (HUMAN RECOMBINANT) 1800 MG: 1800; 30000 INJECTION SUBCUTANEOUS at 09:25

## 2025-07-18 NOTE — PROGRESS NOTES
Patient to Infusion Center for Darzalex Faspro and hydration. No complaints offered at this time. Labs not drawn prior to treatment. Per Paz CADET RN, OK to draw labs and wait for CBC with diff to result. Labs resulted within parameters- OK to proceed with tx. PIV placed in RAC with positive blood return for hydration.

## 2025-07-19 LAB
KAPPA LC FREE SER-MCNC: 14.6 MG/L (ref 3.3–19.4)
KAPPA LC FREE/LAMBDA FREE SER: 0.22 {RATIO} (ref 0.26–1.65)
LAMBDA LC FREE SERPL-MCNC: 67.7 MG/L (ref 5.7–26.3)

## 2025-07-21 LAB
ALBUMIN SERPL ELPH-MCNC: 3.02 G/DL (ref 3.2–5.1)
ALBUMIN SERPL ELPH-MCNC: 58.1 % (ref 48–70)
ALPHA1 GLOB SERPL ELPH-MCNC: 0.28 G/DL (ref 0.15–0.47)
ALPHA1 GLOB SERPL ELPH-MCNC: 5.3 % (ref 1.8–7)
ALPHA2 GLOB SERPL ELPH-MCNC: 0.58 G/DL (ref 0.42–1.04)
ALPHA2 GLOB SERPL ELPH-MCNC: 11.2 % (ref 5.9–14.9)
BETA GLOB ABNORMAL SERPL ELPH-MCNC: 0.3 G/DL (ref 0.31–0.57)
BETA1 GLOB SERPL ELPH-MCNC: 5.8 % (ref 4.7–7.7)
BETA2 GLOB SERPL ELPH-MCNC: 12.2 % (ref 3.1–7.9)
BETA2+GAMMA GLOB SERPL ELPH-MCNC: 0.63 G/DL (ref 0.2–0.58)
GAMMA GLOB ABNORMAL SERPL ELPH-MCNC: 0.38 G/DL (ref 0.4–1.66)
GAMMA GLOB SERPL ELPH-MCNC: 7.4 % (ref 6.9–22.3)
IGG/ALB SER: 1.39 {RATIO} (ref 1.1–1.8)
M PROTEIN 1 SERPL ELPH-MCNC: 0.51 G/DL
PROT SERPL-MCNC: 5.2 G/DL (ref 6.4–8.4)

## 2025-07-21 PROCEDURE — 84165 PROTEIN E-PHORESIS SERUM: CPT | Performed by: PATHOLOGY

## 2025-07-28 DIAGNOSIS — C90.00 MULTIPLE MYELOMA NOT HAVING ACHIEVED REMISSION (HCC): ICD-10-CM

## 2025-07-28 RX ORDER — LENALIDOMIDE 15 MG/1
15 CAPSULE ORAL DAILY
Qty: 14 CAPSULE | Refills: 0 | Status: SHIPPED | OUTPATIENT
Start: 2025-07-28

## 2025-08-05 ENCOUNTER — APPOINTMENT (OUTPATIENT)
Dept: LAB | Facility: CLINIC | Age: 70
End: 2025-08-05
Payer: COMMERCIAL

## 2025-08-05 DIAGNOSIS — C90.00 MULTIPLE MYELOMA NOT HAVING ACHIEVED REMISSION (HCC): ICD-10-CM

## 2025-08-05 LAB
ALBUMIN SERPL BCG-MCNC: 3.6 G/DL (ref 3.5–5)
ALP SERPL-CCNC: 62 U/L (ref 34–104)
ALT SERPL W P-5'-P-CCNC: 14 U/L (ref 7–52)
ANION GAP SERPL CALCULATED.3IONS-SCNC: 6 MMOL/L (ref 4–13)
AST SERPL W P-5'-P-CCNC: 14 U/L (ref 13–39)
BASOPHILS # BLD AUTO: 0.08 THOUSANDS/ÂΜL (ref 0–0.1)
BASOPHILS NFR BLD AUTO: 2 % (ref 0–1)
BILIRUB SERPL-MCNC: 0.27 MG/DL (ref 0.2–1)
BUN SERPL-MCNC: 32 MG/DL (ref 5–25)
CALCIUM SERPL-MCNC: 9 MG/DL (ref 8.4–10.2)
CHLORIDE SERPL-SCNC: 111 MMOL/L (ref 96–108)
CO2 SERPL-SCNC: 25 MMOL/L (ref 21–32)
CREAT SERPL-MCNC: 1.69 MG/DL (ref 0.6–1.3)
EOSINOPHIL # BLD AUTO: 0.24 THOUSAND/ÂΜL (ref 0–0.61)
EOSINOPHIL NFR BLD AUTO: 5 % (ref 0–6)
ERYTHROCYTE [DISTWIDTH] IN BLOOD BY AUTOMATED COUNT: 14.1 % (ref 11.6–15.1)
GFR SERPL CREATININE-BSD FRML MDRD: 40 ML/MIN/1.73SQ M
GLUCOSE SERPL-MCNC: 97 MG/DL (ref 65–140)
HCT VFR BLD AUTO: 31.2 % (ref 36.5–49.3)
HGB BLD-MCNC: 10.3 G/DL (ref 12–17)
IGA SERPL-MCNC: 25 MG/DL (ref 66–433)
IGG SERPL-MCNC: 989 MG/DL (ref 635–1741)
IGM SERPL-MCNC: 25 MG/DL (ref 45–281)
IMM GRANULOCYTES # BLD AUTO: 0.01 THOUSAND/UL (ref 0–0.2)
IMM GRANULOCYTES NFR BLD AUTO: 0 % (ref 0–2)
LYMPHOCYTES # BLD AUTO: 1.44 THOUSANDS/ÂΜL (ref 0.6–4.47)
LYMPHOCYTES NFR BLD AUTO: 30 % (ref 14–44)
MCH RBC QN AUTO: 31.3 PG (ref 26.8–34.3)
MCHC RBC AUTO-ENTMCNC: 33 G/DL (ref 31.4–37.4)
MCV RBC AUTO: 95 FL (ref 82–98)
MONOCYTES # BLD AUTO: 0.82 THOUSAND/ÂΜL (ref 0.17–1.22)
MONOCYTES NFR BLD AUTO: 17 % (ref 4–12)
NEUTROPHILS # BLD AUTO: 2.19 THOUSANDS/ÂΜL (ref 1.85–7.62)
NEUTS SEG NFR BLD AUTO: 46 % (ref 43–75)
NRBC BLD AUTO-RTO: 0 /100 WBCS
PLATELET # BLD AUTO: 207 THOUSANDS/UL (ref 149–390)
PMV BLD AUTO: 11.9 FL (ref 8.9–12.7)
POTASSIUM SERPL-SCNC: 3.6 MMOL/L (ref 3.5–5.3)
PROT SERPL-MCNC: 6.1 G/DL (ref 6.4–8.4)
RBC # BLD AUTO: 3.29 MILLION/UL (ref 3.88–5.62)
SODIUM SERPL-SCNC: 142 MMOL/L (ref 135–147)
WBC # BLD AUTO: 4.78 THOUSAND/UL (ref 4.31–10.16)

## 2025-08-05 PROCEDURE — 84165 PROTEIN E-PHORESIS SERUM: CPT

## 2025-08-05 PROCEDURE — 82784 ASSAY IGA/IGD/IGG/IGM EACH: CPT

## 2025-08-05 PROCEDURE — 80053 COMPREHEN METABOLIC PANEL: CPT

## 2025-08-05 PROCEDURE — 85025 COMPLETE CBC W/AUTO DIFF WBC: CPT

## 2025-08-05 PROCEDURE — 83521 IG LIGHT CHAINS FREE EACH: CPT

## 2025-08-05 PROCEDURE — 36415 COLL VENOUS BLD VENIPUNCTURE: CPT

## 2025-08-06 DIAGNOSIS — C90.00 MULTIPLE MYELOMA NOT HAVING ACHIEVED REMISSION (HCC): Primary | ICD-10-CM

## 2025-08-06 DIAGNOSIS — N17.9 ACUTE KIDNEY INJURY SUPERIMPOSED ON CKD  (HCC): ICD-10-CM

## 2025-08-06 DIAGNOSIS — N18.9 ACUTE KIDNEY INJURY SUPERIMPOSED ON CKD  (HCC): ICD-10-CM

## 2025-08-06 RX ORDER — LENALIDOMIDE 15 MG/1
CAPSULE ORAL
Refills: 0 | OUTPATIENT
Start: 2025-08-06

## 2025-08-07 LAB
KAPPA LC FREE SER-MCNC: 19.3 MG/L (ref 3.3–19.4)
KAPPA LC FREE/LAMBDA FREE SER: 0.29 {RATIO} (ref 0.26–1.65)
LAMBDA LC FREE SERPL-MCNC: 66 MG/L (ref 5.7–26.3)

## 2025-08-08 ENCOUNTER — TELEPHONE (OUTPATIENT)
Age: 70
End: 2025-08-08

## 2025-08-08 ENCOUNTER — HOSPITAL ENCOUNTER (OUTPATIENT)
Dept: INFUSION CENTER | Facility: CLINIC | Age: 70
End: 2025-08-08
Attending: INTERNAL MEDICINE
Payer: COMMERCIAL

## 2025-08-08 ENCOUNTER — OFFICE VISIT (OUTPATIENT)
Dept: HEMATOLOGY ONCOLOGY | Facility: CLINIC | Age: 70
End: 2025-08-08
Payer: COMMERCIAL

## 2025-08-08 VITALS
BODY MASS INDEX: 32.44 KG/M2 | HEIGHT: 64 IN | RESPIRATION RATE: 16 BRPM | DIASTOLIC BLOOD PRESSURE: 78 MMHG | WEIGHT: 190 LBS | TEMPERATURE: 97.6 F | HEART RATE: 78 BPM | OXYGEN SATURATION: 98 % | SYSTOLIC BLOOD PRESSURE: 142 MMHG

## 2025-08-08 DIAGNOSIS — C90.00 MULTIPLE MYELOMA NOT HAVING ACHIEVED REMISSION (HCC): ICD-10-CM

## 2025-08-08 PROCEDURE — 99214 OFFICE O/P EST MOD 30 MIN: CPT | Performed by: INTERNAL MEDICINE

## 2025-08-08 RX ORDER — DEXAMETHASONE 4 MG/1
20 TABLET ORAL ONCE
Status: CANCELLED | OUTPATIENT
Start: 2025-08-08

## 2025-08-08 RX ORDER — ACETAMINOPHEN 325 MG/1
650 TABLET ORAL ONCE
Status: CANCELLED | OUTPATIENT
Start: 2025-08-08

## 2025-08-08 RX ORDER — ACETAMINOPHEN 325 MG/1
650 TABLET ORAL ONCE
OUTPATIENT
Start: 2025-08-29

## 2025-08-08 RX ORDER — DIPHENHYDRAMINE HCL 25 MG
25 TABLET ORAL ONCE
OUTPATIENT
Start: 2025-08-29

## 2025-08-08 RX ORDER — ACYCLOVIR 400 MG/1
400 TABLET ORAL 2 TIMES DAILY
Qty: 60 TABLET | Refills: 3 | Status: SHIPPED | OUTPATIENT
Start: 2025-08-08 | End: 2026-01-05

## 2025-08-08 RX ORDER — DEXAMETHASONE 4 MG/1
20 TABLET ORAL ONCE
OUTPATIENT
Start: 2025-08-29

## 2025-08-08 RX ORDER — DIPHENHYDRAMINE HCL 25 MG
25 TABLET ORAL ONCE
Status: CANCELLED | OUTPATIENT
Start: 2025-08-08

## 2025-08-09 LAB
ALBUMIN SERPL ELPH-MCNC: 3.18 G/DL (ref 3.2–5.1)
ALBUMIN SERPL ELPH-MCNC: 56.8 % (ref 48–70)
ALPHA1 GLOB SERPL ELPH-MCNC: 0.3 G/DL (ref 0.15–0.47)
ALPHA1 GLOB SERPL ELPH-MCNC: 5.3 % (ref 1.8–7)
ALPHA2 GLOB SERPL ELPH-MCNC: 0.64 G/DL (ref 0.42–1.04)
ALPHA2 GLOB SERPL ELPH-MCNC: 11.5 % (ref 5.9–14.9)
BETA GLOB ABNORMAL SERPL ELPH-MCNC: 0.33 G/DL (ref 0.31–0.57)
BETA1 GLOB SERPL ELPH-MCNC: 5.9 % (ref 4.7–7.7)
BETA2 GLOB SERPL ELPH-MCNC: 12.5 % (ref 3.1–7.9)
BETA2+GAMMA GLOB SERPL ELPH-MCNC: 0.7 G/DL (ref 0.2–0.58)
GAMMA GLOB ABNORMAL SERPL ELPH-MCNC: 0.45 G/DL (ref 0.4–1.66)
GAMMA GLOB SERPL ELPH-MCNC: 8 % (ref 6.9–22.3)
IGG/ALB SER: 1.31 {RATIO} (ref 1.1–1.8)
M PROTEIN 1 SERPL ELPH-MCNC: 0.49 G/DL
PROT SERPL-MCNC: 5.6 G/DL (ref 6.4–8.4)

## 2025-08-09 PROCEDURE — 84165 PROTEIN E-PHORESIS SERUM: CPT | Performed by: STUDENT IN AN ORGANIZED HEALTH CARE EDUCATION/TRAINING PROGRAM

## 2025-08-13 ENCOUNTER — OFFICE VISIT (OUTPATIENT)
Dept: NEPHROLOGY | Facility: CLINIC | Age: 70
End: 2025-08-13
Payer: COMMERCIAL

## 2025-08-13 ENCOUNTER — TELEPHONE (OUTPATIENT)
Dept: NEPHROLOGY | Facility: CLINIC | Age: 70
End: 2025-08-13

## 2025-08-22 DIAGNOSIS — C90.00 MULTIPLE MYELOMA NOT HAVING ACHIEVED REMISSION (HCC): ICD-10-CM

## 2025-08-22 RX ORDER — LENALIDOMIDE 15 MG/1
CAPSULE ORAL
Qty: 14 CAPSULE | Refills: 0 | Status: SHIPPED | OUTPATIENT
Start: 2025-08-22